# Patient Record
Sex: MALE | Race: WHITE | NOT HISPANIC OR LATINO | Employment: OTHER | URBAN - METROPOLITAN AREA
[De-identification: names, ages, dates, MRNs, and addresses within clinical notes are randomized per-mention and may not be internally consistent; named-entity substitution may affect disease eponyms.]

---

## 2018-05-25 ENCOUNTER — TRANSFERRED RECORDS (OUTPATIENT)
Dept: HEALTH INFORMATION MANAGEMENT | Facility: CLINIC | Age: 68
End: 2018-05-25

## 2018-10-15 ENCOUNTER — OFFICE VISIT (OUTPATIENT)
Dept: FAMILY MEDICINE | Facility: CLINIC | Age: 68
End: 2018-10-15
Payer: COMMERCIAL

## 2018-10-15 VITALS
OXYGEN SATURATION: 100 % | HEART RATE: 71 BPM | HEIGHT: 74 IN | BODY MASS INDEX: 27.73 KG/M2 | DIASTOLIC BLOOD PRESSURE: 79 MMHG | SYSTOLIC BLOOD PRESSURE: 123 MMHG

## 2018-10-15 DIAGNOSIS — L81.4 SOLAR LENTIGO: ICD-10-CM

## 2018-10-15 DIAGNOSIS — Z80.8 FAMILY HISTORY OF NONMELANOMA SKIN CANCER: ICD-10-CM

## 2018-10-15 DIAGNOSIS — B35.3 TINEA PEDIS OF BOTH FEET: ICD-10-CM

## 2018-10-15 DIAGNOSIS — D48.5 NEOPLASM OF UNCERTAIN BEHAVIOR OF SKIN: Primary | ICD-10-CM

## 2018-10-15 DIAGNOSIS — D22.9 MULTIPLE BENIGN MELANOCYTIC NEVI: ICD-10-CM

## 2018-10-15 DIAGNOSIS — Z23 NEED FOR PROPHYLACTIC VACCINATION AND INOCULATION AGAINST INFLUENZA: ICD-10-CM

## 2018-10-15 DIAGNOSIS — B35.3 ATHLETE'S FOOT: Primary | ICD-10-CM

## 2018-10-15 DIAGNOSIS — L57.0 AK (ACTINIC KERATOSIS): ICD-10-CM

## 2018-10-15 DIAGNOSIS — B07.0 PLANTAR WARTS: ICD-10-CM

## 2018-10-15 DIAGNOSIS — Z85.828 HISTORY OF BASAL CELL CARCINOMA: ICD-10-CM

## 2018-10-15 PROCEDURE — 99203 OFFICE O/P NEW LOW 30 MIN: CPT | Mod: 25 | Performed by: FAMILY MEDICINE

## 2018-10-15 PROCEDURE — 11311 SHAVE SKIN LESION 0.6-1.0 CM: CPT | Mod: 59 | Performed by: FAMILY MEDICINE

## 2018-10-15 PROCEDURE — 88305 TISSUE EXAM BY PATHOLOGIST: CPT | Mod: TC | Performed by: FAMILY MEDICINE

## 2018-10-15 PROCEDURE — 17003 DESTRUCT PREMALG LES 2-14: CPT | Performed by: FAMILY MEDICINE

## 2018-10-15 PROCEDURE — 90662 IIV NO PRSV INCREASED AG IM: CPT | Performed by: FAMILY MEDICINE

## 2018-10-15 PROCEDURE — 17000 DESTRUCT PREMALG LESION: CPT | Mod: 51 | Performed by: FAMILY MEDICINE

## 2018-10-15 PROCEDURE — G0008 ADMIN INFLUENZA VIRUS VAC: HCPCS | Performed by: FAMILY MEDICINE

## 2018-10-15 RX ORDER — CLOTRIMAZOLE AND BETAMETHASONE DIPROPIONATE 10; .64 MG/G; MG/G
CREAM TOPICAL 2 TIMES DAILY
Qty: 45 G | Refills: 0 | Status: SHIPPED | OUTPATIENT
Start: 2018-10-15 | End: 2018-12-14

## 2018-10-15 RX ORDER — TRIAMTERENE/HYDROCHLOROTHIAZID 37.5-25 MG
1 TABLET ORAL DAILY
COMMUNITY
End: 2019-01-30

## 2018-10-15 NOTE — PROGRESS NOTES

## 2018-10-15 NOTE — PROGRESS NOTES
[facility] - PRIMARY CARE SKIN    CC : skin cancer screening (full-body)  SUBJECTIVE:                                                    Pranay Ratliff is a 68 year old male who presents to clinic today for a full-body skin exam because of changing lesions and his history of skin cancer.    Bothersome lesions noticed by the patient or other skin concerns :  Issue One : Lesion on central forehead comes and goes. He has noted some purulent and bloody drainage.  Onset : several months.  Enlarging : NO.  Bleeding : YES  Itchy or irritating : NO.  Pain or tenderness : NO.  Changing color : NO.  Issue Two : A lesion on the sole of the right foot may be a callus. It is sometimes painful to walk. He has had multiple cryotherapy treatments. He has used Dr. Arrieta's and an OTC wart treatment solution bought from [employer].  Onset : began in 2016.  Enlarging : NO.  Bleeding : NO  Itchy or irritating : NO.  Pain or tenderness : YES.  Changing color : NO.    Issue Three: history of athletes foot and would like a refill on his antifungal prescription  Current medications include : Dyazide    Personal history of skin cancer : YES - history of basal cell carcinoma on the corner of the right lower eyelid (s/p MMS approximately [id number]).  Family history of skin cancer : YES - keratinocyte carcinoma(s) in mother.    Personal Medical History  Eczema Psoriasis Autoimmune   NO NO NO     Family Medical History  Eczema Psoriasis Autoimmune   NO YES - in brother and in paternal uncle Rheumatoid arthritis in mother, maternal aunt, and maternal cousin     Sun Exposure History  Previous history of significant sun exposure: He lives in [place], [place] and [place] and has had annual skin exams.  Blistering sunburns : NO  Tanning beds : NO.  Sunscreen Use : YES, SPF : uses Blue Lizard 50+.  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    Refer to electronic medical record (EMR) for past medical history and medications.    INTEGUMENTARY/SKIN: POSITIVE for changing lesions  ROS : 14 point review of systems was negative except the symptoms listed above in the HPI.    This document serves as a record of the services and decisions personally performed and made by Shandra Paige MD. It was created on her behalf by Anupam Payne, a trained medical scribe.  The creation of this document is based on the scribe's personal observations and the provider's statements to the medical scribe.  Anupam Payne, October 15, 2018 8:25 AM      OBJECTIVE:                                                    GENERAL: healthy, alert and no distress  SKIN: [place] Skin Type - II.  This patient was examined from the top of the head to the bottom of the feet  including scalp, face, neck, back, chest, breasts, buttocks, both arms, both legs, both hands, both feet, all 10 fingers and all 10 toes. The dermatoscope was used to help evaluate pigmented lesions.  Skin Pertinent Findings:  Right arm(s) and left arm(s) : brown, macule(s) most consistent with benign solar lentigo. Scattered infrequent brown macules of various sizes and shapes most consistent with (benign) melanocytic nevi.    Back : Multiple brown, macule(s) most consistent with benign solar lentigo. stuck-on appearing papules, raised, brown, coarse-textured, round lesion(s) most consistent with seborrheic keratoses.    Significant Findings:  Right foot, plantar surface : 3 mm in size verrucous lesion(s) within a calus    Mid-forehead : 7 x 3 mm in size slightly raised, indurated, erythematous, scaly lesion. ? Actinic keratosis ? Basal cell carcinoma ? Squamous cell carcinoma ? Other      Face: 3 mm in size, erythematous, scaly, non-indurated lesion(s) most consistent with actinic keratoses.  Name : Liquid Nitrogen Cry-Ac  Cryotherapy.  Indication : Pre-malignant lesion.  Location(s) : right upper lateral forehead - x3.  Completed by : Shandra Paige MD  Note : Discussed natural history of lesion and treatment options. Prior to treatment, we discussed inflammation, tenderness post-procedure, the healing process, and the risks of pain, infection, scarring, blistering, and hypo-/hyperpigmentation after healing. Explained that these lesions may grow back and may need additional treatment or re-treatment. The patient expressed a desire to proceed with cryotherapy.    The lesion(s) was treated with liquid nitrogen Cry-Ac, five second freeze repeated twice with a pause to allow for the area to thaw. If this lesion should recur, then it needs to be re-evaluated.    Patient tolerated the procedure well and left in good condition.  Total number of lesions treated : 3.    Diagnostic Test Results:  none           ASSESSMENT:                                                      Encounter Diagnoses   Name Primary?     Neoplasm of uncertain behavior of skin Yes     AK (actinic keratosis)      History of basal cell carcinoma      Family history of nonmelanoma skin cancer      Solar lentigo      Multiple benign melanocytic nevi      Plantar warts          PLAN:                                                    Patient Instructions   FUTURE APPOINTMENTS  Follow up in 1 year(s) for a full-body skin cancer screening.  Follow up per pathology report.    WOUND CARE INSTRUCTIONS  1. Wash hands before every dressing change.  2. After 24 hours, change dressing daily.  3. Wash the wound area with a mild soap, then rinse.  4. Gently pat dry with a sterile gauze or Q-tip.  5. Using a Q-tip, apply Vaseline or Aquaphor only over entire wound. Do NOT use Neosporin - as many people react to neomycin.  6. Finally, cover with a bandage or sterile non-stick gauze with micropore paper tape.  7. Repeat once daily until wound has healed.      Soap, water and shampoo will  "not hurt this area.    Do not go swimming or take baths, but showering is encouraged.    Limit use of the area where the procedure was done for a few days to allow for optimal healing.    If you experience bleeding:  Wash hands and hold firm pressure on the area for 10 minutes without checking to see if the bleeding has stopped. \"Checking\" pulls off the protective wound clot and restarts the bleeding all over again. Re-apply pressure for 10 minutes if necessary to stop bleeding.  Use additional sterile gauze and tape to maintain pressure once bleeding has stopped.  If bleeding continues, then call back to clinic at [phone].    Signs of Infection:  Infection can occur in any area where skin has been disrupted.  If you notice persistent redness, swelling, colored drainage, increasing pain, fever or other signs of infection, please call us at: [phone] and ask to have me or my colleague paged. We will call you back to discuss.    Pathology Results:  You will be notified, generally via letter or MyChart, in approximately 10 days. If there is anything we need to discuss or further treatment needed, I will call you to discuss it.    PATIENT INFORMATION : WOUNDS  During the healing process you will notice a number of changes. All wounds develop a small halo of redness surrounding the wound.  This means healing is occurring. Severe itching with extensive redness usually indicates sensitivity to the ointment or bandage tape used to dress the wound.  You should call our office if this develops.      Swelling  and/or discoloration around your surgical site is common, particularly when performed around the eye.    All wounds normally drain.  The larger the wound the more drainage there will be.  After 7-10 days, you will notice the wound beginning to shrink and new skin will begin to grow.  The wound is healed when you can see skin has formed over the entire area.  A healed wound has a healthy, shiny look to the " "surface and is red to dark pink in color to normalize.  Wounds may take approximately 4-6 weeks to heal.  Larger wounds may take 6-8 weeks. After the wound is healed you may discontinue dressing changes.    You may experience a sensation of tightness as your wound heals. This is normal and will gradually subside.    Your healed wound may be sensitive to temperature changes. This sensitivity improves with time, but if you re having a lot of discomfort, try to avoid temperature extremes.    Patients frequently experience itching after their wound appears to have healed because of the continue healing under the skin.  Plain Vaseline will help relieve the itching.    SUN PROTECTION INSTRUCTIONS  Sun damage can lead to skin cancer and premature aging of the skin.      The best way to protect from sun damage to your skin is to avoid the sun during peak hours (10 am - 2 pm) even on overcast days.      Use UPF sun-protective clothing, which while more expensive initially provides longer lasting coverage without having to worry about remembering to re-apply.  1. Wear a wide-brimmed hat and sunglasses.   2. Wear sun-protective clothing.  [employer] and other [employer] make sun protective clothing that are stylish, comfortable and cool. [employer] and other [employer] make UV arm sleeves suitable for golfing, gardening and other activities.      Sunscreen instructions:  1. Use sunscreens with Zinc Oxide, Titanium Dioxide or Avobenzone to protect from UVA rays.  2. Use SPF 30-50+ to protect from UVB rays.  3. Re-apply every 2 hours even if water resistant.  4. Apply on your face every day even when cloudy and even in the winter. UVA \"aging rays\" penetrate window glass and are just as strong in the winter as in the summer.    Product Recommendations:    Good examples include: Blue Lizard, EltaMD, Solbar    Good daily moisturizers with SPF: Vanicream, CeraVe.    For sensitive skin, consider : SkinMedica Essential " "Defense Mineral Shield Broad Spectrum SPF 35    Men: consider use of Neutrogena Triple Protect Facial Lotion      Never use tanning beds. Tanning beds are associated with much higher risks of skin cancer.    All tanning damages the skin. Aim for ivory skin year round and you will have less trouble with your skin in years to come. There is no merit in getting \"a base tan\" before a warm weather vacation, as any tanning indicates your body's response to sun damage.    Stop smoking. Smokers have higher rates of skin cancer and also have premature skin wrinkling.    FYI  You should use about 3 tablespoons of sunscreen to protect your whole body. Thus a typical eight ounce bottle of sunscreen should last 4 applications. Remember, that the SPF rating is compromised if you don t apply enough. Most people only apply 1/2 - 1/3 of the amount they need. Also don t forget areas such as your ears, feet, upper back and harder to reach places. Keep in mind that these amounts should be increased for larger body sizes.    Sunscreens with titanium dioxide and/or zinc oxide in the active ingredients are physical blockers as opposed to chemical blockers. Chemical-free sunscreens should not irritate the skin.    Spray-on sunscreens may be used for touch-up application only, not as a base layer. Also, use with caution around small children due to inhalation risk.    Avoid retinyl palmitate products.    Avoid combination products that include both sunscreen and insect repellant, as sunscreen should be applied every 2 hours, but insect repellant should not be applied as frequently.    SPF means sun protection factor, which is just the degree to which the sunscreen can protect against UVB rays. There is no rating system for UVA rays. SPF is calculated as the time skin will burn when sunscreen is applied vs. skin without sunscreen.    Water resistant sunscreens should be re-applied every 1-2 hours.    For more " information:  http://www.skincancer.org/prevention/sun-protection/sunscreen/sunscreens-safe-and-effective    SKIN CANCER SELF-EXAM INSTRUCTIONS  Check every month in the mirror or with a household member. Be aware of any changes, especially bleeding or tenderness. Also, make sure to check your nails for color changes after removal of nail polish.    For melanoma, check for:  A - Asymmetry. One half unlike the other half.  B - Border. Irregular, scalloped, ragged, notched, blurred or poorly defined borders.  C - Color. Color variations from one area to another, with shades of tan, brown and/or black present. Sometimes white, red or blue.  D - Diameter. Greater than 6 mm (about the size of a pencil eraser). Any new growth of a mole should be concerning and be evaluated.  E - Evolving. A mole or skin lesion that looks different from the rest or is changing in size, shape or color.    For basal cell carcinoma and squamous cell carcinoma, check for:    Sores, shiny bumps, nodules, scaly lesions, or wart-like growths that are itchy, tender, crusting, scabbing, eroding, oozing or bleeding.    Open sores/wounds or reddish/irritated areas that do not heal within 2-3 weeks.    Scar-like areas that are white, yellow or waxy in color.    FUTURE APPOINTMENTS  Follow up in 6 week(s) as needed.    How to use WartPeel:  1. Apply medication at bedtime.  2. Apply a very small amount of medication to the enclosed pick. Use the pick to apply a thin layer directly onto the wart. Use care to avoid applying the medicine to healthy skin. The medicine will break down healthy skin as well as the wart.  3. Occlude the wart with a piece of the enclosed tape.  4. Put the cap back tightly on the syringe.  5. Wash hands after applying the medicine. NEVER put the WartPEEL  in the mouth, nose, or eyes.  6. Remove the occlusion in the morning and wash the area thoroughly.  7. In case of accidental ingestion or contact with eye, nose, or mouth,  contact your physician or the local poison control center.        To order, please contact:  [facility]  [address] [place] [address]  [address]  [place] [address]  ph: [phone] toll-free  fax: [phone]  [email or web address]    The patient was counseled about sunscreens and sun avoidance. The patient was counseled to check the skin regularly and report any lesion that is new, changing, itching, scabbing, bleeding or otherwise bothersome. The patient was discharged ambulatory and in stable condition.    Educational brochures given to patient : skin cancer.      PROCEDURES:                                                    Name : Shave Excision  Indication : Excision of tissue for pathology evaluation.  Location(s) : Mid-forehead : 7 x 3 mm in size slightly raised, indurated, erythematous, scaly lesion. ? Actinic keratosis ? Basal cell carcinoma ? Squamous cell carcinoma ? Other.  Completed by : Shandra Paige MD  Photo Taken : yes.  Anesthesia : Patient was anesthetized by infiltrating the area surrounding the lesion with 1% lidocaine.   epinephrine [id number] : Yes.  Note : Discussed the risk of pain, infection, scarring, hypo- or hyperpigmentation and recurrence or need for re-treatment. The benefits of treatment and alternative treatments were also discussed.    During this procedure, the universal protocol was utilized. The patient's identity was confirmed by no less than two patient identifiers, correct procedure was verified, correct site was verified and marked as applicable and a final pause was completed.    Sterile technique was used throughout the procedure. The skin was cleaned and prepped with surgical cleanser. Once adequate anesthesia was obtained, the lesion was removed with a deep scallop shave procedure. The specimen was sent to pathology.    Direct pressure and aluminum chloride and monopolar cautery was applied for hemostasis. No bleeding was present upon the completion of the procedure.  The wound was coated with antibacterial ointment. A dry sterile dressing was applied. Patient tolerated the procedure well and left in satisfactory condition.    Primary provider and referring provider will be informed regarding the tissue report when it returns.      The information in this document, created by the medical scribe for me, accurately reflects the services I personally performed and the decisions made by me. I have reviewed and approved this document for accuracy prior to leaving the patient care area.  Shandra Paige MD October 15, 2018 8:25 AM  [facility]

## 2018-10-15 NOTE — TELEPHONE ENCOUNTER
Med pended- routing to provider to order.    Deja Suazo,RN BSN  [facility]  [phone]

## 2018-10-15 NOTE — PATIENT INSTRUCTIONS
"FUTURE APPOINTMENTS  Follow up in 1 year(s) for a full-body skin cancer screening.  Follow up per pathology report.    WOUND CARE INSTRUCTIONS  1. Wash hands before every dressing change.  2. After 24 hours, change dressing daily.  3. Wash the wound area with a mild soap, then rinse.  4. Gently pat dry with a sterile gauze or Q-tip.  5. Using a Q-tip, apply Vaseline or Aquaphor only over entire wound. Do NOT use Neosporin - as many people react to neomycin.  6. Finally, cover with a bandage or sterile non-stick gauze with micropore paper tape.  7. Repeat once daily until wound has healed.      Soap, water and shampoo will not hurt this area.    Do not go swimming or take baths, but showering is encouraged.    Limit use of the area where the procedure was done for a few days to allow for optimal healing.    If you experience bleeding:  Wash hands and hold firm pressure on the area for 10 minutes without checking to see if the bleeding has stopped. \"Checking\" pulls off the protective wound clot and restarts the bleeding all over again. Re-apply pressure for 10 minutes if necessary to stop bleeding.  Use additional sterile gauze and tape to maintain pressure once bleeding has stopped.  If bleeding continues, then call back to clinic at [phone].    Signs of Infection:  Infection can occur in any area where skin has been disrupted.  If you notice persistent redness, swelling, colored drainage, increasing pain, fever or other signs of infection, please call us at: [phone] and ask to have me or my colleague paged. We will call you back to discuss.    Pathology Results:  You will be notified, generally via letter or MyChart, in approximately 10 days. If there is anything we need to discuss or further treatment needed, I will call you to discuss it.    PATIENT INFORMATION : WOUNDS  During the healing process you will notice a number of changes. All wounds develop a small halo of redness surrounding the wound.  " This means healing is occurring. Severe itching with extensive redness usually indicates sensitivity to the ointment or bandage tape used to dress the wound.  You should call our office if this develops.      Swelling  and/or discoloration around your surgical site is common, particularly when performed around the eye.    All wounds normally drain.  The larger the wound the more drainage there will be.  After 7-10 days, you will notice the wound beginning to shrink and new skin will begin to grow.  The wound is healed when you can see skin has formed over the entire area.  A healed wound has a healthy, shiny look to the surface and is red to dark pink in color to normalize.  Wounds may take approximately 4-6 weeks to heal.  Larger wounds may take 6-8 weeks. After the wound is healed you may discontinue dressing changes.    You may experience a sensation of tightness as your wound heals. This is normal and will gradually subside.    Your healed wound may be sensitive to temperature changes. This sensitivity improves with time, but if you re having a lot of discomfort, try to avoid temperature extremes.    Patients frequently experience itching after their wound appears to have healed because of the continue healing under the skin.  Plain Vaseline will help relieve the itching.    SUN PROTECTION INSTRUCTIONS  Sun damage can lead to skin cancer and premature aging of the skin.      The best way to protect from sun damage to your skin is to avoid the sun during peak hours (10 am - 2 pm) even on overcast days.      Use UPF sun-protective clothing, which while more expensive initially provides longer lasting coverage without having to worry about remembering to re-apply.  1. Wear a wide-brimmed hat and sunglasses.   2. Wear sun-protective clothing.  [employer] and other [employer] make sun protective clothing that are stylish, comfortable and cool. [employer] and other [employer] make UV arm sleeves suitable for  "golfing, gardening and other activities.      Sunscreen instructions:  1. Use sunscreens with Zinc Oxide, Titanium Dioxide or Avobenzone to protect from UVA rays.  2. Use SPF 30-50+ to protect from UVB rays.  3. Re-apply every 2 hours even if water resistant.  4. Apply on your face every day even when cloudy and even in the winter. UVA \"aging rays\" penetrate window glass and are just as strong in the winter as in the summer.    Product Recommendations:    Good examples include: Blue Lizard, EltaMD, Solbar    Good daily moisturizers with SPF: Vanicream, CeraVe.    For sensitive skin, consider : SkinMedica Essential Defense Mineral Shield Broad Spectrum SPF 35    Men: consider use of Neutrogena Triple Protect Facial Lotion      Never use tanning beds. Tanning beds are associated with much higher risks of skin cancer.    All tanning damages the skin. Aim for ivory skin year round and you will have less trouble with your skin in years to come. There is no merit in getting \"a base tan\" before a warm weather vacation, as any tanning indicates your body's response to sun damage.    Stop smoking. Smokers have higher rates of skin cancer and also have premature skin wrinkling.    FYI  You should use about 3 tablespoons of sunscreen to protect your whole body. Thus a typical eight ounce bottle of sunscreen should last 4 applications. Remember, that the SPF rating is compromised if you don t apply enough. Most people only apply 1/2 - 1/3 of the amount they need. Also don t forget areas such as your ears, feet, upper back and harder to reach places. Keep in mind that these amounts should be increased for larger body sizes.    Sunscreens with titanium dioxide and/or zinc oxide in the active ingredients are physical blockers as opposed to chemical blockers. Chemical-free sunscreens should not irritate the skin.    Spray-on sunscreens may be used for touch-up application only, not as a base layer. Also, use with caution around " small children due to inhalation risk.    Avoid retinyl palmitate products.    Avoid combination products that include both sunscreen and insect repellant, as sunscreen should be applied every 2 hours, but insect repellant should not be applied as frequently.    SPF means sun protection factor, which is just the degree to which the sunscreen can protect against UVB rays. There is no rating system for UVA rays. SPF is calculated as the time skin will burn when sunscreen is applied vs. skin without sunscreen.    Water resistant sunscreens should be re-applied every 1-2 hours.    For more information:  http://www.skincancer.org/prevention/sun-protection/sunscreen/sunscreens-safe-and-effective    SKIN CANCER SELF-EXAM INSTRUCTIONS  Check every month in the mirror or with a household member. Be aware of any changes, especially bleeding or tenderness. Also, make sure to check your nails for color changes after removal of nail polish.    For melanoma, check for:  A - Asymmetry. One half unlike the other half.  B - Border. Irregular, scalloped, ragged, notched, blurred or poorly defined borders.  C - Color. Color variations from one area to another, with shades of tan, brown and/or black present. Sometimes white, red or blue.  D - Diameter. Greater than 6 mm (about the size of a pencil eraser). Any new growth of a mole should be concerning and be evaluated.  E - Evolving. A mole or skin lesion that looks different from the rest or is changing in size, shape or color.    For basal cell carcinoma and squamous cell carcinoma, check for:    Sores, shiny bumps, nodules, scaly lesions, or wart-like growths that are itchy, tender, crusting, scabbing, eroding, oozing or bleeding.    Open sores/wounds or reddish/irritated areas that do not heal within 2-3 weeks.    Scar-like areas that are white, yellow or waxy in color.    FUTURE APPOINTMENTS  Follow up in 6 week(s) as needed.    How to use WartPeel:  1. Apply medication at  bedtime.  2. Apply a very small amount of medication to the enclosed pick. Use the pick to apply a thin layer directly onto the wart. Use care to avoid applying the medicine to healthy skin. The medicine will break down healthy skin as well as the wart.  3. Occlude the wart with a piece of the enclosed tape.  4. Put the cap back tightly on the syringe.  5. Wash hands after applying the medicine. NEVER put the WartPEEL  in the mouth, nose, or eyes.  6. Remove the occlusion in the morning and wash the area thoroughly.  7. In case of accidental ingestion or contact with eye, nose, or mouth, contact your physician or the local poison control center.        To order, please contact:  [facility]  [facility] [address] [place] [address]  ph: [phone] toll-free  fax: [phone]  [email or web address]

## 2018-10-15 NOTE — TELEPHONE ENCOUNTER
Pt called back and the medication for his athletes foot is clotrimazole and betamethasone dipropionate cream 1%/0.05%. Please send new rx to [facility] ([facility]) Any questions pt can be reached at [phone] ok to leave message. Thank you.  Yaneth Vidal,

## 2018-10-15 NOTE — MR AVS SNAPSHOT
"              After Visit Summary   10/15/2018    Pranay Ratliff    MRN: [id number]           Patient Information     Date Of Birth          1950        Visit Information        Provider Department      10/15/2018 9:00 AM Patrizia Paige MD [facility]        Today's Diagnoses     Neoplasm of uncertain behavior of skin    -  1    AK (actinic keratosis)        History of basal cell carcinoma        Family history of nonmelanoma skin cancer        Solar lentigo        Multiple benign melanocytic nevi        Plantar warts          Care Instructions    FUTURE APPOINTMENTS  Follow up in 1 year(s) for a full-body skin cancer screening.  Follow up per pathology report.    WOUND CARE INSTRUCTIONS  1. Wash hands before every dressing change.  2. After 24 hours, change dressing daily.  3. Wash the wound area with a mild soap, then rinse.  4. Gently pat dry with a sterile gauze or Q-tip.  5. Using a Q-tip, apply Vaseline or Aquaphor only over entire wound. Do NOT use Neosporin - as many people react to neomycin.  6. Finally, cover with a bandage or sterile non-stick gauze with micropore paper tape.  7. Repeat once daily until wound has healed.      Soap, water and shampoo will not hurt this area.    Do not go swimming or take baths, but showering is encouraged.    Limit use of the area where the procedure was done for a few days to allow for optimal healing.    If you experience bleeding:  Wash hands and hold firm pressure on the area for 10 minutes without checking to see if the bleeding has stopped. \"Checking\" pulls off the protective wound clot and restarts the bleeding all over again. Re-apply pressure for 10 minutes if necessary to stop bleeding.  Use additional sterile gauze and tape to maintain pressure once bleeding has stopped.  If bleeding continues, then call back to clinic at [phone].    Signs of Infection:  Infection can occur in any area where skin has been disrupted.  If " you notice persistent redness, swelling, colored drainage, increasing pain, fever or other signs of infection, please call us at: [phone] and ask to have me or my colleague paged. We will call you back to discuss.    Pathology Results:  You will be notified, generally via letter or MyChart, in approximately 10 days. If there is anything we need to discuss or further treatment needed, I will call you to discuss it.    PATIENT INFORMATION : WOUNDS  During the healing process you will notice a number of changes. All wounds develop a small halo of redness surrounding the wound.  This means healing is occurring. Severe itching with extensive redness usually indicates sensitivity to the ointment or bandage tape used to dress the wound.  You should call our office if this develops.      Swelling  and/or discoloration around your surgical site is common, particularly when performed around the eye.    All wounds normally drain.  The larger the wound the more drainage there will be.  After 7-10 days, you will notice the wound beginning to shrink and new skin will begin to grow.  The wound is healed when you can see skin has formed over the entire area.  A healed wound has a healthy, shiny look to the surface and is red to dark pink in color to normalize.  Wounds may take approximately 4-6 weeks to heal.  Larger wounds may take 6-8 weeks. After the wound is healed you may discontinue dressing changes.    You may experience a sensation of tightness as your wound heals. This is normal and will gradually subside.    Your healed wound may be sensitive to temperature changes. This sensitivity improves with time, but if you re having a lot of discomfort, try to avoid temperature extremes.    Patients frequently experience itching after their wound appears to have healed because of the continue healing under the skin.  Plain Vaseline will help relieve the itching.    SUN PROTECTION INSTRUCTIONS  Sun damage can lead to skin  "cancer and premature aging of the skin.      The best way to protect from sun damage to your skin is to avoid the sun during peak hours (10 am - 2 pm) even on overcast days.      Use UPF sun-protective clothing, which while more expensive initially provides longer lasting coverage without having to worry about remembering to re-apply.  1. Wear a wide-brimmed hat and sunglasses.   2. Wear sun-protective clothing.  [employer] and other [employer] make sun protective clothing that are stylish, comfortable and cool. [employer] and other [employer] make UV arm sleeves suitable for golfing, gardening and other activities.      Sunscreen instructions:  1. Use sunscreens with Zinc Oxide, Titanium Dioxide or Avobenzone to protect from UVA rays.  2. Use SPF 30-50+ to protect from UVB rays.  3. Re-apply every 2 hours even if water resistant.  4. Apply on your face every day even when cloudy and even in the winter. UVA \"aging rays\" penetrate window glass and are just as strong in the winter as in the summer.    Product Recommendations:    Good examples include: Blue Lizard, EltaMD, Solbar    Good daily moisturizers with SPF: Vanicream, CeraVe.    For sensitive skin, consider : SkinMedica Essential Defense Mineral Shield Broad Spectrum SPF 35    Men: consider use of Neutrogena Triple Protect Facial Lotion      Never use tanning beds. Tanning beds are associated with much higher risks of skin cancer.    All tanning damages the skin. Aim for ivory skin year round and you will have less trouble with your skin in years to come. There is no merit in getting \"a base tan\" before a warm weather vacation, as any tanning indicates your body's response to sun damage.    Stop smoking. Smokers have higher rates of skin cancer and also have premature skin wrinkling.    FYI  You should use about 3 tablespoons of sunscreen to protect your whole body. Thus a typical eight ounce bottle of sunscreen should last 4 applications. Remember, " that the SPF rating is compromised if you don t apply enough. Most people only apply 1/2 - 1/3 of the amount they need. Also don t forget areas such as your ears, feet, upper back and harder to reach places. Keep in mind that these amounts should be increased for larger body sizes.    Sunscreens with titanium dioxide and/or zinc oxide in the active ingredients are physical blockers as opposed to chemical blockers. Chemical-free sunscreens should not irritate the skin.    Spray-on sunscreens may be used for touch-up application only, not as a base layer. Also, use with caution around small children due to inhalation risk.    Avoid retinyl palmitate products.    Avoid combination products that include both sunscreen and insect repellant, as sunscreen should be applied every 2 hours, but insect repellant should not be applied as frequently.    SPF means sun protection factor, which is just the degree to which the sunscreen can protect against UVB rays. There is no rating system for UVA rays. SPF is calculated as the time skin will burn when sunscreen is applied vs. skin without sunscreen.    Water resistant sunscreens should be re-applied every 1-2 hours.    For more information:  http://www.skincancer.org/prevention/sun-protection/sunscreen/sunscreens-safe-and-effective    SKIN CANCER SELF-EXAM INSTRUCTIONS  Check every month in the mirror or with a household member. Be aware of any changes, especially bleeding or tenderness. Also, make sure to check your nails for color changes after removal of nail polish.    For melanoma, check for:  A - Asymmetry. One half unlike the other half.  B - Border. Irregular, scalloped, ragged, notched, blurred or poorly defined borders.  C - Color. Color variations from one area to another, with shades of tan, brown and/or black present. Sometimes white, red or blue.  D - Diameter. Greater than 6 mm (about the size of a pencil eraser). Any new growth of a mole should be concerning and  be evaluated.  E - Evolving. A mole or skin lesion that looks different from the rest or is changing in size, shape or color.    For basal cell carcinoma and squamous cell carcinoma, check for:    Sores, shiny bumps, nodules, scaly lesions, or wart-like growths that are itchy, tender, crusting, scabbing, eroding, oozing or bleeding.    Open sores/wounds or reddish/irritated areas that do not heal within 2-3 weeks.    Scar-like areas that are white, yellow or waxy in color.    FUTURE APPOINTMENTS  Follow up in 6 week(s) as needed.    How to use WartPeel:  1. Apply medication at bedtime.  2. Apply a very small amount of medication to the enclosed pick. Use the pick to apply a thin layer directly onto the wart. Use care to avoid applying the medicine to healthy skin. The medicine will break down healthy skin as well as the wart.  3. Occlude the wart with a piece of the enclosed tape.  4. Put the cap back tightly on the syringe.  5. Wash hands after applying the medicine. NEVER put the WartPEEL  in the mouth, nose, or eyes.  6. Remove the occlusion in the morning and wash the area thoroughly.  7. In case of accidental ingestion or contact with eye, nose, or mouth, contact your physician or the local poison control center.        To order, please contact:  [facility]  [address] [place] [address]  ph: [phone] toll-free  fax: [phone]  [email or web address]          Follow-ups after your visit        Who to contact     If you have questions or need follow up information about today's clinic visit or your schedule please contact [facility] directly at [phone].  Normal or non-critical lab and imaging results will be communicated to you by MyChart, letter or phone within 4 business days after the clinic has received the results. If you do not hear from us within 7 days, please contact the clinic through MyChart or phone. If you have a critical or abnormal lab  "result, we will notify you by phone as soon as possible.  Submit refill requests through [employer] or call your pharmacy and they will forward the refill request to us. Please allow 3 business days for your refill to be completed.          Additional Information About Your Visit        Care EveryWhere ID     This is your Care EveryWhere ID. This could be used by other organizations to access your [facility] medical records  [id number]        Your Vitals Were     Pulse Height Pulse Oximetry BMI (Body Mass Index)          71 6' 1.83\" (1.875 m) 100% 27.73 kg/m2         Blood Pressure from Last 3 Encounters:   10/15/18 123/79   07/27/14 144/85    Weight from Last 3 Encounters:   07/27/14 215 lb (97.5 kg)              Today, you had the following     No orders found for display       Primary Care Provider    Provider Not In System                Equal Access to Services     [facility]: Hadii yi arizmendio Sopanchito, waaxda luqadaha, qaybta kaalmada yandy, mathew martines . So [facility] [phone].    ATENCIÓN: Si habla español, tiene a liz disposición servicios gratuitos de asistencia lingüística. Rosi al [phone].    We comply with applicable federal civil rights laws and [place] laws. We do not discriminate on the basis of race, color, national origin, age, disability, sex, sexual orientation, or gender identity.            Thank you!     Thank you for choosing [facility]  for your care. Our goal is always to provide you with excellent care. Hearing back from our patients is one way we can continue to improve our services. Please take a few minutes to complete the written survey that you may receive in the mail after your visit with us. Thank you!             Your Updated Medication List - Protect others around you: Learn how to safely use, store and throw away your medicines at www.disposemymeds.org.          This list is accurate as of 10/15/18  9:09 AM.  Always use " your most recent med list.                   Brand Name Dispense Instructions for use Diagnosis    aspirin 81 MG tablet      Take by mouth daily        Multi-vitamin Tabs tablet      Take 1 tablet by mouth daily        OMEGA-3 FISH OIL PO           SIMVASTATIN PO

## 2018-10-15 NOTE — TELEPHONE ENCOUNTER
patient requested a refill on his medication for athletes foot- he did not discuss with provider and does nto know the name of the medication.  Per Dr. Paige- she did see his athletes foot between his toes on exam- she will fill medication-  Called patient and left a voice mail that she would do this- he just needs to call back with the information regarding medication (name-dosage)    Deja Suazo RN BSN  [facility]  [phone]

## 2018-10-15 NOTE — LETTER
10/15/2018         RE: Pranay Ratliff  [address] [facility] [address]        Dear Colleague,    Thank you for referring your patient, Pranay Ratliff, to the [facility]. Please see a copy of my visit note below.    [facility] - PRIMARY CARE SKIN    CC : skin cancer screening (full-body)  SUBJECTIVE:                                                    Pranay Ratliff is a 68 year old male who presents to clinic today for a full-body skin exam because of changing lesions and his history of skin cancer.    Bothersome lesions noticed by the patient or other skin concerns :  Issue One : Lesion on central forehead comes and goes. He has noted some purulent and bloody drainage.  Onset : several months.  Enlarging : NO.  Bleeding : YES  Itchy or irritating : NO.  Pain or tenderness : NO.  Changing color : NO.  Issue Two : A lesion on the sole of the right foot may be a callus. It is sometimes painful to walk. He has had multiple cryotherapy treatments. He has used Dr. Arrieta's and an OTC wart treatment solution bought from [facility].  Onset : began in 2016.  Enlarging : NO.  Bleeding : NO  Itchy or irritating : NO.  Pain or tenderness : YES.  Changing color : NO.    Issue Three: history of athletes foot and would like a refill on his antifungal prescription  Current medications include : Dyazide    Personal history of skin cancer : YES - history of basal cell carcinoma on the corner of the right lower eyelid (s/p MMS approximately [id number]).  Family history of skin cancer : YES - keratinocyte carcinoma(s) in mother.    Personal Medical History  Eczema Psoriasis Autoimmune   NO NO NO     Family Medical History  Eczema Psoriasis Autoimmune   NO YES - in brother and in paternal uncle Rheumatoid arthritis in mother, maternal aunt, and maternal cousin     Sun Exposure History  Previous history of significant sun exposure: He lives in [place], [place] and [place] and has had annual skin  exams.  Blistering sunburns : NO  Tanning beds : NO.  Sunscreen Use : YES, SPF : uses Blue Lizard 50+.  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    Refer to electronic medical record (EMR) for past medical history and medications.    INTEGUMENTARY/SKIN: POSITIVE for changing lesions  ROS : 14 point review of systems was negative except the symptoms listed above in the HPI.    This document serves as a record of the services and decisions personally performed and made by Shandra Paige MD. It was created on her behalf by Anupam Payne, a trained medical scribe.  The creation of this document is based on the scribe's personal observations and the provider's statements to the medical scribe.  Anupam Payne, October 15, 2018 8:25 AM      OBJECTIVE:                                                    GENERAL: healthy, alert and no distress  SKIN: [place] Skin Type - II.  This patient was examined from the top of the head to the bottom of the feet  including scalp, face, neck, back, chest, breasts, buttocks, both arms, both legs, both hands, both feet, all 10 fingers and all 10 toes. The dermatoscope was used to help evaluate pigmented lesions.  Skin Pertinent Findings:  Right arm(s) and left arm(s) : brown, macule(s) most consistent with benign solar lentigo. Scattered infrequent brown macules of various sizes and shapes most consistent with (benign) melanocytic nevi.    Back : Multiple brown, macule(s) most consistent with benign solar lentigo. stuck-on appearing papules, raised, brown, coarse-textured, round lesion(s) most consistent with seborrheic keratoses.    Significant Findings:  Right foot, plantar surface : 3 mm in size verrucous lesion(s) within a calus    Mid-forehead : 7 x 3 mm in size slightly raised, indurated, erythematous,  scaly lesion. ? Actinic keratosis ? Basal cell carcinoma ? Squamous cell carcinoma ? Other      Face: 3 mm in size, erythematous, scaly, non-indurated lesion(s) most consistent with actinic keratoses.  Name : Liquid Nitrogen Cry-Ac Cryotherapy.  Indication : Pre-malignant lesion.  Location(s) : right upper lateral forehead - x3.  Completed by : Shandra Paige MD  Note : Discussed natural history of lesion and treatment options. Prior to treatment, we discussed inflammation, tenderness post-procedure, the healing process, and the risks of pain, infection, scarring, blistering, and hypo-/hyperpigmentation after healing. Explained that these lesions may grow back and may need additional treatment or re-treatment. The patient expressed a desire to proceed with cryotherapy.    The lesion(s) was treated with liquid nitrogen Cry-Ac, five second freeze repeated twice with a pause to allow for the area to thaw. If this lesion should recur, then it needs to be re-evaluated.    Patient tolerated the procedure well and left in good condition.  Total number of lesions treated : 3.    Diagnostic Test Results:  none           ASSESSMENT:                                                      Encounter Diagnoses   Name Primary?     Neoplasm of uncertain behavior of skin Yes     AK (actinic keratosis)      History of basal cell carcinoma      Family history of nonmelanoma skin cancer      Solar lentigo      Multiple benign melanocytic nevi      Plantar warts          PLAN:                                                    Patient Instructions   FUTURE APPOINTMENTS  Follow up in 1 year(s) for a full-body skin cancer screening.  Follow up per pathology report.    WOUND CARE INSTRUCTIONS  1. Wash hands before every dressing change.  2. After 24 hours, change dressing daily.  3. Wash the wound area with a mild soap, then rinse.  4. Gently pat dry with a sterile gauze or Q-tip.  5. Using a Q-tip, apply Vaseline or Aquaphor only over entire  "wound. Do NOT use Neosporin - as many people react to neomycin.  6. Finally, cover with a bandage or sterile non-stick gauze with micropore paper tape.  7. Repeat once daily until wound has healed.      Soap, water and shampoo will not hurt this area.    Do not go swimming or take baths, but showering is encouraged.    Limit use of the area where the procedure was done for a few days to allow for optimal healing.    If you experience bleeding:  Wash hands and hold firm pressure on the area for 10 minutes without checking to see if the bleeding has stopped. \"Checking\" pulls off the protective wound clot and restarts the bleeding all over again. Re-apply pressure for 10 minutes if necessary to stop bleeding.  Use additional sterile gauze and tape to maintain pressure once bleeding has stopped.  If bleeding continues, then call back to clinic at [phone].    Signs of Infection:  Infection can occur in any area where skin has been disrupted.  If you notice persistent redness, swelling, colored drainage, increasing pain, fever or other signs of infection, please call us at: [phone] and ask to have me or my colleague paged. We will call you back to discuss.    Pathology Results:  You will be notified, generally via letter or MyChart, in approximately 10 days. If there is anything we need to discuss or further treatment needed, I will call you to discuss it.    PATIENT INFORMATION : WOUNDS  During the healing process you will notice a number of changes. All wounds develop a small halo of redness surrounding the wound.  This means healing is occurring. Severe itching with extensive redness usually indicates sensitivity to the ointment or bandage tape used to dress the wound.  You should call our office if this develops.      Swelling  and/or discoloration around your surgical site is common, particularly when performed around the eye.    All wounds normally drain.  The larger the wound the more drainage there " "will be.  After 7-10 days, you will notice the wound beginning to shrink and new skin will begin to grow.  The wound is healed when you can see skin has formed over the entire area.  A healed wound has a healthy, shiny look to the surface and is red to dark pink in color to normalize.  Wounds may take approximately 4-6 weeks to heal.  Larger wounds may take 6-8 weeks. After the wound is healed you may discontinue dressing changes.    You may experience a sensation of tightness as your wound heals. This is normal and will gradually subside.    Your healed wound may be sensitive to temperature changes. This sensitivity improves with time, but if you re having a lot of discomfort, try to avoid temperature extremes.    Patients frequently experience itching after their wound appears to have healed because of the continue healing under the skin.  Plain Vaseline will help relieve the itching.    SUN PROTECTION INSTRUCTIONS  Sun damage can lead to skin cancer and premature aging of the skin.      The best way to protect from sun damage to your skin is to avoid the sun during peak hours (10 am - 2 pm) even on overcast days.      Use UPF sun-protective clothing, which while more expensive initially provides longer lasting coverage without having to worry about remembering to re-apply.  1. Wear a wide-brimmed hat and sunglasses.   2. Wear sun-protective clothing.  Wattio and other [employer]ate make sun protective clothing that are stylish, comfortable and cool. LIA and other Equidate make UV arm sleeves suitable for golfing, gardening and other activities.      Sunscreen instructions:  1. Use sunscreens with Zinc Oxide, Titanium Dioxide or Avobenzone to protect from UVA rays.  2. Use SPF 30-50+ to protect from UVB rays.  3. Re-apply every 2 hours even if water resistant.  4. Apply on your face every day even when cloudy and even in the winter. UVA \"aging rays\" penetrate window glass and are just as strong " "in the winter as in the summer.    Product Recommendations:    Good examples include: Blue Lizard, EltaMD, Solbar    Good daily moisturizers with SPF: Vanicream, CeraVe.    For sensitive skin, consider : SkinMedica Essential Defense Mineral Shield Broad Spectrum SPF 35    Men: consider use of Neutrogena Triple Protect Facial Lotion      Never use tanning beds. Tanning beds are associated with much higher risks of skin cancer.    All tanning damages the skin. Aim for ivory skin year round and you will have less trouble with your skin in years to come. There is no merit in getting \"a base tan\" before a warm weather vacation, as any tanning indicates your body's response to sun damage.    Stop smoking. Smokers have higher rates of skin cancer and also have premature skin wrinkling.    FYI  You should use about 3 tablespoons of sunscreen to protect your whole body. Thus a typical eight ounce bottle of sunscreen should last 4 applications. Remember, that the SPF rating is compromised if you don t apply enough. Most people only apply 1/2 - 1/3 of the amount they need. Also don t forget areas such as your ears, feet, upper back and harder to reach places. Keep in mind that these amounts should be increased for larger body sizes.    Sunscreens with titanium dioxide and/or zinc oxide in the active ingredients are physical blockers as opposed to chemical blockers. Chemical-free sunscreens should not irritate the skin.    Spray-on sunscreens may be used for touch-up application only, not as a base layer. Also, use with caution around small children due to inhalation risk.    Avoid retinyl palmitate products.    Avoid combination products that include both sunscreen and insect repellant, as sunscreen should be applied every 2 hours, but insect repellant should not be applied as frequently.    SPF means sun protection factor, which is just the degree to which the sunscreen can protect against UVB rays. There is no rating system " for UVA rays. SPF is calculated as the time skin will burn when sunscreen is applied vs. skin without sunscreen.    Water resistant sunscreens should be re-applied every 1-2 hours.    For more information:  http://www.skincancer.org/prevention/sun-protection/sunscreen/sunscreens-safe-and-effective    SKIN CANCER SELF-EXAM INSTRUCTIONS  Check every month in the mirror or with a household member. Be aware of any changes, especially bleeding or tenderness. Also, make sure to check your nails for color changes after removal of nail polish.    For melanoma, check for:  A - Asymmetry. One half unlike the other half.  B - Border. Irregular, scalloped, ragged, notched, blurred or poorly defined borders.  C - Color. Color variations from one area to another, with shades of tan, brown and/or black present. Sometimes white, red or blue.  D - Diameter. Greater than 6 mm (about the size of a pencil eraser). Any new growth of a mole should be concerning and be evaluated.  E - Evolving. A mole or skin lesion that looks different from the rest or is changing in size, shape or color.    For basal cell carcinoma and squamous cell carcinoma, check for:    Sores, shiny bumps, nodules, scaly lesions, or wart-like growths that are itchy, tender, crusting, scabbing, eroding, oozing or bleeding.    Open sores/wounds or reddish/irritated areas that do not heal within 2-3 weeks.    Scar-like areas that are white, yellow or waxy in color.    FUTURE APPOINTMENTS  Follow up in 6 week(s) as needed.    How to use WartPeel:  1. Apply medication at bedtime.  2. Apply a very small amount of medication to the enclosed pick. Use the pick to apply a thin layer directly onto the wart. Use care to avoid applying the medicine to healthy skin. The medicine will break down healthy skin as well as the wart.  3. Occlude the wart with a piece of the enclosed tape.  4. Put the cap back tightly on the syringe.  5. Wash hands after applying the medicine. NEVER  put the WartPEEL  in the mouth, nose, or eyes.  6. Remove the occlusion in the morning and wash the area thoroughly.  7. In case of accidental ingestion or contact with eye, nose, or mouth, contact your physician or the local poison control center.        To order, please contact:  [facility]  [address] [place] [address] [address]  [place] [address]  ph: [phone] toll-free  fax: [phone]  [email or web address]    The patient was counseled about sunscreens and sun avoidance. The patient was counseled to check the skin regularly and report any lesion that is new, changing, itching, scabbing, bleeding or otherwise bothersome. The patient was discharged ambulatory and in stable condition.    Educational brochures given to patient : skin cancer.      PROCEDURES:                                                    Name : Shave Excision  Indication : Excision of tissue for pathology evaluation.  Location(s) : Mid-forehead : 7 x 3 mm in size slightly raised, indurated, erythematous, scaly lesion. ? Actinic keratosis ? Basal cell carcinoma ? Squamous cell carcinoma ? Other.  Completed by : Shandra Paige MD  Photo Taken : yes.  Anesthesia : Patient was anesthetized by infiltrating the area surrounding the lesion with 1% lidocaine.   epinephrine [id number] : Yes.  Note : Discussed the risk of pain, infection, scarring, hypo- or hyperpigmentation and recurrence or need for re-treatment. The benefits of treatment and alternative treatments were also discussed.    During this procedure, the universal protocol was utilized. The patient's identity was confirmed by no less than two patient identifiers, correct procedure was verified, correct site was verified and marked as applicable and a final pause was completed.    Sterile technique was used throughout the procedure. The skin was cleaned and prepped with surgical cleanser. Once adequate anesthesia was obtained, the lesion was removed with a deep scallop shave procedure.  The specimen was sent to pathology.    Direct pressure and aluminum chloride and monopolar cautery was applied for hemostasis. No bleeding was present upon the completion of the procedure. The wound was coated with antibacterial ointment. A dry sterile dressing was applied. Patient tolerated the procedure well and left in satisfactory condition.    Primary provider and referring provider will be informed regarding the tissue report when it returns.      The information in this document, created by the medical scribe for me, accurately reflects the services I personally performed and the decisions made by me. I have reviewed and approved this document for accuracy prior to leaving the patient care area.  Shandra Paige MD October 15, 2018 8:25 AM  [facility] Influenza Immunization Documentation    1.  Is the person to be vaccinated sick today?   No    2. Does the person to be vaccinated have an allergy to a component   of the vaccine?   No  Egg Allergy Algorithm Link    3. Has the person to be vaccinated ever had a serious reaction   to influenza vaccine in the past?   No    4. Has the person to be vaccinated ever had Guillain-Barré syndrome?   No    Form completed by   Audra Morgan MA              Again, thank you for allowing me to participate in the care of your patient.        Sincerely,        Patrizia Paige MD

## 2018-10-17 LAB — COPATH REPORT: NORMAL

## 2018-10-22 ENCOUNTER — TELEPHONE (OUTPATIENT)
Dept: FAMILY MEDICINE | Facility: CLINIC | Age: 68
End: 2018-10-22

## 2018-10-22 NOTE — TELEPHONE ENCOUNTER
patient notified of test results and mohs procedure explained- appointment scheduled- letter mailed.    Deja JEFFERY RN  [facility]  [phone]  [facility]   [phone]

## 2018-10-22 NOTE — LETTER
[facility]  [address]  [address]  [phone]        10/22/2018       Pranay Ratliff  [address] [facility] [address]      Dear Pranay:    You are scheduled for Mohs Surgery on: Thursday, December 20, 2018 7:45 am.    Please check in at [facility], [address].     You don't need to arrive more than 5-10 minutes prior to your appointment time.     Be sure to eat a good breakfast and bathe and wash your hair prior to surgery.     If you are taking any anti-coagulants that are prescribed by your Doctor (such as Coumadin/Warfarin, Plavix, Aspirin, Ibuprofen), please continue taking them.     However, if you are taking anti-coagulants over the counter without a Doctor's order for a medical condition, please discontinue them 10 days prior to surgery.     Please wear loose comfortable clothing as it could possibly be 4-6 hours until your surgery is completed depending upon how many layers of tissue need to be removed.      Thank you,    DYAN Gallagher MD

## 2018-10-22 NOTE — TELEPHONE ENCOUNTER
Encounter : phonecall      Skin, mid-forehead:   - Basal cell carcinoma, nodular type, extending to the lateral and deep   margins - (see description)     Recommend :  Mohs procedure, referral to Dr. Gallagher , please set up.    Thank you,   Patrizia Paige M.D.

## 2018-12-14 DIAGNOSIS — B35.3 TINEA PEDIS OF BOTH FEET: ICD-10-CM

## 2018-12-14 RX ORDER — CLOTRIMAZOLE AND BETAMETHASONE DIPROPIONATE 10; .64 MG/G; MG/G
CREAM TOPICAL
Qty: 45 G | Refills: 0 | Status: SHIPPED | OUTPATIENT
Start: 2018-12-14 | End: 2023-04-13

## 2018-12-14 NOTE — TELEPHONE ENCOUNTER
Reason for Call:  Other prescription    Detailed comments: The patient is calling saying he needs to  this clotrimazole medication today.    Phone Number Patient can be reached at: Cell number on file:    Telephone Information:   Mobile [phone]     Best Time: Anytime    Can we leave a detailed message on this number? YES    Call taken on 12/14/2018 at 12:47 PM by Linda Zelaya

## 2018-12-20 ENCOUNTER — OFFICE VISIT (OUTPATIENT)
Dept: DERMATOLOGY | Facility: CLINIC | Age: 68
End: 2018-12-20
Payer: COMMERCIAL

## 2018-12-20 VITALS — DIASTOLIC BLOOD PRESSURE: 80 MMHG | HEART RATE: 77 BPM | SYSTOLIC BLOOD PRESSURE: 131 MMHG | OXYGEN SATURATION: 96 %

## 2018-12-20 DIAGNOSIS — L81.4 LENTIGO: ICD-10-CM

## 2018-12-20 DIAGNOSIS — D18.00 ANGIOMA: ICD-10-CM

## 2018-12-20 DIAGNOSIS — Z85.828 HISTORY OF SKIN CANCER: ICD-10-CM

## 2018-12-20 DIAGNOSIS — L82.1 SEBORRHEIC KERATOSIS: ICD-10-CM

## 2018-12-20 DIAGNOSIS — C44.319 BASAL CELL CARCINOMA (BCC) OF FOREHEAD: Primary | ICD-10-CM

## 2018-12-20 PROCEDURE — 13132 CMPLX RPR F/C/C/M/N/AX/G/H/F: CPT | Mod: 51 | Performed by: DERMATOLOGY

## 2018-12-20 PROCEDURE — 99203 OFFICE O/P NEW LOW 30 MIN: CPT | Mod: 25 | Performed by: DERMATOLOGY

## 2018-12-20 PROCEDURE — 17311 MOHS 1 STAGE H/N/HF/G: CPT | Performed by: DERMATOLOGY

## 2018-12-20 NOTE — PROGRESS NOTES
Pranay Ratliff , a 68 year old year old male patient, I was asked to see by Dr. otto for basal cell carcinoma on forehead.  He has a hc of non-melanoma skin cancer.  Patient states this has been present for a while.  Patient reports the following symptoms:  Not healing .  Patient reports the following previous treatments none.  Patient reports the following modifying factors none.  Associated symptoms: none.  Patient has no other skin complaints today.  Remainder of the HPI, Meds, PMH, Allergies, FH, and SH was reviewed in chart.      Past Medical History:   Diagnosis Date     Basal cell carcinoma        History reviewed. No pertinent surgical history.     Family History   Problem Relation Age of Onset     Skin Cancer Mother        Social History     Socioeconomic History     Marital status: Single     Spouse name: Not on file     Number of children: Not on file     Years of education: Not on file     Highest education level: Not on file   Social Needs     Financial resource strain: Not on file     Food insecurity - worry: Not on file     Food insecurity - inability: Not on file     Transportation needs - medical: Not on file     Transportation needs - non-medical: Not on file   Occupational History     Not on file   Tobacco Use     Smoking status: Never Smoker     Smokeless tobacco: Never Used   Substance and Sexual Activity     Alcohol use: Yes     Comment: twice a week     Drug use: No     Sexual activity: Yes   Other Topics Concern     Parent/sibling w/ CABG, MI or angioplasty before 65F 55M? Not Asked   Social History Narrative     Not on file       Outpatient Encounter Medications as of 12/20/2018   Medication Sig Dispense Refill     aspirin 81 MG tablet Take by mouth daily       clotrimazole-betamethasone (LOTRISONE) 1-0.05 % external cream APPLY TOPICALLY TWICE DAILY TO THE FEET. 45 g 0     multivitamin, therapeutic with minerals (MULTI-VITAMIN) TABS Take 1 tablet by mouth daily       Omega-3 Fatty Acids  (OMEGA-3 FISH OIL PO)        SIMVASTATIN PO        triamterene-hydrochlorothiazide (MAXZIDE-25) 37.5-25 MG per tablet Take 1 tablet by mouth daily       No facility-administered encounter medications on file as of 12/20/2018.              Review Of Systems  Skin: As above  Eyes: negative  Ears/Nose/Throat: negative  Respiratory: No shortness of breath, dyspnea on exertion, cough, or hemoptysis  Cardiovascular: negative  Gastrointestinal: negative  Genitourinary: negative  Musculoskeletal: negative  Neurologic: negative  Psychiatric: negative  Hematologic/Lymphatic/Immunologic: negative  Endocrine: negative      O:   NAD, WDWN, Alert & Oriented, Mood & Affect wnl, Vitals stable   Here today alone   /80   Pulse 77   SpO2 96%    General appearance diego ii   Vitals stable   Alert, oriented and in no acute distress      Stuck on papules and brown macules on trunk and ext    Red papules on neck   Mid forehead 5mm pink pearly papule        The remainder of expanded problem focused exam was unremarkable; the following areas were examined:  scalp/hair, conjunctiva/lids, face, neck, lips, chest, digits/nails, RUE, LUE.      Eyes: Conjunctivae/lids:Normal     ENT: Lips, buccal mucosa, tongue: normal    MSK:Normal    Cardiovascular: peripheral edema none    Pulm: Breathing Normal    Lymph Nodes: No Head and Neck Lymphadenopathy     Neuro/Psych: Orientation:Normal; Mood/Affect:Normal      A/P:  1. Seborrheic keratosis, letnigo, angioma, hx of non-melanoma skin cancer   2. Basal cell carcinoma forehead  BENIGN LESIONS DISCUSSED WITH PATIENT:  I discussed the specifics of tumor, prognosis, and genetics of benign lesions.  I explained that treatment of these lesions would be purely cosmetic and not medically neccessary.  I discussed with patient different removal options including excision, cautery and /or laser.      Nature and genetics of benign skin lesions dicussed with patient.  Signs and Symptoms of skin cancer  discussed with patient.  ABCDEs of melanoma reviewed with patient.  Patient encouraged to perform monthly skin exams.  UV precautions reviewed with patient.  Patient to follow up with Primary Care provider regarding elevated blood pressure.    Skin care regimen reviewed with patient: Eliminate harsh soaps, i.e. Dial, zest, irsih spring; Mild soaps such as Cetaphil or Dove sensitive skin, avoid hot or cold showers, aggressive use of emollients including vanicream, cetaphil or cerave discussed with patient.    Risks of non-melanoma skin cancer discussed with patient   Return to clinic 6 months    PROCEDURE NOTE  Mid forehead basal cell carcinoma   MOHS:   Location    After PGACAC discussed with patient, decision for Mohs surgery was made. Indication for Mohs was Location. Patient confirmed the site with Dr. Gallagher.  After anesthesia with LEC, the tumor was excised using standard Mohs technique in 1 stages(s).  CLEAR MARGINS OBTAINED and Final defect size was 1 cm.       REPAIR COMPLEX: Because of the tightness of the surrounding skin and Because of the size and full thickness nature of the defect, a complex closure was planned. After LEC anesthesia and prep, Burow's triangles were excised in the relaxed skin tension lines. The wound edges were widely undermined by dissection in the subcutaneous plane until adequate tissue mobility was obtained. Hemostasis was obtained. The wound edges were closed in a layered fashion using Vicryl and Fast Absorbing Plain Gut sutures. Postoperative length was 4 cm.   EBL minimal; complications none; wound care routine.  The patient was discharged in good condition and will return in one week for wound evaluation.

## 2018-12-20 NOTE — PATIENT INSTRUCTIONS
Sutured Wound Care     [facility]: [phone]    [facility]: [phone]          ? No strenuous activity for 48 hours. Resume moderate activity in 48 hours. No heavy exercising until you are seen for follow up in one week.     ? Take Tylenol as needed for discomfort.                         ? Do not drink alcoholic beverages for 48 hours.     ? Keep the pressure bandage in place for 24 hours. If the bandage becomes blood tinged or loose, reinforce it with gauze and tape.        (Refer to the reverse side of this page for management of bleeding).    ? Remove pressure bandage in 24 hours     ? Leave the flat bandage in place until your follow up appointment.    ? Keep the bandage dry. Wash around it carefully.    ? If the tape becomes soiled or starts to come off, reinforce it with additional paper tape.    ? Do not smoke for 3 weeks; smoking is detrimental to wound healing.    ? It is normal to have swelling and bruising around the surgical site. The bruising will fade in approximately 10-14 days. Elevate the area to reduce swelling.    ? Numbness, itchiness and sensitivity to temperature changes can occur after surgery and may take up to 18 months to normalize.      POSSIBLE COMPLICATIONS    BLEEDIN. Leave the bandage in place.  2. Use tightly rolled up gauze or a cloth to apply direct pressure over the bandage for 20   minutes.  3. Reapply pressure for an additional 20 minutes if necessary  4. Call the office or go to the nearest emergency room if pressure fails to stop the bleeding.  5. Use additional gauze and tape to maintain pressure once the bleeding has stopped.        PAIN:    1. Post operative pain should slowly get better, never worse.  2. A severe increase in pain may indicate a problem. Call the office if this occurs.    In case of emergency phone:Dr Gallagher [phone]

## 2018-12-20 NOTE — PROGRESS NOTES
Surgical Office Location:  [facility]  [address] [place] [id number]

## 2018-12-26 ENCOUNTER — ALLIED HEALTH/NURSE VISIT (OUTPATIENT)
Dept: DERMATOLOGY | Facility: CLINIC | Age: 68
End: 2018-12-26
Payer: COMMERCIAL

## 2018-12-26 DIAGNOSIS — Z48.01 ENCOUNTER FOR CHANGE OR REMOVAL OF SURGICAL WOUND DRESSING: ICD-10-CM

## 2018-12-26 DIAGNOSIS — C44.319 BASAL CELL CARCINOMA (BCC) OF FOREHEAD: Primary | ICD-10-CM

## 2018-12-26 PROCEDURE — 99207 ZZC NO CHARGE NURSE ONLY: CPT

## 2018-12-26 NOTE — PROGRESS NOTES
Pt returned to clinic for post surgery 1 week follow up bandage change. Pt has no complaints, denies pain. Bandage removed forehead, area cleansed with normal saline. Site is healing and wound edges approximating well. Reapplied new steri strips and paper tape.    Advised to watch for signs/sx of infection; spreading redness, drainage, odor, fever. Call or report promptly to clinic. Pt given written instructions and informed to rtc as needed. Patient verbalized understanding.       WOUND CARE INSTRUCTIONS  for  ONE WEEK AFTER SURGERY          1) Leave flat bandage on your skin for one week after today s bandage change.  2) In one week when you remove the bandage, you may resume your regular skin care routine, including washing with mild soap and water, applying moisturizer, make-up and sunscreen.    3) If there are any open or bleeding areas at the incision/graft site you should begin to cover the area with a bandage daily as follows:    1) Clean and dry the area with plain tap water using a Q-tip or sterile gauze pad.  2) Apply Polysporin or Bacitracin ointment to the open area.  3) Cover the wound with a band-aid or a sterile non-stick gauze pad and micropore paper tape.         SIGNS OF INFECTION  - If you notice any of these signs of infection, call your doctor right away: expanding redness around the wound.  - Yellow or greenish-colored pus or cloudy wound drainage.    - Red streaking spreading from the wound.  - Increased swelling, tenderness, or pain around the wound.   - Fever.    Please remember that yellow and clear drainage from a wound can be normal and related to normal wound healing.  Isolated drainage from a wound without a combination of the above features does not indicate infection.       *Once the bandages are removed, the scar will be red and firm (especially in the lip/chin area). This is normal and will fade in time. It might take 6-12 months for this to happen.     *Massaging the area will  help the scar soften and fade quicker. Begin to massage the area one month after the bandages have been removed. To massage apply pressure directly and firmly over the scar with the fingertips and move in a circular motion. Massage the area for a few minutes several times a day. Continue to massage the site for several months.    *Approximately 6-8 weeks after surgery it is not uncommon to see the formation of  tender pimple-like  bump along the scar. This is normal. As the scar continues to mature and the stitches underneath the skin begin to dissolve, this might occur. Do not pick or squeeze, this will resolve on it s own. Should one break open producing a small amount of drainage, apply Polysporin or Bacitracin ointment a few times a day until the wound is completely healed.    *Numbness in the surgical area is expected. It might take 12-18 months for the feeling to return to normal. During this time sensations of itchiness, tingling and occasional sharp pains might be noted. These feelings are normal and will subside once the nerves have completely healed.         IN CASE OF EMERGENCY: Dr Gallagher [phone]     If you were seen in [place] call: [phone]    If you were seen in [place] call: [phone]

## 2018-12-26 NOTE — PROGRESS NOTES
Pt returned to clinic for post surgery 1 week follow up bandage change. Pt has no complaints, denies pain. Bandage removed from forehead, area cleansed with normal saline. Site is healing and wound edges approximating well. Reapplied new steri strips and paper tape.    Advised to watch for signs/sx of infection; spreading redness, drainage, odor, fever. Call or report promptly to clinic. Pt given written instructions and informed to rtc as needed. Patient verbalized understanding.     Patient has follow up appointment next week with Dr. Paige patient advised he can clinic at any time with concerns.     Qi HALL RN, BSN, PHN

## 2018-12-31 ENCOUNTER — TELEPHONE (OUTPATIENT)
Dept: DERMATOLOGY | Facility: CLINIC | Age: 68
End: 2018-12-31

## 2018-12-31 NOTE — TELEPHONE ENCOUNTER
Patient called again, hoping to get advice asap or possibly get squeezed in sometime this week to check the wound.

## 2018-12-31 NOTE — TELEPHONE ENCOUNTER
Calling patient for reminder call with Dr. Paige. Patient stated his MOHS on his scalp was infected. He talked to his surgeon and was advised to remove the bandage and to clean the area.   Advise patient, he should speak to Dr. Gallagher's nurse re: issue.  Please advise and call him  [phone] (home)   Ok to leave detailed message: yes  Thank you  Britany Johnson

## 2019-01-02 ENCOUNTER — OFFICE VISIT (OUTPATIENT)
Dept: FAMILY MEDICINE | Facility: CLINIC | Age: 69
End: 2019-01-02
Payer: COMMERCIAL

## 2019-01-02 ENCOUNTER — OFFICE VISIT (OUTPATIENT)
Dept: DERMATOLOGY | Facility: CLINIC | Age: 69
End: 2019-01-02
Payer: COMMERCIAL

## 2019-01-02 VITALS
SYSTOLIC BLOOD PRESSURE: 125 MMHG | OXYGEN SATURATION: 100 % | DIASTOLIC BLOOD PRESSURE: 81 MMHG | HEART RATE: 70 BPM | BODY MASS INDEX: 27.73 KG/M2 | HEIGHT: 74 IN

## 2019-01-02 DIAGNOSIS — B07.0 PLANTAR WARTS: Primary | ICD-10-CM

## 2019-01-02 DIAGNOSIS — Z48.01 ENCOUNTER FOR CHANGE OR REMOVAL OF SURGICAL WOUND DRESSING: Primary | ICD-10-CM

## 2019-01-02 DIAGNOSIS — H10.33 ACUTE CONJUNCTIVITIS OF BOTH EYES, UNSPECIFIED ACUTE CONJUNCTIVITIS TYPE: ICD-10-CM

## 2019-01-02 DIAGNOSIS — L84 CALLUS OF FOOT: ICD-10-CM

## 2019-01-02 PROCEDURE — 99207 ZZC NO CHARGE NURSE ONLY: CPT

## 2019-01-02 PROCEDURE — 99214 OFFICE O/P EST MOD 30 MIN: CPT | Performed by: FAMILY MEDICINE

## 2019-01-02 NOTE — PROGRESS NOTES
"[facility] - PRIMARY CARE SKIN    CC: wart(s)  SUBJECTIVE:   Pranay Ratliff is a(n) 68 year old male who presents to clinic today for follow-up of warts on the right foot that started in 2016.    Type of treatment: WartPeel.  Treatment response: The wart has not been resolving. The callus may have fallen off.  Side effects: None.    Issue Two:  He has noticed inflammation of eyes (puffiness, sore, itchiness, and redness) that seem to start after the Mohs surgery on the forehead.  This began after 12/28/18. He has only been applying Vaseline to the Mohs site on the forehead. He has noticed increased mattering of the eyes. He has also been using Visine A. No other respiratory symptoms.    Issue Three:  His skin is \"so incredibly dry\" although he uses a variety of lotions.     Personal history of skin cancer : YES - history of basal cell carcinoma on the corner of the right lower eyelid (s/p MMS approximately 0354-8820); basal cell carcinoma on mid-forehead (s/p MMS 12/20/18).  Family history of skin cancer : YES - keratinocyte carcinoma(s) in mother.     Personal Medical History  Eczema Psoriasis Autoimmune   NO NO NO      Family Medical History  Eczema Psoriasis Autoimmune   NO YES - in brother and in paternal uncle Rheumatoid arthritis in mother, maternal aunt, and maternal cousin      Sun Exposure History  Previous history of significant sun exposure: He lives in [place], [place] and [place] and has had annual skin exams.  Blistering sunburns : NO  Tanning beds : NO.  Sunscreen Use : YES, SPF : uses Blue Lizard 50+.  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    Refer to electronic medical record (EMR) for past medical history and medications.    ROS: 14 point review of systems was negative except the symptoms listed above " in the [facility].    This document serves as a record of the services and decisions personally performed and made by Patrizia Paige MD and was created by Anupam Payne, a trained medical scribe, based on personal observations and provider statements to the medical scribe.  January 2, 2019 9:01 AM   Anupam Payne    OBJECTIVE:   GENERAL: healthy, alert and no distress.  SKIN: [place] Skin Type - II.  Face, Feet and Toes examined. The dermatoscope was used to help evaluate pigmented lesions.  Skin Pertinent Findings:  Feet:  Pared, residual verrucous lesion but smaller in size.    Conjunctiva injected erythema of upper and lower eyelids. Slight swelling.  Mid-forehead: Healing incision site. No infection.    ASSESSMENT & PLAN:     Encounter Diagnoses   Name Primary?     Plantar warts Yes     Callus of foot      Acute conjunctivitis of both eyes, unspecified acute conjunctivitis type      MDM:   Discussed the viral cause of warts and potential need for multiple treatments. Treatment options include observation, cryotherapy, curettage, candida, cantharidin and contact immunotherapy, WartPeel. Potential side effects include blister formation, scarring, and pain depending on the treatment. The patient decided to continue treatment of the wart(s) with WartPeel.    Patient Instructions   FUTURE APPOINTMENTS  Follow up in 2-3 weeks for re-evaluation of warts.    Apply topical gentamicin antibiotic eye drops to eyes 3 times a day for 7-10 days.    Continue doing WartPeel as previously instructed for 4 weeks.  2-3 times a week, use an [place] board or pumice stone after showering to gently pare down the callus.    DRY SKIN MANAGEMENT INSTRUCTIONS  Routine use of moisturizer is important for healthy, resilient skin not just for soft skin.     Sealing in moisture    Twice daily use of a moisturizer such as over-the-counter (OTC) CeraVe moisturizer cream (in the jar). CeraVe products contain ceramides and filaggrin proteins that can  "help to maintain the body's moisture layer.    After cleansing or washing, always apply moisturizer immediately after drying off (pat dry only) for best effect.    Protection while hydrating    Do not overuse soap. Unless you have been sweating extensively, just apply soap to groin and armpits.    Recommended products for body include: OTC unscented Dove for sensitive skin or OTC Vanicream cleansing bar.    Recommended facial cleansers include: OTC CeraVe hydrating facial cleanser or OTC Cetaphil daily facial cleanser.    Avoid use of    Scented/perfumed products    Irritating clothing (wool, new jeans, new/unwashed clothing, scratchy synthetics)    Neosporin or triple antibiotic topical products    Products containing aloe, herbs, Vitamin E, or other \"natural ingredients\".    Dryer sheets or fabric softeners (while symptoms are present)    If a topical medication is prescribed, apply topical prescription first, followed by use of moisturizing product.    TT: 25 minutes.  CT: 20 minutes, discussing treatment options (including insurance considerations), side effects, risks and benefits of the treatment options, management of pain and blister formation and when to return if not improving.    The information in this document, created by the medical scribe for me, accurately reflects the services I personally performed and the decisions made by me. I have reviewed and approved this document for accuracy prior to leaving the patient care area.  January 2, 2019 9:01 AM  Patrizia Paige MD  [facility] [place]  "

## 2019-01-02 NOTE — PATIENT INSTRUCTIONS

## 2019-01-02 NOTE — NURSING NOTE
Pt returned to clinic for post surgery 2 week follow up for wound check. Pt has no complaints, denies pain. Bandage removed from forehead, area cleansed with normal saline. Site is healing and wound edges approximating well. Reapplied Aquaphor and Band-aid.    Advised to watch for signs/sx of infection; spreading redness, drainage, odor, fever. Call or report promptly to clinic. Pt given written instructions and informed to rtc as needed. Patient verbalized understanding.     TERESA Jacobs-BSN  [facility]  [phone]

## 2019-01-02 NOTE — PATIENT INSTRUCTIONS
"FUTURE APPOINTMENTS  Follow up in 2-3 weeks for re-evaluation of warts.    Apply topical gentamicin antibiotic eye drops to eyes 3 times a day for 7-10 days.    Continue doing WartPeel as previously instructed for 4 weeks.  2-3 times a week, use an [place] board or pumice stone after showering to gently pare down the callus.    DRY SKIN MANAGEMENT INSTRUCTIONS  Routine use of moisturizer is important for healthy, resilient skin not just for soft skin.     Sealing in moisture    Twice daily use of a moisturizer such as over-the-counter (OTC) CeraVe moisturizer cream (in the jar). CeraVe products contain ceramides and filaggrin proteins that can help to maintain the body's moisture layer.    After cleansing or washing, always apply moisturizer immediately after drying off (pat dry only) for best effect.    Protection while hydrating    Do not overuse soap. Unless you have been sweating extensively, just apply soap to groin and armpits.    Recommended products for body include: OTC unscented Dove for sensitive skin or OTC Vanicream cleansing bar.    Recommended facial cleansers include: OTC CeraVe hydrating facial cleanser or OTC Cetaphil daily facial cleanser.    Avoid use of    Scented/perfumed products    Irritating clothing (wool, new jeans, new/unwashed clothing, scratchy synthetics)    Neosporin or triple antibiotic topical products    Products containing aloe, herbs, Vitamin E, or other \"natural ingredients\".    Dryer sheets or fabric softeners (while symptoms are present)    If a topical medication is prescribed, apply topical prescription first, followed by use of moisturizing product.  "

## 2019-01-03 ENCOUNTER — TELEPHONE (OUTPATIENT)
Dept: FAMILY MEDICINE | Facility: CLINIC | Age: 69
End: 2019-01-03

## 2019-01-03 DIAGNOSIS — H10.33 ACUTE CONJUNCTIVITIS OF BOTH EYES, UNSPECIFIED ACUTE CONJUNCTIVITIS TYPE: ICD-10-CM

## 2019-01-03 RX ORDER — ERYTHROMYCIN 5 MG/G
0.5 OINTMENT OPHTHALMIC 3 TIMES DAILY
Qty: 3.5 G | Refills: 0 | Status: SHIPPED | OUTPATIENT
Start: 2019-01-03 | End: 2019-02-12

## 2019-01-03 NOTE — TELEPHONE ENCOUNTER
gentamycin not available through  can we substitutes erythromycin  [facility].    Ok per Dr. Paige to give the EES - called [facility] pharmacist and gave verbal order.    Deja SuazoRN BSN  [facility]  [phone]

## 2019-01-04 ENCOUNTER — OFFICE VISIT (OUTPATIENT)
Dept: FAMILY MEDICINE | Facility: CLINIC | Age: 69
End: 2019-01-04
Payer: COMMERCIAL

## 2019-01-04 VITALS
SYSTOLIC BLOOD PRESSURE: 130 MMHG | BODY MASS INDEX: 28.51 KG/M2 | TEMPERATURE: 97 F | HEART RATE: 71 BPM | DIASTOLIC BLOOD PRESSURE: 80 MMHG | WEIGHT: 221 LBS

## 2019-01-04 DIAGNOSIS — H61.23 BILATERAL IMPACTED CERUMEN: ICD-10-CM

## 2019-01-04 DIAGNOSIS — H93.13 TINNITUS, BILATERAL: Primary | ICD-10-CM

## 2019-01-04 DIAGNOSIS — H93.293 ABNORMAL AUDITORY PERCEPTION, BILATERAL: ICD-10-CM

## 2019-01-04 PROCEDURE — 99213 OFFICE O/P EST LOW 20 MIN: CPT | Mod: 25 | Performed by: NURSE PRACTITIONER

## 2019-01-04 PROCEDURE — 69209 REMOVE IMPACTED EAR WAX UNI: CPT | Mod: 50 | Performed by: NURSE PRACTITIONER

## 2019-01-04 NOTE — PATIENT INSTRUCTIONS
1. See your audiologist next week and let him/her know about this. Hopefully, they can get you to their ENT associate. If not, when you return, I will order a referral to one of our ENT providers.

## 2019-01-04 NOTE — PROGRESS NOTES
"  SUBJECTIVE:                                                      Pranay Ratliff is a 68 year old male who presents to clinic today for the following health issues:    Concern - Pt states that he has a buildup feeling Since Dec 20th, and also says there is a clicking noise. Notes he wears hearing aides. Has a hx of blockage and tinnitus, but never clicking sound     Therapies Tried and outcome: none     HPI: Noticing a sound like \"crickets\" in his ears (R>L) for a couple weeks. Not getting worse. He does wear hearing aids. He hears this sound whether he is wearing them or not. He also has longstanding tinnitus and newly-diagnosed TMJ (treated with Flexeril over the summer). He notes that the sound is mainly present when he rotates his head or goes from standing to sitting and vice versa. It generally goes away while still. He denies mechanical, rhythmic, or clicking character of sound. Does not correspond to heart beat. Has an appointment with his audiologist early next week in [place].    Problem list and histories reviewed & adjusted, as indicated.  Additional history: as documented    Reviewed and updated as needed this visit by clinical staff  Tobacco  Allergies  Meds  Problems  Med Hx  Surg Hx  Fam Hx       Reviewed and updated as needed this visit by Provider  Tobacco  Allergies  Meds  Problems  Med Hx  Surg Hx  Fam Hx         ROS:  Constitutional, HEENT, musculoskeletal, neuro systems are negative, except as otherwise noted.    OBJECTIVE:                                                      /80 (BP Location: Left arm, Patient Position: Chair, Cuff Size: Adult Regular)   Pulse 71   Temp 97  F (36.1  C) (Tympanic)   Wt 100.2 kg (221 lb)   BMI 28.51 kg/m   Body mass index is 28.51 kg/m .   GENERAL: healthy, alert, well nourished, well hydrated, no distress  HENT: ear canals- impacted bilaterally with dark, dry cerumen (successfully cleared via irrigation); TMs- normal; Nose- normal; Mouth- " no ulcers, no lesions. Some jaw clicking with ROM. No change in patient perception of sound with gentle pressure over carotids and jugular veins.   NECK: no tenderness, no adenopathy, no asymmetry, no masses, no stiffness; thyroid- normal to palpation. No bruits.   RESP: lungs clear to auscultation - no rales, no rhonchi, no wheezes  CV: regular rates and rhythm, normal S1 S2, no S3 or S4 and no murmur, no click or rub -  NEURO: strength and tone- normal, sensory exam- grossly normal, mentation- intact, speech- normal, reflexes- symmetric    Diagnostic test results:  none     ASSESSMENT/PLAN:                                                      Pranay was seen today for ear problem. Could be a novel manifestation of his tinnitus. Does not seem to be related to vascular issue or palatal myoclonus based on history and exam. No red flags to prompt urgent neuro consult. Sound has decreased in intensity after clearing of cerumen. Advised him to bring this up at audiology appointment next week, if still present. Hopefully, his provider can get him to their associated ENT clinic while he is down in [place]. If this isn't possible, and the issue persists, he agrees to call this clinic, so I can order an ENT referral for when he returns to [place]. Agrees with plan, and all questions answered. Debrox drops ordered to keep cerumen soft and canals clear.     Diagnoses and all orders for this visit:    Tinnitus, bilateral    Abnormal auditory perception, bilateral    Bilateral impacted cerumen  -     HC REMOVAL IMPACTED CERUMEN IRRIGATION/LVG UNILAT  -     carbamide peroxide (DEBROX) 6.5 % otic solution; Place 5 drops into both ears daily as needed for other (impacted cerumen)        Risks, benefits and alternatives of treatments discussed. Plan agreed upon and all questions answered.      Follow-Up: Return in about 4 weeks (around 2/1/2019) for persistent or worsening symptoms.    See Patient Instructions      Florian Cabral,  APRN, CNP

## 2019-01-04 NOTE — Clinical Note
Please abstract the following data from this visit with this patient into the appropriate field in Epic:Colonoscopy done on this date: 8/2016 (approximately), by this group: [facility], results were normal .

## 2019-01-16 ENCOUNTER — TRANSFERRED RECORDS (OUTPATIENT)
Dept: HEALTH INFORMATION MANAGEMENT | Facility: CLINIC | Age: 69
End: 2019-01-16

## 2019-01-18 ENCOUNTER — TRANSFERRED RECORDS (OUTPATIENT)
Dept: FAMILY MEDICINE | Facility: CLINIC | Age: 69
End: 2019-01-18

## 2019-01-18 NOTE — PROGRESS NOTES
Records received form the [facility] and given to Nakul Cabral for review.  Yaneth Vidal,

## 2019-01-22 ENCOUNTER — OFFICE VISIT (OUTPATIENT)
Dept: FAMILY MEDICINE | Facility: CLINIC | Age: 69
End: 2019-01-22
Payer: COMMERCIAL

## 2019-01-22 VITALS — OXYGEN SATURATION: 98 % | SYSTOLIC BLOOD PRESSURE: 123 MMHG | DIASTOLIC BLOOD PRESSURE: 82 MMHG | HEART RATE: 78 BPM

## 2019-01-22 DIAGNOSIS — B07.0 PLANTAR WARTS: Primary | ICD-10-CM

## 2019-01-22 PROCEDURE — 99213 OFFICE O/P EST LOW 20 MIN: CPT | Performed by: FAMILY MEDICINE

## 2019-01-22 NOTE — LETTER
1/22/2019         RE: Pranay Ratliff  [address] [address]        Dear Colleague,    Thank you for referring your patient, Pranay Ratliff, to the [facility] [place]. Please see a copy of my visit note below.    [facility] - PRIMARY CARE SKIN    CC: wart(s)  SUBJECTIVE:   Pranay Ratliff is a(n) 68 year old male who presents to clinic today for follow-up of warts on the right foot that started in 2016.    Type of treatment: WartPeel started Oct 15, 2018. He is applying it every night. He has also been paring dead skin with a pumice stone.  Treatment response: The wart has still not responded to treatment.  Side effects: None.    Personal history of skin cancer : YES - history of basal cell carcinoma on the corner of the right lower eyelid (s/p MMS approximately [id number]); basal cell carcinoma on mid-forehead (s/p MMS 12/20/18).  Family history of skin cancer : YES - keratinocyte carcinoma(s) in mother.     Personal Medical History  Eczema Psoriasis Autoimmune   NO NO NO      Family Medical History  Eczema Psoriasis Autoimmune   NO YES - in brother and in paternal uncle Rheumatoid arthritis in mother, maternal aunt, and maternal cousin      Sun Exposure History  Previous history of significant sun exposure: He lives in [place], [place] and [place] and has had annual skin exams.  Blistering sunburns : NO  Tanning beds : NO.  Sunscreen Use : YES, SPF : uses Blue Lizard 50+.  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    Refer to electronic medical record (EMR) for past medical history and medications.    ROS: 14 point review of systems was negative except the symptoms listed above in the HPI.    This document serves as a record of the services and decisions personally performed and made by  Patrizia Paige MD and was created by Anupam Payne, a trained medical scribe, based on personal observations and provider statements to the medical scribe.  January 22, 2019 9:26 AM   Anupam Payne    OBJECTIVE:   GENERAL: healthy, alert and no distress.  SKIN: Hudson Skin Type - II.  Feet and Toes examined. The dermatoscope was used to help evaluate pigmented lesions.  Skin Pertinent Findings:  Right foot, plantar surface: Decreased thickness, but verrucous tissue still present.    ASSESSMENT & PLAN:     Encounter Diagnosis   Name Primary?     Plantar warts Yes     MDM:   Discussed the viral cause of warts and potential need for multiple treatments. Treatment options include observation, cryotherapy, curettage, candida, cantharidin and contact immunotherapy, WartPeel. Potential side effects include blister formation, scarring, and pain depending on the treatment. The patient decided to continue treatment of the wart(s) with WartPeel.    Patient Instructions   FUTURE APPOINTMENTS  Follow up in 2 weeks.  Follow up in Oct 2019 for a full-body skin cancer screening.    Continue applying WartPeel.  Continue using the pumice stone after showering to pare down dead skin.    TT: 20 minutes.  CT: 15 minutes, discussing treatment options (including insurance considerations), side effects, risks and benefits of the treatment options, management of pain and blister formation and when to return if not improving.    The information in this document, created by the medical scribe for me, accurately reflects the services I personally performed and the decisions made by me. I have reviewed and approved this document for accuracy prior to leaving the patient care area.  January 22, 2019 9:25 AM  Patrizia Paige MD  [facility] [place]    Again, thank you for allowing me to participate in the care of your patient.        Sincerely,        Patrizia Paige MD

## 2019-01-22 NOTE — PATIENT INSTRUCTIONS
FUTURE APPOINTMENTS  Follow up in 2 weeks.  Follow up in Oct 2019 for a full-body skin cancer screening.    Continue applying WartPeel.  Continue using the pumice stone after showering to pare down dead skin.

## 2019-01-22 NOTE — PROGRESS NOTES
[facility] - PRIMARY CARE SKIN    CC: wart(s)  SUBJECTIVE:   Pranay Ratliff is a(n) 68 year old male who presents to clinic today for follow-up of warts on the right foot that started in 2016.    Type of treatment: WartPeel started Oct 15, 2018. He is applying it every night. He has also been paring dead skin with a pumice stone.  Treatment response: The wart has still not responded to treatment.  Side effects: None.    Personal history of skin cancer : YES - history of basal cell carcinoma on the corner of the right lower eyelid (s/p MMS approximately [id number]); basal cell carcinoma on mid-forehead (s/p MMS 12/20/18).  Family history of skin cancer : YES - keratinocyte carcinoma(s) in mother.     Personal Medical History  Eczema Psoriasis Autoimmune   NO NO NO      Family Medical History  Eczema Psoriasis Autoimmune   NO YES - in brother and in paternal uncle Rheumatoid arthritis in mother, maternal aunt, and maternal cousin      Sun Exposure History  Previous history of significant sun exposure: He lives in [place], [place] and [place] and has had annual skin exams.  Blistering sunburns : NO  Tanning beds : NO.  Sunscreen Use : YES, SPF : uses Blue Lizard 50+.  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    Refer to electronic medical record (EMR) for past medical history and medications.    ROS: 14 point review of systems was negative except the symptoms listed above in the HPI.    This document serves as a record of the services and decisions personally performed and made by Patrizia Paige MD and was created by Anupam Payne, a trained medical scribe, based on personal observations and provider statements to the medical scribe.  January 22, 2019 9:26 AM   Anupam Payne    OBJECTIVE:   GENERAL: healthy, alert and no  distress.  SKIN: Hudson Skin Type - II.  Feet and Toes examined. The dermatoscope was used to help evaluate pigmented lesions.  Skin Pertinent Findings:  Right foot, plantar surface: Decreased thickness, but verrucous tissue still present.    ASSESSMENT & PLAN:     Encounter Diagnosis   Name Primary?     Plantar warts Yes     MDM:   Discussed the viral cause of warts and potential need for multiple treatments. Treatment options include observation, cryotherapy, curettage, candida, cantharidin and contact immunotherapy, WartPeel. Potential side effects include blister formation, scarring, and pain depending on the treatment. The patient decided to continue treatment of the wart(s) with WartPeel.    Patient Instructions   FUTURE APPOINTMENTS  Follow up in 2 weeks.  Follow up in Oct 2019 for a full-body skin cancer screening.    Continue applying WartPeel.  Continue using the pumice stone after showering to pare down dead skin.    TT: 20 minutes.  CT: 15 minutes, discussing treatment options (including insurance considerations), side effects, risks and benefits of the treatment options, management of pain and blister formation and when to return if not improving.    The information in this document, created by the medical scribe for me, accurately reflects the services I personally performed and the decisions made by me. I have reviewed and approved this document for accuracy prior to leaving the patient care area.  January 22, 2019 9:25 AM  Patrizia Paige MD  [facility] [place]

## 2019-01-25 ENCOUNTER — OFFICE VISIT (OUTPATIENT)
Dept: FAMILY MEDICINE | Facility: CLINIC | Age: 69
End: 2019-01-25
Payer: COMMERCIAL

## 2019-01-25 VITALS
DIASTOLIC BLOOD PRESSURE: 80 MMHG | WEIGHT: 222 LBS | TEMPERATURE: 97.2 F | BODY MASS INDEX: 28.63 KG/M2 | HEART RATE: 72 BPM | SYSTOLIC BLOOD PRESSURE: 126 MMHG

## 2019-01-25 DIAGNOSIS — J01.90 ACUTE SINUSITIS WITH SYMPTOMS > 10 DAYS: Primary | ICD-10-CM

## 2019-01-25 DIAGNOSIS — T78.40XS ALLERGIC REACTION, SEQUELA: ICD-10-CM

## 2019-01-25 PROCEDURE — 99213 OFFICE O/P EST LOW 20 MIN: CPT | Performed by: FAMILY MEDICINE

## 2019-01-25 RX ORDER — AZITHROMYCIN 250 MG/1
TABLET, FILM COATED ORAL
Qty: 6 TABLET | Refills: 0 | Status: SHIPPED | OUTPATIENT
Start: 2019-01-25 | End: 2019-01-30

## 2019-01-25 NOTE — PROGRESS NOTES
SUBJECTIVE:   Pranay Ratliff is a 68 year old male who presents to clinic today for the following health issues:      Acute Illness   Acute illness concerns: cough  Onset: 1 week, worsening     Fever: no    Chills/Sweats: no    Headache (location?): no    Sinus Pressure:no    Conjunctivitis:  no    Ear Pain: no    Rhinorrhea: no    Congestion: YES    Sore Throat: no     Cough: YES    Wheeze: no     Decreased Appetite: no    Nausea: no    Vomiting: no    Diarrhea:  no    Dysuria/Freq.: no    Fatigue/Achiness: no    Sick/Strep Exposure: no     Therapies Tried and outcome: johanna seltzer cold/cough - pt stopped due to some swelling/skin irritation around lips after taking       He has been feeling upper respiratory congestion symptoms.  Denies any chest pain shortness of breath some lip swelling noted after using Johanna-Derby plus which contain phenylephrine.    Problem list and histories reviewed & adjusted, as indicated.      Patient Active Problem List   Diagnosis     History of basal cell carcinoma     No past surgical history on file.    Social History     Tobacco Use     Smoking status: Never Smoker     Smokeless tobacco: Never Used   Substance Use Topics     Alcohol use: Yes     Comment: twice a week     Family History   Problem Relation Age of Onset     Skin Cancer Mother          Current Outpatient Medications   Medication Sig Dispense Refill     aspirin 81 MG tablet Take by mouth daily       azithromycin (ZITHROMAX) 250 MG tablet Two tablets first day, then one tablet daily for four days. 6 tablet 0     carbamide peroxide (DEBROX) 6.5 % otic solution Place 5 drops into both ears daily as needed for other (impacted cerumen) 14.8 mL 11     clotrimazole-betamethasone (LOTRISONE) 1-0.05 % external cream APPLY TOPICALLY TWICE DAILY TO THE FEET. 45 g 0     multivitamin, therapeutic with minerals (MULTI-VITAMIN) TABS Take 1 tablet by mouth daily       Omega-3 Fatty Acids (OMEGA-3 FISH OIL PO)        SIMVASTATIN PO         triamterene-hydrochlorothiazide (MAXZIDE-25) 37.5-25 MG per tablet Take 1 tablet by mouth daily         Reviewed and updated as needed this visit by clinical staff  Tobacco  Allergies  Meds       Reviewed and updated as needed this visit by Provider         ROS:  CONSTITUTIONAL: NEGATIVE for fever, chills, change in weight  ENT/MOUTH: positive for congestion  RESP: positive for cough non productuve  CV: NEGATIVE for chest pain, palpitations or peripheral edema    OBJECTIVE:                                                    /80   Pulse 72   Temp 97.2  F (36.2  C) (Tympanic)   Wt 100.7 kg (222 lb)   BMI 28.63 kg/m    Body mass index is 28.63 kg/m .   GENERAL: healthy, alert, well nourished, well hydrated, no distress  HENT: ear canals- normal; TMs- normal; Nose- normal; Mouth- no ulcers, no lesions. Sinus congestion.  NECK: no tenderness, no adenopathy, no asymmetry, no masses, no stiffness; thyroid- normal to palpation  RESP: lungs clear to auscultation - no rales, no rhonchi, no wheezes  CV: regular rates and rhythm, normal S1 S2, no S3 or S4 and no murmur, no click or rub -       ASSESSMENT/PLAN:                                                        ICD-10-CM    1. Acute sinusitis with symptoms > 10 days J01.90 azithromycin (ZITHROMAX) 250 MG tablet   2. Allergic reaction, sequela T78.40XS        Patient started Johanna-Chamberlain plus which contains phenylephrine guanfacine and Tylenol.  He has used Tylenol and guanfacine in the past however not sure what phenylephrine.  He stopped taking it he still have some blisters on the lips.  Suggested hydrocortisone cream to be applied.  Keep lips moist with Vaseline.  Avoid such medication in the past.  Warning signs were discussed with the patient.  Any worsening symptoms he needs to report back.  Other upper respiratory symptoms most likely sinsuites  Suggested azithromycin for possible upper respiratory sinusitis.  Gigi Rebollar MD  [facility]  [place]

## 2019-01-29 ENCOUNTER — TRANSFERRED RECORDS (OUTPATIENT)
Dept: FAMILY MEDICINE | Facility: CLINIC | Age: 69
End: 2019-01-29

## 2019-01-29 ENCOUNTER — TRANSFERRED RECORDS (OUTPATIENT)
Dept: HEALTH INFORMATION MANAGEMENT | Facility: CLINIC | Age: 69
End: 2019-01-29

## 2019-01-29 NOTE — PROGRESS NOTES
Records received from the [facility] and given to Dr Rebollar for review.  Yaneth Vidal,

## 2019-01-30 ENCOUNTER — OFFICE VISIT (OUTPATIENT)
Dept: FAMILY MEDICINE | Facility: CLINIC | Age: 69
End: 2019-01-30
Payer: COMMERCIAL

## 2019-01-30 VITALS
SYSTOLIC BLOOD PRESSURE: 130 MMHG | WEIGHT: 216 LBS | DIASTOLIC BLOOD PRESSURE: 80 MMHG | HEART RATE: 64 BPM | BODY MASS INDEX: 30.24 KG/M2 | TEMPERATURE: 96.9 F | HEIGHT: 71 IN

## 2019-01-30 DIAGNOSIS — K12.0 APHTHOUS ULCER OF MOUTH: ICD-10-CM

## 2019-01-30 DIAGNOSIS — Z00.00 ANNUAL VISIT FOR GENERAL ADULT MEDICAL EXAMINATION WITHOUT ABNORMAL FINDINGS: Primary | ICD-10-CM

## 2019-01-30 DIAGNOSIS — M76.30 IT BAND SYNDROME, UNSPECIFIED LATERALITY: ICD-10-CM

## 2019-01-30 DIAGNOSIS — I10 BENIGN ESSENTIAL HYPERTENSION: ICD-10-CM

## 2019-01-30 DIAGNOSIS — Z11.59 NEED FOR HEPATITIS C SCREENING TEST: ICD-10-CM

## 2019-01-30 DIAGNOSIS — G47.33 OBSTRUCTIVE SLEEP APNEA SYNDROME: ICD-10-CM

## 2019-01-30 DIAGNOSIS — Z85.828 HISTORY OF BASAL CELL CARCINOMA: ICD-10-CM

## 2019-01-30 DIAGNOSIS — M25.512 ACUTE PAIN OF LEFT SHOULDER: ICD-10-CM

## 2019-01-30 DIAGNOSIS — Z12.5 SCREENING FOR PROSTATE CANCER: Primary | ICD-10-CM

## 2019-01-30 DIAGNOSIS — Z12.5 SCREENING FOR PROSTATE CANCER: ICD-10-CM

## 2019-01-30 DIAGNOSIS — E78.5 HYPERLIPIDEMIA LDL GOAL <130: ICD-10-CM

## 2019-01-30 LAB
ALBUMIN SERPL-MCNC: 4.1 G/DL (ref 3.4–5)
ALP SERPL-CCNC: 78 U/L (ref 40–150)
ALT SERPL W P-5'-P-CCNC: 30 U/L (ref 0–70)
ANION GAP SERPL CALCULATED.3IONS-SCNC: 7 MMOL/L (ref 3–14)
AST SERPL W P-5'-P-CCNC: 23 U/L (ref 0–45)
BILIRUB SERPL-MCNC: 0.7 MG/DL (ref 0.2–1.3)
BUN SERPL-MCNC: 13 MG/DL (ref 7–30)
CALCIUM SERPL-MCNC: 9.1 MG/DL (ref 8.5–10.1)
CHLORIDE SERPL-SCNC: 101 MMOL/L (ref 94–109)
CHOLEST SERPL-MCNC: 157 MG/DL
CO2 SERPL-SCNC: 25 MMOL/L (ref 20–32)
CREAT SERPL-MCNC: 0.96 MG/DL (ref 0.66–1.25)
CREAT UR-MCNC: 146 MG/DL
ERYTHROCYTE [DISTWIDTH] IN BLOOD BY AUTOMATED COUNT: 13.5 % (ref 10–15)
GFR SERPL CREATININE-BSD FRML MDRD: 80 ML/MIN/{1.73_M2}
GLUCOSE SERPL-MCNC: 94 MG/DL (ref 70–99)
HCT VFR BLD AUTO: 44.5 % (ref 40–53)
HCV AB SERPL QL IA: NONREACTIVE
HDLC SERPL-MCNC: 48 MG/DL
HGB BLD-MCNC: 14.9 G/DL (ref 13.3–17.7)
LDLC SERPL CALC-MCNC: 73 MG/DL
MCH RBC QN AUTO: 30 PG (ref 26.5–33)
MCHC RBC AUTO-ENTMCNC: 33.5 G/DL (ref 31.5–36.5)
MCV RBC AUTO: 90 FL (ref 78–100)
MICROALBUMIN UR-MCNC: 7 MG/L
MICROALBUMIN/CREAT UR: 4.53 MG/G CR (ref 0–17)
NONHDLC SERPL-MCNC: 109 MG/DL
PLATELET # BLD AUTO: 337 10E9/L (ref 150–450)
POTASSIUM SERPL-SCNC: 3.6 MMOL/L (ref 3.4–5.3)
PROT SERPL-MCNC: 8.1 G/DL (ref 6.8–8.8)
PSA SERPL-ACNC: 6.65 UG/L (ref 0–4)
RBC # BLD AUTO: 4.96 10E12/L (ref 4.4–5.9)
SODIUM SERPL-SCNC: 133 MMOL/L (ref 133–144)
TRIGL SERPL-MCNC: 181 MG/DL
WBC # BLD AUTO: 9.8 10E9/L (ref 4–11)

## 2019-01-30 PROCEDURE — G0438 PPPS, INITIAL VISIT: HCPCS | Performed by: INTERNAL MEDICINE

## 2019-01-30 PROCEDURE — 82043 UR ALBUMIN QUANTITATIVE: CPT | Performed by: INTERNAL MEDICINE

## 2019-01-30 PROCEDURE — 85027 COMPLETE CBC AUTOMATED: CPT | Performed by: INTERNAL MEDICINE

## 2019-01-30 PROCEDURE — 80053 COMPREHEN METABOLIC PANEL: CPT | Performed by: INTERNAL MEDICINE

## 2019-01-30 PROCEDURE — G0103 PSA SCREENING: HCPCS | Performed by: INTERNAL MEDICINE

## 2019-01-30 PROCEDURE — 36415 COLL VENOUS BLD VENIPUNCTURE: CPT | Performed by: INTERNAL MEDICINE

## 2019-01-30 PROCEDURE — G0472 HEP C SCREEN HIGH RISK/OTHER: HCPCS | Performed by: INTERNAL MEDICINE

## 2019-01-30 PROCEDURE — 80061 LIPID PANEL: CPT | Performed by: INTERNAL MEDICINE

## 2019-01-30 RX ORDER — TRIAMTERENE/HYDROCHLOROTHIAZID 37.5-25 MG
1 TABLET ORAL DAILY
Qty: 90 TABLET | Refills: 3 | Status: SHIPPED | OUTPATIENT
Start: 2019-01-30 | End: 2019-02-12

## 2019-01-30 RX ORDER — SIMVASTATIN 20 MG
20 TABLET ORAL AT BEDTIME
Qty: 90 TABLET | Refills: 3 | Status: SHIPPED | OUTPATIENT
Start: 2019-01-30 | End: 2021-07-06

## 2019-01-30 SDOH — HEALTH STABILITY: MENTAL HEALTH: HOW OFTEN DO YOU HAVE A DRINK CONTAINING ALCOHOL?: MONTHLY OR LESS

## 2019-01-30 ASSESSMENT — MIFFLIN-ST. JEOR: SCORE: 1764.77

## 2019-01-30 NOTE — RESULT ENCOUNTER NOTE
Please let Pranay know that his PSA came back slightly elevated - 6.68. I would like him to come back in 4 weeks for a repeat test. If still elevated then we should refer to Urology.     I will send him a letter with the rest of his results.

## 2019-01-30 NOTE — LETTER
January 30, 2019      Pranay Ratliff  [address]  [place] [address]        Dear ,      It was a pleasure seeing you in clinic. Here are the results of your most recent labs:     - Cholesterol levels are well controlled with the exception of a slight elevation in your Triglycerides. You can continue taking Simvastatin 20 mg daily.   - Kidney function, liver function tests, and electrolytes are normal.   - Urine microalbumin level is normal   - Prostate cancer screening (PSA) is a little high. We can see this elevated for many reasons, most often it is not due to prostate cancer, so I would like to recheck this before referring you to see Urology. Please come back for labs in 4 weeks, this can be a lab only appointment.      If you have any further questions please don't hesitate to contact me. You can either reply via [employer] or call [phone].     Brigida Veras MD    Resulted Orders   Lipid panel reflex to direct LDL Fasting   Result Value Ref Range    Cholesterol 157 <200 mg/dL    Triglycerides 181 (H) <150 mg/dL      Comment:      Borderline high:  150-199 mg/dl  High:             200-499 mg/dl  Very high:       >499 mg/dl      HDL Cholesterol 48 >39 mg/dL    LDL Cholesterol Calculated 73 <100 mg/dL      Comment:      Desirable:       <100 mg/dl    Non HDL Cholesterol 109 <130 mg/dL   Comprehensive metabolic panel   Result Value Ref Range    Sodium 133 133 - 144 mmol/L    Potassium 3.6 3.4 - 5.3 mmol/L    Chloride 101 94 - 109 mmol/L    Carbon Dioxide 25 20 - 32 mmol/L    Anion Gap 7 3 - 14 mmol/L    Glucose 94 70 - 99 mg/dL    Urea Nitrogen 13 7 - 30 mg/dL    Creatinine 0.96 0.66 - 1.25 mg/dL    GFR Estimate 80 >60 mL/min/[1.73_m2]      Comment:      Non  GFR Calc  Starting 12/18/2018, serum creatinine based estimated GFR (eGFR) will be   calculated using the Chronic Kidney Disease Epidemiology Collaboration   (CKD-EPI) equation.      GFR Estimate If Black >90 >60  mL/min/[1.73_m2]      Comment:       GFR Calc  Starting 12/18/2018, serum creatinine based estimated GFR (eGFR) will be   calculated using the Chronic Kidney Disease Epidemiology Collaboration   (CKD-EPI) equation.      Calcium 9.1 8.5 - 10.1 mg/dL    Bilirubin Total 0.7 0.2 - 1.3 mg/dL    Albumin 4.1 3.4 - 5.0 g/dL    Protein Total 8.1 6.8 - 8.8 g/dL    Alkaline Phosphatase 78 40 - 150 U/L    ALT 30 0 - 70 U/L    AST 23 0 - 45 U/L   Albumin Random Urine Quantitative with Creat Ratio   Result Value Ref Range    Creatinine Urine 146 mg/dL    Albumin Urine mg/L 7 mg/L    Albumin Urine mg/g Cr 4.53 0 - 17 mg/g Cr   CBC with platelets   Result Value Ref Range    WBC 9.8 4.0 - 11.0 10e9/L    RBC Count 4.96 4.4 - 5.9 10e12/L    Hemoglobin 14.9 13.3 - 17.7 g/dL    Hematocrit 44.5 40.0 - 53.0 %    MCV 90 78 - 100 fl    MCH 30.0 26.5 - 33.0 pg    MCHC 33.5 31.5 - 36.5 g/dL    RDW 13.5 10.0 - 15.0 %    Platelet Count 337 150 - 450 10e9/L   Prostate spec antigen screen   Result Value Ref Range    PSA 6.65 (H) 0 - 4 ug/L      Comment:      Assay Method:  Chemiluminescence using Siemens Vista analyzer

## 2019-01-30 NOTE — Clinical Note
Colonoscopy done on this date: 4/24/2011 (approximately), by this group: [facility]S, results were normal.PLEASE DELETE THE 2016 DATE

## 2019-01-30 NOTE — RESULT ENCOUNTER NOTE
Please mail this letter to patient.     It was a pleasure seeing you in clinic. Here are the results of your most recent labs:     - Cholesterol levels are well controlled with the exception of a slight elevation in your Triglycerides. You can continue taking Simvastatin 20 mg daily.  - Kidney function, liver function tests, and electrolytes are normal.   - Urine microalbumin level is normal   - Prostate cancer screening (PSA) is a little high. We can see this elevated for many reasons, most often it is not due to prostate cancer, so I would like to recheck this before referring you to see Urology. Please come back for labs in 4 weeks.        If you have any further questions please don't hesitate to contact me. You can either reply via [employer] or call [phone].    Gricel Veras MD  Internal Medicine & Pediatrics  [facility] [place]

## 2019-01-30 NOTE — PROGRESS NOTES
"  SUBJECTIVE:   Pranay Ratliff is a 68 year old male who presents for Preventive Visit.  Are you in the first 12 months of your Medicare Part B coverage?  No    Physical Health:    In general, how would you rate your overall physical health? good    Outside of work, how many days during the week do you exercise? 2-3 days/week    Outside of work, approximately how many minutes a day do you exercise?45-60 minutes    If you drink alcohol do you typically have >3 drinks per day or >7 drinks per week? No    Do you usually eat at least 4 servings of fruit and vegetables a day, include whole grains & fiber and avoid regularly eating high fat or \"junk\" foods? Yes    Do you have any problems taking medications regularly?  No    Do you have any side effects from medications? none    Needs assistance for the following daily activities: no assistance needed    Which of the following safety concerns are present in your home?  none identified     Hearing impairment: Yes, wears bilat hearing aids - had hearing test done yesterday     In the past 6 months, have you been bothered by leaking of urine? no    Mental Health:    In general, how would you rate your overall mental or emotional health? Good - partner living in [facility]  PHQ-2 Score:      Do you feel safe in your environment? Yes    Do you have a Health Care Directive? Yes: Patient states has Advance Directive and will bring in a copy to clinic.    Additional concerns to address?  YES - blisters on his lips that started after taking a decongestant. Overall his cold symptoms have improved. He saw an ENT physician yesterday who took pictures of the blisters. Also, when he was in [facility] he was seeing physical therapy for bilateral IT band syndrome. He would like a referral to continue this year. Pranay does yoga and has been struggling with left shoulder pain so also would like this evaluated by PT.    Fall risk:  Fallen 2 or more times in the past year?: No  Any fall with " injury in the past year?: No  click delete button to remove this line now  Cognitive Screenin) Repeat 3 items (Leader, Season, Table)    2) Clock draw: NORMAL  3) 3 item recall: Recalls 3 objects  Results: 3 items recalled: COGNITIVE IMPAIRMENT LESS LIKELY    Mini-CogTM Copyright BRITTNEY Walker. Licensed by the author for use in [facility]; reprinted with permission (nikita@[facility]). All rights reserved.      Do you have sleep apnea, excessive snoring or daytime drowsiness?: yes  Has a CPAP machine for sleep apnea.     Colonoscopy done on this date: 2011 (approximately), by this group: [facility], results were normal.          Reviewed and updated as needed this visit by clinical staff  Tobacco  Allergies  Meds  Fam Hx  Soc Hx        Reviewed and updated as needed this visit by Provider        Social History     Tobacco Use     Smoking status: Never Smoker     Smokeless tobacco: Never Used   Substance Use Topics     Alcohol use: Yes     Frequency: Monthly or less     Comment: twice a week                           Current providers sharing in care for this patient include:   Patient Care Team:  [facility], Roma Jamee Wake as PCP - Patrizia Herebrt MD as PCP - Assigned PCP    The following health maintenance items are reviewed in Epic and correct as of today:  Health Maintenance   Topic Date Due     HEPATITIS C SCREENING  1968     LIPID SCREEN Q5 YR MALE (SYSTEM ASSIGNED)  1985     ZOSTER IMMUNIZATION (1 of 2) 2000     DTAP/TDAP/TD IMMUNIZATION (1 - Tdap) 2000     ADVANCE DIRECTIVE PLANNING Q5 YRS  2005     AORTIC ANEURYSM SCREENING (SYSTEM ASSIGNED)  2015     FALL RISK ASSESSMENT  2020     PHQ-2 Q1 YR  2020     COLON CANCER SCREEN (SYSTEM ASSIGNED)  2026     INFLUENZA VACCINE  Completed     IPV IMMUNIZATION  Aged Out     MENINGITIS IMMUNIZATION  Aged Out     BP Readings from Last 3 Encounters:   19 130/80   19  "126/80   01/22/19 123/82    Wt Readings from Last 3 Encounters:   01/30/19 98 kg (216 lb)   01/25/19 100.7 kg (222 lb)   01/04/19 100.2 kg (221 lb)                  Pneumonia Vaccine:Adults age 65+ who received Pneumovax (PPSV23) at 65 years or older: Should be given PCV13 > 1 year after their most recent PPSV23    ROS:  Constitutional, HEENT, cardiovascular, pulmonary, GI, , musculoskeletal, neuro, skin, endocrine and psych systems are negative, except as otherwise noted.    OBJECTIVE:   /80   Pulse 64   Temp 96.9  F (36.1  C) (Tympanic)   Ht 1.792 m (5' 10.55\")   Wt 98 kg (216 lb)   BMI 30.51 kg/m   Estimated body mass index is 30.51 kg/m  as calculated from the following:    Height as of this encounter: 1.792 m (5' 10.55\").    Weight as of this encounter: 98 kg (216 lb).  EXAM:   GENERAL: healthy, alert and no distress  EYES: Eyes grossly normal to inspection, PERRL and conjunctivae and sclerae normal  HENT: normal cephalic/atraumatic, ear canals and TM's normal, one apthous ulcer noted on the palate, multiple crusted over lesions on lips  NECK: no adenopathy, no asymmetry, masses, or scars and thyroid normal to palpation  RESP: lungs clear to auscultation - no rales, rhonchi or wheezes  CV: regular rate and rhythm, normal S1 S2, no S3 or S4, no murmur, click or rub, no peripheral edema and peripheral pulses strong  ABDOMEN: soft, nontender, no hepatosplenomegaly, no masses and bowel sounds normal  MS: no gross musculoskeletal defects noted, no edema  SKIN: no suspicious lesions or rashes  NEURO: Normal strength and tone, mentation intact and speech normal  PSYCH: mentation appears normal, affect normal/bright    Diagnostic Test Results:  none     ASSESSMENT / PLAN:   1. Annual visit for general adult medical examination without abnormal findings  - Lipid panel reflex to direct LDL Fasting  - Comprehensive metabolic panel  - CBC with platelets    2. History of basal cell carcinoma  Seeing the skin " "clinic.    3. Benign essential hypertension  Well controlled.   - Comprehensive metabolic panel  - Albumin Random Urine Quantitative with Creat Ratio  - triamterene-HCTZ (MAXZIDE-25) 37.5-25 MG tablet; Take 1 tablet by mouth daily  Dispense: 90 tablet; Refill: 3    4. Hyperlipidemia LDL goal <130  - Lipid panel reflex to direct LDL Fasting  - simvastatin (ZOCOR) 20 MG tablet; Take 1 tablet (20 mg) by mouth At Bedtime  Dispense: 90 tablet; Refill: 3    5. Need for hepatitis C screening test  - Hepatitis C Screen Reflex to HCV RNA Quant and Genotype    6. Obstructive sleep apnea syndrome  Wears his CPAP.    7. It band syndrome, unspecified laterality  - PHYSICAL THERAPY REFERRAL; Future    8. Acute pain of left shoulder  - PHYSICAL THERAPY REFERRAL; Future    9. Screening for prostate cancer  - Prostate spec antigen screen    10. Aphthous ulcer of mouth  Prnaay reports he broke out in sores after taking an OTC cold medication. Unclear if this was the trigger or if this could have been a breakout from his cold. Either way lesions are healing. Recommend watchful waiting.      End of Life Planning:  Patient currently has an advanced directive: No.  I have verified the patient's ablity to prepare an advanced directive/make health care decisions.  Literature was provided to assist patient in preparing an advanced directive.    COUNSELING:  Reviewed preventive health counseling, as reflected in patient instructions       Regular exercise       Healthy diet/nutrition       Vision screening       Hearing screening       Dental care       Hepatitis C screening       Colon cancer screening       Prostate cancer screening    BP Readings from Last 1 Encounters:   01/30/19 130/80     Estimated body mass index is 30.51 kg/m  as calculated from the following:    Height as of this encounter: 1.792 m (5' 10.55\").    Weight as of this encounter: 98 kg (216 lb).           reports that  has never smoked. he has never used smokeless " tobacco.      Appropriate preventive services were discussed with this patient, including applicable screening as appropriate for cardiovascular disease, diabetes, osteopenia/osteoporosis, and glaucoma.  As appropriate for age/gender, discussed screening for colorectal cancer, prostate cancer, breast cancer, and cervical cancer. Checklist reviewing preventive services available has been given to the patient.    Reviewed patients plan of care and provided an AVS. The Basic Care Plan (routine screening as documented in Health Maintenance) for Pranay meets the Care Plan requirement. This Care Plan has been established and reviewed with the Patient.    Counseling Resources:  ATP IV Guidelines  Pooled Cohorts Equation Calculator  Breast Cancer Risk Calculator  FRAX Risk Assessment  ICSI Preventive Guidelines  Dietary Guidelines for Americans, 2010  USDA's MyPlate  ASA Prophylaxis  Lung CA Screening    Brigida Veras MD  [facility] [place]

## 2019-01-30 NOTE — PATIENT INSTRUCTIONS
Preventive Health Recommendations:     See your health care provider every year to    Review health changes.     Discuss preventive care.      Review your medicines if your doctor has prescribed any.    Talk with your health care provider about whether you should have a test to screen for prostate cancer (PSA).    Every 3 years, have a diabetes test (fasting glucose). If you are at risk for diabetes, you should have this test more often.    Every 5 years, have a cholesterol test. Have this test more often if you are at risk for high cholesterol or heart disease.     Every 10 years, have a colonoscopy. Or, have a yearly FIT test (stool test). These exams will check for colon cancer.    Talk to with your health care provider about screening for Abdominal Aortic Aneurysm if you have a family history of AAA or have a history of smoking.  Shots:     Get a flu shot each year.     Get a tetanus shot every 10 years.     Talk to your doctor about your pneumonia vaccines. There are now two you should receive - Pneumovax (PPSV 23) and Prevnar (PCV 13).    Talk to your pharmacist about a shingles vaccine.     Talk to your doctor about the hepatitis B vaccine.  Nutrition:     Eat at least 5 servings of fruits and vegetables each day.     Eat whole-grain bread, whole-wheat pasta and brown rice instead of white grains and rice.     Get adequate Calcium and Vitamin D.   Lifestyle    Exercise for at least 150 minutes a week (30 minutes a day, 5 days a week). This will help you control your weight and prevent disease.     Limit alcohol to one drink per day.     No smoking.     Wear sunscreen to prevent skin cancer.     See your dentist every six months for an exam and cleaning.     See your eye doctor every 1 to 2 years to screen for conditions such as glaucoma, macular degeneration and cataracts.    Personalized Prevention Plan  You are due for the preventive services outlined below.  Your care team is available to assist you in  scheduling these services.  If you have already completed any of these items, please share that information with your care team to update in your medical record.    Health Maintenance Due   Topic Date Due     Hepatitis C Screening  01/31/1968     Cholesterol Lab - every 5 years  01/31/1985     Zoster (Shingles) Vaccine (1 of 2) 01/31/2000     Diptheria Tetanus Pertussis (DTAP/TDAP/TD) Vaccine (1 - Tdap) 07/13/2000     Discuss Advance Directive Planning  01/31/2005     AORTIC ANEURYSM SCREENING (SYSTEM ASSIGNED)  01/31/2015

## 2019-02-12 ENCOUNTER — OFFICE VISIT (OUTPATIENT)
Dept: FAMILY MEDICINE | Facility: CLINIC | Age: 69
End: 2019-02-12
Payer: COMMERCIAL

## 2019-02-12 VITALS
BODY MASS INDEX: 30.93 KG/M2 | DIASTOLIC BLOOD PRESSURE: 84 MMHG | HEART RATE: 89 BPM | OXYGEN SATURATION: 100 % | SYSTOLIC BLOOD PRESSURE: 122 MMHG | WEIGHT: 219 LBS | TEMPERATURE: 97.8 F

## 2019-02-12 DIAGNOSIS — M79.89 LOCALIZED SWELLING OF LOWER EXTREMITY: Primary | ICD-10-CM

## 2019-02-12 DIAGNOSIS — K21.00 GASTROESOPHAGEAL REFLUX DISEASE WITH ESOPHAGITIS: ICD-10-CM

## 2019-02-12 DIAGNOSIS — R97.20 ELEVATED PROSTATE SPECIFIC ANTIGEN (PSA): ICD-10-CM

## 2019-02-12 PROCEDURE — 99214 OFFICE O/P EST MOD 30 MIN: CPT | Performed by: INTERNAL MEDICINE

## 2019-02-12 RX ORDER — FUROSEMIDE 20 MG
TABLET ORAL
Qty: 30 TABLET | Refills: 1 | Status: SHIPPED | OUTPATIENT
Start: 2019-02-12 | End: 2022-03-09

## 2019-02-12 NOTE — PROGRESS NOTES
SUBJECTIVE:   Pranay Ratliff is a 69 year old male who presents to clinic today for the following health issues:      Medication Followup of  Triamterene    Taking Medication as prescribed: NO    Side Effects:  Eyes are dry, low labido    Medication Helping Symptoms:  yes     Pranay is concerned about his triamterene-hydrochlorothiazide. He recently got it filled and was reading the possible side effects so decided to try stopping it. Since stopping the medication he has noticed an improvement in his libido and also an improvement in his dry eye symptoms. He has been on this medication for several years.     Heartburn symptoms - was previously taking prilosec but heard about the possible side effects so wondering if there is something else he can try.    PSA elevation - noted at physical two weeks ago. Has a scheduled follow up in 2 weeks. Tells me that he does a lot of recumbant biking.       Problem list and histories reviewed & adjusted, as indicated.  Additional history: as documented    Patient Active Problem List   Diagnosis     History of basal cell carcinoma     Benign essential hypertension     Hyperlipidemia LDL goal <130     Obstructive sleep apnea syndrome     History reviewed. No pertinent surgical history.    Social History     Tobacco Use     Smoking status: Never Smoker     Smokeless tobacco: Never Used   Substance Use Topics     Alcohol use: Yes     Frequency: Monthly or less     Comment: twice a week     Family History   Problem Relation Age of Onset     Skin Cancer Mother          from pneumonia     Abdominal Aortic Aneurysm Mother      Heart Failure Father      Diabetes Type 1 Brother         Possibly secondary to chemical exposure in [place]     Diabetes Type 2  Maternal Grandmother      Cancer Brother         Exposure to Agent Orange in [place]     Heart Disease Brother            Reviewed and updated as needed this visit by clinical staff  Tobacco  Allergies  Meds  Med Hx  Surg Hx   Fam Hx  Soc Hx      Reviewed and updated as needed this visit by Provider         ROS:  Constitutional, HEENT, cardiovascular, pulmonary, gi and gu systems are negative, except as otherwise noted.    OBJECTIVE:     /84 (Cuff Size: Adult Regular)   Pulse 89   Temp 97.8  F (36.6  C) (Tympanic)   Wt 99.3 kg (219 lb)   SpO2 100%   BMI 30.93 kg/m    Body mass index is 30.93 kg/m .  GENERAL: healthy, alert and no distress  NECK: no adenopathy, no asymmetry, masses, or scars and thyroid normal to palpation  RESP: lungs clear to auscultation - no rales, rhonchi or wheezes  CV: regular rate and rhythm, normal S1 S2, no S3 or S4, no murmur, click or rub, no peripheral edema and peripheral pulses strong  ABDOMEN: soft, nontender, no hepatosplenomegaly, no masses and bowel sounds normal  MS: no gross musculoskeletal defects noted, no edema    Diagnostic Test Results:  none     ASSESSMENT/PLAN:     1. Localized swelling of lower extremity  Agree that Pranay should stop his Triamterene/HCTZ due to the side effects. If he experiences swelling he can use lasix on a PRN basis. However, I am worried that his blood pressure may get higher being off the medication. He has a home BP monitor and will monitor with goal of being around 130/80 or under. He will call with updates.   - furosemide (LASIX) 20 MG tablet; Take 1 tablet (20 mg) once daily as needed for swelling.  Dispense: 30 tablet; Refill: 1    2. Gastroesophageal reflux disease with esophagitis  Discussed lifestyle changes - smaller portions, not eating before bed. He can use Protonix for short periods of time or try using Zantac.     3. Elevated prostate specific antigen (PSA)  Recommending that Pranay avoid recumbant biking. He should       Follow up for lab appointment in 2 weeks and also with BP results.    Brigida Veras MD  [facility] [place]

## 2019-02-12 NOTE — PATIENT INSTRUCTIONS
Start measuring your blood pressure once or twice per week. The goal for your blood pressure is to be around 130/80 or under. If your blood pressure is increasing consistently beyond 140/90 then I would like you to let me know and I would likely start you on something else for your blood pressure.     For your heartburn - make sure to eat small portions and avoid eating a lot before laying in bed. Eating more fruits and vegetables is very helpful. Some medications you could consider would be over the counter prilosec once daily for 14 days or Zantac daily.

## 2019-02-13 ENCOUNTER — OFFICE VISIT (OUTPATIENT)
Dept: FAMILY MEDICINE | Facility: CLINIC | Age: 69
End: 2019-02-13
Payer: COMMERCIAL

## 2019-02-13 VITALS — SYSTOLIC BLOOD PRESSURE: 113 MMHG | OXYGEN SATURATION: 98 % | DIASTOLIC BLOOD PRESSURE: 75 MMHG | HEART RATE: 82 BPM

## 2019-02-13 DIAGNOSIS — L30.9 ECZEMA, UNSPECIFIED TYPE: ICD-10-CM

## 2019-02-13 DIAGNOSIS — B07.0 PLANTAR WARTS: Primary | ICD-10-CM

## 2019-02-13 PROCEDURE — 99213 OFFICE O/P EST LOW 20 MIN: CPT | Performed by: FAMILY MEDICINE

## 2019-02-13 RX ORDER — TRIAMCINOLONE ACETONIDE 1 MG/G
CREAM TOPICAL 2 TIMES DAILY
Qty: 30 G | Refills: 1 | Status: SHIPPED | OUTPATIENT
Start: 2019-02-13 | End: 2021-07-06 | Stop reason: ALTCHOICE

## 2019-02-13 NOTE — PATIENT INSTRUCTIONS
"FUTURE APPOINTMENTS  Follow up in 2 weeks for re-evaluation of warts.    Continue applying WartPeel as previously instructed.    DRY SKIN MANAGEMENT INSTRUCTIONS  Routine use of moisturizer is important for healthy, resilient skin not just for soft skin.     Sealing in moisture    Twice daily use of a moisturizer such as over-the-counter (OTC) CeraVe moisturizer cream (in the jar). CeraVe products contain ceramides and filaggrin proteins that can help to maintain the body's moisture layer.    After cleansing or washing, always apply moisturizer immediately after drying off (pat dry only) for best effect.    Protection while hydrating    Do not overuse soap. Unless you have been sweating extensively, just apply soap to groin and armpits.    Recommended products for body include: OTC unscented Dove for sensitive skin or OTC Vanicream cleansing bar.    Recommended facial cleansers include: OTC CeraVe hydrating facial cleanser or OTC Cetaphil daily facial cleanser.    Always try to wear rubber gloves when washing dishes or cleaning.    Avoid use of    Scented/perfumed products    Irritating clothing (wool, new jeans, new/unwashed clothing, scratchy synthetics)    Neosporin or triple antibiotic topical products    Products containing aloe, herbs, Vitamin E, or other \"natural ingredients\".    Dryer sheets or fabric softeners (while symptoms are present)    If a topical medication is prescribed, apply topical prescription first, followed by use of moisturizing product.    TOPICAL STEROID INSTRUCTIONS  Triamcinolone 0.1% cream.  Apply two times per day for 10-14 days. Then, use only when needed.  1. Wash hands before applying topical steroid.  2. Apply sparingly (just enough to rub in) onto affected areas of the left hand.  (Use the adult fingertip unit (FTU) as a guide.) Apply no more than 0.25 FTU per area.      This higher strength steroid should never be used on face nor groin.    After the initial treatment, topical " steroid may be used as needed for flare-ups but only for short-term treatment. If you are using this for prolonged periods of time to control flare-ups, return to clinic for re-evaluation of treatment.    Keep in mind to also regularly use moisturizer, as this preventative measure can help maintain your skin's natural protective moisture barrier.  Wear socks and/or cotton gloves (can purchase at any retail pharmacy) after application of moisturizer and topical steroid nightly at bedtime and wear until the morning.

## 2019-02-13 NOTE — LETTER
2/13/2019         RE: Pranay Ratliff  [address] [address]        Dear Colleague,    Thank you for [place] your [place], [place]iff[place] [facility] [place] see a copy of my visit note below.    [facility] - PRIMARY CARE SKIN    CC: wart(s)  SUBJECTIVE:   Pranay Ratliff is a(n) 68 year old male who presents to clinic today for follow-up of warts on the right foot that started in 2016.    Type of treatment: WartPeel started Oct 15, 2018. He is applying it every night. He has also been paring dead skin with a pumice stone.  Treatment response: The warts do not appear to have changed much in size, and callused skin is still present.  Side effects: None.    Previous therapies tried: OTC wart treatments    Issue Two; He has noticed dry, itchy skin on the left hand. OTC hydrocortisone 1% cream has helped relieve some of the itching. He does not use a moisturizer regularly on the hands; he has Aveeno at home.    Personal history of skin cancer : YES - history of basal cell carcinoma on the corner of the right lower eyelid (s/p MMS approximately 3327-1739); basal cell carcinoma on mid-forehead (s/p MMS 12/20/18).  Family history of skin cancer : YES - keratinocyte carcinoma(s) in mother.     Personal Medical History  Eczema Psoriasis Autoimmune   YES NO NO      Family Medical History  Eczema Psoriasis Autoimmune   NO YES - in brother and in paternal uncle Rheumatoid arthritis in mother, maternal aunt, and maternal cousin      Sun Exposure History  Previous history of significant sun exposure: He lives in [place], [place] and [place] and has had annual skin exams.  Blistering sunburns : NO  Tanning beds : NO.  Sunscreen Use : YES, SPF : uses Blue Lizard 50+.  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    Refer to electronic medical record (EMR) for past medical history and medications.    ROS: 14 point review of systems was negative except the symptoms listed above in the HPI.    This document serves as a record of the services and decisions personally performed and made by Patrizia Paige MD and was created by Anupam Payne, a trained medical scribe, based on personal observations and provider statements to the medical scribe.  February 13, 2019 9:18 AM   Anupam Payne    OBJECTIVE:   GENERAL: healthy, alert and no distress.  SKIN: Hudson Skin Type - II.  Hand, Feet and Toes examined. The dermatoscope was used to help evaluate pigmented lesions.  Skin Pertinent Findings:  Right foot, plantar surface: clear    Left dorsal hand: maculopapular patches varying in size from 5 - 20 mm.    ASSESSMENT & PLAN:     Encounter Diagnoses   Name Primary?     Plantar warts Yes     Eczema, unspecified type      MDM:   Discussed the viral cause of warts and potential need for multiple treatments. Treatment options include observation, cryotherapy, curettage, candida, cantharidin and contact immunotherapy, WartPeel. Potential side effects include blister formation, scarring, and pain depending on the treatment. The patient decided to continue treatment of the wart(s) with WartPeel.        Patient Instructions   FUTURE APPOINTMENTS  Follow up in 2 weeks for re-evaluation of warts.    Continue applying WartPeel as previously instructed.    DRY SKIN MANAGEMENT INSTRUCTIONS  Routine use of moisturizer is important for healthy, resilient skin not just for soft skin.     Sealing in moisture    Twice daily use of a moisturizer such as over-the-counter (OTC) CeraVe moisturizer cream (in the jar). CeraVe products contain ceramides and filaggrin proteins that can help to maintain the body's moisture layer.    After cleansing or washing, always apply moisturizer immediately after drying off (pat dry only) for  "best effect.    Protection while hydrating    Do not overuse soap. Unless you have been sweating extensively, just apply soap to groin and armpits.    Recommended products for body include: OTC unscented Dove for sensitive skin or OTC Vanicream cleansing bar.    Recommended facial cleansers include: OTC CeraVe hydrating facial cleanser or OTC Cetaphil daily facial cleanser.    Always try to wear rubber gloves when washing dishes or cleaning.    Avoid use of    Scented/perfumed products    Irritating clothing (wool, new jeans, new/unwashed clothing, scratchy synthetics)    Neosporin or triple antibiotic topical products    Products containing aloe, herbs, Vitamin E, or other \"natural ingredients\".    Dryer sheets or fabric softeners (while symptoms are present)    If a topical medication is prescribed, apply topical prescription first, followed by use of moisturizing product.    TOPICAL STEROID INSTRUCTIONS  Triamcinolone 0.1% cream.  Apply two times per day for 10-14 days. Then, use only when needed.  1. Wash hands before applying topical steroid.  2. Apply sparingly (just enough to rub in) onto affected areas of the left hand.  (Use the adult fingertip unit (FTU) as a guide.) Apply no more than 0.25 FTU per area.      This higher strength steroid should never be used on face nor groin.    After the initial treatment, topical steroid may be used as needed for flare-ups but only for short-term treatment. If you are using this for prolonged periods of time to control flare-ups, return to clinic for re-evaluation of treatment.    Keep in mind to also regularly use moisturizer, as this preventative measure can help maintain your skin's natural protective moisture barrier.  Wear socks and/or cotton gloves (can purchase at any retail pharmacy) after application of moisturizer and topical steroid nightly at bedtime and wear until the morning.    TT: 20 minutes.  CT: 15 minutes, discussing treatment options (including " insurance considerations), side effects, risks and benefits of the treatment options, management of pain and blister formation and when to return if not improving.    The information in this document, created by the medical scribe for me, accurately reflects the services I personally performed and the decisions made by me. I have reviewed and approved this document for accuracy prior to leaving the patient care area.  February 13, 2019 9:17 AM  Patrizia Paige MD  [facility]    Again, thank you for allowing me to participate in the care of your patient.        Sincerely,        Patrizia Paige MD

## 2019-02-13 NOTE — PROGRESS NOTES
[facility] - PRIMARY CARE SKIN    CC: wart(s)  SUBJECTIVE:   Pranay Ratliff is a(n) 68 year old male who presents to clinic today for follow-up of warts on the right foot that started in 2016.    Type of treatment: WartPeel started Oct 15, 2018. He is applying it every night. He has also been paring dead skin with a pumice stone.  Treatment response: The warts do not appear to have changed much in size, and callused skin is still present.  Side effects: None.    Previous therapies tried: OTC wart treatments    Issue Two; He has noticed dry, itchy skin on the left hand. OTC hydrocortisone 1% cream has helped relieve some of the itching. He does not use a moisturizer regularly on the hands; he has Aveeno at home.    Personal history of skin cancer : YES - history of basal cell carcinoma on the corner of the right lower eyelid (s/p MMS approximately 5333-2240); basal cell carcinoma on mid-forehead (s/p MMS 12/20/18).  Family history of skin cancer : YES - keratinocyte carcinoma(s) in mother.     Personal Medical History  Eczema Psoriasis Autoimmune   YES NO NO      Family Medical History  Eczema Psoriasis Autoimmune   NO YES - in brother and in paternal uncle Rheumatoid arthritis in mother, maternal aunt, and maternal cousin      Sun Exposure History  Previous history of significant sun exposure: He lives in [place], [place] and [place] and has had annual skin exams.  Blistering sunburns : NO  Tanning beds : NO.  Sunscreen Use : YES, SPF : uses Blue Lizard 50+.  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    Refer to electronic medical record (EMR) for past medical history and medications.    ROS: 14 point review of systems was negative except the symptoms listed above in the HPI.    This document serves as a record of the  services and decisions personally performed and made by Patrizia Paige MD and was created by Anupam Payne, a trained medical scribe, based on personal observations and provider statements to the medical scribe.  February 13, 2019 9:18 AM   Anupam Payne    OBJECTIVE:   GENERAL: healthy, alert and no distress.  SKIN: Hudson Skin Type - II.  Hand, Feet and Toes examined. The dermatoscope was used to help evaluate pigmented lesions.  Skin Pertinent Findings:  Right foot, plantar surface: clear    Left dorsal hand: maculopapular patches varying in size from 5 - 20 mm.    ASSESSMENT & PLAN:     Encounter Diagnoses   Name Primary?     Plantar warts Yes     Eczema, unspecified type      MDM:   Discussed the viral cause of warts and potential need for multiple treatments. Treatment options include observation, cryotherapy, curettage, candida, cantharidin and contact immunotherapy, WartPeel. Potential side effects include blister formation, scarring, and pain depending on the treatment. The patient decided to continue treatment of the wart(s) with WartPeel.        Patient Instructions   FUTURE APPOINTMENTS  Follow up in 2 weeks for re-evaluation of warts.    Continue applying WartPeel as previously instructed.    DRY SKIN MANAGEMENT INSTRUCTIONS  Routine use of moisturizer is important for healthy, resilient skin not just for soft skin.     Sealing in moisture    Twice daily use of a moisturizer such as over-the-counter (OTC) CeraVe moisturizer cream (in the jar). CeraVe products contain ceramides and filaggrin proteins that can help to maintain the body's moisture layer.    After cleansing or washing, always apply moisturizer immediately after drying off (pat dry only) for best effect.    Protection while hydrating    Do not overuse soap. Unless you have been sweating extensively, just apply soap to groin and armpits.    Recommended products for body include: OTC unscented Dove for sensitive skin or OTC Vanicream  "cleansing bar.    Recommended facial cleansers include: OTC CeraVe hydrating facial cleanser or OTC Cetaphil daily facial cleanser.    Always try to wear rubber gloves when washing dishes or cleaning.    Avoid use of    Scented/perfumed products    Irritating clothing (wool, new jeans, new/unwashed clothing, scratchy synthetics)    Neosporin or triple antibiotic topical products    Products containing aloe, herbs, Vitamin E, or other \"natural ingredients\".    Dryer sheets or fabric softeners (while symptoms are present)    If a topical medication is prescribed, apply topical prescription first, followed by use of moisturizing product.    TOPICAL STEROID INSTRUCTIONS  Triamcinolone 0.1% cream.  Apply two times per day for 10-14 days. Then, use only when needed.  1. Wash hands before applying topical steroid.  2. Apply sparingly (just enough to rub in) onto affected areas of the left hand.  (Use the adult fingertip unit (FTU) as a guide.) Apply no more than 0.25 FTU per area.      This higher strength steroid should never be used on face nor groin.    After the initial treatment, topical steroid may be used as needed for flare-ups but only for short-term treatment. If you are using this for prolonged periods of time to control flare-ups, return to clinic for re-evaluation of treatment.    Keep in mind to also regularly use moisturizer, as this preventative measure can help maintain your skin's natural protective moisture barrier.  Wear socks and/or cotton gloves (can purchase at any retail pharmacy) after application of moisturizer and topical steroid nightly at bedtime and wear until the morning.    TT: 20 minutes.  CT: 15 minutes, discussing treatment options (including insurance considerations), side effects, risks and benefits of the treatment options, management of pain and blister formation and when to return if not improving.    The information in this document, created by the medical scribe for me, accurately " reflects the services I personally performed and the decisions made by me. I have reviewed and approved this document for accuracy prior to leaving the patient care area.  February 13, 2019 9:17 AM  Patrizia Paige MD  [facility] [place]

## 2019-02-14 PROBLEM — K21.00 GASTROESOPHAGEAL REFLUX DISEASE WITH ESOPHAGITIS: Status: ACTIVE | Noted: 2019-02-14

## 2019-02-14 PROBLEM — M79.89 LOCALIZED SWELLING OF LOWER EXTREMITY: Status: ACTIVE | Noted: 2019-02-14

## 2019-02-26 ENCOUNTER — OFFICE VISIT (OUTPATIENT)
Dept: FAMILY MEDICINE | Facility: CLINIC | Age: 69
End: 2019-02-26
Payer: COMMERCIAL

## 2019-02-26 VITALS — DIASTOLIC BLOOD PRESSURE: 82 MMHG | OXYGEN SATURATION: 99 % | HEART RATE: 68 BPM | SYSTOLIC BLOOD PRESSURE: 136 MMHG

## 2019-02-26 DIAGNOSIS — B07.0 PLANTAR WARTS: Primary | ICD-10-CM

## 2019-02-26 DIAGNOSIS — Z12.5 SCREENING FOR PROSTATE CANCER: ICD-10-CM

## 2019-02-26 PROCEDURE — 36415 COLL VENOUS BLD VENIPUNCTURE: CPT | Performed by: INTERNAL MEDICINE

## 2019-02-26 PROCEDURE — 99213 OFFICE O/P EST LOW 20 MIN: CPT | Performed by: FAMILY MEDICINE

## 2019-02-26 PROCEDURE — G0103 PSA SCREENING: HCPCS | Performed by: INTERNAL MEDICINE

## 2019-02-26 NOTE — PATIENT INSTRUCTIONS
FUTURE APPOINTMENTS  Follow up in 2 week(s).    Continue applying WartPeel nightly.  Continue using the pumice stone to pare down calluses.

## 2019-02-26 NOTE — PROGRESS NOTES
[facility] - PRIMARY CARE SKIN    CC: wart(s)  SUBJECTIVE:   Pranay Ratliff is a(n) 68 year old male who presents to clinic today for follow-up of warts on the right foot that started in 2016.     He will be traveling to [place] beginning 3/26.    Type of treatment: WartPeel started Oct 15, 2018. Recall that he has been applying it every night, but he has previously had difficulty getting the medication absorbed due to thick calluses. He has also been paring dead skin with a pumice stone.  Treatment response: Warts are smaller in size.  Side effects: None.    Previous therapies tried: OTC wart treatments    Personal history of skin cancer : YES - history of basal cell carcinoma on the corner of the right lower eyelid (s/p MMS approximately 4193-4271); basal cell carcinoma on mid-forehead (s/p MMS 12/20/18).  Family history of skin cancer : YES - keratinocyte carcinoma(s) in mother.     Personal Medical History  Eczema Psoriasis Autoimmune   YES NO NO      Family Medical History  Eczema Psoriasis Autoimmune   NO YES - in brother and in paternal uncle Rheumatoid arthritis in mother, maternal aunt, and maternal cousin      Sun Exposure History  Previous history of significant sun exposure: He lives in [place], [place] and [place] and has had annual skin exams.  Blistering sunburns : NO  Tanning beds : NO.  Sunscreen Use : YES, SPF : uses Blue Lizard 50+.  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    Refer to electronic medical record (EMR) for past medical history and medications.    ROS: 14 point review of systems was negative except the symptoms listed above in the HPI.    This document serves as a record of the services and decisions personally performed and made by Patrizia Paige MD and was created by Anupam Payne, a trained  medical scribe, based on personal observations and provider statements to the medical scribe.  February 26, 2019 8:24 AM   Anupam Elmer    OBJECTIVE:   GENERAL: healthy, alert and no distress.  SKIN: Hudson Skin Type - II.  Hand, Feet and Toes examined. The dermatoscope was used to help evaluate pigmented lesions.  Skin Pertinent Findings:  Right foot: verrucous lesion is smaller in size    ASSESSMENT & PLAN:     Encounter Diagnosis   Name Primary?     Plantar warts Yes     MDM:   Discussed the viral cause of warts and potential need for multiple treatments. Treatment options include observation, cryotherapy, curettage, candida, cantharidin and contact immunotherapy, WartPeel. Potential side effects include blister formation, scarring, and pain depending on the treatment. The patient decided to continue treatment of the wart(s) with WartPeel.    Patient Instructions   FUTURE APPOINTMENTS  Follow up in 2 week(s).    Continue applying WartPeel nightly.  Continue using the pumice stone to pare down calluses.    TT: 20 minutes.  CT: 15 minutes, discussing treatment options (including insurance considerations), side effects, risks and benefits of the treatment options, management of pain and blister formation and when to return if not improving.    The information in this document, created by the medical scribe for me, accurately reflects the services I personally performed and the decisions made by me. I have reviewed and approved this document for accuracy prior to leaving the patient care area.  February 26, 2019 8:23 AM  Patrizia Paige MD  [facility] [place]

## 2019-02-26 NOTE — LETTER
2/26/2019         RE: Pranay Ratliff  [address] [address]        Dear Colleague,    Thank you for referring your patient, Pranay Ratliff, to the [facility] [place]. Please see a copy of my visit note below.    [facility] - PRIMARY CARE SKIN    CC: wart(s)  SUBJECTIVE:   Pranay Ratliff is a(n) 68 year old male who presents to clinic today for follow-up of warts on the right foot that started in 2016.     He will be traveling to [place] beginning 3/26.    Type of treatment: WartPeel started Oct 15, 2018. Recall that he has been applying it every night, but he has previously had difficulty getting the medication absorbed due to thick calluses. He has also been paring dead skin with a pumice stone.  Treatment response: Warts are smaller in size.  Side effects: None.    Previous therapies tried: OTC wart treatments    Personal history of skin cancer : YES - history of basal cell carcinoma on the corner of the right lower eyelid (s/p MMS approximately 0879-8115); basal cell carcinoma on mid-forehead (s/p MMS 12/20/18).  Family history of skin cancer : YES - keratinocyte carcinoma(s) in mother.     Personal Medical History  Eczema Psoriasis Autoimmune   YES NO NO      Family Medical History  Eczema Psoriasis Autoimmune   NO YES - in brother and in paternal uncle Rheumatoid arthritis in mother, maternal aunt, and maternal cousin      Sun Exposure History  Previous history of significant sun exposure: He lives in [place], [place] and [place] and has had annual skin exams.  Blistering sunburns : NO  Tanning beds : NO.  Sunscreen Use : YES, SPF : uses Blue Lizard 50+.  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    Refer to electronic medical record (EMR) for past medical history and medications.    ROS:  14 point review of systems was negative except the symptoms listed above in the HPI.    This document serves as a record of the services and decisions personally performed and made by Patrizia Paige MD and was created by Anupam Payne, a trained medical scribe, based on personal observations and provider statements to the medical scribe.  February 26, 2019 8:24 AM   Anupam Payne    OBJECTIVE:   GENERAL: healthy, alert and no distress.  SKIN: Hudson Skin Type - II.  Hand, Feet and Toes examined. The dermatoscope was used to help evaluate pigmented lesions.  Skin Pertinent Findings:  Right foot: verrucous lesion is smaller in size    ASSESSMENT & PLAN:     Encounter Diagnosis   Name Primary?     Plantar warts Yes     MDM:   Discussed the viral cause of warts and potential need for multiple treatments. Treatment options include observation, cryotherapy, curettage, candida, cantharidin and contact immunotherapy, WartPeel. Potential side effects include blister formation, scarring, and pain depending on the treatment. The patient decided to continue treatment of the wart(s) with WartPeel.    Patient Instructions   FUTURE APPOINTMENTS  Follow up in 2 week(s).    Continue applying WartPeel nightly.  Continue using the pumice stone to pare down calluses.    TT: 20 minutes.  CT: 15 minutes, discussing treatment options (including insurance considerations), side effects, risks and benefits of the treatment options, management of pain and blister formation and when to return if not improving.    The information in this document, created by the medical scribe for me, accurately reflects the services I personally performed and the decisions made by me. I have reviewed and approved this document for accuracy prior to leaving the patient care area.  February 26, 2019 8:23 AM  Patrizia Paige MD  [facility] [place]    Again, thank you for allowing me to participate in the care of your patient.         Sincerely,        Patrizia Paige MD

## 2019-02-27 ENCOUNTER — TELEPHONE (OUTPATIENT)
Dept: FAMILY MEDICINE | Facility: CLINIC | Age: 69
End: 2019-02-27

## 2019-02-27 DIAGNOSIS — R97.20 ELEVATED PROSTATE SPECIFIC ANTIGEN (PSA): Primary | ICD-10-CM

## 2019-02-27 LAB — PSA SERPL-ACNC: 6.32 UG/L (ref 0–4)

## 2019-02-27 NOTE — TELEPHONE ENCOUNTER
S/w pt and gave Dr. Veras's reply and referral information below.  Pt will call to schedule an appt with urology.    Jenifer MENENDEZ RN  EP Triage

## 2019-02-27 NOTE — TELEPHONE ENCOUNTER
Please let Pranay know that his PSA level is about the same (6.3). I would like him to see a Urologist for an evaluation. I am placing a referral to [facility] in [place]. [facility] [place] [phone]      Pranay will need to call and schedule an appointment. They should have our records. Thanks.

## 2019-03-02 ENCOUNTER — HOSPITAL ENCOUNTER (EMERGENCY)
Facility: CLINIC | Age: 69
Discharge: HOME | End: 2019-03-02
Attending: EMERGENCY MEDICINE | Admitting: EMERGENCY MEDICINE
Payer: COMMERCIAL

## 2019-03-02 ENCOUNTER — APPOINTMENT (OUTPATIENT)
Dept: CT IMAGING | Facility: CLINIC | Age: 69
End: 2019-03-02
Attending: EMERGENCY MEDICINE
Payer: COMMERCIAL

## 2019-03-02 ENCOUNTER — APPOINTMENT (OUTPATIENT)
Dept: GENERAL RADIOLOGY | Facility: CLINIC | Age: 69
End: 2019-03-02
Attending: EMERGENCY MEDICINE
Payer: COMMERCIAL

## 2019-03-02 VITALS
HEART RATE: 84 BPM | SYSTOLIC BLOOD PRESSURE: 134 MMHG | HEIGHT: 72 IN | OXYGEN SATURATION: 97 % | TEMPERATURE: 97.6 F | RESPIRATION RATE: 10 BRPM | WEIGHT: 212 LBS | BODY MASS INDEX: 28.71 KG/M2 | DIASTOLIC BLOOD PRESSURE: 91 MMHG

## 2019-03-02 DIAGNOSIS — R55 VASOVAGAL SYNCOPE: ICD-10-CM

## 2019-03-02 DIAGNOSIS — S00.81XA FOREHEAD ABRASION, INITIAL ENCOUNTER: ICD-10-CM

## 2019-03-02 DIAGNOSIS — S06.0X9A CONCUSSION WITH LOSS OF CONSCIOUSNESS, INITIAL ENCOUNTER: ICD-10-CM

## 2019-03-02 DIAGNOSIS — S09.90XA CLOSED HEAD INJURY, INITIAL ENCOUNTER: ICD-10-CM

## 2019-03-02 DIAGNOSIS — R55 SYNCOPE AND COLLAPSE: ICD-10-CM

## 2019-03-02 LAB
ANION GAP SERPL CALCULATED.3IONS-SCNC: 7 MMOL/L (ref 3–14)
BASOPHILS # BLD AUTO: 0.1 10E9/L (ref 0–0.2)
BASOPHILS NFR BLD AUTO: 0.6 %
BUN SERPL-MCNC: 14 MG/DL (ref 7–30)
CALCIUM SERPL-MCNC: 8.8 MG/DL (ref 8.5–10.1)
CHLORIDE SERPL-SCNC: 106 MMOL/L (ref 94–109)
CO2 SERPL-SCNC: 24 MMOL/L (ref 20–32)
CREAT SERPL-MCNC: 1.08 MG/DL (ref 0.66–1.25)
D DIMER PPP FEU-MCNC: 0.3 UG/ML FEU (ref 0–0.5)
DIFFERENTIAL METHOD BLD: ABNORMAL
EOSINOPHIL # BLD AUTO: 0.2 10E9/L (ref 0–0.7)
EOSINOPHIL NFR BLD AUTO: 2.6 %
ERYTHROCYTE [DISTWIDTH] IN BLOOD BY AUTOMATED COUNT: 13.6 % (ref 10–15)
GFR SERPL CREATININE-BSD FRML MDRD: 70 ML/MIN/{1.73_M2}
GLUCOSE SERPL-MCNC: 95 MG/DL (ref 70–99)
HCT VFR BLD AUTO: 39.4 % (ref 40–53)
HGB BLD-MCNC: 13.6 G/DL (ref 13.3–17.7)
IMM GRANULOCYTES # BLD: 0 10E9/L (ref 0–0.4)
IMM GRANULOCYTES NFR BLD: 0.1 %
INTERPRETATION ECG - MUSE: NORMAL
LYMPHOCYTES # BLD AUTO: 1.9 10E9/L (ref 0.8–5.3)
LYMPHOCYTES NFR BLD AUTO: 22.5 %
MCH RBC QN AUTO: 30.2 PG (ref 26.5–33)
MCHC RBC AUTO-ENTMCNC: 34.5 G/DL (ref 31.5–36.5)
MCV RBC AUTO: 87 FL (ref 78–100)
MONOCYTES # BLD AUTO: 1 10E9/L (ref 0–1.3)
MONOCYTES NFR BLD AUTO: 11.4 %
NEUTROPHILS # BLD AUTO: 5.3 10E9/L (ref 1.6–8.3)
NEUTROPHILS NFR BLD AUTO: 62.8 %
NRBC # BLD AUTO: 0 10*3/UL
NRBC BLD AUTO-RTO: 0 /100
PLATELET # BLD AUTO: 247 10E9/L (ref 150–450)
POTASSIUM SERPL-SCNC: 3.9 MMOL/L (ref 3.4–5.3)
RBC # BLD AUTO: 4.51 10E12/L (ref 4.4–5.9)
SODIUM SERPL-SCNC: 137 MMOL/L (ref 133–144)
TROPONIN I SERPL-MCNC: <0.015 UG/L (ref 0–0.04)
TSH SERPL DL<=0.005 MIU/L-ACNC: 3.64 MU/L (ref 0.4–4)
WBC # BLD AUTO: 8.4 10E9/L (ref 4–11)

## 2019-03-02 PROCEDURE — 85379 FIBRIN DEGRADATION QUANT: CPT | Performed by: EMERGENCY MEDICINE

## 2019-03-02 PROCEDURE — 25000128 H RX IP 250 OP 636: Performed by: EMERGENCY MEDICINE

## 2019-03-02 PROCEDURE — 80048 BASIC METABOLIC PNL TOTAL CA: CPT | Performed by: EMERGENCY MEDICINE

## 2019-03-02 PROCEDURE — 84443 ASSAY THYROID STIM HORMONE: CPT | Performed by: EMERGENCY MEDICINE

## 2019-03-02 PROCEDURE — 84484 ASSAY OF TROPONIN QUANT: CPT | Performed by: EMERGENCY MEDICINE

## 2019-03-02 PROCEDURE — 96360 HYDRATION IV INFUSION INIT: CPT

## 2019-03-02 PROCEDURE — 99285 EMERGENCY DEPT VISIT HI MDM: CPT | Mod: 25

## 2019-03-02 PROCEDURE — 93005 ELECTROCARDIOGRAM TRACING: CPT

## 2019-03-02 PROCEDURE — 85025 COMPLETE CBC W/AUTO DIFF WBC: CPT | Performed by: EMERGENCY MEDICINE

## 2019-03-02 PROCEDURE — 70450 CT HEAD/BRAIN W/O DYE: CPT

## 2019-03-02 PROCEDURE — 71046 X-RAY EXAM CHEST 2 VIEWS: CPT

## 2019-03-02 RX ADMIN — SODIUM CHLORIDE 1000 ML: 9 INJECTION, SOLUTION INTRAVENOUS at 17:40

## 2019-03-02 ASSESSMENT — ENCOUNTER SYMPTOMS
SHORTNESS OF BREATH: 0
WOUND: 1
WEAKNESS: 1
DIZZINESS: 1
HEADACHES: 1

## 2019-03-02 ASSESSMENT — MIFFLIN-ST. JEOR: SCORE: 1764.63

## 2019-03-02 NOTE — ED PROVIDER NOTES
History     Chief Complaint:  Syncopal Episode    HPI   Pranay Ratliff is a 69 year old male who presents with a syncopal episode. The patient reports that he was at [employer] prior to his arrival in the ED where he had used the sauna, then the whirlpool for 15 minutes, and then went to take a shower. After he finished his shower, he started to feel very weak and sat down in a shower chair. The next thing the patient knew, someone had found him on the ground and there was blood around him. The patient has no memory of passing out and falling over. He believes that he hit his on the corner of the shower stall when he fell because the right side of his forehead was bleeding. The patient currently feels a little shaky, weak, and has a headache. The patient denies any chest pain or tightness, shortness of breath, visual changes, or any history of diabetes.     The patient also reports that he takes a baby aspirin every day as well as diazide.    Allergies:  [facility] Allergy [Diphenhydramine]     Medications:    Aspirin  Debrox  Lotrisone  Lasix  Zocor  Kenalog  Fish-Oil     Past Medical History:    Basal Cell Carcinoma  Gastroesophageal Reflux Disease with Esophagitis  Localized swelling of lower extremity  Obstructive sleep apnea syndrome  Benign Essential Hypertension    Past Surgical History:    History reviewed. No pertinent surgical history.    Family History:    Mother: Skin Cancer, Abdominal Aortic Aneurysm  Father: Congestive Heart Failure  Brother: Diabetes. Heart Disease, Cancer    Social History:  Smoking Status: Never Smoker  Alcohol Use: Yes  Patient presents with family.  Marital Status:  Single      Review of Systems   Eyes: Negative for visual disturbance.   Respiratory: Negative for shortness of breath.    Cardiovascular: Negative for chest pain.   Skin: Positive for wound.   Neurological: Positive for dizziness, syncope, weakness and headaches.   All other systems reviewed and are  negative.    Physical Exam   First Vitals:  BP: (!) 163/93  Pulse: 69  Heart Rate: 68  Temp: 97.6  F (36.4  C)  Resp: 16  Height: 182.9 cm (6')  Weight: 96.2 kg (212 lb)  SpO2: 100 %  Lying Orthostatic BP: 154/90  Lying Orthostatic Pulse: 68 bpm  Standing Orthostatic BP: 134/91  Standing Orthostatic Pulse: 84 bpm    Physical Exam  General: Alert, appears well-developed and well-nourished. Cooperative.     No distress  HEENT:  Head:  Right forehead hematoma and abrasion.  Ears:  External ears are normal  Mouth/Throat:  Oropharynx is without erythema or exudate and mucous membranes are dry.   Eyes:   Conjunctivae normal and EOM are normal. No scleral icterus.    Pupils are equal, round, and reactive to light.   Neck:   Normal range of motion. Neck supple.  CV:  Normal rate, regular rhythm, normal heart sounds and radial pulses are 2+ and symmetric.  No murmur.  Resp:  Breath sounds are clear bilaterally    Non-labored, no retractions or accessory muscle use  GI:  Abdomen is soft, no distension, no tenderness. No rebound or guarding.  No CVA tenderness bilaterally  MS:  Normal range of motion. No edema.    Normal strength in all 4 extremities.     Back atraumatic.    No midline cervical, thoracic, or lumbar tenderness  Skin:  Warm and dry.  No rash or lesions noted.  Neuro: Alert. Normal strength. GCS: 15  Psych:  Normal mood and affect.    Emergency Department Course   ECG:  ECG (16:37:40):  Rate 72 bpm. AZ interval 150. QRS duration 88. QT/QTc 404/442. P-R-T axes 15 31 25. Normal sinus rhythm. Normal ECG. Interpreted at 1707 by Sawyer Ortega MD.    Imaging:  Radiographic findings were communicated with the patient and family who voiced understanding of the findings.  CT Head w/o contrast  1. No evidence of acute intracranial hemorrhage, mass, or herniation.  2. Right frontal scalp soft tissue injury without underlying fracture.  As read by Radiology.    XR Chest 2 Views  Heart and pulmonary vessels within normal  limits. Lungs clear. No  pleural effusion.  As read by Radiology.    Laboratory:  CBC: WNL (WBC 8.4, HGB 13.6, )  BMP: WNL (Creatinine 1.08)  Troponin I: <0.015  D Dimer Quantitative: 0.3  TSH with free T4 reflex: 3.64    Interventions:  1740 NS 1L IV Bolus    Emergency Department Course:  Past medical records, nursing notes, and vitals reviewed.  1710: I performed an exam of the patient and obtained history, as documented above.    The patient was sent for a Head CT scan  while in the emergency department, findings above.    IV inserted and blood drawn.    1909: I rechecked the patient.  Findings and plan explained to the Patient. Patient discharged home with instructions regarding supportive care, medications, and reasons to return. The importance of close follow-up was reviewed.     Impression & Plan      Medical Decision Making:  Pranay Ratliff is a 69 year old male who presents with a history and clinical exam consistent with syncope. I considered a broad differential for their syncope today including cardiac arrythmia, ACS, aortic stenosis, HOCM, PE, orthostatic hypotension, drugs, situational, carotid hypersensitivity, seizure, TIA, stroke, vasovagal.   He has no signs of a concerning etiology for syncope at this point.  In addition,he has no family history of sudden death, no chest pain, no seizure activity or post-ictal period, no murmur, and no signs of orthostasis in the ED, and no focal neurologic symptoms. The workup in the ED is negative and the physical exam is re-assurring. The patient also presented with a closed head injury.  The differential diagnosis includes skull fracture, epidural hematoma, subdural hematoma, intracerebral hemorrhage, and traumatic subarachnoid hemorrhage. I felt that a CT scan was warranted due to fall and heamtoma.      The CT scan did not detect an intracranial injury. Although unlikely with normal neuroimaging,  the patient/family understands the importance of  returning to the ED for a repeat evaluation with the development of worrisome symptoms, in particular we discussed: headaches that get worse, increased drowsiness, strange behavior, repetitive speech, seizures, repeated vomiting, growing confusion, increased irritability, slurred speech, weakness or numbness, and loss of responsiveness. Although rare, delayed CNS bleeds can occur, and the patient was notified of this.   Post concussive syndrome is also discussed.  The patient was provided with the [facility] Concussion Discharge Instructions. Supportive outpatient management is therefore indicated.  Follow-up with PCP in 2 days.  Return to ED with further episodes of syncope, chest pain, shortness of breath, or any further concern.    Diagnosis:    ICD-10-CM    1. Vasovagal syncope R55    2. Syncope and collapse R55    3. Closed head injury, initial encounter S09.90XA CONCUSSION  REFERRAL   4. Forehead abrasion, initial encounter S00.81XA    5. Concussion with loss of consciousness, initial encounter S06.0X9A CONCUSSION  REFERRAL     Disposition:  discharged to home    Discharge Medications:     Medication List      There are no discharge medications for this visit.         Margo Morgan  3/2/2019    EMERGENCY DEPARTMENT  I, Margo Morgan, am serving as a scribe at 5:10 PM on 3/2/2019 to document services personally performed by Saywer Ortega MD based on my observations and the provider's statements to me.        Sawyer Ortega MD  03/02/19 [id number]

## 2019-03-02 NOTE — ED AVS SNAPSHOT
Emergency Department  [address] [place] [id number]  Phone:  [phone]  Fax:  [phone]                                    Pranay Ratliff   MRN: [id number]    Department:   Emergency Department   Date of Visit:  3/2/2019           After Visit Summary Signature Page    I have received my discharge instructions, and my questions have been answered. I have discussed any challenges I see with this plan with the nurse or doctor.    ..........................................................................................................................................  Patient/Patient Representative Signature      ..........................................................................................................................................  Patient Representative Print Name and Relationship to Patient    ..................................................               ................................................  Date                                   Time    ..........................................................................................................................................  Reviewed by Signature/Title    ...................................................              ..............................................  Date                                               Time          22EPIC Rev 08/18

## 2019-03-02 NOTE — ED NOTES
Bed: ED19  Expected date:   Expected time:   Means of arrival:   Comments:  E1   69 M syncope  162

## 2019-03-03 NOTE — DISCHARGE INSTRUCTIONS
Discharge Instructions  Syncope    Syncope (fainting) is a sudden, short loss of consciousness (passing out spell). People will usually fall to the ground when they faint or slump over if seated.  People may also shake when this happens, and it can sometimes be difficult to tell the difference between syncope and a seizure. At this time, your provider does not find a reason to suspect that your fainting spell is a sign of anything dangerous or life-threatening.  However, sometimes the signs of serious illness do not show up right away.     Generally, every Emergency Department visit should have a follow-up clinic visit with either a primary or a specialty clinic/provider. Please follow-up as instructed by your emergency provider today.    Return to the Emergency Department if:  You faint again.   You have any significant bleeding.  You have chest pain or a fast or irregular heartbeat.  You feel short of breath.  You cough up any blood.  You have abdominal (belly) pain or unusual back pain.  You have ongoing vomiting (throwing up) or diarrhea (loose stools).  You have a black or tarry bowel movement, or blood in the stool or in your vomit.  You have a fever over 101 F.  You lose feeling or cannot move a part of your body or cannot talk normally.  You are confused, have a headache, cannot see well, or have a seizure.  DO NOT DRIVE. CALL 911 INSTEAD!    What can I do to help myself?  Follow any specific instructions that your provider discussed with you.  If you feel light-headed, make sure to sit down right away, even if you have to sit on the floor.  Follow up with your regular medical provider as discussed for further management. This may include lowering your blood pressure medications, insulin or other diabetic medications, checking your blood sugar more frequently, and drinking more fluids, taking medicines for vomiting or diarrhea or getting up slower.  If you were given a prescription for medicine here today,  be sure to read all of the information (including the package insert) that comes with your prescription.  This will include important information about the medicine, its side effects, and any warnings that you need to know about.  The pharmacist who fills the prescription can provide more information and answer questions you may have about the medicine.  If you have questions or concerns that the pharmacist cannot address, please call or return to the Emergency Department.   Remember that you can always come back to the Emergency Department if you are not able to see your regular provider in the amount of time listed above, if you get any new symptoms, or if there is anything that worries you.    Discharge Instructions  Concussion    You were seen today for signs of a concussion.  The symptoms will vary, depending on the nature of your injury and your health. You may have: headache, confusion, nausea (feel sick to your stomach), vomiting (throwing up) and problems with memory, concentrating, or sleep. You may feel dizzy, irritable, and tired. Children and teens may need help from their parents, teachers, and coaches to watch for symptoms as they recover.    Generally, every Emergency Department visit should have a follow-up clinic visit with either a primary or a specialty clinic/provider. Please follow-up as instructed by your emergency provider today.     Return to the Emergency Department if:  Your headache gets worse or you start to have a really bad headache even with the recommended treatment plan.   You feel drowsier, have growing confusion, or slurred speech.   You keep repeating yourself.   You have strange behavior or are feeling more irritable.   You have a seizure.   You vomit (throw up) more than once.   You have trouble walking.   You have weakness or numbness.  Your neck pain gets worse.   You have a loss of consciousness.   You have blood for fluid coming from your ears or nose.   You have new  symptoms or anything that worries you.     Home Care:  Get lots of rest and get enough sleep at night. Take daytime naps or rest if you feel tired.   Limit physical activity and ?thinking? activities. These can make symptoms worse.   Physical activities include gym, sports, weight training, running, exercise, and heavy lifting.   Thinking activities include homework, class work, job-related work, and screen time (phone, computer, tablet, TV, and video games).   Stick to a healthy diet and drink lots of fluids. Avoid alcohol.  As symptoms improve, you may slowly return to your daily activities. If symptoms get worse or return, reduce your activity.   Know that it is normal to feel sad or frustrated when you do not feel right and are less active.     Going Back to Work:  Your care team will tell you when you are ready to return to work.    Limit the amount of work you do soon after your injury. This may speed healing. Take breaks if your symptoms get worse. You should also reduce your physical activity as well as activities that require a lot of thinking until you see your doctor. You may need shorter work days and a lighter workload.  Avoid heavy lifting, working with machinery, driving and working at heights until your symptoms are gone or you are cleared by a provider.    Going Back to School:  If you are still having symptoms, you may need extra help at school.  Tell your teachers and school nurse about your injury and symptoms. Ask them to watch for problems with learning, memory, and concentrating. Symptoms may get worse when you do schoolwork, and you may become more irritable. You may need shorter school days, a reduced workload, and to postpone testing.  Do not drive or take gym class (physical activity) until cleared by a provider.    Returning to Sports:  Never return to play if you have any symptoms. A full recovery will reduce the chances of getting hurt again. Remember, it is better to miss one or two  games than a whole season.  You should rest from all physical activity until you see your provider. Generally, if all symptoms have completely cleared, your provider can help guide you to slowly return to sports. If symptoms return or worsen, stop the activity and see your provider.  Important: If you are in an organized sport and under age 18, you will need written consent from a healthcare provider before you return to sports. Typically, this will be your primary care or sports medicine provider. Please make an appointment.    If you were given a prescription for medicine here today, be sure to read all of the information (including the package insert) that comes with your prescription.  This will include important information about the medicine, its side effects, and any warnings that you need to know about.  The pharmacist who fills the prescription can provide more information and answer questions you may have about the medicine.  If you have questions or concerns that the pharmacist cannot address, please call or return to the Emergency Department.     Remember that you can always come back to the Emergency Department if you are not able to see your regular provider in the amount of time listed above, if you get any new symptoms, or if there is anything that worries you.

## 2019-03-04 ENCOUNTER — OFFICE VISIT (OUTPATIENT)
Dept: FAMILY MEDICINE | Facility: CLINIC | Age: 69
End: 2019-03-04
Payer: COMMERCIAL

## 2019-03-04 VITALS
SYSTOLIC BLOOD PRESSURE: 130 MMHG | BODY MASS INDEX: 29.57 KG/M2 | TEMPERATURE: 97.1 F | HEART RATE: 69 BPM | DIASTOLIC BLOOD PRESSURE: 82 MMHG | WEIGHT: 218 LBS | OXYGEN SATURATION: 95 %

## 2019-03-04 DIAGNOSIS — I10 BENIGN ESSENTIAL HYPERTENSION: ICD-10-CM

## 2019-03-04 DIAGNOSIS — R55 VASOVAGAL SYNCOPE: Primary | ICD-10-CM

## 2019-03-04 PROCEDURE — 99214 OFFICE O/P EST MOD 30 MIN: CPT | Performed by: INTERNAL MEDICINE

## 2019-03-04 RX ORDER — TRIAMTERENE/HYDROCHLOROTHIAZID 37.5-25 MG
1 TABLET ORAL DAILY
Qty: 90 TABLET | Refills: 3
Start: 2019-03-04 | End: 2022-04-07

## 2019-03-04 NOTE — PROGRESS NOTES
SUBJECTIVE:   Pranay Ratliff is a 69 year old male who presents to clinic today for the following health issues:      ED/UC Followup:    Facility:  [facility]   Date of visit: 3/2/2019  Reason for visit: fall at [employer]   Current Status:  Improved but still having fatigue        Pranay was working out at [employer] two days ago when he took a suana and then in a whirlpool for about 15 minutes. After this he went to take a hot shower and suddenly started feeling very dizzy. He sat down but the next thing he knew he was laying on the floor and there was blood around him. He was taken to the ER and his evaluation there was reassuring. He was diagnosed with vasovagal syncope and a concussion.     He no longer has a headache and dizziness has gone away. His only residual symptom is fatigue.       Problem list and histories reviewed & adjusted, as indicated.  Additional history: as documented    Patient Active Problem List   Diagnosis     History of basal cell carcinoma     Benign essential hypertension     Hyperlipidemia LDL goal <130     Obstructive sleep apnea syndrome     Gastroesophageal reflux disease with esophagitis     Localized swelling of lower extremity     Vasovagal syncope     No past surgical history on file.    Social History     Tobacco Use     Smoking status: Never Smoker     Smokeless tobacco: Never Used   Substance Use Topics     Alcohol use: Yes     Frequency: Monthly or less     Comment: twice a week     Family History   Problem Relation Age of Onset     Skin Cancer Mother          from pneumonia     Abdominal Aortic Aneurysm Mother      Heart Failure Father      Diabetes Type 1 Brother         Possibly secondary to chemical exposure in [place]     Diabetes Type 2  Maternal Grandmother      Cancer Brother         Exposure to Agent Orange in [place]     Heart Disease Brother            Reviewed and updated as needed this visit by clinical staff       Reviewed and updated  as needed this visit by Provider         ROS:  Constitutional, HEENT, cardiovascular, pulmonary, gi and gu systems are negative, except as otherwise noted.    OBJECTIVE:     /82   Pulse 69   Temp 97.1  F (36.2  C) (Oral)   Wt 98.9 kg (218 lb)   SpO2 95%   BMI 29.57 kg/m    Body mass index is 29.57 kg/m .  GENERAL: healthy, alert and no distress  RESP: lungs clear to auscultation - no rales, rhonchi or wheezes  CV: regular rate and rhythm, normal S1 S2, no S3 or S4, no murmur, click or rub, no peripheral edema and peripheral pulses strong  PSYCH: mentation appears normal, affect normal/bright    Diagnostic Test Results:  Reviewed in EPIC    ASSESSMENT/PLAN:     1. Vasovagal syncope  History certainly consistent with a vasovagal syncope.  He is no longer experiencing any associated symptoms from the fall other than some leftover fatigue.  I did discuss signs and symptoms of a concussion and recommended that he avoid any physical exertion at least for the next week and also avoid things that will strain his eyes significantly.  He does have information for the concussion clinic if his symptoms began to worsen.    2. Benign essential hypertension  - triamterene-HCTZ (MAXZIDE-25) 37.5-25 MG tablet; Take 1 tablet by mouth daily  Dispense: 90 tablet; Refill: 3    Follow up as needed. Has an appointment with Urology in 2 weeks for follow up of his elevated PSA.      Brigida Veras MD  [facility]

## 2019-03-12 ENCOUNTER — OFFICE VISIT (OUTPATIENT)
Dept: FAMILY MEDICINE | Facility: CLINIC | Age: 69
End: 2019-03-12
Payer: COMMERCIAL

## 2019-03-12 VITALS — DIASTOLIC BLOOD PRESSURE: 80 MMHG | SYSTOLIC BLOOD PRESSURE: 132 MMHG | OXYGEN SATURATION: 96 % | HEART RATE: 70 BPM

## 2019-03-12 DIAGNOSIS — B07.0 PLANTAR WARTS: Primary | ICD-10-CM

## 2019-03-12 PROCEDURE — 99213 OFFICE O/P EST LOW 20 MIN: CPT | Performed by: FAMILY MEDICINE

## 2019-03-12 NOTE — LETTER
3/12/2019         RE: Pranay Ratliff  [address] [address]        Dear Colleague,    Thank you for referring your patient, Pranay Ratliff, to the [facility]. Please see a copy of my visit note below.    [facility] [place] - PRIMARY CARE SKIN    CC: wart(s)  SUBJECTIVE:   Pranay Ratliff is a(n) 69 year old male who presents to clinic today for follow-up of warts on the right foot that started in 2016.    He will be traveling to [place] from 3/26 - 4/16.    Type of treatment: WartPeel started Oct 15, 2018. He has been applying it every night, but recall that he has had difficulty getting the medication absorbed due to thick calluses. He has also been paring dead skin with a pumice stone.    Side effects: None.    Previous therapies tried: OTC wart treatments    Personal history of skin cancer : YES - history of basal cell carcinoma on the corner of the right lower eyelid (s/p MMS approximately [id number]); basal cell carcinoma on mid-forehead (s/p MMS 12/20/18).  Family history of skin cancer : YES - keratinocyte carcinoma(s) in mother.     Personal Medical History  Eczema Psoriasis Autoimmune   YES NO NO      Family Medical History  Eczema Psoriasis Autoimmune   NO YES - in brother and in paternal uncle Rheumatoid arthritis in mother, maternal aunt, and maternal cousin      Sun Exposure History  Previous history of significant sun exposure: He lives in [place], [place] and [place] and has had annual skin exams.  Blistering sunburns : NO  Tanning beds : NO.  Sunscreen Use : YES, SPF : uses Blue Lizard 50+.  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    Refer to electronic medical record (EMR) for past medical history and medications.    ROS: 14 point review of systems was negative except the  symptoms listed above in the HPI.    This document serves as a record of the services and decisions personally performed and made by Patrizia Paige MD and was created by Anupam Payne, a trained medical scribe, based on personal observations and provider statements to the medical scribe.  March 12, 2019 8:37 AM   Anupam Payne    OBJECTIVE:   GENERAL: healthy, alert and no distress.  SKIN: [place] Skin Type - II.  Hand, Feet and Toes examined. The dermatoscope was used to help evaluate pigmented lesions.  Skin Pertinent Findings:  Right foot: pared , verrucous lesion smaller in size    ASSESSMENT & PLAN:     Encounter Diagnosis   Name Primary?     Plantar warts Yes     MDM:   Discussed the viral cause of warts and potential need for multiple treatments. Treatment options include observation, cryotherapy, curettage, candida, cantharidin and contact immunotherapy, WartPeel. Potential side effects include blister formation, scarring, and pain depending on the treatment. The patient decided to continue treatment of the wart(s) with WartPeel.    Patient Instructions   FUTURE APPOINTMENTS  Follow up in 5 weeks.    Continue applying WartPeel.    TT: 20 minutes.  CT: 15 minutes, discussing treatment options (including insurance considerations), side effects, risks and benefits of the treatment options, management of pain and blister formation and when to return if not improving.    The information in this document, created by the medical scribe for me, accurately reflects the services I personally performed and the decisions made by me. I have reviewed and approved this document for accuracy prior to leaving the patient care area.  March 12, 2019 8:37 AM  Patrizia Paige MD  [facility]    Again, thank you for allowing me to participate in the care of your patient.        Sincerely,        Patrizia Paige MD

## 2019-03-12 NOTE — PROGRESS NOTES
[facility] - PRIMARY CARE SKIN    CC: wart(s)  SUBJECTIVE:   Pranay Ratliff is a(n) 69 year old male who presents to clinic today for follow-up of warts on the right foot that started in 2016.    He will be traveling to [place] from 3/26 - 4/16.    Type of treatment: WartPeel started Oct 15, 2018. He has been applying it every night, but recall that he has had difficulty getting the medication absorbed due to thick calluses. He has also been paring dead skin with a pumice stone.    Side effects: None.    Previous therapies tried: OTC wart treatments    Personal history of skin cancer : YES - history of basal cell carcinoma on the corner of the right lower eyelid (s/p MMS approximately [id number]); basal cell carcinoma on mid-forehead (s/p MMS 12/20/18).  Family history of skin cancer : YES - keratinocyte carcinoma(s) in mother.     Personal Medical History  Eczema Psoriasis Autoimmune   YES NO NO      Family Medical History  Eczema Psoriasis Autoimmune   NO YES - in brother and in paternal uncle Rheumatoid arthritis in mother, maternal aunt, and maternal cousin      Sun Exposure History  Previous history of significant sun exposure: He lives in [place], [place] and [place] and has had annual skin exams.  Blistering sunburns : NO  Tanning beds : NO.  Sunscreen Use : YES, SPF : uses Blue Lizard 50+.  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    Refer to electronic medical record (EMR) for past medical history and medications.    ROS: 14 point review of systems was negative except the symptoms listed above in the HPI.    This document serves as a record of the services and decisions personally performed and made by Patrizia Paige MD and was created by Anupam Payne, a trained medical scribe, based on personal observations and  provider statements to the medical scribe.  March 12, 2019 8:37 AM   Anupam Payne    OBJECTIVE:   GENERAL: healthy, alert and no distress.  SKIN: [place] Skin Type - II.  Hand, Feet and Toes examined. The dermatoscope was used to help evaluate pigmented lesions.  Skin Pertinent Findings:  Right foot: pared , verrucous lesion smaller in size    ASSESSMENT & PLAN:     Encounter Diagnosis   Name Primary?     Plantar warts Yes     MDM:   Discussed the viral cause of warts and potential need for multiple treatments. Treatment options include observation, cryotherapy, curettage, candida, cantharidin and contact immunotherapy, WartPeel. Potential side effects include blister formation, scarring, and pain depending on the treatment. The patient decided to continue treatment of the wart(s) with WartPeel.    Patient Instructions   FUTURE APPOINTMENTS  Follow up in 5 weeks.    Continue applying WartPeel.    TT: 20 minutes.  CT: 15 minutes, discussing treatment options (including insurance considerations), side effects, risks and benefits of the treatment options, management of pain and blister formation and when to return if not improving.    The information in this document, created by the medical scribe for me, accurately reflects the services I personally performed and the decisions made by me. I have reviewed and approved this document for accuracy prior to leaving the patient care area.  March 12, 2019 8:37 AM  Patrizia Paige MD  [facility] [place]

## 2019-03-14 DIAGNOSIS — R97.20 ELEVATED PROSTATE SPECIFIC ANTIGEN (PSA): Primary | ICD-10-CM

## 2019-03-18 ENCOUNTER — OFFICE VISIT (OUTPATIENT)
Dept: UROLOGY | Facility: CLINIC | Age: 69
End: 2019-03-18
Payer: COMMERCIAL

## 2019-03-18 VITALS
WEIGHT: 218 LBS | DIASTOLIC BLOOD PRESSURE: 70 MMHG | SYSTOLIC BLOOD PRESSURE: 142 MMHG | BODY MASS INDEX: 29.53 KG/M2 | HEART RATE: 66 BPM | HEIGHT: 72 IN

## 2019-03-18 DIAGNOSIS — R97.20 ELEVATED PROSTATE SPECIFIC ANTIGEN (PSA): Primary | ICD-10-CM

## 2019-03-18 LAB
ALBUMIN UR-MCNC: NEGATIVE MG/DL
APPEARANCE UR: CLEAR
BILIRUB UR QL STRIP: NEGATIVE
COLOR UR AUTO: YELLOW
GLUCOSE UR STRIP-MCNC: NEGATIVE MG/DL
HGB UR QL STRIP: NEGATIVE
KETONES UR STRIP-MCNC: NEGATIVE MG/DL
LEUKOCYTE ESTERASE UR QL STRIP: NEGATIVE
NITRATE UR QL: NEGATIVE
PH UR STRIP: 7 PH (ref 5–7)
PR INTERVAL - MUSE: 1
SOURCE: NORMAL
SP GR UR STRIP: 1.01 (ref 1–1.03)
UROBILINOGEN UR STRIP-ACNC: 0.2 EU/DL (ref 0.2–1)

## 2019-03-18 PROCEDURE — 99204 OFFICE O/P NEW MOD 45 MIN: CPT | Mod: 25 | Performed by: UROLOGY

## 2019-03-18 PROCEDURE — 51798 US URINE CAPACITY MEASURE: CPT | Performed by: UROLOGY

## 2019-03-18 PROCEDURE — 81003 URINALYSIS AUTO W/O SCOPE: CPT | Performed by: UROLOGY

## 2019-03-18 ASSESSMENT — PAIN SCALES - GENERAL: PAINLEVEL: NO PAIN (0)

## 2019-03-18 ASSESSMENT — MIFFLIN-ST. JEOR: SCORE: 1791.84

## 2019-03-18 NOTE — LETTER
RE: Pranay Ratliff  [address] [address]     Dear Colleague,    Thank you for referring your patient, Pranay Ratliff, to the [facility] [place] at [facility]. Please see a copy of my visit note below.          Chief Complaint:    Elevated PSA (Prostate Specific Antigen)         Consult or Referral:     Mr. Pranay Ratliff is a 69 year old male seen at the request of Dr. Veras.         History of Present Illness:    Pranay Ratliff is a very pleasant 69 year old male who presents with a history of Elevated PSA (Prostate Specific Antigen).  No significant urinary symptoms. Occasional strong urinary urgency. Notes nocturia x 1-2. No dysuria or hematuria. Mild erectile dysfunction.     PSA 4.38 in 2/2018. Now up 6.32. Patient does note that he ejaculated within 24 hours of each of the last two PSA checks. No previous prostate cancer workup. No family history of prostate cancer.         Past Medical History:     Past Medical History:   Diagnosis Date     Basal cell carcinoma      Epididymitis, bilateral      Epididymitis, left      Epididymitis, right    HLD  HTN         Past Surgical History:     Past Surgical History:   Procedure Laterality Date     TESTICLE SURGERY       VASECTOMY            Medications     Current Outpatient Medications   Medication     aspirin 81 MG tablet     carbamide peroxide (DEBROX) 6.5 % otic solution     clotrimazole-betamethasone (LOTRISONE) 1-0.05 % external cream     furosemide (LASIX) 20 MG tablet     multivitamin, therapeutic with minerals (MULTI-VITAMIN) TABS     Omega-3 Fatty Acids (OMEGA-3 FISH OIL PO)     simvastatin (ZOCOR) 20 MG tablet     triamcinolone (KENALOG) 0.1 % external cream     triamterene-HCTZ (MAXZIDE-25) 37.5-25 MG tablet     No current facility-administered medications for this visit.           Family History:     Family History   Problem Relation Age of Onset     Skin Cancer  Mother          from pneumonia     Abdominal Aortic Aneurysm Mother      Heart Failure Father      Diabetes Type 1 Brother         Possibly secondary to chemical exposure in [place]     Diabetes Type 2  Maternal Grandmother      Cancer Brother         Exposure to Agent Orange in [place]     Heart Disease Brother           Social History:     Social History     Socioeconomic History     Marital status: Single     Spouse name: Not on file     Number of children: Not on file     Years of education: Not on file     Highest education level: Not on file   Occupational History     Not on file   Social Needs     Financial resource strain: Not on file     Food insecurity:     Worry: Not on file     Inability: Not on file     Transportation needs:     Medical: Not on file     Non-medical: Not on file   Tobacco Use     Smoking status: Never Smoker     Smokeless tobacco: Never Used   Substance and Sexual Activity     Alcohol use: Yes     Frequency: Monthly or less     Comment: twice a week     Drug use: No     Sexual activity: Yes   Lifestyle     Physical activity:     Days per week: Not on file     Minutes per session: Not on file     Stress: Not on file   Relationships     Social connections:     Talks on phone: Not on file     Gets together: Not on file     Attends Baptism service: Not on file     Active member of club or organization: Not on file     Attends meetings of clubs or organizations: Not on file     Relationship status: Not on file     Intimate partner violence:     Fear of current or ex partner: Not on file     Emotionally abused: Not on file     Physically abused: Not on file     Forced sexual activity: Not on file   Other Topics Concern     Parent/sibling w/ CABG, MI or angioplasty before 65F 55M? 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[employer]         Allergies:   Johanna-seljanether plus allergy [diphenhydramine]         Physical Exam:      Patient is a 69 year old  male   Vitals: Blood pressure 142/70, pulse 66, height 1.829 m (6'), weight 98.9 kg (218 lb).  Constitutional: Body mass index is 29.57 kg/m .  Alert, no acute distress, oriented, conversant  Eyes: no scleral icterus; extraocular muscles intact, moist conjunctivae  Neck: trachea midline, no thyromegaly  Ears/nose/mouth: throat/mouth:normal, good dentition  Respiratory: no respiratory distress, or pursed lip breathing  Cardiovascular: pulses strong and intact; no obvious jugular venous distension present  Gastrointestinal: soft, nontender, no organomegaly or masses,   Lymphatics: No inguinal adenopathy  Musculoskeltal: extremities normal, no peripheral edema  Skin: no suspicious lesions or rashes  Neuro: Alert, oriented, speech and mentation normal  Psych: affect and mood normal, alert and oriented to person, place and time  Gait: Normal  : penis, scrotum, testes normal,   ROSA anodular, symmetric      Labs and Pathology:    I reviewed all applicable laboratory and pathology data and went over findings with patient  Significant for   Lab Results   Component Value Date    CR 1.08 03/02/2019    CR 0.96 01/30/2019    CR 0.88 07/27/2014     PSA   Date Value Ref Range Status   02/26/2019 6.32 (H) 0 - 4 ug/L Final     Comment:     Assay Method:  Chemiluminescence using Siemens Vista analyzer   01/30/2019 6.65 (H) 0 - 4 ug/L Final     Comment:     Assay Method:  Chemiluminescence using Siemens Vista analyzer       Outside and Past Medical records:    I spent 10 minutes reviewing outside and past medical records.         Assessment and Plan:     Assessment: 69 year old male with elevated PSA to 6.32 on most recent check. We had a long discussion about the utility of PSA screening.  We talked about the Pros and Cons.  We discussed the findings of the PLCO and EORTC studies.  We discussed the results of the [place] trial of treatment vs. observation in clinically detected prostate cancer as  well as the results of the PIVOT trial in the context of screen-detected cancer.  We discussed the recommendations by the AUA, and USPSTF. We also discussed the significant (2-6%) and rising risk of biopsy associated sepsis.    We plugged his numbers into the PCPT individualized risk calculator and found   the risk of a negative biopsy is 69%  The risk of low risk cancer is 21%  The risk of intermediate to high risk is 10%    We also discussed the emerging role of MRI in the diagnosis of prostate cancer and the potential for performing MRI-Ultrasound Fusion biopsy if suspicious lesions are noted. He is interested in proceeding with MRI for further workup. Will also plan for recheck of his PSA when he returns to go over MRI results given ejaculations prior to his last PSA.    Plan:  MRI and repeat PSA  Follow-up 1 month    Orders  Orders Placed This Encounter   Procedures     MR Prostate wo & w Contrast     Jaylan Guajardo MD  Urology  [facility]  Clinic Phone [phone]

## 2019-03-18 NOTE — NURSING NOTE
Chief Complaint   Patient presents with     Clinic Care Coordination - Follow-up     Elevated PSA      Marisa Amador LPN

## 2019-03-18 NOTE — PROGRESS NOTES
Chief Complaint:    Elevated PSA (Prostate Specific Antigen)         Consult or Referral:     Mr. Pranay Ratliff is a 69 year old male seen at the request of Dr. Veras.         History of Present Illness:    Pranay Ratliff is a very pleasant 69 year old male who presents with a history of Elevated PSA (Prostate Specific Antigen).  No significant urinary symptoms. Occasional strong urinary urgency. Notes nocturia x 1-2. No dysuria or hematuria. Mild erectile dysfunction.     PSA 4.38 in 2018. Now up 6.32. Patient does note that he ejaculated within 24 hours of each of the last two PSA checks. No previous prostate cancer workup. No family history of prostate cancer.         Past Medical History:     Past Medical History:   Diagnosis Date     Basal cell carcinoma      Epididymitis, bilateral      Epididymitis, left      Epididymitis, right    HLD  HTN         Past Surgical History:     Past Surgical History:   Procedure Laterality Date     TESTICLE SURGERY       VASECTOMY            Medications     Current Outpatient Medications   Medication     aspirin 81 MG tablet     carbamide peroxide (DEBROX) 6.5 % otic solution     clotrimazole-betamethasone (LOTRISONE) 1-0.05 % external cream     furosemide (LASIX) 20 MG tablet     multivitamin, therapeutic with minerals (MULTI-VITAMIN) TABS     Omega-3 Fatty Acids (OMEGA-3 FISH OIL PO)     simvastatin (ZOCOR) 20 MG tablet     triamcinolone (KENALOG) 0.1 % external cream     triamterene-HCTZ (MAXZIDE-25) 37.5-25 MG tablet     No current facility-administered medications for this visit.           Family History:     Family History   Problem Relation Age of Onset     Skin Cancer Mother          from pneumonia     Abdominal Aortic Aneurysm Mother      Heart Failure Father      Diabetes Type 1 Brother         Possibly secondary to chemical exposure in [place]     Diabetes Type 2  Maternal Grandmother      Cancer Brother         Exposure to Agent Orange in [place]      Heart Disease Brother           Social History:     Social History     Socioeconomic History     Marital status: Single     Spouse name: Not on file     Number of children: Not on file     Years of education: Not on file     Highest education level: Not on file   Occupational History     Not on file   Social Needs     Financial resource strain: Not on file     Food insecurity:     Worry: Not on file     Inability: Not on file     Transportation needs:     Medical: Not on file     Non-medical: Not on file   Tobacco Use     Smoking status: Never Smoker     Smokeless tobacco: Never Used   Substance and Sexual Activity     Alcohol use: Yes     Frequency: Monthly or less     Comment: twice a week     Drug use: No     Sexual activity: Yes   Lifestyle     Physical activity:     Days per week: Not on file     Minutes per session: Not on file     Stress: Not on file   Relationships     Social connections:     Talks on phone: Not on file     Gets together: Not on file     Attends Hinduism service: Not on file     Active member of club or organization: Not on file     Attends meetings of clubs or organizations: Not on file     Relationship status: Not on file     Intimate partner violence:     Fear of current or ex partner: Not on file     Emotionally abused: Not on file     Physically abused: Not on file     Forced sexual activity: Not on file   Other Topics Concern     Parent/sibling w/ CABG, MI or angioplasty before 65F 55M? 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[employer]         Allergies:   Johanna-blanca plus allergy [diphenhydramine]         Review of Systems:  From intake questionnaire     Skin: negative  Eyes: negative  Ears/Nose/Throat: negative  Respiratory: No shortness of breath, dyspnea on exertion, cough, or hemoptysis  Cardiovascular: No chest pain or palpitations  Gastrointestinal: negative; no nausea/vomiting, constipation or  diarrhea  Genitourinary: as per HPI  Musculoskeletal: negative  Neurologic: negative  Psychiatric: negative  Hematologic/Lymphatic/Immunologic: negative  Endocrine: negative         Physical Exam:     Patient is a 69 year old  male   Vitals: Blood pressure 142/70, pulse 66, height 1.829 m (6'), weight 98.9 kg (218 lb).  Constitutional: Body mass index is 29.57 kg/m .  Alert, no acute distress, oriented, conversant  Eyes: no scleral icterus; extraocular muscles intact, moist conjunctivae  Neck: trachea midline, no thyromegaly  Ears/nose/mouth: throat/mouth:normal, good dentition  Respiratory: no respiratory distress, or pursed lip breathing  Cardiovascular: pulses strong and intact; no obvious jugular venous distension present  Gastrointestinal: soft, nontender, no organomegaly or masses,   Lymphatics: No inguinal adenopathy  Musculoskeltal: extremities normal, no peripheral edema  Skin: no suspicious lesions or rashes  Neuro: Alert, oriented, speech and mentation normal  Psych: affect and mood normal, alert and oriented to person, place and time  Gait: Normal  : penis, scrotum, testes normal,   ROSA anodular, symmetric      Labs and Pathology:    I reviewed all applicable laboratory and pathology data and went over findings with patient  Significant for   Lab Results   Component Value Date    CR 1.08 03/02/2019    CR 0.96 01/30/2019    CR 0.88 07/27/2014     PSA   Date Value Ref Range Status   02/26/2019 6.32 (H) 0 - 4 ug/L Final     Comment:     Assay Method:  Chemiluminescence using Siemens Vista analyzer   01/30/2019 6.65 (H) 0 - 4 ug/L Final     Comment:     Assay Method:  Chemiluminescence using Siemens Vista analyzer         Outside and Past Medical records:    I spent 10 minutes reviewing outside and past medical records.         Assessment and Plan:     Assessment: 69 year old male with elevated PSA to 6.32 on most recent check. We had a long discussion about the utility of PSA screening.  We talked about  the Pros and Cons.  We discussed the findings of the PLCO and EORTC studies.  We discussed the results of the [place] trial of treatment vs. observation in clinically detected prostate cancer as well as the results of the PIVOT trial in the context of screen-detected cancer.  We discussed the recommendations by the AUA, and USPSTF. We also discussed the significant (2-6%) and rising risk of biopsy associated sepsis.    We plugged his numbers into the PCPT individualized risk calculator and found   the risk of a negative biopsy is 69%  The risk of low risk cancer is 21%  The risk of intermediate to high risk is 10%    We also discussed the emerging role of MRI in the diagnosis of prostate cancer and the potential for performing MRI-Ultrasound Fusion biopsy if suspicious lesions are noted. He is interested in proceeding with MRI for further workup. Will also plan for recheck of his PSA when he returns to go over MRI results given ejaculations prior to his last PSA.    Plan:  MRI and repeat PSA  Follow-up 1 month    Orders  Orders Placed This Encounter   Procedures     MR Prostate wo & w Contrast     Jaylan Guajardo MD  Urology  [facility]  Clinic Phone [phone]

## 2019-03-19 PROBLEM — R97.20 ELEVATED PROSTATE SPECIFIC ANTIGEN (PSA): Status: ACTIVE | Noted: 2019-03-19

## 2019-04-18 ENCOUNTER — OFFICE VISIT (OUTPATIENT)
Dept: DERMATOLOGY | Facility: CLINIC | Age: 69
End: 2019-04-18
Payer: COMMERCIAL

## 2019-04-18 VITALS — OXYGEN SATURATION: 97 % | HEART RATE: 69 BPM | SYSTOLIC BLOOD PRESSURE: 134 MMHG | DIASTOLIC BLOOD PRESSURE: 84 MMHG

## 2019-04-18 DIAGNOSIS — Z85.828 HISTORY OF SKIN CANCER: Primary | ICD-10-CM

## 2019-04-18 PROCEDURE — 99212 OFFICE O/P EST SF 10 MIN: CPT | Performed by: DERMATOLOGY

## 2019-04-18 NOTE — PROGRESS NOTES
Pranay Ratliff is a 69 year old year old male patient here today for wound check.  WEll healed no issues.  Patient reports the following modifying factors none.  Associated symptoms: none.  Patient has no other skin complaints today.  Remainder of the HPI, Meds, PMH, Allergies, FH, and SH was reviewed in chart.      Past Medical History:   Diagnosis Date     Basal cell carcinoma      Epididymitis, bilateral      Epididymitis, left      Epididymitis, right        Past Surgical History:   Procedure Laterality Date     TESTICLE SURGERY       VASECTOMY          Family History   Problem Relation Age of Onset     Skin Cancer Mother          from pneumonia     Abdominal Aortic Aneurysm Mother      Heart Failure Father      Diabetes Type 1 Brother         Possibly secondary to chemical exposure in [place]     Diabetes Type 2  Maternal Grandmother      Cancer Brother         Exposure to Agent Orange in [place]     Heart Disease Brother        Social History     Socioeconomic History     Marital status: Single     Spouse name: Not on file     Number of children: Not on file     Years of education: Not on file     Highest education level: Not on file   Occupational History     Not on file   Social Needs     Financial resource strain: Not on file     Food insecurity:     Worry: Not on file     Inability: Not on file     Transportation needs:     Medical: Not on file     Non-medical: Not on file   Tobacco Use     Smoking status: Never Smoker     Smokeless tobacco: Never Used   Substance and Sexual Activity     Alcohol use: Yes     Frequency: Monthly or less     Comment: twice a week     Drug use: No     Sexual activity: Yes   Lifestyle     Physical activity:     Days per week: Not on file     Minutes per session: Not on file     Stress: Not on file   Relationships     Social connections:     Talks on phone: Not on file     Gets together: Not on file     Attends Congregational service: Not on file     Active member of club or  organization: Not on file     Attends meetings of clubs or organizations: Not on file     Relationship status: Not on file     Intimate partner violence:     Fear of current or ex partner: Not on file     Emotionally abused: Not on file     Physically abused: Not on file     Forced sexual activity: Not on file   Other Topics Concern     Parent/sibling w/ CABG, MI or angioplasty before 65F 55M? Not Asked   Social History Narrative     Not on file       Outpatient Encounter Medications as of 4/18/2019   Medication Sig Dispense Refill     aspirin 81 MG tablet Take by mouth daily       carbamide peroxide (DEBROX) 6.5 % otic solution Place 5 drops into both ears daily as needed for other (impacted cerumen) 14.8 mL 11     clotrimazole-betamethasone (LOTRISONE) 1-0.05 % external cream APPLY TOPICALLY TWICE DAILY TO THE FEET. 45 g 0     furosemide (LASIX) 20 MG tablet Take 1 tablet (20 mg) once daily as needed for swelling. 30 tablet 1     multivitamin, therapeutic with minerals (MULTI-VITAMIN) TABS Take 1 tablet by mouth daily       Omega-3 Fatty Acids (OMEGA-3 FISH OIL PO)        simvastatin (ZOCOR) 20 MG tablet Take 1 tablet (20 mg) by mouth At Bedtime 90 tablet 3     triamcinolone (KENALOG) 0.1 % external cream Apply topically 2 times daily to affected areas on the left hand for 10-14 days. Not for use on face nor groin. 30 g 1     triamterene-HCTZ (MAXZIDE-25) 37.5-25 MG tablet Take 1 tablet by mouth daily 90 tablet 3     No facility-administered encounter medications on file as of 4/18/2019.              Review Of Systems  Skin: As above  Eyes: negative  Ears/Nose/Throat: negative  Respiratory: No shortness of breath, dyspnea on exertion, cough, or hemoptysis  Cardiovascular: negative  Gastrointestinal: negative  Genitourinary: negative  Musculoskeletal: negative  Neurologic: negative  Psychiatric: negative  Hematologic/Lymphatic/Immunologic: negative  Endocrine: negative      O:   NAD, WDWN, Alert & Oriented, Mood &  Affect wnl, Vitals stable   Here today alone   /84   Pulse 69   SpO2 97%    General appearance normal   Vitals stable   Alert, oriented and in no acute distress     Basal cell carcinoma site wel lhealed      Eyes: Conjunctivae/lids:Normal     ENT: Lips, buccal mucosa, tongue: normal    MSK:Normal    Cardiovascular: peripheral edema none    Pulm: Breathing Normal    Neuro/Psych: Orientation:Normal; Mood/Affect:Normal      A/P:  1. Hx of non-melanoma skin cancer   Well healed  BENIGN LESIONS DISCUSSED WITH PATIENT:  I discussed the specifics of tumor, prognosis, and genetics of benign lesions.  I explained that treatment of these lesions would be purely cosmetic and not medically neccessary.  I discussed with patient different removal options including excision, cautery and /or laser.      Nature and genetics of benign skin lesions dicussed with patient.  Signs and Symptoms of skin cancer discussed with patient.  Patient encouraged to perform monthly skin exams.  UV precautions reviewed with patient.  Patient to follow up with Primary Care provider regarding elevated blood pressure.  Skin care regimen reviewed with patient: Eliminate harsh soaps, i.e. Dial, zest, irsih spring; Mild soaps such as Cetaphil or Dove sensitive skin, avoid hot or cold showers, aggressive use of emollients including vanicream, cetaphil or cerave discussed with patient.    Risks of non-melanoma skin cancer discussed with patient   Return to clinic 6 months

## 2019-04-18 NOTE — LETTER
2019         RE: Pranay Ratliff  [address] [facility] [address]        Dear Colleague,    Thank you for referring your patient, Pranay Ratliff, to the [facility]. Please see a copy of my visit note below.    Pranay Ratliff is a 69 year old year old male patient here today for wound check.  WEll healed no issues.  Patient reports the following modifying factors none.  Associated symptoms: none.  Patient has no other skin complaints today.  Remainder of the HPI, Meds, PMH, Allergies, FH, and SH was reviewed in chart.      Past Medical History:   Diagnosis Date     Basal cell carcinoma      Epididymitis, bilateral      Epididymitis, left      Epididymitis, right        Past Surgical History:   Procedure Laterality Date     TESTICLE SURGERY       VASECTOMY          Family History   Problem Relation Age of Onset     Skin Cancer Mother          from pneumonia     Abdominal Aortic Aneurysm Mother      Heart Failure Father      Diabetes Type 1 Brother         Possibly secondary to chemical exposure in [place]     Diabetes Type 2  Maternal Grandmother      Cancer Brother         Exposure to Agent Orange in [place]     Heart Disease Brother        Social History     Socioeconomic History     Marital status: Single     Spouse name: Not on file     Number of children: Not on file     Years of education: Not on file     Highest education level: Not on file   Occupational History     Not on file   Social Needs     Financial resource strain: Not on file     Food insecurity:     Worry: Not on file     Inability: Not on file     Transportation needs:     Medical: Not on file     Non-medical: Not on file   Tobacco Use     Smoking status: Never Smoker     Smokeless tobacco: Never Used   Substance and Sexual Activity     Alcohol use: Yes     Frequency: Monthly or less     Comment: twice a week     Drug use: No     Sexual activity: Yes   Lifestyle     Physical activity:     Days per  week: Not on file     Minutes per session: Not on file     Stress: Not on file   Relationships     Social connections:     Talks on phone: Not on file     Gets together: Not on file     Attends Zoroastrian service: Not on file     Active member of club or organization: Not on file     Attends meetings of clubs or organizations: Not on file     Relationship status: Not on file     Intimate partner violence:     Fear of current or ex partner: Not on file     Emotionally abused: Not on file     Physically abused: Not on file     Forced sexual activity: Not on file   Other Topics Concern     Parent/sibling w/ CABG, MI or angioplasty before 65F 55M? Not Asked   Social History Narrative     Not on file       Outpatient Encounter Medications as of 4/18/2019   Medication Sig Dispense Refill     aspirin 81 MG tablet Take by mouth daily       carbamide peroxide (DEBROX) 6.5 % otic solution Place 5 drops into both ears daily as needed for other (impacted cerumen) 14.8 mL 11     clotrimazole-betamethasone (LOTRISONE) 1-0.05 % external cream APPLY TOPICALLY TWICE DAILY TO THE FEET. 45 g 0     furosemide (LASIX) 20 MG tablet Take 1 tablet (20 mg) once daily as needed for swelling. 30 tablet 1     multivitamin, therapeutic with minerals (MULTI-VITAMIN) TABS Take 1 tablet by mouth daily       Omega-3 Fatty Acids (OMEGA-3 FISH OIL PO)        simvastatin (ZOCOR) 20 MG tablet Take 1 tablet (20 mg) by mouth At Bedtime 90 tablet 3     triamcinolone (KENALOG) 0.1 % external cream Apply topically 2 times daily to affected areas on the left hand for 10-14 days. Not for use on face nor groin. 30 g 1     triamterene-HCTZ (MAXZIDE-25) 37.5-25 MG tablet Take 1 tablet by mouth daily 90 tablet 3     No facility-administered encounter medications on file as of 4/18/2019.              Review Of Systems  Skin: As above  Eyes: negative  Ears/Nose/Throat: negative  Respiratory: No shortness of breath, dyspnea on exertion, cough, or  hemoptysis  Cardiovascular: negative  Gastrointestinal: negative  Genitourinary: negative  Musculoskeletal: negative  Neurologic: negative  Psychiatric: negative  Hematologic/Lymphatic/Immunologic: negative  Endocrine: negative      O:   NAD, WDWN, Alert & Oriented, Mood & Affect wnl, Vitals stable   Here today alone   /84   Pulse 69   SpO2 97%    General appearance normal   Vitals stable   Alert, oriented and in no acute distress     Basal cell carcinoma site wel lhealed      Eyes: Conjunctivae/lids:Normal     ENT: Lips, buccal mucosa, tongue: normal    MSK:Normal    Cardiovascular: peripheral edema none    Pulm: Breathing Normal    Neuro/Psych: Orientation:Normal; Mood/Affect:Normal      A/P:  1. Hx of non-melanoma skin cancer   Well healed  BENIGN LESIONS DISCUSSED WITH PATIENT:  I discussed the specifics of tumor, prognosis, and genetics of benign lesions.  I explained that treatment of these lesions would be purely cosmetic and not medically neccessary.  I discussed with patient different removal options including excision, cautery and /or laser.      Nature and genetics of benign skin lesions dicussed with patient.  Signs and Symptoms of skin cancer discussed with patient.  Patient encouraged to perform monthly skin exams.  UV precautions reviewed with patient.  Patient to follow up with Primary Care provider regarding elevated blood pressure.  Skin care regimen reviewed with patient: Eliminate harsh soaps, i.e. Dial, zest, irsih spring; Mild soaps such as Cetaphil or Dove sensitive skin, avoid hot or cold showers, aggressive use of emollients including vanicream, cetaphil or cerave discussed with patient.    Risks of non-melanoma skin cancer discussed with patient   Return to clinic 6 months      Again, thank you for allowing me to participate in the care of your patient.        Sincerely,        Tato Gallagher MD

## 2019-04-19 DIAGNOSIS — R97.20 ELEVATED PROSTATE SPECIFIC ANTIGEN (PSA): Primary | ICD-10-CM

## 2019-04-20 ENCOUNTER — ANCILLARY PROCEDURE (OUTPATIENT)
Dept: MRI IMAGING | Facility: CLINIC | Age: 69
End: 2019-04-20
Attending: UROLOGY
Payer: COMMERCIAL

## 2019-04-20 DIAGNOSIS — R97.20 ELEVATED PROSTATE SPECIFIC ANTIGEN (PSA): ICD-10-CM

## 2019-04-20 RX ORDER — GADOBUTROL 604.72 MG/ML
10 INJECTION INTRAVENOUS ONCE
Status: COMPLETED | OUTPATIENT
Start: 2019-04-20 | End: 2019-04-20

## 2019-04-20 RX ADMIN — GADOBUTROL 10 ML: 604.72 INJECTION INTRAVENOUS at 07:58

## 2019-04-20 NOTE — DISCHARGE INSTRUCTIONS
MRI Contrast Discharge Instructions    The IV contrast you received today will pass out of your body in your  urine. This will happen in the next 24 hours. You will not feel this process.  Your urine will not change color.    Drink at least 4 extra glasses of water or juice today (unless your doctor  has restricted your fluids). This reduces the stress on your kidneys.  You may take your regular medicines.    If you are on dialysis: It is best to have dialysis today.    If you have a reaction: Most reactions happen right away. If you have  any new symptoms after leaving the hospital (such as hives or swelling),  call your hospital at the correct number below. Or call your family doctor.  If you have breathing distress or wheezing, call 911.    Special instructions: ***    I have read and understand the above information.    Signature:______________________________________ Date:___________    Staff:__________________________________________ Date:___________     Time:__________    [facility] Departments:    ___[place]: [phone]  ___[facility]: [phone]  ___[place]: [phone] ___[facility]: [phone]  ___[facility]: [phone]  ___[facility]: [phone]  ___[phone]  ___[facility]: [phone]  ___[phone]

## 2019-04-22 ENCOUNTER — OFFICE VISIT (OUTPATIENT)
Dept: FAMILY MEDICINE | Facility: CLINIC | Age: 69
End: 2019-04-22
Payer: COMMERCIAL

## 2019-04-22 ENCOUNTER — OFFICE VISIT (OUTPATIENT)
Dept: UROLOGY | Facility: CLINIC | Age: 69
End: 2019-04-22
Payer: COMMERCIAL

## 2019-04-22 VITALS
DIASTOLIC BLOOD PRESSURE: 88 MMHG | BODY MASS INDEX: 29.12 KG/M2 | SYSTOLIC BLOOD PRESSURE: 144 MMHG | HEIGHT: 72 IN | WEIGHT: 215 LBS | HEART RATE: 67 BPM | OXYGEN SATURATION: 98 %

## 2019-04-22 VITALS — SYSTOLIC BLOOD PRESSURE: 120 MMHG | DIASTOLIC BLOOD PRESSURE: 78 MMHG

## 2019-04-22 DIAGNOSIS — B07.0 PLANTAR WARTS: Primary | ICD-10-CM

## 2019-04-22 DIAGNOSIS — R97.20 ELEVATED PROSTATE SPECIFIC ANTIGEN (PSA): Primary | ICD-10-CM

## 2019-04-22 LAB — PSA SERPL-MCNC: 4.7 NG/ML (ref 0–4)

## 2019-04-22 PROCEDURE — 36415 COLL VENOUS BLD VENIPUNCTURE: CPT | Performed by: UROLOGY

## 2019-04-22 PROCEDURE — 99213 OFFICE O/P EST LOW 20 MIN: CPT | Performed by: UROLOGY

## 2019-04-22 PROCEDURE — 84153 ASSAY OF PSA TOTAL: CPT | Performed by: UROLOGY

## 2019-04-22 PROCEDURE — 99213 OFFICE O/P EST LOW 20 MIN: CPT | Performed by: FAMILY MEDICINE

## 2019-04-22 RX ORDER — TAMSULOSIN HYDROCHLORIDE 0.4 MG/1
0.4 CAPSULE ORAL DAILY
Qty: 30 CAPSULE | Refills: 11 | Status: SHIPPED | OUTPATIENT
Start: 2019-04-22 | End: 2019-07-29

## 2019-04-22 ASSESSMENT — MIFFLIN-ST. JEOR: SCORE: 1770.29

## 2019-04-22 ASSESSMENT — PAIN SCALES - GENERAL: PAINLEVEL: NO PAIN (0)

## 2019-04-22 NOTE — LETTER
4/22/2019         RE: Pranay Ratliff  [address] [facility] [address]        Dear Colleague,    Thank you for referring your patient, [place]ay [place] [facility] Please see a copy of my visit note below.    [facility] - PRIMARY CARE SKIN    CC: wart(s)  SUBJECTIVE:   Pranay Ratliff is a(n) 69 year old male who presents to clinic today for follow-up of warts on the right foot that started in 2016.    Type of treatment: WartPeel started Oct 15, 2018. He has been applying it every night. Due to thick calluses preventing full absorption of the medication absorbed, he has been paring dead skin with a pumice stone.    Side effects: None.    Previous therapies tried: OTC wart treatments    Personal history of skin cancer : YES - history of basal cell carcinoma on the corner of the right lower eyelid (s/p MMS approximately [id number]); basal cell carcinoma on mid-forehead (s/p MMS 12/20/18).  Family history of skin cancer : YES - keratinocyte carcinoma(s) in mother.     Personal Medical History  Eczema Psoriasis Autoimmune   YES NO NO      Family Medical History  Eczema Psoriasis Autoimmune   NO YES - in brother and in paternal uncle Rheumatoid arthritis in mother, maternal aunt, and maternal cousin      Sun Exposure History  Previous history of significant sun exposure: He lives in [place], [place] and [place] and has had annual skin exams.  Blistering sunburns : NO  Tanning beds : NO.  Sunscreen Use : YES, SPF : uses Blue Lizard 50+.  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    Refer to electronic medical record (EMR) for past medical history and medications.    ROS: 14 point review of systems was negative except the symptoms listed above in the HPI.    This document serves as a record of  the services and decisions personally performed and made by Patrizia Paige MD and was created by Anupam Payne, a trained medical scribe, based on personal observations and provider statements to the medical scribe.  April 22, 2019 8:02 AM   Anupam Payne    OBJECTIVE:   GENERAL: healthy, alert and no distress.  SKIN: [place] Skin Type - II.  Hand, Feet and Toes examined. The dermatoscope was used to help evaluate pigmented lesions.  Skin Pertinent Findings:  Right foot: No appreciable verrucous lesion(s) seen. 1-2 mm area of soft erythematous tissue.    ASSESSMENT & PLAN:     Encounter Diagnosis   Name Primary?     Plantar warts Yes     MDM:   Discussed the viral cause of warts and potential need for multiple treatments. Treatment options include observation, cryotherapy, curettage, candida, cantharidin and contact immunotherapy, WartPeel. Potential side effects include blister formation, scarring, and pain depending on the treatment. The patient decided to continue treatment of the wart(s) with WartPeel.    Patient Instructions   FUTURE APPOINTMENTS  Follow up as needed if plantar warts recur.    Continue applying WartPeel once daily for 1 more month.    TT: 20 minutes.  CT: 15 minutes, discussing treatment options (including insurance considerations), side effects, risks and benefits of the treatment options, management of pain and blister formation and when to return if not improving.    The information in this document, created by the medical scribe for me, accurately reflects the services I personally performed and the decisions made by me. I have reviewed and approved this document for accuracy prior to leaving the patient care area.  April 22, 2019 8:02 AM  Patrizia Paige MD  [facility]    Again, thank you for allowing me to participate in the care of your patient.        Sincerely,        Patrizia Paige MD

## 2019-04-22 NOTE — PROGRESS NOTES
[facility] - PRIMARY CARE SKIN    CC: wart(s)  SUBJECTIVE:   Pranay Ratliff is a(n) 69 year old male who presents to clinic today for follow-up of warts on the right foot that started in 2016.    Type of treatment: WartPeel started Oct 15, 2018. He has been applying it every night. Due to thick calluses preventing full absorption of the medication absorbed, he has been paring dead skin with a pumice stone.    Side effects: None.    Previous therapies tried: OTC wart treatments    Personal history of skin cancer : YES - history of basal cell carcinoma on the corner of the right lower eyelid (s/p MMS approximately [id number]); basal cell carcinoma on mid-forehead (s/p MMS 12/20/18).  Family history of skin cancer : YES - keratinocyte carcinoma(s) in mother.     Personal Medical History  Eczema Psoriasis Autoimmune   YES NO NO      Family Medical History  Eczema Psoriasis Autoimmune   NO YES - in brother and in paternal uncle Rheumatoid arthritis in mother, maternal aunt, and maternal cousin      Sun Exposure History  Previous history of significant sun exposure: He lives in [place], [place] and [place] and has had annual skin exams.  Blistering sunburns : NO  Tanning beds : NO.  Sunscreen Use : YES, SPF : uses Blue Lizard 50+.  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    Refer to electronic medical record (EMR) for past medical history and medications.    ROS: 14 point review of systems was negative except the symptoms listed above in the HPI.    This document serves as a record of the services and decisions personally performed and made by Patrizia Paige MD and was created by Anupam Payne, a trained medical scribe, based on personal observations and provider statements to the medical scribe.  April 22, 2019 8:02 AM   Anupam  Payne    OBJECTIVE:   GENERAL: healthy, alert and no distress.  SKIN: [place] Skin Type - II.  Hand, Feet and Toes examined. The dermatoscope was used to help evaluate pigmented lesions.  Skin Pertinent Findings:  Right foot: No appreciable verrucous lesion(s) seen. 1-2 mm area of soft erythematous tissue.    ASSESSMENT & PLAN:     Encounter Diagnosis   Name Primary?     Plantar warts Yes     MDM:   Discussed the viral cause of warts and potential need for multiple treatments. Treatment options include observation, cryotherapy, curettage, candida, cantharidin and contact immunotherapy, WartPeel. Potential side effects include blister formation, scarring, and pain depending on the treatment. The patient decided to continue treatment of the wart(s) with WartPeel.    Patient Instructions   FUTURE APPOINTMENTS  Follow up as needed if plantar warts recur.    Continue applying WartPeel once daily for 1 more month.    TT: 20 minutes.  CT: 15 minutes, discussing treatment options (including insurance considerations), side effects, risks and benefits of the treatment options, management of pain and blister formation and when to return if not improving.    The information in this document, created by the medical scribe for me, accurately reflects the services I personally performed and the decisions made by me. I have reviewed and approved this document for accuracy prior to leaving the patient care area.  April 22, 2019 8:02 AM  Patrizia Paige MD  [facility]

## 2019-04-22 NOTE — NURSING NOTE
Chief Complaint   Patient presents with     Clinic Care Coordination - Follow-up     Pt here for same day PSA     Abbey Zepeda, YUKI

## 2019-04-22 NOTE — PATIENT INSTRUCTIONS
FUTURE APPOINTMENTS  Follow up as needed if plantar warts recur.    Continue applying WartPeel once daily for 1 more month.

## 2019-04-22 NOTE — PROGRESS NOTES
"  CHIEF COMPLAINT   It was my pleasure to see Prnaay Ratliff who is a 69 year old male for follow-up of elevated PSA.      HPI  Pranay Ratliff is a very pleasant 69 year old male who presents with a history of Elevated PSA (Prostate Specific Antigen).  PSA 4.38 in 2/2018. Now up 6.32. Patient does note that he ejaculated within 24 hours of each of the previous two PSA checks. No previous prostate cancer workup. No family history of prostate cancer. PSA today has come down some to 4.70. Also with recent MRI and here to discuss results.    Of note, he also endorses urinary urgency/frequency with modest weakening of stream. Also notes nocturia x 2-3. These symptoms have gradually worsened over the past few years. He has not had previous treatment for BPH. No hematuria or dysuria.    PSA  4/22/2019 - 4.70  2/26/2019 - 6.32  1/30/2019 - 6.65  2/2018 - 4.38    MRI Prostate 4/20/2019  Size: 73.5 grams  IMPRESSION:  1. Based on the most suspicious abnormality, this exam is  characterized as PIRADS 3 - The presence of clinically significant  cancer is equivocal.?The most suspicious abnormality is located at the  left posterior apex peripheral zone and there is no evidence of  extraprostatic extension.  2. No suspicious adenopathy or evidence of pelvic metastases.    PHYSICAL EXAM  Patient is a 69 year old  male   Vitals: Blood pressure 144/88, pulse 67, height 1.816 m (5' 11.5\"), weight 97.5 kg (215 lb), SpO2 98 %.  General Appearance Adult: Body mass index is 29.57 kg/m .  Alert, no acute distress, oriented  HENT: throat/mouth:normal, good dentition  Lungs: no respiratory distress, or pursed lip breathing  Heart: No obvious jugular venous distension present  Abdomen: soft, nontender, no organomegaly or masses  Back: no CVAT  Musculoskeltal: extremities normal, no peripheral edema  Skin: no suspicious lesions or rashes  Neuro: Alert, oriented, speech and mentation normal  Psych: affect and mood normal  Gait: " Normal    ASSESSMENT and PLAN  69 year old male with elevated PSA to 4.70 and PIRADS 3 lesion on his MRI. While his PSA has come down some, this has remained elevated over the past year. Furthermore, his PIRADS 3 lesion has the potential to represent prostate cancer. In this context, I recommended a prostate biopsy to better evaluate the potential for malignancy. That being said, his drop in PSA and MRI results are rather reassuring, and patient is rather reticent to proceed with a biopsy. As such, we discussed option to observe for now while he considers his options, and recheck a PSA in 3-6 months. In the end, he would like to consider options a prior to biopsy.    Regarding his voiding symptoms, we discussed the risks, benefits and side effects of tamsulosin and other BPH treatments. He is interested in trial of tamsulosin.    - Tamsulosin 0.4mg daily  - Follow-up 3 months for PVR, symptom review, and PSA free and total    I spent over 15 minutes with the patient.  Over half this time was spent on counseling regarding elevated PSA.    Jaylan Guajardo MD  Urology  [facility]  Clinic Phone [phone]

## 2019-04-24 ENCOUNTER — TELEPHONE (OUTPATIENT)
Dept: UROLOGY | Facility: CLINIC | Age: 69
End: 2019-04-24

## 2019-04-24 NOTE — TELEPHONE ENCOUNTER
Urology pt of Dr. Guajardo last seen 4/22/2019 when he received a new Rx for Flomax. Pranay calls today reporting he started this med 4/22 evening and on Tues 4/23 experienced side effects of SOB, dizziness and fatigue. He has stopped med and wanted to inform provider. Will forward this update to Dr. Guajardo. In Basket message sent.

## 2019-07-11 DIAGNOSIS — R97.20 ELEVATED PROSTATE SPECIFIC ANTIGEN (PSA): Primary | ICD-10-CM

## 2019-07-15 ENCOUNTER — OFFICE VISIT (OUTPATIENT)
Dept: UROLOGY | Facility: CLINIC | Age: 69
End: 2019-07-15
Payer: COMMERCIAL

## 2019-07-15 VITALS
BODY MASS INDEX: 29.12 KG/M2 | DIASTOLIC BLOOD PRESSURE: 88 MMHG | SYSTOLIC BLOOD PRESSURE: 120 MMHG | HEART RATE: 67 BPM | OXYGEN SATURATION: 96 % | WEIGHT: 215 LBS | HEIGHT: 72 IN

## 2019-07-15 DIAGNOSIS — R97.20 ELEVATED PROSTATE SPECIFIC ANTIGEN (PSA): ICD-10-CM

## 2019-07-15 LAB
PSA SERPL-MCNC: 5.3 NG/ML (ref 0–4)
RESIDUAL VOLUME (RV) (EXTERNAL): 6

## 2019-07-15 PROCEDURE — 99213 OFFICE O/P EST LOW 20 MIN: CPT | Mod: 25 | Performed by: UROLOGY

## 2019-07-15 PROCEDURE — 84153 ASSAY OF PSA TOTAL: CPT | Performed by: UROLOGY

## 2019-07-15 PROCEDURE — 36415 COLL VENOUS BLD VENIPUNCTURE: CPT | Performed by: UROLOGY

## 2019-07-15 PROCEDURE — 51798 US URINE CAPACITY MEASURE: CPT | Performed by: UROLOGY

## 2019-07-15 ASSESSMENT — MIFFLIN-ST. JEOR: SCORE: 1778.23

## 2019-07-15 ASSESSMENT — PAIN SCALES - GENERAL: PAINLEVEL: NO PAIN (0)

## 2019-07-15 NOTE — NURSING NOTE
Chief Complaint   Patient presents with     Clinic Care Coordination - Follow-up     Pt here for same day PSA and PVR   PVR is 6mL  Abbey Zepeda

## 2019-07-15 NOTE — PROGRESS NOTES
CHIEF COMPLAINT   It was my pleasure to see Pranay Ratliff who is a 69 year old male for follow-up of elevated PSA.      HPI  Pranay Ratliff is a very pleasant 69 year old male who presents with a history of Elevated PSA (Prostate Specific Antigen). See trend below. MRI with large prostate and PIRADS 3. PSA increased again to 5.30.     Of note, he also endorses urinary urgency/frequency with modest weakening of stream. Also notes nocturia x 2-3. These symptoms have gradually worsened over the past few years. He has not had previous treatment for BPH. No hematuria or dysuria. He tried tamsulosin and had significant side effects from this.     PSA  7/15/2019 - 5.30  4/22/2019 - 4.70  2/26/2019 - 6.32  1/30/2019 - 6.65  2/2018 - 4.38     MRI Prostate 4/20/2019  Size: 73.5 grams  IMPRESSION:  1. Based on the most suspicious abnormality, this exam is  characterized as PIRADS 3 - The presence of clinically significant  cancer is equivocal.?The most suspicious abnormality is located at the  left posterior apex peripheral zone and there is no evidence of  extraprostatic extension.  2. No suspicious adenopathy or evidence of pelvic metastases.    PHYSICAL EXAM  Patient is a 69 year old  male   Vitals: Blood pressure 120/88, pulse 67, height 1.829 m (6'), weight 97.5 kg (215 lb), SpO2 96 %.  General Appearance Adult: Body mass index is 29.16 kg/m .  Alert, no acute distress, oriented  HENT: throat/mouth:normal, good dentition  Lungs: no respiratory distress, or pursed lip breathing  Heart: No obvious jugular venous distension present  Abdomen: soft, nontender, no organomegaly or masses  Back: no CVAT  Musculoskeltal: extremities normal, no peripheral edema  Skin: no suspicious lesions or rashes  Neuro: Alert, oriented, speech and mentation normal  Psych: affect and mood normal  Gait: Normal    PVR = 6    ASSESSMENT and PLAN  69 year old male with elevated PSA to 4.70 and PIRADS 3 lesion on his MRI. His PSA has risen again  to 5.30. We discussed the implications of this, in the context of his PIRADS 3 lesion. Given the PSA remains elevated, proceeding with a TRUS biopsy would be reasonable, and he elects to proceed at this point. We discussed the risks of a biopsy, including blood in urine, stool, and semen, as well as risk of infection. He expresses understanding and would like to proceed with a Fusion biopsy.     - Schedule for MRI-US Fusion prostate biopsy    I spent over 15 minutes with the patient.  Over half this time was spent on counseling regarding elevated PSA.    Jaylan Guajardo MD  Urology  [facility]  Clinic Phone [phone]

## 2019-07-23 ENCOUNTER — TELEPHONE (OUTPATIENT)
Dept: UROLOGY | Facility: CLINIC | Age: 69
End: 2019-07-23

## 2019-07-23 DIAGNOSIS — Z79.2 PROPHYLACTIC ANTIBIOTIC: Primary | ICD-10-CM

## 2019-07-23 RX ORDER — CIPROFLOXACIN 500 MG/1
500 TABLET, FILM COATED ORAL 2 TIMES DAILY
Qty: 6 TABLET | Refills: 0 | Status: SHIPPED | OUTPATIENT
Start: 2019-07-23 | End: 2019-09-16

## 2019-07-23 NOTE — TELEPHONE ENCOUNTER
[facility] Call Center    Phone Message    May a detailed message be left on voicemail: no    Reason for Call: Medication Question or concern regarding medication   Prescription Clarification  Name of Medication: Cipro 500mg  Prescribing Provider: Dr. Guajardo   Pharmacy: [facility]   What on the order needs clarification? Pt needs to get this medication prior to his MRI prostate biopsy on 8/6/19 with Dr. Guajardo. Pt is being seen at [facility]. Please call Pt back to confirm.          Action Taken: Message routed to:  Other:  CLINICAL [place]

## 2019-07-29 ENCOUNTER — OFFICE VISIT (OUTPATIENT)
Dept: FAMILY MEDICINE | Facility: CLINIC | Age: 69
End: 2019-07-29
Payer: COMMERCIAL

## 2019-07-29 ENCOUNTER — TELEPHONE (OUTPATIENT)
Dept: FAMILY MEDICINE | Facility: CLINIC | Age: 69
End: 2019-07-29

## 2019-07-29 VITALS
SYSTOLIC BLOOD PRESSURE: 122 MMHG | OXYGEN SATURATION: 95 % | DIASTOLIC BLOOD PRESSURE: 74 MMHG | HEART RATE: 69 BPM | TEMPERATURE: 97.4 F | BODY MASS INDEX: 30.07 KG/M2 | WEIGHT: 222 LBS | HEIGHT: 72 IN

## 2019-07-29 DIAGNOSIS — Z82.49 FAMILY HISTORY OF CORONARY ARTERY DISEASE: ICD-10-CM

## 2019-07-29 DIAGNOSIS — S06.0X1D CONCUSSION WITH LOSS OF CONSCIOUSNESS OF 30 MINUTES OR LESS, SUBSEQUENT ENCOUNTER: Primary | ICD-10-CM

## 2019-07-29 PROCEDURE — 99207 C PAF COMPLETED  NO CHARGE: CPT | Performed by: INTERNAL MEDICINE

## 2019-07-29 PROCEDURE — 99213 OFFICE O/P EST LOW 20 MIN: CPT | Performed by: INTERNAL MEDICINE

## 2019-07-29 ASSESSMENT — MIFFLIN-ST. JEOR: SCORE: 1809.99

## 2019-07-29 NOTE — TELEPHONE ENCOUNTER
DONE.      .Gaby WILLETT    [facility]

## 2019-07-29 NOTE — TELEPHONE ENCOUNTER
OPTUM PAF Project printed and given to PCP or MA for completion at patient's on 07/29/2019 appointment  Routing to Provider to sign EPIC order (pended in this encounter) once appointment is complete  TC to fax to [phone] to process once it has been signed    .Gaby WILLETT    [facility] [place]

## 2019-07-29 NOTE — PROGRESS NOTES
"Subjective     Pranay Ratliff is a 69 year old male who presents to clinic today for the following health issues:    HPI   ED/UC Followup:    Facility:  [facility]   Date of visit: 2019  Reason for visit: [place]ell at [place], was unconscious was dehydration. Hit his head really hard    Current Status: Getting headaches and feeling fatigue a lot lately. Feeling some pressure on the back and top and front head area. Just feeling not all the way there.      Pranay had a concussion back in March. I saw him for follow up on  at which time he reported no significant symptoms other than fatigue. He had a referral for the concussion clinic but did not follow through since he felt like he was recovering.     Unfortunately Pranay continues to feel \"not right\". He is experiencing more fatigue, dull daily headache, and occasional dizziness.     Has a prostate biopsy ().    Patient Active Problem List   Diagnosis     History of basal cell carcinoma     Benign essential hypertension     Hyperlipidemia LDL goal <130     Obstructive sleep apnea syndrome     Gastroesophageal reflux disease with esophagitis     Localized swelling of lower extremity     Vasovagal syncope     Elevated prostate specific antigen (PSA)     Past Surgical History:   Procedure Laterality Date     TESTICLE SURGERY       VASECTOMY         Social History     Tobacco Use     Smoking status: Never Smoker     Smokeless tobacco: Never Used   Substance Use Topics     Alcohol use: Yes     Frequency: Monthly or less     Comment: twice a week     Family History   Problem Relation Age of Onset     Skin Cancer Mother          from pneumonia     Abdominal Aortic Aneurysm Mother      Heart Failure Father      Diabetes Type 1 Brother         Possibly secondary to chemical exposure in [place]     Diabetes Type 2  Maternal Grandmother      Cancer Brother         Exposure to Agent Orange in [place]     Heart Disease Brother      "       Reviewed and updated as needed this visit by Provider         Review of Systems   ROS COMP: Constitutional, HEENT, cardiovascular, pulmonary, GI, , musculoskeletal, neuro, skin, endocrine and psych systems are negative, except as otherwise noted.      Objective    /74 (BP Location: Left arm, Patient Position: Chair, Cuff Size: Adult Regular)   Pulse 69   Temp 97.4  F (36.3  C) (Tympanic)   Ht 1.829 m (6')   Wt 100.7 kg (222 lb)   SpO2 95%   BMI 30.11 kg/m    Body mass index is 30.11 kg/m .  Physical Exam   GENERAL: healthy, alert and no distress  EYES: Eyes grossly normal to inspection, PERRL and conjunctivae and sclerae normal  RESP: lungs clear to auscultation - no rales, rhonchi or wheezes  CV: regular rate and rhythm, normal S1 S2, no S3 or S4, no murmur, click or rub, no peripheral edema and peripheral pulses strong  NEURO: Normal strength and tone, mentation intact and speech normal    Diagnostic Test Results:  Labs reviewed in Epic        Assessment & Plan     1. Concussion with loss of consciousness of 30 minutes or less, subsequent encounter  Pranay is still experiencing some residual symptoms from his concussion in March, specifically fatigue and a dull headache. Recommending he make an appointment with the concussion clinic. He agrees and has this referral information already.    2. Family history of coronary artery disease  - CT Coronary Calcium Scan; Future     BMI:   Estimated body mass index is 30.11 kg/m  as calculated from the following:    Height as of this encounter: 1.829 m (6').    Weight as of this encounter: 100.7 kg (222 lb).             No follow-ups on file.    Brigida Veras MD  [facility] [place]

## 2019-07-30 ENCOUNTER — PRE VISIT (OUTPATIENT)
Dept: ONCOLOGY | Facility: CLINIC | Age: 69
End: 2019-07-30

## 2019-07-30 NOTE — TELEPHONE ENCOUNTER
Patient coming in for prostate biopsy. Patient contacted regarding prep needed to be done prior to biopsy. This includes stopping all blood thinners for 1 week prior to biopsy, obtaining and administering fleets enema 2 hours prior to biopsy. Patient will also receive 6 antibiotics to start BID for three days starting the day prior to procedure. Patient stated understanding and had no further questions.

## 2019-08-06 ENCOUNTER — OFFICE VISIT (OUTPATIENT)
Dept: UROLOGY | Facility: CLINIC | Age: 69
End: 2019-08-06
Payer: COMMERCIAL

## 2019-08-06 VITALS
BODY MASS INDEX: 29.8 KG/M2 | DIASTOLIC BLOOD PRESSURE: 84 MMHG | WEIGHT: 220 LBS | HEIGHT: 72 IN | HEART RATE: 93 BPM | SYSTOLIC BLOOD PRESSURE: 136 MMHG

## 2019-08-06 DIAGNOSIS — R97.20 ELEVATED PROSTATE SPECIFIC ANTIGEN (PSA): Primary | ICD-10-CM

## 2019-08-06 PROCEDURE — 88305 TISSUE EXAM BY PATHOLOGIST: CPT | Performed by: UROLOGY

## 2019-08-06 RX ORDER — GENTAMICIN 40 MG/ML
80 INJECTION, SOLUTION INTRAMUSCULAR; INTRAVENOUS ONCE
Status: COMPLETED | OUTPATIENT
Start: 2019-08-06 | End: 2019-08-06

## 2019-08-06 RX ORDER — LIDOCAINE HYDROCHLORIDE 10 MG/ML
10 INJECTION, SOLUTION EPIDURAL; INFILTRATION; INTRACAUDAL; PERINEURAL ONCE
Status: COMPLETED | OUTPATIENT
Start: 2019-08-06 | End: 2019-08-06

## 2019-08-06 RX ADMIN — GENTAMICIN 80 MG: 40 INJECTION, SOLUTION INTRAMUSCULAR; INTRAVENOUS at 12:12

## 2019-08-06 RX ADMIN — LIDOCAINE HYDROCHLORIDE 10 ML: 10 INJECTION, SOLUTION EPIDURAL; INFILTRATION; INTRACAUDAL; PERINEURAL at 12:12

## 2019-08-06 ASSESSMENT — PAIN SCALES - GENERAL: PAINLEVEL: NO PAIN (0)

## 2019-08-06 ASSESSMENT — MIFFLIN-ST. JEOR: SCORE: 1800.91

## 2019-08-06 NOTE — PATIENT INSTRUCTIONS
Please see the attached instructions regarding your Post Biopsy Care, and make sure you have scheduled or will schedule your Biopsy Follow Up.    It was a pleasure meeting with you today.  Thank you for allowing me and my team the privilege of caring for you today. YOU are the reason we are here, and I truly hope we provided you with the excellent service you deserve.  Please let us know if there is anything else we can do for you so that we can be sure you are leaving completely satisfied with your care experience.      Lele Amador, EMT.            [facility]  Precautions Following a Prostate Biopsy    There are four conditions that you should watch for after a prostate biopsy:    1. Excessive pain  2. Bleeding irregularities (passing numerous  dime sized  clots or if your urine looks like cranberry juice)  3. Fever of 100 degrees or more  4. If you are unable to urinate        If any of these occur, call the Urology Clinic during normal business hours (M-F, 8:00-4:30) at [phone].  If you experience a problem after normal business hours, call our 24-hour phone number at [phone] and ask for the Urology Resident on call to be paged.      If you experience any discomfort following the biopsy, you may take Tylenol.  DO NOT TAKE ASPIRIN unless specified by your physician.   If the discomfort becomes severe or uncontrolled by medication, contact the Urology Clinic or Urology Resident (after normal business hours).      Do not be alarmed if you have some blood in your stool, in your urine, or ejaculate (semen).  This occurrence is normal and may last up to three (3) or four (4) days, usually intermittently.  Blood in the ejaculate (semen) may last several weeks, up to about a dozen ejaculations.  The blood in your ejaculate may appear as brown streaks, blood tinged, and immediately following a biopsy, it may appear bright red.      If you run a fever above 100 degrees, call  the Urology Clinic or Urology Resident (after normal business hours) immediately.  If you are unable to reach your physician or the Resident on call, go to the nearest emergency room.  Explain that you have had a transrectal biopsy of your prostate and what problems you are experiencing.        You should attempt to urinate following your biopsy before you leave the clinic.  If you are unable to urinate four (4) to six (6) hours after you leave the clinic, you will need to contact the Urology Clinic or the Resident on call.  If you are unable to reach your physician or the Resident on call, go to the nearest emergency room.      If you have any questions or concerns after your biopsy, feel free to contact the Urology Clinic at [phone] during M-F, 8:00-5pm business hours.  If you need to speak with someone after normal business hours, call [phone] and ask for the Resident on call to be paged.

## 2019-08-06 NOTE — NURSING NOTE
Return-  Prostate Biopsy     He denies any uriary sx and pains.    Chief Complaint   Patient presents with     Prostate Biopsy     Elevated PSA       Blood pressure 136/84, pulse 93, height 1.829 m (6'), weight 99.8 kg (220 lb). Body mass index is 29.84 kg/m .    Patient Active Problem List   Diagnosis     History of basal cell carcinoma     Benign essential hypertension     Hyperlipidemia LDL goal <130     Obstructive sleep apnea syndrome     Gastroesophageal reflux disease with esophagitis     Localized swelling of lower extremity     Vasovagal syncope     Elevated prostate specific antigen (PSA)     Concussion with loss of consciousness of 30 minutes or less, subsequent encounter       Allergies   Allergen Reactions     Johanna-Jana Plus Allergy [Diphenhydramine]      Patient has lip swollen ,has used tylenol in th past. No issues. Has used benadryl in the past no issues.  Never used phenylephrine.       Current Outpatient Medications   Medication Sig Dispense Refill     aspirin 81 MG tablet Take by mouth daily       carbamide peroxide (DEBROX) 6.5 % otic solution Place 5 drops into both ears daily as needed for other (impacted cerumen) 14.8 mL 11     clotrimazole-betamethasone (LOTRISONE) 1-0.05 % external cream APPLY TOPICALLY TWICE DAILY TO THE FEET. 45 g 0     furosemide (LASIX) 20 MG tablet Take 1 tablet (20 mg) once daily as needed for swelling. 30 tablet 1     multivitamin, therapeutic with minerals (MULTI-VITAMIN) TABS Take 1 tablet by mouth daily       Omega-3 Fatty Acids (OMEGA-3 FISH OIL PO)        simvastatin (ZOCOR) 20 MG tablet Take 1 tablet (20 mg) by mouth At Bedtime 90 tablet 3     triamcinolone (KENALOG) 0.1 % external cream Apply topically 2 times daily to affected areas on the left hand for 10-14 days. Not for use on face nor groin. 30 g 1     triamterene-HCTZ (MAXZIDE-25) 37.5-25 MG tablet Take 1 tablet by mouth daily 90 tablet 3       Social History     Tobacco Use     Smoking status: Never  Smoker     Smokeless tobacco: Never Used   Substance Use Topics     Alcohol use: Yes     Frequency: Monthly or less     Comment: twice a week     Drug use: No       Patient states he did all biopsy prep accordingly, including taking the antibiotics as directed, stopping blood thinners, and completing the enema shortly prior to his appointment.    Invasive Procedure Safety Checklist:    Procedure: Prostate Biopsy    Action: Complete sections and checkboxes as appropriate.    Pre-procedure:  1. Patient ID Verified with 2 identifiers (Anna and  or MRN) : YES    2. Procedure and site verified with patient/designee (when able) : YES    3. Accurate consent documentation in medical record : YES    4. H&P (or appropriate assessment) documented in medical record : N/A  H&P must be up to 30 days prior to procedure an updated within 24 hours of                 Procedure as applicable.     5. Relevant diagnostic and radiology test results appropriately labeled and displayed as applicable : YES    6. Blood products, implants, devices, and/or special equipment available for the procedure as applicable : YES    7. Procedure site(s) marked with provider initials [Exclusions: none] : NO    8. Marking not required. Reason : Yes  Procedure does not require site marking    Time Out:     Time-Out performed immediately prior to starting procedure, including verbal and active participation of all team members addressing: YES    1. Correct patient identity.  2. Confirmed that the correct side and site are marked.  3. An accurate procedure to be done.  4. Agreement on the procedure to be done.  5. Correct patient position.  6. Relevant images and results are properly labeled and appropriately displayed.  7. The need to administer antibiotics or fluids for irrigation purposes during the procedure as applicable.  8. Safety precautions based on patient history or medication use.    During Procedure: Verification of correct person, site, and  procedure occurs any time the responsibility for care of the patient is transferred to another member of the care team.    The following medication was given:     MEDICATION:  Lidocaine 1%  ROUTE: Provider Administered  SITE: Provider Administered  DOSE: 10mL  LOT #: [id number]  :[employer]  EXPIRATION DATE: 04/23  NDC#: [address]-37   Was there drug waste? Yes  Amount of drug waste (10mL): 20.  Reason for waste:  As per MD    Prior to injection, verified patient identity using patient's name and date of birth.  Due to injection administration, patient instructed to remain in clinic for 15 minutes  afterwards, and to report any adverse reaction to me immediately.    Drug Amount Wasted:  Yes: 10 mg/ml   Vial/Syringe: Single dose vial    The following medication was given:     MEDICATION:  Gentamicin    ROUTE: IM  SITE: RUQ - Gluteus  DOSE: 80mg  LOT #: [id number]  : [employer]works  EXPIRATION DATE: 07/20  NDC#: [address]-01   Was there drug waste? No    Prior to injection, verified patient identity using patient's name and date of birth.  Due to injection administration, patient instructed to remain in clinic for 15 minutes  afterwards, and to report any adverse reaction to me immediately.    Drug Amount Wasted:  None.  Vial/Syringe: Single dose vial    Lele Amador, ADELAIDA  August 6, 2019

## 2019-08-06 NOTE — PROGRESS NOTES
PREPROCEDURE DIAGNOSIS: Elevated PSA.   POSTPROCEDURE DIAGNOSIS: Elevated PSA.   PROCEDURE: Transrectal ultrasound sizing and transrectal ultrasound guided prostatic needle biopsy, including MRI fusion targeting  for Pranay Ratliff who is a 69 year old male. PSA 5.30.  SURGEON: Jaylan Guajardo  ANESTHESIA: 5 mL of 1% periprostatic block bilaterally   We previously obtained an MRI of the prostate and identified all PIRADS 3-5 lesions for targeting    Target Lesion #1 PIRADS 3 left posterior apex    DESCRIPTION OF PROCEDURE: The procedure, the outcome, the anesthesia, and the risks were discussed with the patient. Informed consent was obtained and signed and a timeout was completed prior to the procedure. Digital rectal examination was performed with the below findings noted. Anesthesia was administered as noted above and the transrectal ultrasound probe was inserted, sizing was performed, and the below findings were noted. 3 core biopsies were taken from each of the MRI targets, and 12 core biopsies were taken as described below. The probe was then withdrawn. Patient tolerated the procedure well.   FINDINGS: Digital rectal exam reveals normal prostate. Total volume is 60 mL. Hypoechoic lesion bilaterally. Calcifications noted bilaterally. US images were obtained and then fused with the MRI images.  The fused images were then used to guide the biopsy of the targeted lesions with 2 cores taken of each lesion.  We then performed random biopsies.  12 cores were taken with 6 on each side, base, mid and apex.  PLAN: Will follow-up next week to review results.  Jaylan Guajardo MD  Urology  [facility]  Clinic Phone [phone]

## 2019-08-15 ENCOUNTER — HOSPITAL ENCOUNTER (OUTPATIENT)
Dept: CARDIOLOGY | Facility: CLINIC | Age: 69
Discharge: HOME | End: 2019-08-15
Attending: INTERNAL MEDICINE | Admitting: INTERNAL MEDICINE
Payer: COMMERCIAL

## 2019-08-15 DIAGNOSIS — Z82.49 FAMILY HISTORY OF CORONARY ARTERY DISEASE: ICD-10-CM

## 2019-08-15 LAB — COPATH REPORT: NORMAL

## 2019-08-15 PROCEDURE — 75571 CT HRT W/O DYE W/CA TEST: CPT

## 2019-08-15 PROCEDURE — 75571 CT HRT W/O DYE W/CA TEST: CPT | Mod: 26 | Performed by: INTERNAL MEDICINE

## 2019-08-15 NOTE — LETTER
August 16, 2019      Pranay Ratliff  [address] [address]        Dear ,    We are writing to inform you of your test results.    Your CT Calcium screening shows moderate risk for coronary artery disease based on your calcium score. Reduction of risk factors and maintaining an LDL cholesterol < 100 mg/dL is the best preventive measure against heart disease. Your LDL is currently at goal. Continue to consume a heart-healthy diet. I would recommend staying on Aspirin 81 mg daily in addition to your statin.     Resulted Orders   CT Coronary Calcium Scan    Narrative    Procedure: CT CALCIUM SCREENING     Examination Date: 8/15/2019 3:13 PM      Clinical Information: Family history of coronary artery disease     Ordering Provider: Brigida Veras    PROCEDURE: High-resolution, ECG synchronized multi-slice computed  tomography was performed without incident. Coronary calcification was  analyzed using [employer] calcium scoring software. Scan protocol was  optimized to minimize radiation exposure. The total radiation exposure  was calculated to be 39 DLP and 0.40 mSv.    FINDINGS:    Overall quality of the study: Good.     CORONARY ARTERY CALCIUM SCORES:     Left main coronary artery: 0  Left anterior descending coronary artery: 81.83  Circumflex coronary artery: 8.49  Right coronary artery: 22.87    TOTAL CALCIUM SCORE: 113.18    The total Agatston calcium score is 113.18 placing the patient in the  47th percentile when compared to age and gender matched control group.    The ascending aorta appears to be mildly dilated measuring 3.60 x 3.67  cm at the level of the right pulmonary artery.    Please review Radiology report for incidental noncardiac findings that  will follow separately      CALCIUM SCORE -399: If the patient has more than one cardiac  risk factor (male age >45, female age >55,hypertension, smoking, high  cholesterol, low HDL, family history of heart disease) then the  risk  of coronary heart disease, death or myocardial infarction is 1.3% per  year (ENMA Maradiaga. et al. [facility], 2007; 49:378-402). The more calcium  is detected, the higher is the likelihood for an obstructive coronary  disease. However, there is no unique relationship between the amount  of detected calcium and the extent of or localization of coronary  artery disease. If there are any cardiac symptoms (for example chest  pain/ pressure or shortness of breath) then immediate medical  attention is necessary.    GENERAL RECOMMENDATIONS: Adoption and maintenance of a healthy  lifestyle is recommended for all people. This should include regular,  appropriate exercise and observance of a proper diet, to ensure  balanced nutrition and weight control. Tobacco use should be avoided.  Cholesterol has been linked to coronary atherosclerosis, and we  strongly encourage adhering to the recommendations of the National  Cholesterol Education Panel (NCEP). For primary prevention, these  include a target goal for total cholesterol of less than 200 mg/dl,  HDL cholesterol of greater than 40 mg/dl, triglycerides of less than  150 mg/dl, and LDL cholesterol of less than 100 mg/dl. However note  that these are general recommendations only, and as with all such  matters, the personal physician should be consulted regarding  recommendations and treatments appropriate for the individual.    AGENS ROCHA MD       If you have any questions or concerns, please call the clinic at the number listed above.       Sincerely,      Brigida Veras MD

## 2019-08-16 NOTE — RESULT ENCOUNTER NOTE
Please mail this letter to patient.     Dear Pranay,    Your CT Calcium screening shows moderate risk for coronary artery disease based on your calcium score. Reduction of risk factors and maintaining an LDL cholesterol < 100 mg/dL is the best preventive measure against heart disease. Your LDL is currently at goal. Continue to consume a heart-healthy diet. I would recommend staying on Aspirin 81 mg daily in addition to your statin.      If you have any further questions please don't hesitate to contact me. You can either reply via [employer] or call [phone].    Gricel Veras MD  Internal Medicine & Pediatrics  [facility] [place] [place]

## 2019-08-19 ENCOUNTER — OFFICE VISIT (OUTPATIENT)
Dept: UROLOGY | Facility: CLINIC | Age: 69
End: 2019-08-19
Payer: COMMERCIAL

## 2019-08-19 VITALS
WEIGHT: 220 LBS | BODY MASS INDEX: 29.8 KG/M2 | SYSTOLIC BLOOD PRESSURE: 122 MMHG | HEIGHT: 72 IN | DIASTOLIC BLOOD PRESSURE: 82 MMHG

## 2019-08-19 DIAGNOSIS — R97.20 ELEVATED PROSTATE SPECIFIC ANTIGEN (PSA): ICD-10-CM

## 2019-08-19 DIAGNOSIS — N40.1 BPH WITH URINARY OBSTRUCTION: Primary | ICD-10-CM

## 2019-08-19 DIAGNOSIS — N13.8 BPH WITH URINARY OBSTRUCTION: Primary | ICD-10-CM

## 2019-08-19 PROCEDURE — 99213 OFFICE O/P EST LOW 20 MIN: CPT | Performed by: UROLOGY

## 2019-08-19 RX ORDER — FINASTERIDE 5 MG/1
5 TABLET, FILM COATED ORAL DAILY
Qty: 90 TABLET | Refills: 3 | Status: SHIPPED | OUTPATIENT
Start: 2019-08-19 | End: 2021-07-06 | Stop reason: ALTCHOICE

## 2019-08-19 ASSESSMENT — MIFFLIN-ST. JEOR: SCORE: 1800.91

## 2019-08-19 ASSESSMENT — PAIN SCALES - GENERAL: PAINLEVEL: NO PAIN (0)

## 2019-08-19 NOTE — PROGRESS NOTES
CHIEF COMPLAINT   It was my pleasure to see Pranay Ratliff who is a 69 year old male for follow-up of elevated PSA    HPI   Pranay Ratliff is a very pleasant 69 year old male who presents with a history of Elevated PSA (Prostate Specific Antigen).  PSA 5.3 Now s/p TRUS biopsy. Has done well since biopsy which demonstrated no malignancy.    Also has history of BPH with weak stream and LUTS. Did not tolerate tamsulosin on previous trial of this. Is bothered by his urinary symptoms.     MRI Prostate 4/20/2019  IMPRESSION:  1. Based on the most suspicious abnormality, this exam is  characterized as PIRADS 3 - The presence of clinically significant  cancer is equivocal.?The most suspicious abnormality is located at the  left posterior apex peripheral zone and there is no evidence of  extraprostatic extension.  2. No suspicious adenopathy or evidence of pelvic metastases.    TRUS 8/6/2019  FINAL DIAGNOSIS:   A. Prostate biopsy, left base:   - Benign prostate tissue     B. Prostate biopsy, left mid:   - Benign prostate tissue     C. Prostate biopsy, left apex:   - Benign prostate tissue     D. Prostate biopsy, right base:   - Benign prostate tissue     E. Prostate biopsy, right mid:   - Benign prostate tissue     F. Prostate biopsy, right apex:   - Benign prostate tissue     G. Prostate biopsy peripheral target #1:   - Benign prostate tissue     PHYSICAL EXAM  Patient is a 69 year old  male   Vitals: Blood pressure 122/82, height 1.829 m (6'), weight 99.8 kg (220 lb).  General Appearance Adult: Body mass index is 29.84 kg/m .  Alert, no acute distress, oriented  HENT: throat/mouth:normal, good dentition  Lungs: no respiratory distress, or pursed lip breathing  Heart: No obvious jugular venous distension present  Abdomen: soft, nontender, no organomegaly or masses  Back: no CVAT  Musculoskeltal: extremities normal, no peripheral edema  Skin: no suspicious lesions or rashes  Neuro: Alert, oriented, speech and mentation  normal  Psych: affect and mood normal  Gait: Normal    ASSESSMENT and PLAN  69 year old male with history of elevated PSA and PIRADS 3 lesion on MRI. PSA 5.3. TRUS biopsy in August 2019 with no evidence of malignancy. We discussed his urinary symptoms today. He is interested in trial of finasteride, as he did not tolerate alpha blockers. We discussed potential risks of this. Side effects were discussed today.    - Finasteride 5 mg daily  - Follow-up in 6 months with PSA    I spent over 15 minutes with the patient.  Over half this time was spent on counseling regarding elevated PSA and BPH with urinary obstruction.    Jaylan Guajardo MD  Urology  [facility]  Clinic Phone [phone]

## 2019-08-20 PROBLEM — N13.8 BPH WITH URINARY OBSTRUCTION: Status: ACTIVE | Noted: 2019-08-20

## 2019-08-20 PROBLEM — N40.1 BPH WITH URINARY OBSTRUCTION: Status: ACTIVE | Noted: 2019-08-20

## 2019-09-03 ENCOUNTER — TELEPHONE (OUTPATIENT)
Dept: SURGERY | Facility: CLINIC | Age: 69
End: 2019-09-03

## 2019-09-04 ENCOUNTER — OFFICE VISIT (OUTPATIENT)
Dept: PHYSICAL MEDICINE AND REHAB | Facility: CLINIC | Age: 69
End: 2019-09-04
Payer: COMMERCIAL

## 2019-09-04 VITALS
OXYGEN SATURATION: 94 % | BODY MASS INDEX: 29.8 KG/M2 | SYSTOLIC BLOOD PRESSURE: 129 MMHG | HEART RATE: 62 BPM | HEIGHT: 72 IN | DIASTOLIC BLOOD PRESSURE: 79 MMHG | WEIGHT: 220 LBS

## 2019-09-04 DIAGNOSIS — S09.90XS FATIGUE DUE TO OLD HEAD INJURY: ICD-10-CM

## 2019-09-04 DIAGNOSIS — R53.83 FATIGUE DUE TO OLD HEAD INJURY: ICD-10-CM

## 2019-09-04 DIAGNOSIS — M79.18 MYOFASCIAL PAIN SYNDROME, CERVICAL: ICD-10-CM

## 2019-09-04 DIAGNOSIS — M54.81 OCCIPITAL NEURALGIA OF RIGHT SIDE: ICD-10-CM

## 2019-09-04 DIAGNOSIS — F07.81 POST CONCUSSION SYNDROME: Primary | ICD-10-CM

## 2019-09-04 ASSESSMENT — ANXIETY QUESTIONNAIRES
1. FEELING NERVOUS, ANXIOUS, OR ON EDGE: NOT AT ALL
IF YOU CHECKED OFF ANY PROBLEMS ON THIS QUESTIONNAIRE, HOW DIFFICULT HAVE THESE PROBLEMS MADE IT FOR YOU TO DO YOUR WORK, TAKE CARE OF THINGS AT HOME, OR GET ALONG WITH OTHER PEOPLE: NOT DIFFICULT AT ALL
7. FEELING AFRAID AS IF SOMETHING AWFUL MIGHT HAPPEN: NOT AT ALL
6. BECOMING EASILY ANNOYED OR IRRITABLE: NOT AT ALL
5. BEING SO RESTLESS THAT IT IS HARD TO SIT STILL: NOT AT ALL
3. WORRYING TOO MUCH ABOUT DIFFERENT THINGS: NOT AT ALL
2. NOT BEING ABLE TO STOP OR CONTROL WORRYING: NOT AT ALL
GAD7 TOTAL SCORE: 1

## 2019-09-04 ASSESSMENT — PATIENT HEALTH QUESTIONNAIRE - PHQ9
SUM OF ALL RESPONSES TO PHQ QUESTIONS 1-9: 1
5. POOR APPETITE OR OVEREATING: SEVERAL DAYS

## 2019-09-04 ASSESSMENT — PAIN SCALES - GENERAL: PAINLEVEL: NO PAIN (1)

## 2019-09-04 ASSESSMENT — MIFFLIN-ST. JEOR: SCORE: 1800.91

## 2019-09-04 NOTE — LETTER
"2019       RE: Pranay Ratliff  [address]  [place] [address]     Dear Colleague,    Thank you for referring your patient, Pranay Ratliff, to the [facility] PHYSICAL MEDICINE AND REHABILITATION at [facility]. Please see a copy of my visit note below.           PM&R Clinic Note     Patient Name: Pranay Ratliff : 1950 Medical Record: [id number]     Requesting Physician/clinician: No att. providers found           History of Present Illness:     Pranay Ratliff is a 69 year old male whom back in 2019 was at [employer] prior to his arrival in the ED where he had used the sauna, then the whirlpool for 15 minutes, and then went to take a shower. After he finished his shower, he started to feel very weak and sat down in a shower chair. The next thing the patient knew, someone had found him on the ground and there was blood around him. The patient has no memory of passing out and falling over. He believes that he hit his on the corner of the shower stall when he fell because the right side of his forehead was bleeding.  He went to ER via ambulance and GCS was 15 as well as cardiac work up at arrival.      CT Head w/o contrast  1. No evidence of acute intracranial hemorrhage, mass, or herniation.  2. Right frontal scalp soft tissue injury without underlying fracture.    Patient was discharged home.  Per records patient continues to feel \"not right\". He is experiencing more fatigue, dull daily headache, and occasional dizziness.     Current status:  Patient states since then he feels some dull headaches which have been improving; however, he still gets occipital and back of neck headaches.   Almost daily.  Low level. Not electric or stabbing, just dull ache, and has not tried any medications to help.  He also has some fatigue issues, tires easily with normal activities in which previously would not.  No bladder or bowel issues.     Symptoms    "   CONCUSSION SYMPTOMS ASSESSMENT 2019   Headache or Pressure In Head 1 - mild   Upset Stomach or Throwing Up 0 - none   Problems with Balance 0 - none   Feeling Dizzy 0 - none   Sensitivity to Light 0 - none   Sensitivity to Noise 0 - none   Mood Changes 0 - none   Feeling sluggish, hazy, or foggy 1 - mild   Trouble Concentrating, Lack of Focus 1 - mild   Motion Sickness 0 - none   Vision Changes 0 - none   Memory Problems 0 - none   Feeling Confused 0 - none   Neck Pain 2 - mild to moderate   Trouble Sleeping 1 - mild   Total Number of Symptoms 5   Symptom Severity Score 6     Therapies/HEP:  None for this concussion.    Functionally, still at normal level, doing Yen Yoga.  Just has these dull headaches and notices some fatigue.             Past Medical and Surgical History:     Past Medical History:   Diagnosis Date     Basal cell carcinoma      Epididymitis, bilateral      Epididymitis, left      Epididymitis, right      Past Surgical History:   Procedure Laterality Date     TESTICLE SURGERY       VASECTOMY              Social History:     Social History     Tobacco Use     Smoking status: Never Smoker     Smokeless tobacco: Never Used   Substance Use Topics     Alcohol use: Yes     Frequency: Monthly or less     Comment: twice a week       Marital Status: has partner  Living situation: with partner who is in hospital  Family support: partner   Vocational History:  Retired, 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    ADLs: independent  Assistive devices:  none  iADLs (medication management and finances): indpendent  Hand dominance: Left handed  Driving: yes         Family History:     Family History   Problem Relation Age of Onset     Skin Cancer Mother          from pneumonia     Abdominal Aortic Aneurysm Mother      Heart Failure Father       Diabetes Type 1 Brother         Possibly secondary to chemical exposure in [place]     Diabetes Type 2  Maternal Grandmother      Cancer Brother         Exposure to Agent Orange in [place]     Heart Disease Brother             Medications:     Current Outpatient Medications   Medication Sig Dispense Refill     aspirin 81 MG tablet Take by mouth daily       Camphor-Menthol-Methyl Sal 1.2-5.7-6.3 % PTCH Externally apply 1 patch topically daily as needed (apply to right side of neck for pain) 24 patch 3     carbamide peroxide (DEBROX) 6.5 % otic solution Place 5 drops into both ears daily as needed for other (impacted cerumen) 14.8 mL 11     clotrimazole-betamethasone (LOTRISONE) 1-0.05 % external cream APPLY TOPICALLY TWICE DAILY TO THE FEET. 45 g 0     finasteride (PROSCAR) 5 MG tablet Take 1 tablet (5 mg) by mouth daily 90 tablet 3     furosemide (LASIX) 20 MG tablet Take 1 tablet (20 mg) once daily as needed for swelling. 30 tablet 1     multivitamin, therapeutic with minerals (MULTI-VITAMIN) TABS Take 1 tablet by mouth daily       Omega-3 Fatty Acids (OMEGA-3 FISH OIL PO)        simvastatin (ZOCOR) 20 MG tablet Take 1 tablet (20 mg) by mouth At Bedtime 90 tablet 3     triamcinolone (KENALOG) 0.1 % external cream Apply topically 2 times daily to affected areas on the left hand for 10-14 days. Not for use on face nor groin. 30 g 1     triamterene-HCTZ (MAXZIDE-25) 37.5-25 MG tablet Take 1 tablet by mouth daily 90 tablet 3            Allergies:     Allergies   Allergen Reactions     Johanna-Jana Plus Allergy [Diphenhydramine]      Patient has lip swollen ,has used tylenol in th past. No issues. Has used benadryl in the past no issues.  Never used phenylephrine.            ROS:     A focused ROS is negative other than the symptoms noted above in the HPI.    Constitutional: denies any fevers, chills, any recent weight loss   Eyes: denies changes in visual acuity   Ears, Nose, Throat: denies any difficulty  swallowing   Cardiovascular: denies any exertional chest pain or palpitation   Respiratory: denies dyspnea   Gastrointestinal: denies any nausea, vomiting, abdominal pain, diarrhea or constipation   Genitourinary: denies any dysuria, hematuria, frequency or urgency   Musculoskeletal: denies any muscle pain, joint pain, there is some neck pain/stiffness or back pain   Neurologic: denies  changes in motor or sensory function, no loss of balance or vertigo   Psychiatric: denies mood changes; sleeps OK except with CPAP         Physical Examiniation:     VITAL SIGNS: /79   Pulse 62   Ht 1.829 m (6')   Wt 99.8 kg (220 lb)   SpO2 94%   BMI 29.84 kg/m     BMI: Estimated body mass index is 29.84 kg/m  as calculated from the following:    Height as of this encounter: 1.829 m (6').    Weight as of this encounter: 99.8 kg (220 lb).    Gen: NAD, pleasant and cooperative   HEENT: patient has no nystagmus, NCAT, EOMI, there is marked tender and taught cervical paraspinals on R side and reproducible pain at R occipital region.    Cardio: 2+ radial pulse, good cap refill  Pulm: non-labored breathing in room air, symmetrical chest rise  Abd: benign  Ext: WWP, no edema in BLE, no tenderness in calves  Neuro/MSK: there is 5/5 in c5-t1 and l2-s1 myotomes.  Patient is AOx3  No aphasia  Negative hoffmans b/l  Normal reflexes, slightly diminished achilles b/l  No dysmetria  CN 2-12 intact  GAIT: wnfl         Laboratory/Imaging:     CT SCAN OF THE HEAD WITHOUT CONTRAST   3/2/2019 5:54 PM      HISTORY: Head trauma.     TECHNIQUE:  Axial images of the head and coronal reformations without  IV contrast material. Radiation dose for this scan was reduced using  automated exposure control, adjustment of the mA and/or kV according  to patient size, or iterative reconstruction technique.     COMPARISON: None.     FINDINGS: There is no evidence of intracranial hemorrhage, mass, acute  infarct or anomaly. The ventricles are normal in size,  shape and  configuration. The brain parenchyma and subarachnoid spaces are  normal.      The visualized portions of the sinuses and mastoids appear normal.     Right frontal scalp soft tissue injury without underlying fracture.                                                               IMPRESSION:     1. No evidence of acute intracranial hemorrhage, mass, or herniation.  2. Right frontal scalp soft tissue injury without underlying fracture.         Assessment/Plan:     Pranay was seen today for head injury.    Diagnoses and all orders for this visit:    Post concussion syndrome  -     CONCUSSION  REFERRAL    Myofascial pain syndrome, cervical  -     PHYSICAL THERAPY REFERRAL; Future  -     DME Referral  -     Camphor-Menthol-Methyl Sal 1.2-5.7-6.3 % PTCH; Externally apply 1 patch topically daily as needed (apply to right side of neck for pain)  -     CONCUSSION  REFERRAL    Occipital neuralgia of right side    Fatigue due to old head injury    1. Patient education: In depth discussion and education was provided about the assessment and implications of each of the below recommendations for management. Patient indicated readiness to learn, all questions were answered and understanding of material presented was confirmed.  2. Work-up: none at this time  3. Therapy/equipment/braces: wrote for PT and message therapy also ordered Neck Messager  4. Medications: salonpas prn  5. Interventions: none  6. Referral / follow up with other providers: none  7. Follow up: 6 weeks, if no improvement will need trigger points and/or occipital nerve block.  Discussed neurofatigue as well as and building tolerance back up, as well as sleep hygiene and melatonin trial.  If no improvement may need CBT as well as stimulant.     I spent a total of 45 minutes face-to-face with Pranay Ratliff during today's office visit. Over 50% of this time was spent counseling the patient and/or coordinating care. See note for details.      Again, thank you for allowing me to participate in the care of your patient.      Sincerely,    Tato Avelar, DO

## 2019-09-04 NOTE — PATIENT INSTRUCTIONS
"    GENERAL ADVICE:  ~ Gradually ease back into your usual activities.   ~ Rest as needed to help your symptoms go away.  - Consider pairing 50 minutes of activity with 10 minutes of rest.  ~ Allow yourself more time for activities.  ~ Write things down.  At home, at work, whenever there is something that you should remember, even it is simple.  SCREENS:  ~ Change settings on your phone and computer using the \"Blue Light Filter\" (Night Shift on all  [employer] products)  ~ The goal is making screens more yellow and less blue.    ~ If this is not an option you can download this program, [employer], to adjust your screen resolution.  ~ Swivel for various filter and font apps  ~ Turn screen brightness down  ~ Increase font size  ~ Limit screen activities including computer, TV and phones.  NECK PAIN:  ~ Ice or Heat are good~  ~ Massage is ok if it doesn't trigger more symptoms~  ~ Gentle stretches and range-of-motion are helpful.  DIZZINESS:  ~ No driving when dizzy.  ~ No biking, climbing heights or ladders if dizzy.  FATIGUE:  ~ Daily exercise is strongly encouraged.  Start with a 10 min walk and increase the time as tolerated until you are walking 30 minutes per day.    ~ Focus on Good sleep hygiene instead of napping . Your goal should be 8 hours of sleep every night.  ~ Try Melatonin 1 hour before bed  ANXIETY OR MOOD SWINGS:  ~ If you are irritable or anxious, take a break in a quiet room.  ~ Try using the free Calm dennise for guided breathing and mindfulness/meditation.  ~ Explore [employer] (https://www.headspace.com) for free and easy-to-use meditation guidance.  LIGHT SENSITIVITIES:  ~ Avoid florescent lighting when possible.  ~Yellow or zan tinted lenses may help reduce computer or night-time road glare.             ~ Amazon has a $10.00 option: [employer] yellow Night Vision.  NOISE SENSITIVITIES:  ~ Try listening to calming sounds such as the \"Calm Dennise\" to help shift your focus off of more irritating " sounds.  ~ Avoid crowded areas at first then slowly introduce yourself to small increments of crowded, noisy areas.  ~ Try High fidelity earplugs used by [employer]. Etymotic ETY-Plugs, can be found on Amazon for $13.00.  DIET:  - In principle incorporate more protein, lots of veggies, some fruit, whole grains.    - Less sweets and saturated fat.   - Mediterranean Diet is an easy-to-follow example.  ~ Drink plenty of water throughout the day (8-10 glasses per day)    [facility]   Nurse Line # [phone]   Fax # [phone]  Scheduling; # [phone]  E-MAIL - [email or web address]    Thank you for allowing us to be a part of your care.

## 2019-09-04 NOTE — NURSING NOTE
Chief Complaint   Patient presents with     Head Injury     concussion w/loc 3/2/2019 - Fall/ Syncopy with frontal head strike.       Vitals:    09/04/19 1545   BP: 129/79   Pulse: 62   SpO2: 94%   Weight: 99.8 kg (220 lb)   Height: 1.829 m (6')       Body mass index is 29.84 kg/m .      CONCUSSION SYMPTOMS ASSESSMENT 9/4/2019   Headache or Pressure In Head 1 - mild   Upset Stomach or Throwing Up 0 - none   Problems with Balance 0 - none   Feeling Dizzy 0 - none   Sensitivity to Light 0 - none   Sensitivity to Noise 0 - none   Mood Changes 0 - none   Feeling sluggish, hazy, or foggy 1 - mild   Trouble Concentrating, Lack of Focus 1 - mild   Motion Sickness 0 - none   Vision Changes 0 - none   Memory Problems 0 - none   Feeling Confused 0 - none   Neck Pain 2 - mild to moderate   Trouble Sleeping 1 - mild   Total Number of Symptoms 5   Symptom Severity Score 6     PHQ-9 SCORE 9/4/2019   PHQ-9 Total Score 1         GADIEL-7 SCORE 9/4/2019   Total Score 1

## 2019-09-04 NOTE — PROGRESS NOTES
"       PM&R Clinic Note     Patient Name: Pranay Ratliff : 1950 Medical Record: [id number]     Requesting Physician/clinician: No att. providers found           History of Present Illness:     Pranay Ratliff is a 69 year old male whom back in 2019 was at [employer] prior to his arrival in the ED where he had used the sauna, then the whirlpool for 15 minutes, and then went to take a shower. After he finished his shower, he started to feel very weak and sat down in a shower chair. The next thing the patient knew, someone had found him on the ground and there was blood around him. The patient has no memory of passing out and falling over. He believes that he hit his on the corner of the shower stall when he fell because the right side of his forehead was bleeding.  He went to ER via ambulance and GCS was 15 as well as cardiac work up at arrival.      CT Head w/o contrast  1. No evidence of acute intracranial hemorrhage, mass, or herniation.  2. Right frontal scalp soft tissue injury without underlying fracture.    Patient was discharged home.  Per records patient continues to feel \"not right\". He is experiencing more fatigue, dull daily headache, and occasional dizziness.     Current status:  Patient states since then he feels some dull headaches which have been improving; however, he still gets occipital and back of neck headaches.   Almost daily.  Low level. Not electric or stabbing, just dull ache, and has not tried any medications to help.  He also has some fatigue issues, tires easily with normal activities in which previously would not.  No bladder or bowel issues.     Symptoms      CONCUSSION SYMPTOMS ASSESSMENT 2019   Headache or Pressure In Head 1 - mild   Upset Stomach or Throwing Up 0 - none   Problems with Balance 0 - none   Feeling Dizzy 0 - none   Sensitivity to Light 0 - none   Sensitivity to Noise 0 - none   Mood Changes 0 - none   Feeling sluggish, hazy, or foggy 1 - mild "   Trouble Concentrating, Lack of Focus 1 - mild   Motion Sickness 0 - none   Vision Changes 0 - none   Memory Problems 0 - none   Feeling Confused 0 - none   Neck Pain 2 - mild to moderate   Trouble Sleeping 1 - mild   Total Number of Symptoms 5   Symptom Severity Score 6     Therapies/HEP:  None for this concussion.    Functionally, still at normal level, doing Yen Yoga.  Just has these dull headaches and notices some fatigue.               Past Medical and Surgical History:     Past Medical History:   Diagnosis Date     Basal cell carcinoma      Epididymitis, bilateral      Epididymitis, left      Epididymitis, right      Past Surgical History:   Procedure Laterality Date     TESTICLE SURGERY       VASECTOMY              Social History:     Social History     Tobacco Use     Smoking status: Never Smoker     Smokeless tobacco: Never Used   Substance Use Topics     Alcohol use: Yes     Frequency: Monthly or less     Comment: twice a week       Marital Status: has partner  Living situation: with partner who is in hospital  Family support: partner   Vocational History:  Retired, 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    ADLs: independent  Assistive devices:  none  iADLs (medication management and finances): indpendent  Hand dominance: Left handed  Driving: yes           Family History:     Family History   Problem Relation Age of Onset     Skin Cancer Mother          from pneumonia     Abdominal Aortic Aneurysm Mother      Heart Failure Father      Diabetes Type 1 Brother         Possibly secondary to chemical exposure in [place]     Diabetes Type 2  Maternal Grandmother      Cancer Brother         Exposure to Agent Orange in [place]     Heart Disease Brother             Medications:     Current Outpatient Medications   Medication Sig Dispense  Refill     aspirin 81 MG tablet Take by mouth daily       Camphor-Menthol-Methyl Sal 1.2-5.7-6.3 % PTCH Externally apply 1 patch topically daily as needed (apply to right side of neck for pain) 24 patch 3     carbamide peroxide (DEBROX) 6.5 % otic solution Place 5 drops into both ears daily as needed for other (impacted cerumen) 14.8 mL 11     clotrimazole-betamethasone (LOTRISONE) 1-0.05 % external cream APPLY TOPICALLY TWICE DAILY TO THE FEET. 45 g 0     finasteride (PROSCAR) 5 MG tablet Take 1 tablet (5 mg) by mouth daily 90 tablet 3     furosemide (LASIX) 20 MG tablet Take 1 tablet (20 mg) once daily as needed for swelling. 30 tablet 1     multivitamin, therapeutic with minerals (MULTI-VITAMIN) TABS Take 1 tablet by mouth daily       Omega-3 Fatty Acids (OMEGA-3 FISH OIL PO)        simvastatin (ZOCOR) 20 MG tablet Take 1 tablet (20 mg) by mouth At Bedtime 90 tablet 3     triamcinolone (KENALOG) 0.1 % external cream Apply topically 2 times daily to affected areas on the left hand for 10-14 days. Not for use on face nor groin. 30 g 1     triamterene-HCTZ (MAXZIDE-25) 37.5-25 MG tablet Take 1 tablet by mouth daily 90 tablet 3            Allergies:     Allergies   Allergen Reactions     Johanna-Jana Plus Allergy [Diphenhydramine]      Patient has lip swollen ,has used tylenol in th past. No issues. Has used benadryl in the past no issues.  Never used phenylephrine.              ROS:     A focused ROS is negative other than the symptoms noted above in the HPI.      Constitutional: denies any fevers, chills, any recent weight loss   Eyes: denies changes in visual acuity   Ears, Nose, Throat: denies any difficulty swallowing   Cardiovascular: denies any exertional chest pain or palpitation   Respiratory: denies dyspnea   Gastrointestinal: denies any nausea, vomiting, abdominal pain, diarrhea or constipation   Genitourinary: denies any dysuria, hematuria, frequency or urgency   Musculoskeletal: denies any muscle pain,  joint pain, there is some neck pain/stiffness or back pain   Neurologic: denies  changes in motor or sensory function, no loss of balance or vertigo   Psychiatric: denies mood changes; sleeps OK except with CPAP             Physical Examiniation:     VITAL SIGNS: /79   Pulse 62   Ht 1.829 m (6')   Wt 99.8 kg (220 lb)   SpO2 94%   BMI 29.84 kg/m    BMI: Estimated body mass index is 29.84 kg/m  as calculated from the following:    Height as of this encounter: 1.829 m (6').    Weight as of this encounter: 99.8 kg (220 lb).    Gen: NAD, pleasant and cooperative   HEENT: patient has no nystagmus, NCAT, EOMI, there is marked tender and taught cervical paraspinals on R side and reproducible pain at R occipital region.    Cardio: 2+ radial pulse, good cap refill  Pulm: non-labored breathing in room air, symmetrical chest rise  Abd: benign  Ext: WWP, no edema in BLE, no tenderness in calves  Neuro/MSK: there is 5/5 in c5-t1 and l2-s1 myotomes.  Patient is AOx3  No aphasia  Negative hoffmans b/l  Normal reflexes, slightly diminished achilles b/l  No dysmetria  CN 2-12 intact  GAIT: wnfl               Laboratory/Imaging:     CT SCAN OF THE HEAD WITHOUT CONTRAST   3/2/2019 5:54 PM      HISTORY: Head trauma.     TECHNIQUE:  Axial images of the head and coronal reformations without  IV contrast material. Radiation dose for this scan was reduced using  automated exposure control, adjustment of the mA and/or kV according  to patient size, or iterative reconstruction technique.     COMPARISON: None.     FINDINGS: There is no evidence of intracranial hemorrhage, mass, acute  infarct or anomaly. The ventricles are normal in size, shape and  configuration. The brain parenchyma and subarachnoid spaces are  normal.      The visualized portions of the sinuses and mastoids appear normal.     Right frontal scalp soft tissue injury without underlying fracture.                                                                       IMPRESSION:     1. No evidence of acute intracranial hemorrhage, mass, or herniation.  2. Right frontal scalp soft tissue injury without underlying fracture.           Assessment/Plan:     Pranay was seen today for head injury.    Diagnoses and all orders for this visit:    Post concussion syndrome  -     CONCUSSION  REFERRAL    Myofascial pain syndrome, cervical  -     PHYSICAL THERAPY REFERRAL; Future  -     DME Referral  -     Camphor-Menthol-Methyl Sal 1.2-5.7-6.3 % PTCH; Externally apply 1 patch topically daily as needed (apply to right side of neck for pain)  -     CONCUSSION  REFERRAL    Occipital neuralgia of right side    Fatigue due to old head injury          1. Patient education: In depth discussion and education was provided about the assessment and implications of each of the below recommendations for management. Patient indicated readiness to learn, all questions were answered and understanding of material presented was confirmed.  2. Work-up: none at this time  3. Therapy/equipment/braces: wrote for PT and message therapy also ordered Neck Messager  4. Medications: salonpas prn  5. Interventions: none  6. Referral / follow up with other providers: none  7. Follow up: 6 weeks, if no improvement will need trigger points and/or occipital nerve block.  Discussed neurofatigue as well as and building tolerance back up, as well as sleep hygiene and melatonin trial.  If no improvement may need CBT as well as stimulant.         I spent a total of 45 minutes face-to-face with Pranay Ratliff during today's office visit. Over 50% of this time was spent counseling the patient and/or coordinating care. See note for details.

## 2019-09-05 ENCOUNTER — TELEPHONE (OUTPATIENT)
Dept: SURGERY | Facility: CLINIC | Age: 69
End: 2019-09-05

## 2019-09-05 ASSESSMENT — ANXIETY QUESTIONNAIRES: GAD7 TOTAL SCORE: 1

## 2019-09-05 NOTE — TELEPHONE ENCOUNTER
Notified patient that [id number] does not have the cervical massager.He will have to purchase it retail.

## 2019-09-11 ENCOUNTER — THERAPY VISIT (OUTPATIENT)
Dept: PHYSICAL THERAPY | Facility: CLINIC | Age: 69
End: 2019-09-11
Payer: COMMERCIAL

## 2019-09-11 DIAGNOSIS — G44.219 EPISODIC TENSION-TYPE HEADACHE, NOT INTRACTABLE: ICD-10-CM

## 2019-09-11 DIAGNOSIS — M54.2 NECK PAIN: ICD-10-CM

## 2019-09-11 DIAGNOSIS — M79.18 MYOFASCIAL PAIN SYNDROME, CERVICAL: ICD-10-CM

## 2019-09-11 PROCEDURE — 97110 THERAPEUTIC EXERCISES: CPT | Mod: GP | Performed by: PHYSICAL THERAPIST

## 2019-09-11 PROCEDURE — 97162 PT EVAL MOD COMPLEX 30 MIN: CPT | Mod: GP | Performed by: PHYSICAL THERAPIST

## 2019-09-11 PROCEDURE — 97140 MANUAL THERAPY 1/> REGIONS: CPT | Mod: GP | Performed by: PHYSICAL THERAPIST

## 2019-09-11 NOTE — PROGRESS NOTES
[facility] Initial Evaluation  Subjective:  Patient is referred with c/o R sided neck pain and occipital and frontal headaches. He lost consciousness and fell, striking his head on 3-2-19 [place] [facility] [place]. He suffered a concussion. He underwent a variety of tests and no other serious underlying health issues were discovered. He currently notes intermittent right sided occipital and frontal headaches/pressure as well as right sided neck pain and limited ROM. Sxs are worse with neck flexion, prolonged sitting and R rotation.    The history is provided by the patient.   Type of problem:  Cervical spine   Condition occurred with:  A fall/slip. This is a new condition    Patient reports pain:  Cervical right side, mid cervical spine and upper cervical spine. Radiates to:  Head. Associated symptoms:  Loss of motion/stiffness, ringing in ears, headache and dizziness. Symptoms are exacerbated by rotating head, looking up or down and sitting and relieved by rest.    Pranay Ratliff being seen for neck pain and headaches.   Where condition occurred: in the community.  and reported as 2/10 on pain scale. General health as reported by patient is good. Pertinent medical history includes:  Migraines/headaches, concussions/dizziness, cancer and sleep disorder/apnea.  Medical allergies: other (christiano seltzer plus).    Current medications:  High blood pressure medication.   Primary job tasks include:  Repetitive tasks.  Pain is described as aching and is intermittent. Pain is worse during the day. Since onset symptoms are gradually improving. Special tests:  X-ray, CT scan and other.        Barriers include:  None as reported by patient.  Red flags:  None as reported by patient.                      Objective:  System    Physical Exam    [facility] Cervical Evaluation    Posture:  Sitting: fair  Standing: fair  Protruding Head: yes  Wry Neck: no  Correction of Posture: better    Movement Loss:  Protrusion  (PRO): min and pain  Flexion (Flex): min and pain  Retraction (RET): nil  Extension (EXT): mod and pain  Lateral Flexion Right (LF R): pain and min  Lateral Flexion Left (LF L): mod and pain  Rotation Right (ROT R): min and pain  Rotation Left (ROT L): nil  Test Movements:      RET: During: increases  After: no worse  Mechanical Response: no effect  Repeat RET: During: increases  After: better  Mechanical Response: IncROM                          Conclusion: trauma and other (cervicogenic headaches)  Principle of Treatment:  Posture Correction: Instructed in proper posture when reading or using computer    Extension: retraction with OP x 10/ 4-6 x daily    Other: fist tractions, R C1-2 rotation SNAGS                                         ROS    Assessment/Plan:    Patient is a 69 year old male with cervical complaints.    Patient has the following significant findings with corresponding treatment plan.                Diagnosis 1:  Neck pain and headaches  Pain -  mechanical traction, manual therapy, self management, education, directional preference exercise and home program  Decreased joint mobility - manual therapy, therapeutic exercise and home program  Decreased function - therapeutic activities and home program    Therapy Evaluation Codes:   1) History comprised of:   Personal factors that impact the plan of care:      Time since onset of symptoms.    Comorbidity factors that impact the plan of care are:      None.     Medications impacting care: None.  2) Examination of Body Systems comprised of:   Body structures and functions that impact the plan of care:      Cervical spine.   Activity limitations that impact the plan of care are:      Driving, Reading/Computer work and Sitting.  3) Clinical presentation characteristics are:   Evolving/Changing.  4) Decision-Making    Moderate complexity using standardized patient assessment instrument and/or measureable assessment of functional outcome.  Cumulative  Therapy Evaluation is: Moderate complexity.    Previous and current functional limitations:  (See Goal Flow Sheet for this information)    Short term and Long term goals: (See Goal Flow Sheet for this information)     Communication ability:  Patient appears to be able to clearly communicate and understand verbal and written communication and follow directions correctly.  Treatment Explanation - The following has been discussed with the patient:   RX ordered/plan of care  Anticipated outcomes  Possible risks and side effects  This patient would benefit from PT intervention to resume normal activities.   Rehab potential is excellent.    Frequency:  2 X week, once daily  Duration:  for 3 weeks  Discharge Plan:  Achieve all LTG.  Independent in home treatment program.  Reach maximal therapeutic benefit.    Please refer to the daily flowsheet for treatment today, total treatment time and time spent performing 1:1 timed codes.

## 2019-09-13 ENCOUNTER — THERAPY VISIT (OUTPATIENT)
Dept: PHYSICAL THERAPY | Facility: CLINIC | Age: 69
End: 2019-09-13
Payer: COMMERCIAL

## 2019-09-13 DIAGNOSIS — G44.219 EPISODIC TENSION-TYPE HEADACHE, NOT INTRACTABLE: ICD-10-CM

## 2019-09-13 DIAGNOSIS — M54.2 NECK PAIN: ICD-10-CM

## 2019-09-13 PROCEDURE — 97110 THERAPEUTIC EXERCISES: CPT | Mod: GP | Performed by: PHYSICAL THERAPIST

## 2019-09-13 PROCEDURE — 97140 MANUAL THERAPY 1/> REGIONS: CPT | Mod: GP | Performed by: PHYSICAL THERAPIST

## 2019-09-16 ENCOUNTER — OFFICE VISIT (OUTPATIENT)
Dept: FAMILY MEDICINE | Facility: CLINIC | Age: 69
End: 2019-09-16
Payer: COMMERCIAL

## 2019-09-16 VITALS
TEMPERATURE: 98.1 F | BODY MASS INDEX: 30.24 KG/M2 | WEIGHT: 223 LBS | SYSTOLIC BLOOD PRESSURE: 112 MMHG | OXYGEN SATURATION: 97 % | DIASTOLIC BLOOD PRESSURE: 71 MMHG | HEART RATE: 65 BPM

## 2019-09-16 DIAGNOSIS — R97.20 ELEVATED PROSTATE SPECIFIC ANTIGEN (PSA): ICD-10-CM

## 2019-09-16 DIAGNOSIS — I10 BENIGN ESSENTIAL HYPERTENSION: ICD-10-CM

## 2019-09-16 DIAGNOSIS — E78.5 HYPERLIPIDEMIA LDL GOAL <100: Primary | ICD-10-CM

## 2019-09-16 DIAGNOSIS — R53.83 FATIGUE, UNSPECIFIED TYPE: ICD-10-CM

## 2019-09-16 DIAGNOSIS — D64.9 LOW HEMATOCRIT: ICD-10-CM

## 2019-09-16 DIAGNOSIS — Z23 NEED FOR PROPHYLACTIC VACCINATION AND INOCULATION AGAINST INFLUENZA: ICD-10-CM

## 2019-09-16 PROCEDURE — 90662 IIV NO PRSV INCREASED AG IM: CPT | Performed by: INTERNAL MEDICINE

## 2019-09-16 PROCEDURE — 83540 ASSAY OF IRON: CPT | Performed by: INTERNAL MEDICINE

## 2019-09-16 PROCEDURE — 82728 ASSAY OF FERRITIN: CPT | Performed by: INTERNAL MEDICINE

## 2019-09-16 PROCEDURE — G0008 ADMIN INFLUENZA VIRUS VAC: HCPCS | Performed by: INTERNAL MEDICINE

## 2019-09-16 PROCEDURE — 99214 OFFICE O/P EST MOD 30 MIN: CPT | Mod: 25 | Performed by: INTERNAL MEDICINE

## 2019-09-16 PROCEDURE — 83550 IRON BINDING TEST: CPT | Performed by: INTERNAL MEDICINE

## 2019-09-16 PROCEDURE — 36415 COLL VENOUS BLD VENIPUNCTURE: CPT | Performed by: INTERNAL MEDICINE

## 2019-09-16 NOTE — PROGRESS NOTES
Subjective     Pranay Ratliff is a 69 year old male who presents to clinic today for the following health issues:    HPI   Concern - fatigue  Onset:     Description:    pt coming in to discuss labs. He is very fatigue. He does have sleep apnea and is using his CPAP at night. He usually takes it off after 3-4 hours but he does use it every night. Under some stress recently; his partner was in the hospital for a month and will be coming home with a wound vac. Pranay is getting regular excerise and also doing a restorative yoga class weekly.Trying to eat well.    Going through physical therapy for his post-concussion syndrome. This has been helping. Low level headache - taking Tylenol and this has been helping.    Prostate biopsy clear - without evidence for cancer. Has a follow up with Urology in February.       Patient Active Problem List   Diagnosis     History of basal cell carcinoma     Benign essential hypertension     Hyperlipidemia LDL goal <100     Obstructive sleep apnea syndrome     Gastroesophageal reflux disease with esophagitis     Localized swelling of lower extremity     Vasovagal syncope     Elevated prostate specific antigen (PSA)     Concussion with loss of consciousness of 30 minutes or less, subsequent encounter     BPH with urinary obstruction     Myofascial pain syndrome, cervical     Occipital neuralgia of right side     Fatigue due to old head injury     Post concussion syndrome     Neck pain     Episodic tension-type headache, not intractable     Past Surgical History:   Procedure Laterality Date     TESTICLE SURGERY       VASECTOMY         Social History     Tobacco Use     Smoking status: Never Smoker     Smokeless tobacco: Never Used   Substance Use Topics     Alcohol use: Yes     Frequency: Monthly or less     Comment: twice a week     Family History   Problem Relation Age of Onset     Skin Cancer Mother          from pneumonia     Abdominal Aortic Aneurysm Mother      Heart Failure  Father      Diabetes Type 1 Brother         Possibly secondary to chemical exposure in [place]     Diabetes Type 2  Maternal Grandmother      Cancer Brother         Exposure to Agent Orange in [place]     Heart Disease Brother            Reviewed and updated as needed this visit by Provider         Review of Systems   ROS COMP: Constitutional, HEENT, cardiovascular, pulmonary, gi and gu systems are negative, except as otherwise noted.      Objective    /71   Pulse 65   Temp 98.1  F (36.7  C) (Tympanic)   Wt 101.2 kg (223 lb)   SpO2 97%   BMI 30.24 kg/m    Body mass index is 30.24 kg/m .  Physical Exam   GENERAL: healthy, alert and no distress  NECK: no adenopathy, no asymmetry, masses, or scars and thyroid normal to palpation  RESP: lungs clear to auscultation - no rales, rhonchi or wheezes  CV: regular rate and rhythm, normal S1 S2, no S3 or S4, no murmur, click or rub, no peripheral edema and peripheral pulses strong  MS: no gross musculoskeletal defects noted, no edema  PSYCH: mentation appears normal, affect normal/bright    Diagnostic Test Results:  CORONARY ARTERY CALCIUM SCORES:      Left main coronary artery: 0  Left anterior descending coronary artery: 81.83  Circumflex coronary artery: 8.49  Right coronary artery: 22.87     TOTAL CALCIUM SCORE: 113.18        Assessment & Plan     1. Hyperlipidemia LDL goal <100  CT Coronary Calcium scan done last month. Scores noted above. Recommending LDL remain below 100 mg/dL and ideally close to or below 70 mg/dL. Also recommending a daily aspirin 81 mg.   - Continue Simvastatin 20 mg for now. LDL was 73 mg/dL. Follow up for repeat fasting labs in January 2020.     2. Benign essential hypertension  Well controlled on Triamterene/HCTZ    3. Fatigue, unspecified type  Discussed that this may be secondary to stress/anxiety. His hemoglobin has been normal though his hematocrit was low on our last check. Will add on iron studies.   - Ferritin  - Iron and iron  binding capacity    4. Low hematocrit  - Ferritin  - Iron and iron binding capacity    5. Need for prophylactic vaccination and inoculation against influenza  - INFLUENZA (HIGH DOSE) 3 VALENT VACCINE [26912]  - Vaccine Administration, Initial [71905]    6. Elevated prostate specific antigen (PSA)  S/p prostate biopsy with no evidence for malignancy.       BMI:   Estimated body mass index is 30.24 kg/m  as calculated from the following:    Height as of 9/4/19: 1.829 m (6').    Weight as of this encounter: 101.2 kg (223 lb).           Return in about 4 months (around 1/16/2020) for Lab Work - Fasting, Medication Follow up.    Brigida Veras MD  [facility] [place]

## 2019-09-17 ENCOUNTER — TELEPHONE (OUTPATIENT)
Dept: FAMILY MEDICINE | Facility: CLINIC | Age: 69
End: 2019-09-17

## 2019-09-17 LAB
FERRITIN SERPL-MCNC: 78 NG/ML (ref 26–388)
IRON SATN MFR SERPL: 43 % (ref 15–46)
IRON SERPL-MCNC: 126 UG/DL (ref 35–180)
TIBC SERPL-MCNC: 290 UG/DL (ref 240–430)

## 2019-09-17 NOTE — TELEPHONE ENCOUNTER
Please see result note on list. Called patient back with lab results, Left a non-detailed message and call back number [phone].     Fannie Waite RN, BSN  [facility] [place]

## 2019-09-17 NOTE — RESULT ENCOUNTER NOTE
Please let Pranay know that his Ferritin and Iron levels are well within the normal limits. It appears that his fatigue is not due to iron deficiency.

## 2019-09-17 NOTE — TELEPHONE ENCOUNTER
Reason for Call:  Other call back    Detailed comments: Pt returning call from nurse about results    Phone Number Patient can be reached at: Cell number on file:    Telephone Information:   Mobile [phone]       Best Time: any    Can we leave a detailed message on this number? Yes      Call taken on 9/17/2019 at 10:28 AM by Sandi Shah

## 2019-09-20 ENCOUNTER — THERAPY VISIT (OUTPATIENT)
Dept: PHYSICAL THERAPY | Facility: CLINIC | Age: 69
End: 2019-09-20
Payer: COMMERCIAL

## 2019-09-20 DIAGNOSIS — G44.219 EPISODIC TENSION-TYPE HEADACHE, NOT INTRACTABLE: ICD-10-CM

## 2019-09-20 DIAGNOSIS — M54.2 NECK PAIN: ICD-10-CM

## 2019-09-20 PROCEDURE — 97010 HOT OR COLD PACKS THERAPY: CPT | Mod: GP | Performed by: PHYSICAL THERAPIST

## 2019-09-20 PROCEDURE — 97110 THERAPEUTIC EXERCISES: CPT | Mod: GP | Performed by: PHYSICAL THERAPIST

## 2019-09-20 PROCEDURE — 97140 MANUAL THERAPY 1/> REGIONS: CPT | Mod: GP | Performed by: PHYSICAL THERAPIST

## 2019-09-26 ENCOUNTER — THERAPY VISIT (OUTPATIENT)
Dept: PHYSICAL THERAPY | Facility: CLINIC | Age: 69
End: 2019-09-26
Payer: COMMERCIAL

## 2019-09-26 DIAGNOSIS — M54.2 NECK PAIN: ICD-10-CM

## 2019-09-26 DIAGNOSIS — G44.219 EPISODIC TENSION-TYPE HEADACHE, NOT INTRACTABLE: ICD-10-CM

## 2019-09-26 PROCEDURE — 97140 MANUAL THERAPY 1/> REGIONS: CPT | Mod: GP | Performed by: PHYSICAL THERAPIST

## 2019-09-26 PROCEDURE — 97010 HOT OR COLD PACKS THERAPY: CPT | Mod: GP | Performed by: PHYSICAL THERAPIST

## 2019-09-26 PROCEDURE — 97110 THERAPEUTIC EXERCISES: CPT | Mod: GP | Performed by: PHYSICAL THERAPIST

## 2019-10-03 ENCOUNTER — THERAPY VISIT (OUTPATIENT)
Dept: PHYSICAL THERAPY | Facility: CLINIC | Age: 69
End: 2019-10-03
Payer: COMMERCIAL

## 2019-10-03 DIAGNOSIS — M54.2 NECK PAIN: ICD-10-CM

## 2019-10-03 DIAGNOSIS — G44.219 EPISODIC TENSION-TYPE HEADACHE, NOT INTRACTABLE: ICD-10-CM

## 2019-10-03 PROCEDURE — 97140 MANUAL THERAPY 1/> REGIONS: CPT | Mod: GP | Performed by: PHYSICAL THERAPIST

## 2019-10-03 PROCEDURE — 97110 THERAPEUTIC EXERCISES: CPT | Mod: GP | Performed by: PHYSICAL THERAPIST

## 2019-10-10 ENCOUNTER — THERAPY VISIT (OUTPATIENT)
Dept: PHYSICAL THERAPY | Facility: CLINIC | Age: 69
End: 2019-10-10
Payer: COMMERCIAL

## 2019-10-10 DIAGNOSIS — G44.219 EPISODIC TENSION-TYPE HEADACHE, NOT INTRACTABLE: ICD-10-CM

## 2019-10-10 DIAGNOSIS — M54.2 NECK PAIN: ICD-10-CM

## 2019-10-10 PROCEDURE — 97140 MANUAL THERAPY 1/> REGIONS: CPT | Mod: GP | Performed by: PHYSICAL THERAPIST

## 2019-10-10 PROCEDURE — 97010 HOT OR COLD PACKS THERAPY: CPT | Mod: GP | Performed by: PHYSICAL THERAPIST

## 2019-10-10 PROCEDURE — 97012 MECHANICAL TRACTION THERAPY: CPT | Mod: GP | Performed by: PHYSICAL THERAPIST

## 2019-10-16 ENCOUNTER — OFFICE VISIT (OUTPATIENT)
Dept: PHYSICAL MEDICINE AND REHAB | Facility: CLINIC | Age: 69
End: 2019-10-16
Payer: COMMERCIAL

## 2019-10-16 ENCOUNTER — THERAPY VISIT (OUTPATIENT)
Dept: CHIROPRACTIC MEDICINE | Facility: CLINIC | Age: 69
End: 2019-10-16
Payer: COMMERCIAL

## 2019-10-16 VITALS
BODY MASS INDEX: 30.2 KG/M2 | DIASTOLIC BLOOD PRESSURE: 84 MMHG | OXYGEN SATURATION: 96 % | SYSTOLIC BLOOD PRESSURE: 143 MMHG | HEIGHT: 72 IN | HEART RATE: 69 BPM | WEIGHT: 223 LBS

## 2019-10-16 DIAGNOSIS — S09.90XS FATIGUE DUE TO OLD HEAD INJURY: ICD-10-CM

## 2019-10-16 DIAGNOSIS — M99.02 THORACIC SEGMENT DYSFUNCTION: ICD-10-CM

## 2019-10-16 DIAGNOSIS — F07.81 POST CONCUSSION SYNDROME: ICD-10-CM

## 2019-10-16 DIAGNOSIS — M54.2 CERVICALGIA: ICD-10-CM

## 2019-10-16 DIAGNOSIS — R53.83 FATIGUE DUE TO OLD HEAD INJURY: ICD-10-CM

## 2019-10-16 DIAGNOSIS — M99.01 CERVICAL SEGMENT DYSFUNCTION: Primary | ICD-10-CM

## 2019-10-16 DIAGNOSIS — R51.9 CEPHALGIA: ICD-10-CM

## 2019-10-16 DIAGNOSIS — M79.18 MYOFASCIAL PAIN SYNDROME, CERVICAL: ICD-10-CM

## 2019-10-16 DIAGNOSIS — F07.81 POST CONCUSSION SYNDROME: Primary | ICD-10-CM

## 2019-10-16 PROCEDURE — 99203 OFFICE O/P NEW LOW 30 MIN: CPT | Mod: GA | Performed by: CHIROPRACTOR

## 2019-10-16 PROCEDURE — 98940 CHIROPRACT MANJ 1-2 REGIONS: CPT | Mod: AT | Performed by: CHIROPRACTOR

## 2019-10-16 ASSESSMENT — PAIN SCALES - GENERAL: PAINLEVEL: MILD PAIN (2)

## 2019-10-16 ASSESSMENT — MIFFLIN-ST. JEOR: SCORE: 1814.52

## 2019-10-16 NOTE — PROGRESS NOTES
"       PM&R Follow-up Clinic Note     Patient Name: Pranay Ratliff : 1950 Medical Record: [id number]     Requesting Physician/clinician: No att. providers found           History of Present Illness:     Pranay Ratliff is a 69 year old male whom back in 2019 was at [employer] prior to his arrival in the ED where he had used the sauna, then the whirlpool for 15 minutes, and then went to take a shower. After he finished his shower, he started to feel very weak and sat down in a shower chair. The next thing the patient knew, someone had found him on the ground and there was blood around him. The patient has no memory of passing out and falling over. He believes that he hit his on the corner of the shower stall when he fell because the right side of his forehead was bleeding.  He went to ER via ambulance and GCS was 15 as well as cardiac work up at arrival.      CT Head w/o contrast  1. No evidence of acute intracranial hemorrhage, mass, or herniation.  2. Right frontal scalp soft tissue injury without underlying fracture.    Patient was discharged home.  Per records patient continues to feel \"not right\". He is experiencing more fatigue, dull daily headache, and occasional dizziness.     Last visit:  Patient stated since then he felt some dull headaches which have been improving; however, he still gets occipital and back of neck headaches.   Almost daily.  Low level. Not electric or stabbing, just dull ache, and has not tried any medications to help.  He also has some fatigue issues, tires easily with normal activities in which previously would not.  No bladder or bowel issues.     Current Symptoms:  CONCUSSION SYMPTOMS ASSESSMENT 2019 10/16/2019   Headache or Pressure In Head 1 - mild 3 - moderate   Upset Stomach or Throwing Up 0 - none 0 - none   Problems with Balance 0 - none 0 - none   Feeling Dizzy 0 - none 0 - none   Sensitivity to Light 0 - none 0 - none   Sensitivity to Noise 0 - none 0 " - none   Mood Changes 0 - none 0 - none   Feeling sluggish, hazy, or foggy 1 - mild 1 - mild   Trouble Concentrating, Lack of Focus 1 - mild 1 - mild   Motion Sickness 0 - none 0 - none   Vision Changes 0 - none 0 - none   Memory Problems 0 - none 0 - none   Feeling Confused 0 - none 0 - none   Neck Pain 2 - mild to moderate 3 - moderate   Trouble Sleeping 1 - mild 1 - mild   Total Number of Symptoms 5 5   Symptom Severity Score 6 9     Therapies/HEP:  Been doing PT and also Chiropractor for his neck pain.  The neck pain is improving as well as range of motion.      Functionally, still at normal level, doing Yen Yoga.  Just has these dull headaches and notices some fatigue.      Headaches still with him, mild to moderate, tylenol helps.  Headaches are daily.  They are right side and also back of head.             Past Medical and Surgical History:     Past Medical History:   Diagnosis Date     Basal cell carcinoma      Epididymitis, bilateral      Epididymitis, left      Epididymitis, right      Past Surgical History:   Procedure Laterality Date     TESTICLE SURGERY       VASECTOMY              Social History:     Social History     Tobacco Use     Smoking status: Never Smoker     Smokeless tobacco: Never Used   Substance Use Topics     Alcohol use: Yes     Frequency: Monthly or less     Comment: twice a week       Marital Status: has partner  Living situation: with partner who is in hospital  Family support: partner   Vocational History:  Retired, 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    ADLs: independent  Assistive devices:  none  iADLs (medication management and finances): indpendent  Hand dominance: Left handed  Driving: yes           Family History:     Family History   Problem Relation Age of Onset     Skin Cancer Mother          from  pneumonia     Abdominal Aortic Aneurysm Mother      Heart Failure Father      Diabetes Type 1 Brother         Possibly secondary to chemical exposure in [place]     Diabetes Type 2  Maternal Grandmother      Cancer Brother         Exposure to Agent Orange in [place]     Heart Disease Brother             Medications:     Current Outpatient Medications   Medication Sig Dispense Refill     aspirin 81 MG tablet Take by mouth daily       Camphor-Menthol-Methyl Sal 1.2-5.7-6.3 % PTCH Externally apply 1 patch topically daily as needed (apply to right side of neck for pain) 24 patch 3     carbamide peroxide (DEBROX) 6.5 % otic solution Place 5 drops into both ears daily as needed for other (impacted cerumen) 14.8 mL 11     clotrimazole-betamethasone (LOTRISONE) 1-0.05 % external cream APPLY TOPICALLY TWICE DAILY TO THE FEET. 45 g 0     finasteride (PROSCAR) 5 MG tablet Take 1 tablet (5 mg) by mouth daily 90 tablet 3     furosemide (LASIX) 20 MG tablet Take 1 tablet (20 mg) once daily as needed for swelling. 30 tablet 1     multivitamin, therapeutic with minerals (MULTI-VITAMIN) TABS Take 1 tablet by mouth daily       Omega-3 Fatty Acids (OMEGA-3 FISH OIL PO)        simvastatin (ZOCOR) 20 MG tablet Take 1 tablet (20 mg) by mouth At Bedtime 90 tablet 3     triamcinolone (KENALOG) 0.1 % external cream Apply topically 2 times daily to affected areas on the left hand for 10-14 days. Not for use on face nor groin. 30 g 1     triamterene-HCTZ (MAXZIDE-25) 37.5-25 MG tablet Take 1 tablet by mouth daily 90 tablet 3            Allergies:     Allergies   Allergen Reactions     Dottie Plus Allergy [Diphenhydramine]      Patient has lip swollen ,has used tylenol in th past. No issues. Has used benadryl in the past no issues.  Never used phenylephrine.              ROS:     A focused ROS is negative other than the symptoms noted above in the HPI.      Constitutional: denies any fevers, chills, any recent weight loss   Eyes: denies  changes in visual acuity   Ears, Nose, Throat: denies any difficulty swallowing   Cardiovascular: denies any exertional chest pain or palpitation   Respiratory: denies dyspnea   Gastrointestinal: denies any nausea, vomiting, abdominal pain, diarrhea or constipation   Genitourinary: denies any dysuria, hematuria, frequency or urgency   Musculoskeletal: denies any muscle pain, joint pain, there is some neck pain/stiffness or back pain   Neurologic: denies  changes in motor or sensory function, no loss of balance or vertigo   Psychiatric: denies mood changes; sleeps OK except with CPAP             Physical Examiniation:     VITAL SIGNS: BP (!) 143/84   Pulse 69   Ht 1.829 m (6')   Wt 101.2 kg (223 lb)   SpO2 96%   BMI 30.24 kg/m    BMI: Estimated body mass index is 30.24 kg/m  as calculated from the following:    Height as of this encounter: 1.829 m (6').    Weight as of this encounter: 101.2 kg (223 lb).    Gen: NAD, pleasant and cooperative   HEENT: patient has no nystagmus, NCAT, EOMI, there is marked tender and taught cervical paraspinals on R side and reproducible pain at R occipital region.    Cardio: 2+ radial pulse, good cap refill  Pulm: non-labored breathing in room air, symmetrical chest rise  Abd: benign  Ext: WWP, no edema in BLE, no tenderness in calves  Neuro/MSK: there is 5/5 in c5-t1 and l2-s1 myotomes.  Patient is AOx3  No aphasia  Negative hoffmans b/l  Normal reflexes, slightly diminished achilles b/l  No dysmetria  CN 2-12 intact  GAIT: wnfl               Laboratory/Imaging:     CT SCAN OF THE HEAD WITHOUT CONTRAST   3/2/2019 5:54 PM      HISTORY: Head trauma.     TECHNIQUE:  Axial images of the head and coronal reformations without  IV contrast material. Radiation dose for this scan was reduced using  automated exposure control, adjustment of the mA and/or kV according  to patient size, or iterative reconstruction technique.     COMPARISON: None.     FINDINGS: There is no evidence of  intracranial hemorrhage, mass, acute  infarct or anomaly. The ventricles are normal in size, shape and  configuration. The brain parenchyma and subarachnoid spaces are  normal.      The visualized portions of the sinuses and mastoids appear normal.     Right frontal scalp soft tissue injury without underlying fracture.                                                                      IMPRESSION:     1. No evidence of acute intracranial hemorrhage, mass, or herniation.  2. Right frontal scalp soft tissue injury without underlying fracture.           Assessment/Plan:     Pranay was seen today for head injury.    Diagnoses and all orders for this visit:    Post concussion syndrome    Myofascial pain syndrome, cervical  -     ACUPUNCTURE REFERRAL    Fatigue due to old head injury        1. Patient education: In depth discussion and education was provided about the assessment and implications of each of the below recommendations for management. Patient indicated readiness to learn, all questions were answered and understanding of material presented was confirmed.  2. Work-up: none at this time  3. Therapy/equipment/braces: wrote for PT and message therapy also ordered Neck Massager, he will start acupuncture   4. Medications: none at this time  5. Interventions: none  6. Referral / follow up with other providers: none  7. Follow up: 3 months, if no improvement will need trigger points and/or occipital nerve block.  Discussed neurofatigue as well as and building tolerance back up, as well as sleep hygiene and melatonin trial.  If no improvement may need CBT as well as stimulant.     Tato Avelar,       I spent a total of 45 minutes face-to-face with Pranay Ratliff during today's office visit. Over 50% of this time was spent counseling the patient and/or coordinating care. See note for details.

## 2019-10-16 NOTE — LETTER
"10/16/2019       RE: Pranay Ratliff  [address]  [place] [address]     Dear Colleague,    Thank you for referring your patient, Pranay Ratliff, to the [facility] PHYSICAL MEDICINE AND REHABILITATION at [facility]. Please see a copy of my visit note below.           PM&R Follow-up Clinic Note     Patient Name: Pranay Ratliff : 1950 Medical Record: [id number]     Requesting Physician/clinician: No att. providers found           History of Present Illness:     Pranay Ratliff is a 69 year old male whom back in 2019 was at [employer] prior to his arrival in the ED where he had used the sauna, then the whirlpool for 15 minutes, and then went to take a shower. After he finished his shower, he started to feel very weak and sat down in a shower chair. The next thing the patient knew, someone had found him on the ground and there was blood around him. The patient has no memory of passing out and falling over. He believes that he hit his on the corner of the shower stall when he fell because the right side of his forehead was bleeding.  He went to ER via ambulance and GCS was 15 as well as cardiac work up at arrival.      CT Head w/o contrast  1. No evidence of acute intracranial hemorrhage, mass, or herniation.  2. Right frontal scalp soft tissue injury without underlying fracture.    Patient was discharged home.  Per records patient continues to feel \"not right\". He is experiencing more fatigue, dull daily headache, and occasional dizziness.     Last visit:  Patient stated since then he felt some dull headaches which have been improving; however, he still gets occipital and back of neck headaches.   Almost daily.  Low level. Not electric or stabbing, just dull ache, and has not tried any medications to help.  He also has some fatigue issues, tires easily with normal activities in which previously would not.  No bladder or bowel issues.     Current " Symptoms:  CONCUSSION SYMPTOMS ASSESSMENT 9/4/2019 10/16/2019   Headache or Pressure In Head 1 - mild 3 - moderate   Upset Stomach or Throwing Up 0 - none 0 - none   Problems with Balance 0 - none 0 - none   Feeling Dizzy 0 - none 0 - none   Sensitivity to Light 0 - none 0 - none   Sensitivity to Noise 0 - none 0 - none   Mood Changes 0 - none 0 - none   Feeling sluggish, hazy, or foggy 1 - mild 1 - mild   Trouble Concentrating, Lack of Focus 1 - mild 1 - mild   Motion Sickness 0 - none 0 - none   Vision Changes 0 - none 0 - none   Memory Problems 0 - none 0 - none   Feeling Confused 0 - none 0 - none   Neck Pain 2 - mild to moderate 3 - moderate   Trouble Sleeping 1 - mild 1 - mild   Total Number of Symptoms 5 5   Symptom Severity Score 6 9     Therapies/HEP:  Been doing PT and also Chiropractor for his neck pain.  The neck pain is improving as well as range of motion.      Functionally, still at normal level, doing Yen Yoga.  Just has these dull headaches and notices some fatigue.      Headaches still with him, mild to moderate, tylenol helps.  Headaches are daily.  They are right side and also back of head.             Past Medical and Surgical History:     Past Medical History:   Diagnosis Date     Basal cell carcinoma      Epididymitis, bilateral      Epididymitis, left      Epididymitis, right      Past Surgical History:   Procedure Laterality Date     TESTICLE SURGERY       VASECTOMY              Social History:     Social History     Tobacco Use     Smoking status: Never Smoker     Smokeless tobacco: Never Used   Substance Use Topics     Alcohol use: Yes     Frequency: Monthly or less     Comment: twice a week       Marital Status: has partner  Living situation: with partner who is in hospital  Family support: partner   Vocational History:  Retired, 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    ADLs: independent  Assistive devices:  none  iADLs (medication management and finances): indpendent  Hand dominance: Left handed  Driving: yes           Family History:     Family History   Problem Relation Age of Onset     Skin Cancer Mother          from pneumonia     Abdominal Aortic Aneurysm Mother      Heart Failure Father      Diabetes Type 1 Brother         Possibly secondary to chemical exposure in [place]     Diabetes Type 2  Maternal Grandmother      Cancer Brother         Exposure to Agent Orange in [place]     Heart Disease Brother             Medications:     Current Outpatient Medications   Medication Sig Dispense Refill     aspirin 81 MG tablet Take by mouth daily       Camphor-Menthol-Methyl Sal 1.2-5.7-6.3 % PTCH Externally apply 1 patch topically daily as needed (apply to right side of neck for pain) 24 patch 3     carbamide peroxide (DEBROX) 6.5 % otic solution Place 5 drops into both ears daily as needed for other (impacted cerumen) 14.8 mL 11     clotrimazole-betamethasone (LOTRISONE) 1-0.05 % external cream APPLY TOPICALLY TWICE DAILY TO THE FEET. 45 g 0     finasteride (PROSCAR) 5 MG tablet Take 1 tablet (5 mg) by mouth daily 90 tablet 3     furosemide (LASIX) 20 MG tablet Take 1 tablet (20 mg) once daily as needed for swelling. 30 tablet 1     multivitamin, therapeutic with minerals (MULTI-VITAMIN) TABS Take 1 tablet by mouth daily       Omega-3 Fatty Acids (OMEGA-3 FISH OIL PO)        simvastatin (ZOCOR) 20 MG tablet Take 1 tablet (20 mg) by mouth At Bedtime 90 tablet 3     triamcinolone (KENALOG) 0.1 % external cream Apply topically 2 times daily to affected areas on the left hand for 10-14 days. Not for use on face nor groin. 30 g 1     triamterene-HCTZ (MAXZIDE-25) 37.5-25 MG tablet Take 1 tablet by mouth daily 90 tablet 3            Allergies:     Allergies   Allergen Reactions     Dottie Plus Allergy [Diphenhydramine]       Patient has lip swollen ,has used tylenol in th past. No issues. Has used benadryl in the past no issues.  Never used phenylephrine.              ROS:     A focused ROS is negative other than the symptoms noted above in the HPI.      Constitutional: denies any fevers, chills, any recent weight loss   Eyes: denies changes in visual acuity   Ears, Nose, Throat: denies any difficulty swallowing   Cardiovascular: denies any exertional chest pain or palpitation   Respiratory: denies dyspnea   Gastrointestinal: denies any nausea, vomiting, abdominal pain, diarrhea or constipation   Genitourinary: denies any dysuria, hematuria, frequency or urgency   Musculoskeletal: denies any muscle pain, joint pain, there is some neck pain/stiffness or back pain   Neurologic: denies  changes in motor or sensory function, no loss of balance or vertigo   Psychiatric: denies mood changes; sleeps OK except with CPAP             Physical Examiniation:     VITAL SIGNS: BP (!) 143/84   Pulse 69   Ht 1.829 m (6')   Wt 101.2 kg (223 lb)   SpO2 96%   BMI 30.24 kg/m     BMI: Estimated body mass index is 30.24 kg/m  as calculated from the following:    Height as of this encounter: 1.829 m (6').    Weight as of this encounter: 101.2 kg (223 lb).    Gen: NAD, pleasant and cooperative   HEENT: patient has no nystagmus, NCAT, EOMI, there is marked tender and taught cervical paraspinals on R side and reproducible pain at R occipital region.    Cardio: 2+ radial pulse, good cap refill  Pulm: non-labored breathing in room air, symmetrical chest rise  Abd: benign  Ext: WWP, no edema in BLE, no tenderness in calves  Neuro/MSK: there is 5/5 in c5-t1 and l2-s1 myotomes.  Patient is AOx3  No aphasia  Negative hoffmans b/l  Normal reflexes, slightly diminished achilles b/l  No dysmetria  CN 2-12 intact  GAIT: wnfl               Laboratory/Imaging:     CT SCAN OF THE HEAD WITHOUT CONTRAST   3/2/2019 5:54 PM      HISTORY: Head trauma.     TECHNIQUE:  Axial  images of the head and coronal reformations without  IV contrast material. Radiation dose for this scan was reduced using  automated exposure control, adjustment of the mA and/or kV according  to patient size, or iterative reconstruction technique.     COMPARISON: None.     FINDINGS: There is no evidence of intracranial hemorrhage, mass, acute  infarct or anomaly. The ventricles are normal in size, shape and  configuration. The brain parenchyma and subarachnoid spaces are  normal.      The visualized portions of the sinuses and mastoids appear normal.     Right frontal scalp soft tissue injury without underlying fracture.                                                                      IMPRESSION:     1. No evidence of acute intracranial hemorrhage, mass, or herniation.  2. Right frontal scalp soft tissue injury without underlying fracture.           Assessment/Plan:     Pranay was seen today for head injury.    Diagnoses and all orders for this visit:    Post concussion syndrome    Myofascial pain syndrome, cervical  -     ACUPUNCTURE REFERRAL    Fatigue due to old head injury        1. Patient education: In depth discussion and education was provided about the assessment and implications of each of the below recommendations for management. Patient indicated readiness to learn, all questions were answered and understanding of material presented was confirmed.  2. Work-up: none at this time  3. Therapy/equipment/braces: wrote for PT and message therapy also ordered Neck Massager, he will start acupuncture   4. Medications: none at this time  5. Interventions: none  6. Referral / follow up with other providers: none  7. Follow up: 3 months, if no improvement will need trigger points and/or occipital nerve block.  Discussed neurofatigue as well as and building tolerance back up, as well as sleep hygiene and melatonin trial.  If no improvement may need CBT as well as stimulant.     Tato Avelar,       I  spent a total of 45 minutes face-to-face with Pranay Ratliff during today's office visit. Over 50% of this time was spent counseling the patient and/or coordinating care. See note for details.

## 2019-10-16 NOTE — NURSING NOTE
Chief Complaint   Patient presents with     Head Injury     CONCUSSION 3/2/2019- SYNCOPY WITH FRONTAL HEAD STRIKE.       Vitals:    10/16/19 1517   BP: (!) 143/84   Pulse: 69   SpO2: 96%   Weight: 101.2 kg (223 lb)   Height: 1.829 m (6')       Body mass index is 30.24 kg/m .      GADIEL-7 SCORE 9/4/2019   Total Score 1     PHQ-9 SCORE 9/4/2019   PHQ-9 Total Score 1     CONCUSSION SYMPTOMS ASSESSMENT 9/4/2019 10/16/2019   Headache or Pressure In Head 1 - mild 3 - moderate   Upset Stomach or Throwing Up 0 - none 0 - none   Problems with Balance 0 - none 0 - none   Feeling Dizzy 0 - none 0 - none   Sensitivity to Light 0 - none 0 - none   Sensitivity to Noise 0 - none 0 - none   Mood Changes 0 - none 0 - none   Feeling sluggish, hazy, or foggy 1 - mild 1 - mild   Trouble Concentrating, Lack of Focus 1 - mild 1 - mild   Motion Sickness 0 - none 0 - none   Vision Changes 0 - none 0 - none   Memory Problems 0 - none 0 - none   Feeling Confused 0 - none 0 - none   Neck Pain 2 - mild to moderate 3 - moderate   Trouble Sleeping 1 - mild 1 - mild   Total Number of Symptoms 5 5   Symptom Severity Score 6 9

## 2019-10-23 ENCOUNTER — THERAPY VISIT (OUTPATIENT)
Dept: CHIROPRACTIC MEDICINE | Facility: CLINIC | Age: 69
End: 2019-10-23
Payer: COMMERCIAL

## 2019-10-23 DIAGNOSIS — M99.02 THORACIC SEGMENT DYSFUNCTION: ICD-10-CM

## 2019-10-23 DIAGNOSIS — M99.01 CERVICAL SEGMENT DYSFUNCTION: Primary | ICD-10-CM

## 2019-10-23 DIAGNOSIS — M54.2 CERVICALGIA: ICD-10-CM

## 2019-10-23 DIAGNOSIS — M54.81 OCCIPITAL NEURALGIA OF RIGHT SIDE: ICD-10-CM

## 2019-10-23 DIAGNOSIS — F07.81 POST CONCUSSION SYNDROME: ICD-10-CM

## 2019-10-23 DIAGNOSIS — R51.9 CEPHALGIA: ICD-10-CM

## 2019-10-23 PROCEDURE — 98940 CHIROPRACT MANJ 1-2 REGIONS: CPT | Mod: AT | Performed by: CHIROPRACTOR

## 2019-10-23 NOTE — PROGRESS NOTES
Visit #:  2    Subjective:  Pranay Ratliff is a 69 year old male who is seen in f/u up for:        Cervical segment dysfunction  Cervicalgia  Occipital neuralgia of right side  Thoracic segment dysfunction  Cephalgia  Post concussion syndrome.     Since last visit on 10/16/2019,  Pranay Ratliff reports:    Area of chief complaint:  Cervical and Thoracic :  Symptoms are graded at 3/10. The quality is described as stiff, achey, dull.  Motion has increased, but is still not normal. The pt reports 30% improvement since initial presentation. He is pleased with his progress. Pain concentrates on the R side of the neck. ROM has improved when turing to the right. He states he slept much better this past week. The pt denies weakness or other unusual sx. Patient feels that they are improved due to a reduction in symptoms.     Since last visit the patient feels that they are 30 percent  improved from last visit.       Objective:  The following was observed:    P: palpatory tenderness Levator scapulae and Sub-occipital Bilaterally  A: static palpation demonstrates intersegmental asymmetry   R: motion palpation notes restricted motion  T: hypertonicity at: Levator scapulae and Sub-occipital R>>L    Segmental spinal dysfunction/restrictions found at:  Occiput, C1 , C7  and T5       Assessment:    Diagnoses:      1. Cervical segment dysfunction    2. Cervicalgia    3. Occipital neuralgia of right side    4. Thoracic segment dysfunction    5. Cephalgia    6. Post concussion syndrome        Patient's condition:  Patient had restrictions pre-manipulation    Treatment effectiveness:  Post manipulation there is better intersegmental movement and Patient claims to feel looser post manipulation      Procedures:  CMT:  87249 Chiropractic manipulative treatment 1-2 regions performed   Occiput: Diversified and Activator, OCC RR, Prone, Supine  Cervical: Diversified and Activator, C1 , C2, Prone  Thoracic: Diversified, T5, T9,  Prone    Modalities:  None performed this visit    Therapeutic procedures:  None    Response to Treatment  Reduction in symptoms as reported by patient    Prognosis: Good    Progress towards Goals: Patient is making progress towards the goal  .Goals:  SLEEPING: the patient specific goal is to attain his pre-injury status of 6-7  hours comfortably  Turn hid head to the right      Recommendations:    Instructions:  none     Follow-up:    Return to care in 1 week .

## 2019-10-23 NOTE — PROGRESS NOTES
Chiropractic Clinic Visit    PCP: Brigida Veras is a 69 year old male who is seen  in consultation at the request of  Tato Avelar M.D. presenting with chronic neck pain and HA . Patient reports that the onset was 2019.  When asked, patient denies:, falling, slipping, bending and reaching or sleeping awkwardly. The pt reports chronic neck pain and HA sx after falling in the shower. He hit the R side of his head when falling. He has been dx with a concussion. He reports occipitals HA that are on the R side and is constant. The px is sharp. The pt grades the px a 2-4/10 on VAS. He cannot turn his head to the right side. The pt has been in PT with good results. Stretching and yoga will decrease the pain. The pt denies weakness or other unusual sx.  Prior to onset, the patient was able to turn his head to the right side. Patient notes that due to symptoms, they can only sleep interrupted. Pranay Ratliff notes   sleeping rated at a 4/10 difficulty  and prior to this incident it was 0/10.    Injury: falling injury/concussion    Location of Pain: right suboccipital, R cervical  at the following level(s) Occiput, C1 , C2 , C7 , T1  and T4   Duration of Pain: 7 months    Rating of Pain at worst: 4/10  Rating of Pain Currently: 2/10  Symptoms are better with: PT   Symptoms are worse with: turning his head, sleeping  Additional Features: Constant ROY        Health History  as reported by the patient:    How does the patient rate their own health:   Good    Current or past medical history:   Cancer, Concussion/Dizziness and Implanted device    Medical allergies  None    Past Traumas/Surgeries  Cancer:  Basal skin    Family History  Family History   Problem Relation Age of Onset     Skin Cancer Mother          from pneumonia     Abdominal Aortic Aneurysm Mother      Heart Failure Father      Diabetes Type 1 Brother         Possibly secondary to chemical exposure in [place]     Diabetes  Type 2  Maternal Grandmother      Cancer Brother         Exposure to Agent Orange in [place]     Heart Disease Brother        Medications:  other:  Diazine, finasteride, simvastatin      Occupation:  Retired     Primary job tasks:   Other: reading, cooking yoga     Barriers as home/work:   none    Additional health Issues:     None       Review of Systems  Musculoskeletal: as above  Remainder of review of systems is negative including constitutional, CV, pulmonary, GI, Skin and Neurologic except as noted in HPI or medical history.    Past Medical History:   Diagnosis Date     Basal cell carcinoma      Epididymitis, bilateral      Epididymitis, left      Epididymitis, right      Past Surgical History:   Procedure Laterality Date     TESTICLE SURGERY       VASECTOMY         Objective  There were no vitals taken for this visit.    GENERAL APPEARANCE: healthy, alert and no distress   GAIT: NORMAL  SKIN: no suspicious lesions or rashes  NEURO: Normal strength and tone, mentation intact and speech normal  PSYCH:  mentation appears normal and affect normal/bright    Pranay was asked to complete the Neck Disability Index, today in the office. NDI Disability score: 24%; pain severity scale: 4/10..    Cervical Spine Exam    Range of Motion:         Full active and passive ROM forward flexion,   Decreased extension, lateral rotation, lateral flexion pain on the right side     Inspection:         No visible deformity        normal lordotic curvature maintained    Tender:        upper border of trapezius       Suboccipitals     Non-Tender:        remainder of cervical spine area    Muscle strength:       C4 (shoulder shrug)  symmetric 5/5 Normal       C5 (shoulder abduction) symmetric 5/5 Normal       C6 (elbow flexion) symmetric 5/5 Normal       C7 (elbow extension) symmetric 5/5 Normal       C8 (finger abduction, thumb flexion) symmetric 5/5 Normal    Reflexes:        C5 (biceps) symmetric 2 bilaterally       C6 (supinator)  symmetric 2 bilaterally       C7 (triceps) symmetric 2 bilaterally    Sensation:       grossly intact througout bilateral upper extremities    Special Tests:       positive (+) Spurling  Janes's- positive, VBI- negative and Banerjee Lei - negative    Lymphatics:        no edema noted in the upper extremities       Segmental spinal dysfunction/restrictions found at:  Occiput, C1 , C7 , T1  and T5       The following soft tissue hypotonicities were observed:Sub-occiput: ache and dull pain, referred pain: no    Trigger points were found in:none     Muscle spasm found in:Sub-occipital and R longus coli:       Radiology:  None     Assessment:    1. Cervical segment dysfunction    2. Cervicalgia    3. Thoracic segment dysfunction    4. Cephalgia    5. Post concussion syndrome        RX ordered/plan of care  Anticipated outcomes  Possible risks and side effects    After discussing the risk and benefits of care, patient consented to treatment    Patient's condition:  Patient had restrictions pre-manipulation    Treatment effectiveness:  Post manipulation there is better intersegmental movement and Patient claims to feel looser post manipulation    Plan:    Procedures:    Evaluation and Management:  43063 Moderate level exam 30 min    CMT:  27967 Chiropractic manipulative treatment 1-2 regions performed   Occiput: Diversified and Activator, R OCC , Prone, Supine  Cervical: Diversified and Activator, C1 , C2, Prone  Thoracic: Diversified, T1, T5, T9, Prone    Modalities:  None performed this visit    Therapeutic procedures:  None    Response to Treatment  Reduction in symptoms as reported by patient    Prognosis: Good      Treatment plan and goals:  Goals:  SLEEPING: the patient specific goal is to attain his pre-injury status of 6-7  hours comfortably  Turn hid head to the right     Frequency of care  Duration of care is estimated to be 3-6 weeks, from the initial treatment.  It is estimated that the patient will need a total  of 3-6 visits to resolve this episode.  For the initial therapeutic trial of care, the frequency is recommended at 1 X week, once daily.  A reevaluation would be clinically appropriate in 3-6 visits, to determine progress and further course of care.    In-Office Treatment  Evaluation  Spinal Chiropractic Manipulative Therapy  ACP discussion      Recommendations:    Instructions:  none     Follow-up:  Return to care in 1 week .     Disclaimer: This note consists of symbols derived from keyboarding, dictation and/or voice recognition software. As a result, there may be errors in the script that have gone undetected. Please consider this when interpreting information found in this chart.

## 2019-10-25 ENCOUNTER — NURSE TRIAGE (OUTPATIENT)
Dept: FAMILY MEDICINE | Facility: CLINIC | Age: 69
End: 2019-10-25

## 2019-10-25 ENCOUNTER — OFFICE VISIT (OUTPATIENT)
Dept: FAMILY MEDICINE | Facility: CLINIC | Age: 69
End: 2019-10-25
Payer: COMMERCIAL

## 2019-10-25 VITALS
OXYGEN SATURATION: 97 % | RESPIRATION RATE: 16 BRPM | DIASTOLIC BLOOD PRESSURE: 76 MMHG | WEIGHT: 225 LBS | TEMPERATURE: 98.7 F | HEART RATE: 69 BPM | SYSTOLIC BLOOD PRESSURE: 122 MMHG | HEIGHT: 72 IN | BODY MASS INDEX: 30.48 KG/M2

## 2019-10-25 DIAGNOSIS — M53.3 SACROILIAC JOINT DYSFUNCTION: Primary | ICD-10-CM

## 2019-10-25 DIAGNOSIS — R06.02 SOB (SHORTNESS OF BREATH): ICD-10-CM

## 2019-10-25 DIAGNOSIS — Z23 NEED FOR VACCINATION: ICD-10-CM

## 2019-10-25 DIAGNOSIS — K21.9 GASTROESOPHAGEAL REFLUX DISEASE WITHOUT ESOPHAGITIS: ICD-10-CM

## 2019-10-25 PROCEDURE — 99214 OFFICE O/P EST MOD 30 MIN: CPT | Performed by: FAMILY MEDICINE

## 2019-10-25 RX ORDER — SUCRALFATE 1 G/1
1 TABLET ORAL 2 TIMES DAILY
Qty: 20 TABLET | Refills: 0 | Status: SHIPPED | OUTPATIENT
Start: 2019-10-25 | End: 2021-07-06 | Stop reason: ALTCHOICE

## 2019-10-25 RX ORDER — OMEPRAZOLE 40 MG/1
40 CAPSULE, DELAYED RELEASE ORAL DAILY
Qty: 30 CAPSULE | Refills: 0 | Status: SHIPPED | OUTPATIENT
Start: 2019-10-25 | End: 2021-07-06 | Stop reason: ALTCHOICE

## 2019-10-25 ASSESSMENT — MIFFLIN-ST. JEOR: SCORE: 1823.59

## 2019-10-25 NOTE — TELEPHONE ENCOUNTER
"Pt triaged for SOB and hip pain, Pt stated the hip pain in on the lefts side toward the back/top of hip. Pt noted not to have trauma to the area but did perform extra strenuous activity in the yard. Pain a=onset shortly afterwards.    SOB started   Wednesday, Pt noted it is minimal but noticeable more than usual. Pt was taking Ranitidine but stopped about a month ago during the recall. Pt has not been taking alternative. DENIES: CP,  Dizziness/Lightheadedness, Numbness/Tingling, HA, Vision/Hearing Changes, N/V, Palpitations  Pt given appt to review SOB today.     Next 5 appointments (look out 90 days)    Oct 25, 2019  2:00 PM CDT  SHORT with Hipolito Alva MD  [facility] ([facility]) [address] [place] [address] [id number] [phone]          Additional Information    Negative: Breathing stopped and hasn't returned    Negative: Choking on something    Negative: SEVERE difficulty breathing (e.g., struggling for each breath, speaks in single words, pulse > 120)    Negative: Bluish (or gray) lips or face    Negative: Difficult to awaken or acting confused (e.g., disoriented, slurred speech)    Negative: Passed out (i.e., fainted, collapsed and was not responding)    Negative: Wheezing started suddenly after medicine, an allergic food, or bee sting    Negative: Stridor    Negative: Slow, shallow and weak breathing    Negative: Sounds like a life-threatening emergency to the triager    Negative: MODERATE difficulty breathing (e.g., speaks in phrases, SOB even at rest, pulse 100-120) of new onset or worse than normal    Negative: Wheezing can be heard across the room    Negative: Drooling or spitting out saliva (because can't swallow)    Negative: Any history of prior \"blood clot\" in leg or lungs    Negative: Recent illness requiring prolonged bedrest (i.e., immobilization)    Negative: Hip or leg fracture in past 2 months (e.g., or had cast on leg or ankle)    Negative: " "Major surgery in the past month    Negative: Recent long-distance travel with prolonged time in car, bus, plane, or train (i.e., within past 2 weeks; 6 or  more hours duration)    Negative: Extra heart beats OR irregular heart beating   (i.e., \"palpitations\")    Negative: Chest pain    Negative: Wheezing (high pitched whistling sound) and previous asthma attacks or use of asthma medicines    Negative: Difficulty breathing and only present when coughing    Negative: Difficulty breathing and only from stuffy or runny nose    Negative: Fever > 103 F (39.4 C)    Negative: Fever > 101 F (38.3 C) and over 60 years of age    Negative: Fever > 100.0 F (37.8 C) and bedridden (e.g., nursing home patient, stroke, chronic illness, recovering from surgery)    Negative: Fever > 100.0 F (37.8 C) and diabetes mellitus or weak immune system (e.g., HIV positive, cancer chemo, splenectomy, organ transplant, chronic steroids)    Negative: Periods where breathing stops and then resumes normally and bedridden (e.g., nursing home patient, CVA)    Negative: Pregnant or postpartum (< 1 month since delivery)    Negative: Patient sounds very sick or weak to the triager    MILD difficulty breathing (e.g., minimal/no SOB at rest, SOB with walking, pulse < 100) of new onset or worse than normal    Negative: Longstanding difficulty breathing (e.g., CHF, COPD, emphysema) and worse than normal    Negative: Longstanding difficulty breathing and not responding to usual therapy    Negative: Continuous (nonstop) coughing    Negative: Patient wants to be seen    MODERATE longstanding difficulty breathing (e.g., speaks in phrases, SOB even at rest, pulse 100-120) and SAME as normal    Negative: MILD longstanding difficulty breathing (e.g., speaks in phrases, SOB even at rest, pulse 100-120) and SAME as normal    Answer Assessment - Initial Assessment Questions  1. RESPIRATORY STATUS: \"Describe your breathing?\" (e.g., wheezing, shortness of breath, unable " "to speak, severe coughing)       Short of breath,    2. ONSET: \"When did this breathing problem begin?\"       Since Wednesday,    3. PATTERN \"Does the difficult breathing come and go, or has it been constant since it started?\"       Constant, seems GERD related    4. SEVERITY: \"How bad is your breathing?\" (e.g., mild, moderate, severe)     - MILD: No SOB at rest, mild SOB with walking, speaks normally in sentences, can lay down, no retractions, pulse < 100.     - MODERATE: SOB at rest, SOB with minimal exertion and prefers to sit, cannot lie down flat, speaks in phrases, mild retractions, audible wheezing, pulse 100-120.     - SEVERE: Very SOB at rest, speaks in single words, struggling to breathe, sitting hunched forward, retractions, pulse > 120       Moderate, when walking around     5. RECURRENT SYMPTOM: \"Have you had difficulty breathing before?\" If so, ask: \"When was the last time?\" and \"What happened that time?\"       Yes, but more pronounced    6. CARDIAC HISTORY: \"Do you have any history of heart disease?\" (e.g., heart attack, angina, bypass surgery, angioplasty)       No, cholesterol has been high    7. LUNG HISTORY: \"Do you have any history of lung disease?\"  (e.g., pulmonary embolus, asthma, emphysema)      No    8. CAUSE: \"What do you think is causing the breathing problem?\"       Unknown, GERD maybe  Pt stated he has not taken anything for about a month.    9. OTHER SYMPTOMS: \"Do you have any other symptoms? (e.g., dizziness, runny nose, cough, chest pain, fever)      Congestion in sinuses, clear    10. PREGNANCY: \"Is there any chance you are pregnant?\" \"When was your last menstrual period?\"        No    11. TRAVEL: \"Have you traveled out of the country in the last month?\" (e.g., travel history, exposures)        No    Protocols used: BREATHING DIFFICULTY-A-OH    Glenn Jimenez RN   [facility]    "

## 2019-10-25 NOTE — PROGRESS NOTES
Subjective     Pranay Ratliff is a 69 year old male who presents to clinic today for the following health issues:    HPI   Musculoskeletal problem/pain      Duration: 2 days     Description  Location: left hip     Intensity:  moderate    Accompanying signs and symptoms: none    History  Previous similar problem: no   Previous evaluation:  none    Precipitating or alleviating factors:  Trauma or overuse: not sure, was doing some yard work that day   Aggravating factors include: walking and climbing stairs    Therapies tried and outcome: Tylenol, Ibuprofen, Aspercreme, heat     Respiratory Problem       Duration: 2 days     Description (location/character/radiation): SOB         Patient Active Problem List   Diagnosis     History of basal cell carcinoma     Benign essential hypertension     Hyperlipidemia LDL goal <100     Obstructive sleep apnea syndrome     Gastroesophageal reflux disease with esophagitis     Localized swelling of lower extremity     Vasovagal syncope     Elevated prostate specific antigen (PSA)     Concussion with loss of consciousness of 30 minutes or less, subsequent encounter     BPH with urinary obstruction     Myofascial pain syndrome, cervical     Occipital neuralgia of right side     Fatigue due to old head injury     Post concussion syndrome     Cervicalgia     Episodic tension-type headache, not intractable     Cervical segment dysfunction     Thoracic segment dysfunction     Cephalgia     Past Surgical History:   Procedure Laterality Date     TESTICLE SURGERY       VASECTOMY         Social History     Tobacco Use     Smoking status: Never Smoker     Smokeless tobacco: Never Used   Substance Use Topics     Alcohol use: Yes     Frequency: Monthly or less     Comment: twice a week     Family History   Problem Relation Age of Onset     Skin Cancer Mother          from pneumonia     Abdominal Aortic Aneurysm Mother      Heart Failure Father      Diabetes Type 1 Brother         Possibly  secondary to chemical exposure in [facility]     Diabetes Type 2  Maternal Grandmother      Cancer Brother         Exposure to Agent Orange in [facility]     Heart Disease Brother          Current Outpatient Medications   Medication Sig Dispense Refill     aspirin 81 MG tablet Take by mouth daily       carbamide peroxide (DEBROX) 6.5 % otic solution Place 5 drops into both ears daily as needed for other (impacted cerumen) 14.8 mL 11     clotrimazole-betamethasone (LOTRISONE) 1-0.05 % external cream APPLY TOPICALLY TWICE DAILY TO THE FEET. 45 g 0     finasteride (PROSCAR) 5 MG tablet Take 1 tablet (5 mg) by mouth daily 90 tablet 3     multivitamin, therapeutic with minerals (MULTI-VITAMIN) TABS Take 1 tablet by mouth daily       Omega-3 Fatty Acids (OMEGA-3 FISH OIL PO)        omeprazole (PRILOSEC) 40 MG DR capsule Take 1 capsule (40 mg) by mouth daily 30 capsule 0     simvastatin (ZOCOR) 20 MG tablet Take 1 tablet (20 mg) by mouth At Bedtime 90 tablet 3     sucralfate (CARAFATE) 1 GM tablet Take 1 tablet (1 g) by mouth 2 times daily 20 tablet 0     triamterene-HCTZ (MAXZIDE-25) 37.5-25 MG tablet Take 1 tablet by mouth daily 90 tablet 3     Camphor-Menthol-Methyl Sal 1.2-5.7-6.3 % PTCH Externally apply 1 patch topically daily as needed (apply to right side of neck for pain) (Patient not taking: Reported on 10/25/2019) 24 patch 3     furosemide (LASIX) 20 MG tablet Take 1 tablet (20 mg) once daily as needed for swelling. (Patient not taking: Reported on 10/25/2019) 30 tablet 1     triamcinolone (KENALOG) 0.1 % external cream Apply topically 2 times daily to affected areas on the left hand for 10-14 days. Not for use on face nor groin. (Patient not taking: Reported on 10/25/2019) 30 g 1     Allergies   Allergen Reactions     [place] Plus Allergy [Diphenhydramine]      Patient has lip swollen ,has used tylenol in th past. No issues. Has used benadryl in the past no issues.  Never used phenylephrine.     Recent Labs   Lab  Test 03/02/19  1636 01/30/19  0901   LDL  --  73   HDL  --  48   TRIG  --  181*   ALT  --  30   CR 1.08 0.96   GFRESTIMATED 70 80   GFRESTBLACK 81 >90   POTASSIUM 3.9 3.6   TSH 3.64  --       BP Readings from Last 3 Encounters:   10/25/19 122/76   10/16/19 (!) 143/84   09/16/19 112/71    Wt Readings from Last 3 Encounters:   10/25/19 102.1 kg (225 lb)   10/16/19 101.2 kg (223 lb)   09/16/19 101.2 kg (223 lb)              Reviewed and updated as needed this visit by Provider         Review of Systems   ROS COMP: Constitutional, HEENT, cardiovascular, pulmonary, gi and gu systems are negative, except as otherwise noted.      Objective    /76 (Cuff Size: Adult Large)   Pulse 69   Temp 98.7  F (37.1  C) (Tympanic)   Resp 16   Ht 1.829 m (6')   Wt 102.1 kg (225 lb)   SpO2 97%   BMI 30.52 kg/m    Body mass index is 30.52 kg/m .  Physical Exam   GENERAL: healthy, alert and no distress  HENT: normal cephalic/atraumatic, ear canals and TM's normal, nose and mouth without ulcers or lesions and erythematous pharynx with swelling mildly   NECK: no adenopathy, no asymmetry, masses, or scars and thyroid normal to palpation  RESP: lungs clear to auscultation - no rales, rhonchi or wheezes  CV: regular rate and rhythm, normal S1 S2, no S3 or S4, no murmur, click or rub, no peripheral edema and peripheral pulses strong  ABDOMEN: soft, nontender, no hepatosplenomegaly, no masses and bowel sounds normal  MS: right SI joint pain with tenderness on Lt L spine paraspinous muscle, has no radiculopathy, negative SLRT/ANNETTE            Assessment & Plan     1. Need for vaccination      2. Gastroesophageal reflux disease without esophagitis  Has worsening SOB with GERD, will have him to try PPI and carafate for next 1-4 weeks  And will consider EGD if not improving   - omeprazole (PRILOSEC) 40 MG DR capsule; Take 1 capsule (40 mg) by mouth daily  Dispense: 30 capsule; Refill: 0  - sucralfate (CARAFATE) 1 GM tablet; Take 1 tablet  (1 g) by mouth 2 times daily  Dispense: 20 tablet; Refill: 0    3. SOB (shortness of breath)  Mentioned above   - omeprazole (PRILOSEC) 40 MG DR capsule; Take 1 capsule (40 mg) by mouth daily  Dispense: 30 capsule; Refill: 0  - sucralfate (CARAFATE) 1 GM tablet; Take 1 tablet (1 g) by mouth 2 times daily  Dispense: 20 tablet; Refill: 0    4. Sacroiliac joint dysfunction  Will have him to try home PT       BMI:   Estimated body mass index is 30.52 kg/m  as calculated from the following:    Height as of this encounter: 1.829 m (6').    Weight as of this encounter: 102.1 kg (225 lb).           Return in about 4 weeks (around 11/22/2019) for back pain and GERD/SOB.    Hipolito Alva MD  [facility] [place] [place]

## 2019-10-25 NOTE — TELEPHONE ENCOUNTER
Reason for call:  Patient reporting a symptom    Symptom or request: SOB    Duration (how long have symptoms been present): 2 days    Have you been treated for this before? Yes    Additional comments: Patient called in because he is having hip pain, but then said he is having shortness of breath for 2 days.    Phone Number patient can be reached at:  Cell number on file:    Telephone Information:   Mobile [phone]       Best Time:  any    Can we leave a detailed message on this number:  YES    Call taken on 10/25/2019 at 8:52 AM by Audra Duron

## 2019-10-30 ENCOUNTER — THERAPY VISIT (OUTPATIENT)
Dept: CHIROPRACTIC MEDICINE | Facility: CLINIC | Age: 69
End: 2019-10-30
Payer: COMMERCIAL

## 2019-10-30 DIAGNOSIS — M99.02 THORACIC SEGMENT DYSFUNCTION: ICD-10-CM

## 2019-10-30 DIAGNOSIS — M99.01 CERVICAL SEGMENT DYSFUNCTION: Primary | ICD-10-CM

## 2019-10-30 DIAGNOSIS — M54.2 CERVICALGIA: ICD-10-CM

## 2019-10-30 DIAGNOSIS — F07.81 POST CONCUSSION SYNDROME: ICD-10-CM

## 2019-10-30 DIAGNOSIS — G44.301 INTRACTABLE POST-TRAUMATIC HEADACHE, UNSPECIFIED CHRONICITY PATTERN: ICD-10-CM

## 2019-10-30 DIAGNOSIS — M54.81 OCCIPITAL NEURALGIA OF RIGHT SIDE: ICD-10-CM

## 2019-10-30 PROCEDURE — 98941 CHIROPRACT MANJ 3-4 REGIONS: CPT | Mod: AT | Performed by: CHIROPRACTOR

## 2019-10-30 NOTE — PROGRESS NOTES
Visit #:  3    Subjective:  Pranay Ratliff is a 69 year old male who is seen in f/u up for:        Cervical segment dysfunction  Cervicalgia  Occipital neuralgia of right side  Intractable post-traumatic headache, unspecified chronicity pattern  Thoracic segment dysfunction  Post concussion syndrome.     Since last visit,   Pranay Ratliff reports:    Area of chief complaint:  Cervical and Thoracic :  Symptoms are graded at 3/10. The quality is described as stiff, achey, dull.  Motion has increased, but is still not normal. The pt reports 60-70% improvement since initial presentation. He is pleased with his progress. Pain concentrates on the R side of the neck. ROM has improved when turing to the right. He states he is now sleeping throughout the night. The pt states he has decreased pain medication. He was raking in the yard and notes moderate SI joint pain today. He is unable to bend or lift without pain. He saw his PCP for pain medication. The pt denies weakness or other unusual sx. Patient feels that they are improved due to a reduction in symptoms.     Since last visit the patient feels that they are 60-70 percent  improved from last visit.       Objective:  The following was observed:    P: palpatory tenderness Levator scapulae and Sub-occipital Bilaterally  A: static palpation demonstrates intersegmental asymmetry   R: motion palpation notes restricted motion  T: hypertonicity at: Levator scapulae and Sub-occipital R>>L    Segmental spinal dysfunction/restrictions found at:  Occiput, C1 , C7  and T5       Assessment:    Diagnoses:      1. Cervical segment dysfunction    2. Cervicalgia    3. Occipital neuralgia of right side    4. Intractable post-traumatic headache, unspecified chronicity pattern    5. Thoracic segment dysfunction    6. Post concussion syndrome        Patient's condition:  Patient responding well to therapy    Treatment effectiveness:  Post manipulation there is better intersegmental movement  and Patient claims to feel looser post manipulation      Procedures:  CMT:  98440 Chiropractic manipulative treatment 3-4  regions performed   Occiput: Diversified and Activator, OCC RR, Prone, Supine  Cervical: Diversified and Activator, C1 , C2, Prone  Thoracic: Diversified, T5, T9, Prone  L Sacrum: drop table       Modalities:  None performed this visit    Therapeutic procedures:  None    Response to Treatment  Reduction in symptoms as reported by patient    Prognosis: Good    Progress towards Goals: Patient is making progress towards the goal  .Goals:  SLEEPING: the patient specific goal is to attain his pre-injury status of 6-7  hours comfortably  Turn hid head to the right      Recommendations:    Instructions:  none     Follow-up:    Return to care in 1 week .

## 2019-11-08 ENCOUNTER — THERAPY VISIT (OUTPATIENT)
Dept: CHIROPRACTIC MEDICINE | Facility: CLINIC | Age: 69
End: 2019-11-08
Payer: COMMERCIAL

## 2019-11-08 DIAGNOSIS — G44.301 INTRACTABLE POST-TRAUMATIC HEADACHE, UNSPECIFIED CHRONICITY PATTERN: ICD-10-CM

## 2019-11-08 DIAGNOSIS — F07.81 POST CONCUSSION SYNDROME: ICD-10-CM

## 2019-11-08 DIAGNOSIS — M99.02 THORACIC SEGMENT DYSFUNCTION: ICD-10-CM

## 2019-11-08 DIAGNOSIS — M99.01 CERVICAL SEGMENT DYSFUNCTION: Primary | ICD-10-CM

## 2019-11-08 DIAGNOSIS — M99.04 SOMATIC DYSFUNCTION OF SACRAL REGION: ICD-10-CM

## 2019-11-08 DIAGNOSIS — M54.81 OCCIPITAL NEURALGIA OF RIGHT SIDE: ICD-10-CM

## 2019-11-08 DIAGNOSIS — M54.2 CERVICALGIA: ICD-10-CM

## 2019-11-08 PROCEDURE — 98941 CHIROPRACT MANJ 3-4 REGIONS: CPT | Mod: AT | Performed by: CHIROPRACTOR

## 2019-11-08 NOTE — PROGRESS NOTES
Visit #:  4    Subjective:  Pranay Ratliff is a 69 year old male who is seen in f/u up for:        Cervical segment dysfunction  Cervicalgia  Thoracic segment dysfunction  Intractable post-traumatic headache, unspecified chronicity pattern  Occipital neuralgia of right side  Somatic dysfunction of sacral region  Post concussion syndrome.     Since last visit,   Pranay Ratliff reports:    Area of chief complaint:  Cervical and Thoracic :  Symptoms are graded at 3/10. The quality is described as stiff, achey, dull.  Motion has increased, but is still not normal. The pt reports at least 75%-80% improvement overall. He notes some stiffness in the neck area when turning. HA sx have decreased significantly. Low back and sacral pain has subsided as well.   The pt denies weakness or other unusual sx. Patient feels that they are improved due to a reduction in symptoms.     Since last visit the patient feels that they are 75%-80  percent  improved from last visit.       Objective:  The following was observed:    P: palpatory tenderness Levator scapulae and Sub-occipital Bilaterally  A: static palpation demonstrates intersegmental asymmetry   R: motion palpation notes restricted motion  T: hypertonicity at: Levator scapulae and Sub-occipital R>>L    Segmental spinal dysfunction/restrictions found at:  Occiput, C1 , C7  and T5       Assessment:    Diagnoses:      1. Cervical segment dysfunction    2. Cervicalgia    3. Thoracic segment dysfunction    4. Intractable post-traumatic headache, unspecified chronicity pattern    5. Occipital neuralgia of right side    6. Somatic dysfunction of sacral region    7. Post concussion syndrome        Patient's condition:  Patient responding well to therapy    Treatment effectiveness:  Post manipulation there is better intersegmental movement and Patient claims to feel looser post manipulation      Procedures:  CMT:  03132 Chiropractic manipulative treatment 3-4  regions performed   Occiput:  Diversified and Activator, OCC RR, Prone, Supine  Cervical: Diversified and Activator, C1 , C2, Prone  Thoracic: Diversified, T5, T9, Prone  L Sacrum: drop table       Modalities:  None performed this visit    Therapeutic procedures:  None    Response to Treatment  Reduction in symptoms as reported by patient    Prognosis: Good    Progress towards Goals: Patient is making progress towards the goal  .Goals:  SLEEPING: the patient specific goal is to attain his pre-injury status of 6-7  hours comfortably  Turn hid head to the right      Recommendations:    Instructions:  none     Follow-up:    Return to care in 1 week with ACP

## 2019-11-13 PROBLEM — M99.04 SOMATIC DYSFUNCTION OF SACRAL REGION: Status: ACTIVE | Noted: 2019-11-13

## 2019-11-21 PROBLEM — G44.219 EPISODIC TENSION-TYPE HEADACHE, NOT INTRACTABLE: Status: RESOLVED | Noted: 2019-09-11 | Resolved: 2019-11-21

## 2019-11-21 PROBLEM — M54.2 CERVICALGIA: Status: RESOLVED | Noted: 2019-09-11 | Resolved: 2019-11-21

## 2019-11-21 NOTE — PROGRESS NOTES
Discharge Note    Progress reporting period is from last progress note on   to Oct 10, 2019.    Pranay failed to follow up and current status is unknown.  Please see information below for last relevant information on current status.  Patient seen for 6 visits.    SUBJECTIVE  Subjective changes noted by patient:     .  Current pain level is  .     Previous pain level was  2/10.   Changes in function:  Yes (See Goal flowsheet attached for changes in current functional level)  Adverse reaction to treatment or activity: None    OBJECTIVE  Changes noted in objective findings:       ASSESSMENT/PLAN  Diagnosis: neck pain and headaches   Updated problem list and treatment plan:   Pain - HEP  STG/LTGs have been met or progress has been made towards goals:  Yes, please see goal flowsheet for most current information  Assessment of Progress: current status is unknown.    Last current status:     Self Management Plans:  HEP  I have re-evaluated this patient and find that the nature, scope, duration and intensity of the therapy is appropriate for the medical condition of the patient.  Pranay continues to require the following intervention to meet STG and LTG's:  HEP.    Recommendations:  Discharge with current home program.  Patient to follow up with MD as needed.    Please refer to the daily flowsheet for treatment today, total treatment time and time spent performing 1:1 timed codes.

## 2020-05-07 ENCOUNTER — TELEPHONE (OUTPATIENT)
Dept: ONCOLOGY | Facility: CLINIC | Age: 70
End: 2020-05-07

## 2020-05-07 NOTE — TELEPHONE ENCOUNTER
Spoke to patient. He will stay off Finasteride an see primary . He will  Update our office after seeing primary  And we can check with Dr Guajardo at  That time for  Any new orders .Екатерина Robert LPN

## 2020-05-07 NOTE — TELEPHONE ENCOUNTER
[facility] Call Center    Phone Message    May a detailed message be left on voicemail: yes     Reason for Call: Symptoms or Concerns     If patient has red-flag symptoms, warm transfer to triage line    Current symptom or concern: breast pain in both breasts primarily in nipples.  He thinks that it may be a side affect of the finasteride (PROSCAR) 5 MG tablet.  He does not feel any masses.  He does have an appointment on Monday with his PCP in [place] in clinic to have him check them out.  He did stop taking the medication on 5/5/2020.    Symptoms have been present for:  approx 5 week(s)    Has patient previously been seen for this? No    By :     Date:     Are there any new or worsening symptoms? No      Action Taken: Message routed to:  Other: [facility]    Travel Screening: Not Applicable

## 2020-10-06 ENCOUNTER — TELEPHONE (OUTPATIENT)
Dept: UROLOGY | Facility: CLINIC | Age: 70
End: 2020-10-06

## 2020-10-06 NOTE — TELEPHONE ENCOUNTER
Patient called nurse line and reports that his PSA recently on Oct. 5th was 6.5 and on May 6th it was 2.07. Patient stopped finasteride due to gynecomastia side effects. He is in [place] and will not be able to follow-up until near December. Will send message to MD. Marisa Amador LPN

## 2020-10-06 NOTE — TELEPHONE ENCOUNTER
"Per MD-\"Probably best to have him recheck again when he is back in December or January. Nothing urgent to do right now.\"    Marisa Amador LPN    "

## 2020-10-06 NOTE — TELEPHONE ENCOUNTER
[facility] Call Center    Phone Message    May a detailed message be left on voicemail: yes     Reason for Call: Other: pt is currently in [place], might not be back for his appt in dec, had his PSA drawn at [facility] and would like to share/discuss the results with the care team, please call reynold thanks!     Action Taken: Message routed to:  [facility] ([facility]    Travel Screening: Not Applicable

## 2020-10-20 ENCOUNTER — TRANSFERRED RECORDS (OUTPATIENT)
Dept: HEALTH INFORMATION MANAGEMENT | Facility: CLINIC | Age: 70
End: 2020-10-20

## 2020-11-16 ENCOUNTER — HEALTH MAINTENANCE LETTER (OUTPATIENT)
Age: 70
End: 2020-11-16

## 2020-12-28 ENCOUNTER — TRANSFERRED RECORDS (OUTPATIENT)
Dept: HEALTH INFORMATION MANAGEMENT | Facility: CLINIC | Age: 70
End: 2020-12-28

## 2021-03-16 ENCOUNTER — TRANSFERRED RECORDS (OUTPATIENT)
Dept: HEALTH INFORMATION MANAGEMENT | Facility: CLINIC | Age: 71
End: 2021-03-16

## 2021-03-30 ENCOUNTER — TRANSFERRED RECORDS (OUTPATIENT)
Dept: HEALTH INFORMATION MANAGEMENT | Facility: CLINIC | Age: 71
End: 2021-03-30

## 2021-05-24 ENCOUNTER — TRANSFERRED RECORDS (OUTPATIENT)
Dept: HEALTH INFORMATION MANAGEMENT | Facility: CLINIC | Age: 71
End: 2021-05-24

## 2021-07-06 ENCOUNTER — OFFICE VISIT (OUTPATIENT)
Dept: FAMILY MEDICINE | Facility: CLINIC | Age: 71
End: 2021-07-06
Payer: COMMERCIAL

## 2021-07-06 ENCOUNTER — MYC MEDICAL ADVICE (OUTPATIENT)
Dept: FAMILY MEDICINE | Facility: CLINIC | Age: 71
End: 2021-07-06

## 2021-07-06 VITALS
BODY MASS INDEX: 30.61 KG/M2 | TEMPERATURE: 96 F | WEIGHT: 226 LBS | HEART RATE: 66 BPM | HEIGHT: 72 IN | RESPIRATION RATE: 16 BRPM | SYSTOLIC BLOOD PRESSURE: 130 MMHG | DIASTOLIC BLOOD PRESSURE: 78 MMHG | OXYGEN SATURATION: 99 %

## 2021-07-06 DIAGNOSIS — I10 BENIGN ESSENTIAL HYPERTENSION: ICD-10-CM

## 2021-07-06 DIAGNOSIS — K21.9 GASTROESOPHAGEAL REFLUX DISEASE WITHOUT ESOPHAGITIS: Primary | ICD-10-CM

## 2021-07-06 DIAGNOSIS — E78.5 HYPERLIPIDEMIA LDL GOAL <100: ICD-10-CM

## 2021-07-06 DIAGNOSIS — M46.92 CERVICAL SPONDYLITIS (H): Primary | ICD-10-CM

## 2021-07-06 DIAGNOSIS — M48.02 CERVICAL STENOSIS OF SPINAL CANAL: ICD-10-CM

## 2021-07-06 DIAGNOSIS — R97.20 ELEVATED PROSTATE SPECIFIC ANTIGEN (PSA): ICD-10-CM

## 2021-07-06 PROCEDURE — 99214 OFFICE O/P EST MOD 30 MIN: CPT | Performed by: FAMILY MEDICINE

## 2021-07-06 RX ORDER — ROSUVASTATIN CALCIUM 20 MG/1
20 TABLET, COATED ORAL DAILY
COMMUNITY
End: 2021-09-17

## 2021-07-06 RX ORDER — DUTASTERIDE 0.5 MG/1
0.5 CAPSULE, LIQUID FILLED ORAL DAILY
COMMUNITY
End: 2023-04-13

## 2021-07-06 RX ORDER — TADALAFIL 5 MG/1
5 TABLET ORAL EVERY 24 HOURS
COMMUNITY
End: 2022-03-09

## 2021-07-06 RX ORDER — FLUOCINONIDE 0.5 MG/G
CREAM TOPICAL 2 TIMES DAILY
COMMUNITY
End: 2022-04-07

## 2021-07-06 RX ORDER — PANTOPRAZOLE SODIUM 40 MG/1
40 TABLET, DELAYED RELEASE ORAL DAILY
COMMUNITY
End: 2022-07-18

## 2021-07-06 ASSESSMENT — MIFFLIN-ST. JEOR: SCORE: 1818.13

## 2021-07-06 ASSESSMENT — PAIN SCALES - GENERAL: PAINLEVEL: NO PAIN (0)

## 2021-07-06 NOTE — PROGRESS NOTES
Assessment & Plan     Gastroesophageal reflux disease without esophagitis  Stable, keep monitoring   - pantoprazole (PROTONIX) 40 MG EC tablet; Take 40 mg by mouth daily    Hyperlipidemia LDL goal <100  Stable, will review the lab at next CPE  - rosuvastatin (CRESTOR) 20 MG tablet; Take 20 mg by mouth daily    Benign essential hypertension  Stable, keep monitoring, encouraged him to keep on taking med as prescribed by previous PCP     Elevated prostate specific antigen (PSA)  Seeing urology, encouraged him to see dizzienss related with current dose of meds   - dutasteride (AVODART) 0.5 MG capsule; Take 0.5 mg by mouth daily  - tadalafil (CIALIS) 5 MG tablet; Take 5 mg by mouth every 24 hours    Cervical stenosis of spinal canal  Has been stable, keep monitoring   Encouraged him to resume PT, pt will contact us if need new referral order to [facility]  - diclofenac (VOLTAREN) 1 % topical gel; Apply topically 4 times daily      33 minutes spent on the date of the encounter doing chart review, history and exam, documentation and further activities per the note       BMI:   Estimated body mass index is 30.65 kg/m  as calculated from the following:    Height as of this encounter: 1.829 m (6').    Weight as of this encounter: 102.5 kg (226 lb).   Weight management plan: Discussed healthy diet and exercise guidelines    FUTURE APPOINTMENTS:       - Follow-up visit in 6 months for CPE    No follow-ups on file.    Hipolito Alva MD  [facility] NEREYDA PRAIRIMARISA Perez is a 71 year old who presents for the following health issues     HPI     Concern - Establish care          Review of Systems   Constitutional, HEENT, cardiovascular, pulmonary, gi and gu systems are negative, except as otherwise noted.      Objective    /78   Pulse 66   Temp 96  F (35.6  C) (Tympanic)   Resp 16   Ht 1.829 m (6')   Wt 102.5 kg (226 lb)   SpO2 99%   BMI 30.65 kg/m    Body mass index is 30.65 kg/m .  Physical Exam    GENERAL: healthy, alert and no distress  EYES: Eyes grossly normal to inspection, PERRL and conjunctivae and sclerae normal  HENT: ear canals and TM's normal, nose and mouth without ulcers or lesions  NECK: no adenopathy, no asymmetry, masses, or scars and thyroid normal to palpation  RESP: lungs clear to auscultation - no rales, rhonchi or wheezes  CV: regular rate and rhythm, normal S1 S2, no S3 or S4, no murmur, click or rub, no peripheral edema and peripheral pulses strong  ABDOMEN: soft, nontender, no hepatosplenomegaly, no masses and bowel sounds normal  MS: no gross musculoskeletal defects noted, no edema  SKIN: no suspicious lesions or rashes  NEURO: Normal strength and tone, mentation intact and speech normal

## 2021-07-06 NOTE — TELEPHONE ENCOUNTER
Please see MyChart and advise. Immunizations are in the patient's chart and his Health Maintenance is showing he needs another PPSV23.  Ann-Marie Rose RN

## 2021-07-06 NOTE — TELEPHONE ENCOUNTER
Dr. Veras- Please see [employer] message below. Please advise. Thanks  Cee Crocker  Referral Coordinator

## 2021-07-08 ENCOUNTER — THERAPY VISIT (OUTPATIENT)
Dept: PHYSICAL THERAPY | Facility: CLINIC | Age: 71
End: 2021-07-08
Payer: COMMERCIAL

## 2021-07-08 DIAGNOSIS — M46.92 CERVICAL SPONDYLITIS (H): ICD-10-CM

## 2021-07-08 DIAGNOSIS — R42 DIZZINESS: ICD-10-CM

## 2021-07-08 DIAGNOSIS — M54.2 CERVICAL PAIN: ICD-10-CM

## 2021-07-08 PROCEDURE — 97161 PT EVAL LOW COMPLEX 20 MIN: CPT | Mod: GP | Performed by: PHYSICAL THERAPIST

## 2021-07-08 PROCEDURE — 97110 THERAPEUTIC EXERCISES: CPT | Mod: GP | Performed by: PHYSICAL THERAPIST

## 2021-07-08 PROCEDURE — 97112 NEUROMUSCULAR REEDUCATION: CPT | Mod: GP | Performed by: PHYSICAL THERAPIST
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
## 2021-07-12 ENCOUNTER — THERAPY VISIT (OUTPATIENT)
Dept: PHYSICAL THERAPY | Facility: CLINIC | Age: 71
End: 2021-07-12
Payer: COMMERCIAL

## 2021-07-12 DIAGNOSIS — M54.2 CERVICAL PAIN: ICD-10-CM

## 2021-07-12 DIAGNOSIS — R42 DIZZINESS: ICD-10-CM

## 2021-07-12 PROCEDURE — 97110 THERAPEUTIC EXERCISES: CPT | Mod: GP | Performed by: PHYSICAL THERAPIST

## 2021-07-12 PROCEDURE — 97140 MANUAL THERAPY 1/> REGIONS: CPT | Mod: GP | Performed by: PHYSICAL THERAPIST

## 2021-07-12 PROCEDURE — 97112 NEUROMUSCULAR REEDUCATION: CPT | Mod: GP | Performed by: PHYSICAL THERAPIST

## 2021-07-19 ENCOUNTER — THERAPY VISIT (OUTPATIENT)
Dept: PHYSICAL THERAPY | Facility: CLINIC | Age: 71
End: 2021-07-19
Payer: COMMERCIAL

## 2021-07-19 DIAGNOSIS — M54.2 CERVICAL PAIN: ICD-10-CM

## 2021-07-19 DIAGNOSIS — R42 DIZZINESS: ICD-10-CM

## 2021-07-19 PROCEDURE — 97110 THERAPEUTIC EXERCISES: CPT | Mod: GP | Performed by: PHYSICAL THERAPIST

## 2021-07-19 PROCEDURE — 97140 MANUAL THERAPY 1/> REGIONS: CPT | Mod: GP | Performed by: PHYSICAL THERAPIST

## 2021-07-27 ENCOUNTER — THERAPY VISIT (OUTPATIENT)
Dept: PHYSICAL THERAPY | Facility: CLINIC | Age: 71
End: 2021-07-27
Payer: COMMERCIAL

## 2021-07-27 DIAGNOSIS — M54.2 CERVICAL PAIN: ICD-10-CM

## 2021-07-27 DIAGNOSIS — R42 DIZZINESS: ICD-10-CM

## 2021-07-27 PROCEDURE — 97012 MECHANICAL TRACTION THERAPY: CPT | Mod: GP | Performed by: PHYSICAL THERAPIST

## 2021-07-27 PROCEDURE — 97140 MANUAL THERAPY 1/> REGIONS: CPT | Mod: GP | Performed by: PHYSICAL THERAPIST

## 2021-07-27 PROCEDURE — 97110 THERAPEUTIC EXERCISES: CPT | Mod: GP | Performed by: PHYSICAL THERAPIST

## 2021-08-03 ENCOUNTER — THERAPY VISIT (OUTPATIENT)
Dept: PHYSICAL THERAPY | Facility: CLINIC | Age: 71
End: 2021-08-03
Payer: COMMERCIAL

## 2021-08-03 DIAGNOSIS — M54.2 CERVICAL PAIN: ICD-10-CM

## 2021-08-03 DIAGNOSIS — R42 DIZZINESS: ICD-10-CM

## 2021-08-03 PROCEDURE — 97012 MECHANICAL TRACTION THERAPY: CPT | Mod: GP | Performed by: PHYSICAL THERAPIST

## 2021-08-03 PROCEDURE — 97110 THERAPEUTIC EXERCISES: CPT | Mod: GP | Performed by: PHYSICAL THERAPIST

## 2021-08-03 PROCEDURE — 97140 MANUAL THERAPY 1/> REGIONS: CPT | Mod: GP | Performed by: PHYSICAL THERAPIST

## 2021-08-11 ENCOUNTER — THERAPY VISIT (OUTPATIENT)
Dept: PHYSICAL THERAPY | Facility: CLINIC | Age: 71
End: 2021-08-11
Payer: COMMERCIAL

## 2021-08-11 DIAGNOSIS — R42 DIZZINESS: ICD-10-CM

## 2021-08-11 DIAGNOSIS — M54.2 CERVICAL PAIN: ICD-10-CM

## 2021-08-11 PROCEDURE — 97110 THERAPEUTIC EXERCISES: CPT | Mod: GP | Performed by: PHYSICAL THERAPIST

## 2021-08-11 PROCEDURE — 97140 MANUAL THERAPY 1/> REGIONS: CPT | Mod: GP | Performed by: PHYSICAL THERAPIST

## 2021-08-11 PROCEDURE — 97112 NEUROMUSCULAR REEDUCATION: CPT | Mod: GP | Performed by: PHYSICAL THERAPIST

## 2021-08-17 ENCOUNTER — THERAPY VISIT (OUTPATIENT)
Dept: PHYSICAL THERAPY | Facility: CLINIC | Age: 71
End: 2021-08-17
Payer: COMMERCIAL

## 2021-08-17 DIAGNOSIS — R42 DIZZINESS: ICD-10-CM

## 2021-08-17 DIAGNOSIS — M54.2 CERVICAL PAIN: ICD-10-CM

## 2021-08-17 PROCEDURE — 97140 MANUAL THERAPY 1/> REGIONS: CPT | Mod: GP | Performed by: PHYSICAL THERAPIST

## 2021-08-17 PROCEDURE — 97112 NEUROMUSCULAR REEDUCATION: CPT | Mod: GP | Performed by: PHYSICAL THERAPIST

## 2021-08-17 PROCEDURE — 97012 MECHANICAL TRACTION THERAPY: CPT | Mod: GP | Performed by: PHYSICAL THERAPIST

## 2021-08-17 NOTE — PROGRESS NOTES
Subjective:  HPI  Physical Exam                    Objective:  System    Physical Exam    General     ROS    Assessment/Plan:    PROGRESS  REPORT    Progress reporting period is from 07/08/2021 to 08/11/2021.       SUBJECTIVE  Subjective: Neck is about the same as last week, but he thinks it is a little better overall.  He does the retraction exercises which help some, but he still has tightness in his neck.  He thinks the traction is helpful as he seems to feel better for longer periods after he has done it.  He still has pain with reading for about 1 hour, especially bringing his head up afterwards.  He continues to do general UE strengthening exercises from previous PT which don't bother his neck--i.e. bicep curls, triceps, arm raises, etc.      Current Pain level: 1/10.     Initial Pain level: 1/10.   Changes in function:  Yes, decreased pain overall with daily activities.  Adverse reaction to treatment or activity: None    OBJECTIVE  Changes noted in objective findings:  None  Objective: Continued forward head posture contributing to upper cervical tightness/pain.  Needs to continue end range retraction with overpressure to address.  Added scapular strengthening to help with posture--no issues.  Decreased upper/midback strength as noted with posture.       ASSESSMENT/PLAN  Patient has been seen for 6 visits with treatment focusing on cervical retraction and posture exercises in addition to use of manual therapy and cervical traction to decrease pain.  He reports gradual improvement with pain, however, functional improvement has not been significant up to this point.  He would benefit form continued treatment to further decrease pain and allow for greater functional improvements.    Updated problem list and treatment plan: Diagnosis 1:  neck  Pain -  Mechanical traction, self management, education, directional preference exercise and home program  Decreased ROM/flexibility - manual therapy, therapeutic exercise  and home program  Decreased strength - therapeutic exercise, therapeutic activities and home program  Decreased function - therapeutic activities and home program  Impaired posture - neuro re-education and home program  STG/LTGs have been met or progress has been made towards goals:  None  Assessment of Progress: The patient's condition is improving.  Self Management Plans:  Patient has been instructed in a home treatment program.  Patient  has been instructed in self management of symptoms.  I have re-evaluated this patient and find that the nature, scope, duration and intensity of the therapy is appropriate for the medical condition of the patient.  Pranay continues to require the following intervention to meet STG and LTG's:  PT    Recommendations:  This patient would benefit from continued therapy.     Frequency:  1 X week, once daily  Duration:  for 4 weeks        Please refer to the daily flowsheet for treatment today, total treatment time and time spent performing 1:1 timed codes.

## 2021-09-01 ENCOUNTER — THERAPY VISIT (OUTPATIENT)
Dept: PHYSICAL THERAPY | Facility: CLINIC | Age: 71
End: 2021-09-01
Payer: COMMERCIAL

## 2021-09-01 DIAGNOSIS — M54.2 CERVICAL PAIN: ICD-10-CM

## 2021-09-01 DIAGNOSIS — R42 DIZZINESS: ICD-10-CM

## 2021-09-01 PROCEDURE — 97140 MANUAL THERAPY 1/> REGIONS: CPT | Mod: GP | Performed by: PHYSICAL THERAPIST

## 2021-09-01 PROCEDURE — 97012 MECHANICAL TRACTION THERAPY: CPT | Mod: GP | Performed by: PHYSICAL THERAPIST

## 2021-09-09 ENCOUNTER — THERAPY VISIT (OUTPATIENT)
Dept: PHYSICAL THERAPY | Facility: CLINIC | Age: 71
End: 2021-09-09
Payer: COMMERCIAL

## 2021-09-09 DIAGNOSIS — M54.2 CERVICAL PAIN: ICD-10-CM

## 2021-09-09 DIAGNOSIS — R42 DIZZINESS: ICD-10-CM

## 2021-09-09 PROCEDURE — 97012 MECHANICAL TRACTION THERAPY: CPT | Mod: GP | Performed by: PHYSICAL THERAPIST

## 2021-09-09 PROCEDURE — 97110 THERAPEUTIC EXERCISES: CPT | Mod: GP | Performed by: PHYSICAL THERAPIST

## 2021-09-09 NOTE — PROGRESS NOTES
Subjective:  HPI  Physical Exam                    Objective:  System    Physical Exam    General     ROS    Assessment/Plan:    DISCHARGE REPORT    Progress reporting period is from 08/11/2021 to 09/09/2021.       SUBJECTIVE  Subjective: Patient reports his neck is doing okay overall.  Headaches have been a little stronger lately for unknown reasons.  He has continued to do the exercises consistently and feels they help.  He reports less stiffness with turning his neck for driving now.  He can read for about 1 hour but still has discomfort 1/10 afterwards, however, this has improved since his initial visit.  He still feels lightheadedness, but overall he feels this has improved.       Current Pain level: 2/10.     Initial Pain level: 1/10.   Changes in function:  Yes, improved mobility turning neck for driving, overall less pain after reading.    Adverse reaction to treatment or activity: None    OBJECTIVE  Objective: Cervical AROM:  B rotation nil loss, R neck pain increases with L rotation end range; extension mod loss, NE.      ASSESSMENT/PLAN  Patient has been seen for 9 visits with treatment focusing on posture and cervical retraction exercises, scapular strengthening, and use of manual therapy (STM) and cervical mechanical traction to decrease pain and improve mobility.  Progress has been slow as expected due to the chronic nature of his pain, but he has reported improvements with pain, cervical mobility, and overall function since his initial visit.  He has a good HEP and demonstrates good compliance, so he should do well continuing independently to further manage his symptoms at this time    Updated problem list and treatment plan: Diagnosis 1:  Chronic neck pain  Pain -  self management, education, directional preference exercise and home program  Decreased ROM/flexibility - home program  Decreased function - home program  Impaired posture - home program  STG/LTGs have been met or progress has been made  towards goals:  Yes, STG for reading has been met but LTG is still limited by mild pain.   Assessment of Progress: The patient's condition is improving.  Self Management Plans:  Patient has been instructed in a home treatment program.  Patient is independent in a home treatment program.  Patient  has been instructed in self management of symptoms.  Patient is independent in self management of symptoms.  Pranay continues to require the following intervention to meet STG and LTG's:  PT intervention is no longer required to meet STG/LTG.    Recommendations:  This patient is ready to be discharged from therapy and continue their home treatment program.    Please refer to the daily flowsheet for treatment today, total treatment time and time spent performing 1:1 timed codes.

## 2021-09-16 ENCOUNTER — OFFICE VISIT (OUTPATIENT)
Dept: UROLOGY | Facility: CLINIC | Age: 71
End: 2021-09-16
Payer: COMMERCIAL

## 2021-09-16 VITALS
OXYGEN SATURATION: 98 % | SYSTOLIC BLOOD PRESSURE: 130 MMHG | DIASTOLIC BLOOD PRESSURE: 70 MMHG | HEART RATE: 72 BPM | HEIGHT: 72 IN | BODY MASS INDEX: 29.8 KG/M2 | WEIGHT: 220 LBS

## 2021-09-16 DIAGNOSIS — R97.20 ELEVATED PROSTATE SPECIFIC ANTIGEN (PSA): Primary | ICD-10-CM

## 2021-09-16 DIAGNOSIS — N13.8 BPH WITH OBSTRUCTION/LOWER URINARY TRACT SYMPTOMS: ICD-10-CM

## 2021-09-16 DIAGNOSIS — N40.1 BPH WITH OBSTRUCTION/LOWER URINARY TRACT SYMPTOMS: ICD-10-CM

## 2021-09-16 LAB — PSA SERPL-MCNC: 3 UG/L (ref 0–4)

## 2021-09-16 PROCEDURE — 36415 COLL VENOUS BLD VENIPUNCTURE: CPT | Performed by: UROLOGY

## 2021-09-16 PROCEDURE — 99214 OFFICE O/P EST MOD 30 MIN: CPT | Performed by: UROLOGY

## 2021-09-16 PROCEDURE — 84153 ASSAY OF PSA TOTAL: CPT | Performed by: UROLOGY

## 2021-09-16 RX ORDER — DUTASTERIDE 0.5 MG/1
0.5 CAPSULE, LIQUID FILLED ORAL DAILY
Qty: 90 CAPSULE | Refills: 3 | Status: SHIPPED | OUTPATIENT
Start: 2021-09-16 | End: 2022-03-09

## 2021-09-16 ASSESSMENT — PAIN SCALES - GENERAL: PAINLEVEL: NO PAIN (0)

## 2021-09-16 ASSESSMENT — MIFFLIN-ST. JEOR: SCORE: 1790.91

## 2021-09-16 NOTE — PROGRESS NOTES
CHIEF COMPLAINT   It was my pleasure to see Pranay Ratliff who is a 71 year old male for follow-up of elevated PSA and BPH.      HPI  Pranay Ratliff is a very pleasant 71 year old male who presents with a history of Elevated PSA (Prostate Specific Antigen).  PSA 5.3 and had s/p TRUS biopsy 8/2019. Has done well since biopsy which demonstrated no malignancy.     Also has history of BPH with weak stream and LUTS. Did not tolerate tamsulosin on previous trial of this. Is bothered by his urinary symptoms.  Has been on several medications over the years, most recently managed by urologist in [place], [place]. He is now back in [place]. Most recently was taking tadalafil 5 mg daily and dutasteride. Stopped these a few weeks ago given concerns about light-headedness. Currently still bothered by weak stream, and nocturia.    PSA   9/16/2021 - 3.00 (on dutasteride)     MRI Prostate 4/20/2019  IMPRESSION:  1. Based on the most suspicious abnormality, this exam is  characterized as PIRADS 3 - The presence of clinically significant  cancer is equivocal.?The most suspicious abnormality is located at the  left posterior apex peripheral zone and there is no evidence of  extraprostatic extension.  2. No suspicious adenopathy or evidence of pelvic metastases.     TRUS 8/6/2019  FINAL DIAGNOSIS:   A. Prostate biopsy, left base:   - Benign prostate tissue     B. Prostate biopsy, left mid:   - Benign prostate tissue     C. Prostate biopsy, left apex:   - Benign prostate tissue     D. Prostate biopsy, right base:   - Benign prostate tissue     E. Prostate biopsy, right mid:   - Benign prostate tissue     F. Prostate biopsy, right apex:   - Benign prostate tissue     G. Prostate biopsy peripheral target #1:   - Benign prostate tissue     PHYSICAL EXAM  Patient is a 71 year old  male   Vitals: Blood pressure 130/70, pulse 72, height 1.829 m (6'), weight 99.8 kg (220 lb), SpO2 98 %.  General Appearance Adult: Body mass index is 29.84 kg/m .  Alert,  no acute distress, oriented  Lungs: no respiratory distress, or pursed lip breathing  Abdomen: soft, nontender, no organomegaly or masses  Back: no CVAT  Neuro: Alert, oriented, speech and mentation normal  Psych: affect and mood normal    Outside and Past Medical records:  Review of the result(s) of each unique test - PSA    ASSESSMENT and PLAN  71 year old male with history of elevated PSA and PIRADS 3 lesion on MRI. PSA 3.0 today, but on dutasteride (not taking past 2 months, however). TRUS biopsy in August 2019 with no evidence of malignancy. We discussed his urinary symptoms today. His light-headedness very possibly caused by tadalafil. Will plan to restart dutasteride to see what his side effects do. He has not tolerated alpha blockers previously. We discussed options for surgical intervention with TURP, PVP, Rezum, or related procedures which may improve his symptoms without the need for medications. He expresses reticence for any procedural intervention. Will return in about 6 months for symptom review and PSA recheck and tentatively plan to have him see Dr. Sinha to discuss his options for bladder outlet procedures.     - continue dutasteride 5 mg daily  - Follow-up in 6 months with PSA    32 minutes spent on the date of the encounter doing chart review, history and exam, documentation and further activities as noted above.    Jaylan Guajardo MD  Urology  [facility]

## 2021-09-17 ENCOUNTER — MYC MEDICAL ADVICE (OUTPATIENT)
Dept: FAMILY MEDICINE | Facility: CLINIC | Age: 71
End: 2021-09-17

## 2021-09-17 DIAGNOSIS — E78.5 HYPERLIPIDEMIA LDL GOAL <100: ICD-10-CM

## 2021-09-17 RX ORDER — ROSUVASTATIN CALCIUM 20 MG/1
20 TABLET, COATED ORAL DAILY
Qty: 90 TABLET | Refills: 1 | Status: SHIPPED | OUTPATIENT
Start: 2021-09-17 | End: 2022-03-07

## 2021-09-17 NOTE — TELEPHONE ENCOUNTER
Please review my chart message and advise.    Routing refill request to provider for review/approval because:  Medication is reported/historical    Jenifer MENENDEZ RN  EP Triage

## 2021-10-08 ENCOUNTER — OFFICE VISIT (OUTPATIENT)
Dept: URGENT CARE | Facility: URGENT CARE | Age: 71
End: 2021-10-08
Payer: COMMERCIAL

## 2021-10-08 ENCOUNTER — NURSE TRIAGE (OUTPATIENT)
Dept: FAMILY MEDICINE | Facility: CLINIC | Age: 71
End: 2021-10-08

## 2021-10-08 ENCOUNTER — ANCILLARY PROCEDURE (OUTPATIENT)
Dept: GENERAL RADIOLOGY | Facility: CLINIC | Age: 71
End: 2021-10-08
Attending: PHYSICIAN ASSISTANT
Payer: COMMERCIAL

## 2021-10-08 VITALS
BODY MASS INDEX: 30.84 KG/M2 | DIASTOLIC BLOOD PRESSURE: 88 MMHG | WEIGHT: 227.4 LBS | SYSTOLIC BLOOD PRESSURE: 145 MMHG | OXYGEN SATURATION: 97 % | HEART RATE: 56 BPM

## 2021-10-08 DIAGNOSIS — S29.9XXA TRAUMATIC INJURY OF RIB: Primary | ICD-10-CM

## 2021-10-08 DIAGNOSIS — S29.9XXA TRAUMATIC INJURY OF RIB: ICD-10-CM

## 2021-10-08 PROCEDURE — 71101 X-RAY EXAM UNILAT RIBS/CHEST: CPT | Mod: LT | Performed by: RADIOLOGY

## 2021-10-08 PROCEDURE — 99214 OFFICE O/P EST MOD 30 MIN: CPT | Performed by: PHYSICIAN ASSISTANT

## 2021-10-08 RX ORDER — DICLOFENAC SODIUM 75 MG/1
75 TABLET, DELAYED RELEASE ORAL 3 TIMES DAILY PRN
Qty: 30 TABLET | Refills: 0 | Status: SHIPPED | OUTPATIENT
Start: 2021-10-08 | End: 2022-03-09

## 2021-10-08 NOTE — TELEPHONE ENCOUNTER
"Call from patient who reports that he fell off the front step while cleaning window. Estimates about 5\" high. He states that he fell and landed on his left elbow, knee, and rib cage. He is concerned about potential broken rib.     Patient states it is more difficult to take a deep breath, but he is unsure if it is from the trauma of falling. He denies shortness of breath.     RN advised patient be seen today in . He initially declined. No x-ray at [facility]. Patient agrees to go to  to be evaluated.      Reason for Disposition    Back trauma    [1] Last tetanus shot > 5 years ago AND [2] DIRTY cut or scrape    Additional Information    Negative: Neck trauma    Negative: Head trauma    Negative: Abdominal trauma in pregnancy    Negative: Abdominal trauma    Negative: Chest Trauma    Negative: Dangerous mechanism of injury (e.g., MVA, contact sports, trampoline, diving, fall > 10 feet or 3 meters)  (Exception: back pain began > 1 hour after injury)    Negative: [1] Weakness (i.e., paralysis, loss of muscle strength) of the leg(s) or foot AND [2] sudden onset after back injury    Negative: [1] Numbness (i.e., loss of sensation) of the leg(s) or foot AND [2] sudden onset after back injury    Negative: [1] Major bleeding (e.g., actively dripping or spurting) AND [2] can't be stopped    Negative: Bullet wound, knife wound, or other penetrating object    Negative: Shock suspected (e.g., cold/pale/clammy skin, too weak to stand, low BP, rapid pulse)    Negative: Sounds like a life-threatening emergency to the triager    Negative: [1] Injuries at more than 1 site AND [2] unsure which guideline to use    Negative: Injury to the neck    Negative: Injury to the tailbone    Negative: Back pain not from an injury    Negative: Back pain from overuse (work, exercise, gardening) OR from twisting, lifting, or bending injury    Negative: [1] SEVERE PAIN in kidney area (flank) AND [2] follows direct blow to that site    " "Negative: Blood in urine (red, pink, or tea-colored)    Negative: [1] Unable to urinate (or only a few drops) > 4 hours AND [2] bladder feels very full (e.g., palpable bladder or strong urge to urinate)    Negative: [1] Loss of bladder or bowel control (urine or bowel incontinence; wetting self, leaking stool) AND [2] new onset    Negative: Numbness (loss of sensation) in groin or rectal area    Negative: Skin is split open or gaping  (or length > 1/2 inch or 12 mm)    Negative: Puncture wound of back    Negative: [1] Bleeding AND [2] won't stop after 10 minutes of direct pressure (using correct technique)    Negative: Sounds like a serious injury to the triager    Negative: Weakness of a leg or foot (e.g., unable to bear weight, dragging foot)    Negative: Numbness of a leg or foot (i.e.., loss of sensation)    Negative: [1] SEVERE pain (e.g., excruciating) AND [2] not improved 2 hours after pain medicine/ice packs    Negative: Pain radiates into the thigh or further down the leg now    Negative: [1] Landed hard on feet or buttocks AND [2] pain over spine    Negative: Suspicious history for the injury    Negative: Patient is confused or is an unreliable provider of information (e.g., dementia, severe intellectual disability, alcohol intoxication)    Negative: Large swelling or bruise > 2 inches (5 cm)    Negative: [1] No prior tetanus shots (or is not fully vaccinated) AND [2] any wound (e.g., cut, scrape)    Negative: [1] HIV positive or severe immunodeficiency (severely weak immune system) AND [2] DIRTY cut or scrape    Negative: [1] High-risk adult (e.g., age > 60, osteoporosis, chronic steroid use) AND [2] still hurts    Answer Assessment - Initial Assessment Questions  1. MECHANISM: \"How did the injury happen?\" (Consider the possibility of domestic violence or elder abuse)        Cleaning the front window and stepped wrong and fell onto the sidewalk   - noticing pain in elbow, knee, and rib cage on left side " "    2. ONSET: \"When did the injury happen?\" (Minutes or hours ago)        Within last 1 hour     3. LOCATION: \"What part of the back is injured?\"        Left side     4. SEVERITY: \"Can you move the back normally?\"        Able to move around normally  - increased pain   - using ice pack       5. PAIN: \"Is there any pain?\" If so, ask: \"How bad is the pain?\"   (Scale 1-10; or mild, moderate, severe)        Rates pain as 5/10     6. CORD SYMPTOMS: Any weakness or numbness of the arms or legs?\"        No    7. SIZE: For cuts, bruises, or swelling, ask: \"How large is it?\" (e.g., inches or centimeters)    Skin scrapes         8. TETANUS: For any breaks in the skin, ask: \"When was the last tetanus booster?\"            9. OTHER SYMPTOMS: \"Do you have any other symptoms?\" (e.g., abdominal pain, blood in urine)        Rib pain     10. PREGNANCY: \"Is there any chance you are pregnant?\" \"When was your last menstrual period?\"          NO    Protocols used: INJURY - MULTIPLE SITES - GUIDELINE [id number], BACK INJURY-A-      "

## 2021-10-08 NOTE — PROGRESS NOTES
SUBJECTIVE:  Pranay Ratliff is a 71 year old male who comes in with concerns for broken rib on the left.  2 hours ago was cleaning windows and missed a step and landed directly on left side side on  The cement.  Did not hit head and no LOC.  Has pain on the upper lateral side of ribs.  Hurts to take deep breath but no SOB or chest pain at rest  Took some Tylenol     Past Medical History:   Diagnosis Date     Basal cell carcinoma      Epididymitis, bilateral      Epididymitis, left      Epididymitis, right      Current Outpatient Medications   Medication     aspirin 81 MG tablet     carbamide peroxide (DEBROX) 6.5 % otic solution     clotrimazole-betamethasone (LOTRISONE) 1-0.05 % external cream     diclofenac (VOLTAREN) 1 % topical gel     fluocinonide (LIDEX) 0.05 % external cream     furosemide (LASIX) 20 MG tablet     multivitamin, therapeutic with minerals (MULTI-VITAMIN) TABS     Omega-3 Fatty Acids (OMEGA-3 FISH OIL PO)     pantoprazole (PROTONIX) 40 MG EC tablet     rosuvastatin (CRESTOR) 20 MG tablet     tadalafil (CIALIS) 5 MG tablet     triamterene-HCTZ (MAXZIDE-25) 37.5-25 MG tablet     dutasteride (AVODART) 0.5 MG capsule     dutasteride (AVODART) 0.5 MG capsule     No current facility-administered medications for this visit.     Social History     Socioeconomic History     Marital status: Single     Spouse name: Not on file     Number of children: Not on file     Years of education: Not on file     Highest education level: Not on file   Occupational History     Not on file   Tobacco Use     Smoking status: Never Smoker     Smokeless tobacco: Never Used   Substance and Sexual Activity     Alcohol use: Yes     Comment: twice a week     Drug use: No     Sexual activity: Yes   Other Topics Concern     Parent/sibling w/ CABG, MI or angioplasty before 65F 55M? Not Asked   Social History Narrative     Not on file     Social Determinants of Health     Financial Resource Strain:      Difficulty of Paying Living  Expenses:    Food Insecurity:      Worried About Running Out of Food in the Last Year:      Ran Out of Food in the Last Year:    Transportation Needs:      Lack of Transportation (Medical):      Lack of Transportation (Non-Medical):    Physical Activity:      Days of Exercise per Week:      Minutes of Exercise per Session:    Stress:      Feeling of Stress :    Social Connections:      Frequency of Communication with Friends and Family:      Frequency of Social Gatherings with Friends and Family:      Attends Taoist Services:      Active Member of Clubs or Organizations:      Attends Club or Organization Meetings:      Marital Status:    Intimate Partner Violence:      Fear of Current or Ex-Partner:      Emotionally Abused:      Physically Abused:      Sexually Abused:      ROS  Negative other than stated above     Exam:  GENERAL APPEARANCE: healthy, alert and no distress  EYES: EOMI,  PERRL  RESP: lungs clear to auscultation - no rales, rhonchi or wheezes  CV: regular rates and rhythm, normal S1 S2, no S3 or S4 and no murmur, click or rub -  MS: tenderness left upper lateral ribs.  No focal pain and no crepitus noted  SKIN: no suspicious lesions or rashes    X-ray  No acute findings noted per my read    assessment/plan:  (S29.9XXA) Traumatic injury of rib  (primary encounter diagnosis)  Comment:   Plan: XR Ribs & Chest Left G/E 3 Views, diclofenac         (VOLTAREN) 75 MG EC tablet,          No fracture noted.  Ice and med as directed . Red flag signs discussed  To Follow-up with PCP as needed if sx worsen or new sx develop

## 2021-10-11 ENCOUNTER — TELEPHONE (OUTPATIENT)
Dept: FAMILY MEDICINE | Facility: CLINIC | Age: 71
End: 2021-10-11

## 2021-10-11 NOTE — TELEPHONE ENCOUNTER
Pt calling fell on cement on Friday morning landing on left side.  Was seen in UC on Saturday and xray was negative for broken ribs.  Given diclofenac and has been taking 2 tabs a day and tylenol in between doses which is helping.  States having hard time sleeping and is sleeping sitting up.  Not able to take a deep breath.  Wondering what more he can do or if should be seen?    Advised to alternate between ice and heat to see if helps. Scheduled with Dr. Alva on Tuesday 10/12 at 2 pm.    Jenifer MENENDEZ RN  EP Triage

## 2021-10-12 ENCOUNTER — ANCILLARY PROCEDURE (OUTPATIENT)
Dept: GENERAL RADIOLOGY | Facility: CLINIC | Age: 71
End: 2021-10-12
Attending: FAMILY MEDICINE
Payer: COMMERCIAL

## 2021-10-12 ENCOUNTER — OFFICE VISIT (OUTPATIENT)
Dept: FAMILY MEDICINE | Facility: CLINIC | Age: 71
End: 2021-10-12
Payer: COMMERCIAL

## 2021-10-12 VITALS
TEMPERATURE: 96.2 F | HEART RATE: 65 BPM | SYSTOLIC BLOOD PRESSURE: 132 MMHG | DIASTOLIC BLOOD PRESSURE: 84 MMHG | RESPIRATION RATE: 16 BRPM | WEIGHT: 232.8 LBS | OXYGEN SATURATION: 97 % | BODY MASS INDEX: 31.57 KG/M2

## 2021-10-12 DIAGNOSIS — H61.20 WAX IN EAR: ICD-10-CM

## 2021-10-12 DIAGNOSIS — Z12.11 SCREEN FOR COLON CANCER: ICD-10-CM

## 2021-10-12 DIAGNOSIS — S20.212D CONTUSION OF LEFT CHEST WALL, SUBSEQUENT ENCOUNTER: ICD-10-CM

## 2021-10-12 DIAGNOSIS — S20.212D CONTUSION OF LEFT CHEST WALL, SUBSEQUENT ENCOUNTER: Primary | ICD-10-CM

## 2021-10-12 PROCEDURE — 99213 OFFICE O/P EST LOW 20 MIN: CPT | Mod: 25 | Performed by: FAMILY MEDICINE

## 2021-10-12 PROCEDURE — 69210 REMOVE IMPACTED EAR WAX UNI: CPT | Performed by: FAMILY MEDICINE

## 2021-10-12 PROCEDURE — 71101 X-RAY EXAM UNILAT RIBS/CHEST: CPT | Mod: LT | Performed by: RADIOLOGY

## 2021-10-12 ASSESSMENT — PAIN SCALES - GENERAL: PAINLEVEL: EXTREME PAIN (8)

## 2021-10-12 NOTE — PROGRESS NOTES
Assessment & Plan     Screen for colon cancer      Wax in ear  Removed with ENT alligator forceps and flushed with water without complication   - REMOVAL OF IMPACTED WAX MD    Contusion of left chest wall, subsequent encounter  Had no fracture in ER initial xr, still has pain, currently on diclofenac po bid and diclofenac topical gel bid. Encouraged him take pills but not topical NSAIDs, and have him to alternating with APAP with diclofenac po  xr showed 6th and 7th ribs fracture  Encouraged applying ice and chest binder as needed  - XR Ribs & Chest Left G/E 3 Views; Future             FUTURE APPOINTMENTS:       - Follow-up visit in 6 months for CPE    Return in about 6 months (around 4/12/2022) for Physical Exam.    Hipolito Alva MD  [facility] NEREYDA Perez is a 71 year old who presents for the following health issues     HPI     ED/UC Followup:    Facility:  [facility] urgent care  Date of visit: 10-8-21  Reason for visit: fall hurt ribs  Current Status: rib pain  Having diarrhea after taking medication     Patient would also like ears looked at        Review of Systems   Constitutional, HEENT, cardiovascular, pulmonary, gi and gu systems are negative, except as otherwise noted.      Objective    /84 (Cuff Size: Adult Large)   Pulse 65   Temp (!) 96.2  F (35.7  C) (Tympanic)   Resp 16   Wt 105.6 kg (232 lb 12.8 oz)   SpO2 97%   BMI 31.57 kg/m    Body mass index is 31.57 kg/m .  Physical Exam   GENERAL: healthy, alert and no distress  NECK: no adenopathy, no asymmetry, masses, or scars and thyroid normal to palpation  RESP: lungs clear to auscultation - no rales, rhonchi or wheezes  CV: regular rate and rhythm, normal S1 S2, no S3 or S4, no murmur, click or rub, no peripheral edema and peripheral pulses strong  ABDOMEN: soft, nontender, no hepatosplenomegaly, no masses and bowel sounds normal  MS: point tenderness on left lateral chest wall around 7th and 8th ribs and  intercostal muscle

## 2021-10-13 ENCOUNTER — MYC MEDICAL ADVICE (OUTPATIENT)
Dept: FAMILY MEDICINE | Facility: CLINIC | Age: 71
End: 2021-10-13

## 2021-10-13 DIAGNOSIS — S22.42XA CLOSED FRACTURE OF MULTIPLE RIBS OF LEFT SIDE, INITIAL ENCOUNTER: Primary | ICD-10-CM

## 2021-10-13 NOTE — TELEPHONE ENCOUNTER
There is binder called 'rib belt', he can use. It is similar as the binder he currently uses.   I cannot tell it could be covered by insurance and DME. I just in case printed DME scripts for him, and placed at team 3. He can check on with medical supplies store and fill it with the scripts

## 2021-11-01 ENCOUNTER — OFFICE VISIT (OUTPATIENT)
Dept: FAMILY MEDICINE | Facility: CLINIC | Age: 71
End: 2021-11-01
Payer: COMMERCIAL

## 2021-11-01 VITALS
BODY MASS INDEX: 31.49 KG/M2 | SYSTOLIC BLOOD PRESSURE: 130 MMHG | TEMPERATURE: 96 F | HEART RATE: 69 BPM | WEIGHT: 232.2 LBS | DIASTOLIC BLOOD PRESSURE: 76 MMHG | OXYGEN SATURATION: 98 %

## 2021-11-01 DIAGNOSIS — S22.42XA CLOSED FRACTURE OF MULTIPLE RIBS OF LEFT SIDE, INITIAL ENCOUNTER: ICD-10-CM

## 2021-11-01 DIAGNOSIS — R42 DIZZINESS: Primary | ICD-10-CM

## 2021-11-01 PROCEDURE — 99213 OFFICE O/P EST LOW 20 MIN: CPT | Performed by: FAMILY MEDICINE

## 2021-11-01 ASSESSMENT — PAIN SCALES - GENERAL: PAINLEVEL: MODERATE PAIN (4)

## 2021-11-01 NOTE — PROGRESS NOTES
Assessment & Plan     Dizziness  Not improving, has no other focal neurologic sign  Will have him to see ENT for further evaluation   - Otolaryngology Referral; Future    Closed fracture of multiple ribs of left side, initial encounter  Slowly improving, encouraged him conservative management with light cardio exercise                FUTURE APPOINTMENTS:       - Follow-up visit in 6 months for CPE    No follow-ups on file.    Hipolito Alva MD  [facility] NEREYDA Perez is a 71 year old who presents for the following health issues     HPI     Here for a follow up after fall, rib fracture  Having dizziness  Pain on left, makes popping noise  Some pain on right side and upper left chest  taking tylenol        Review of Systems   Constitutional, HEENT, cardiovascular, pulmonary, gi and gu systems are negative, except as otherwise noted.      Objective    /76 (Cuff Size: Adult Large)   Pulse 69   Temp (!) 96  F (35.6  C) (Tympanic)   Wt 105.3 kg (232 lb 3.2 oz)   SpO2 98%   BMI 31.49 kg/m    Body mass index is 31.49 kg/m .  Physical Exam   GENERAL: healthy, alert and no distress  NECK: no adenopathy, no asymmetry, masses, or scars and thyroid normal to palpation  RESP: lungs clear to auscultation - no rales, rhonchi or wheezes  CV: regular rate and rhythm, normal S1 S2, no S3 or S4, no murmur, click or rub, no peripheral edema and peripheral pulses strong  ABDOMEN: soft, nontender, no hepatosplenomegaly, no masses and bowel sounds normal  MS: no gross musculoskeletal defects noted, no edema

## 2021-11-10 ENCOUNTER — TRANSFERRED RECORDS (OUTPATIENT)
Dept: HEALTH INFORMATION MANAGEMENT | Facility: CLINIC | Age: 71
End: 2021-11-10
Payer: COMMERCIAL

## 2021-11-12 ENCOUNTER — MYC MEDICAL ADVICE (OUTPATIENT)
Dept: FAMILY MEDICINE | Facility: CLINIC | Age: 71
End: 2021-11-12
Payer: COMMERCIAL

## 2021-11-12 ENCOUNTER — NURSE TRIAGE (OUTPATIENT)
Dept: FAMILY MEDICINE | Facility: CLINIC | Age: 71
End: 2021-11-12
Payer: COMMERCIAL

## 2021-11-12 DIAGNOSIS — S29.9XXA TRAUMATIC INJURY OF RIB: Primary | ICD-10-CM

## 2021-11-12 DIAGNOSIS — S29.9XXA TRAUMATIC INJURY OF RIB: ICD-10-CM

## 2021-11-12 DIAGNOSIS — S22.42XA CLOSED FRACTURE OF MULTIPLE RIBS OF LEFT SIDE, INITIAL ENCOUNTER: ICD-10-CM

## 2021-11-12 NOTE — TELEPHONE ENCOUNTER
Called patient to triage symptoms based on RMI message:     After I saw Dr Екатерина Camacho at Urgent Care on October 8, 2021 after fracturing ribs on my left side she prescribed Diclofenac 75mg (2 tablets /day)--30 tablets total over two weeks. Since then I ve been taking extra strength Tylenol. Today I m having more sharp pain than usual on my upper left rib cage & I think I need a stronger pain reliever like Diclofenac as it also helps with inflammation. The prescription number is [id number] & my Pharmacy is [facility] on [address] in [place] @ [phone]. My telephone is [phone].     Pt reports symptoms are consistent with previous rib pain. States he had injury on 10/8 and has been managed by Dr. Alva. He requests something else to help with pain. He requests refill of diclofenac.     Routing to provider to review/advise.    Additional Information    Negative: Severe difficulty breathing (e.g., struggling for each breath, speaks in single words)    Negative: Passed out (i.e., fainted, collapsed and was not responding)    Negative: Difficult to awaken or acting confused (e.g., disoriented, slurred speech)    Negative: Shock suspected (e.g., cold/pale/clammy skin, too weak to stand, low BP, rapid pulse)    Negative: Chest pain lasting longer than 5 minutes and ANY of the following:* Over 45 years old* Over 30 years old and at least one cardiac risk factor (e.g., diabetes, high blood pressure, high cholesterol, smoker, or strong family history of heart disease)* History of heart disease (i.e., angina, heart attack, heart failure, bypass surgery, takes nitroglycerin)* Pain is crushing, pressure-like, or heavy    Negative: Heart beating < 50 beats per minute OR > 140 beats per minute    Negative: Visible sweat on face or sweat dripping down face    Negative: Sounds like a life-threatening emergency to the triager    Negative: Followed an injury to chest    Negative: SEVERE chest pain    Negative: Pain also  in shoulder(s) or arm(s) or jaw    Negative: Difficulty breathing    Negative: Cocaine use within last 3 days    Negative: Major surgery in the past month    Negative: Hip or leg fracture (broken bone) in past month (or had cast on leg or ankle in past month)    Negative: Illness requiring prolonged bedrest in past month (e.g., immobilization, long hospital stay)    Negative: Long-distance travel in past month (e.g., car, bus, train, plane; with trip lasting 6 or more hours)    Negative: History of prior 'blood clot' in leg or lungs (i.e., deep vein thrombosis, pulmonary embolism)    Negative: History of inherited increased risk of blood clots (e.g., Factor 5 Leiden, Anti-thrombin 3, Protein C or Protein S deficiency, Prothrombin mutation)    Negative: Heart beating irregularly or very rapidly    Negative: Chest pain lasting longer than 5 minutes and occurred in last 3 days (72 hours) (Exception: feels exactly the same as previously diagnosed heartburn and has accompanying sour taste in mouth)    Negative: Chest pain or 'angina' comes and goes and is happening more often (increasing in frequency) or getting worse (increasing in severity) (Exception: chest pains that last only a few seconds)    Negative: Dizziness or lightheadedness    Negative: Coughing up blood    Negative: Patient sounds very sick or weak to the triager    Negative: Cancer treatment in the past two months (or has cancer now)    Negative: Patient says chest pain feels exactly the same as previously diagnosed 'heartburn'  and  describes burning in chest and accompanying sour taste in mouth    Negative: Fever > 100.4 F (38.0 C)    Negative: Chest pain(s) lasting a few seconds persists > 3 days    Negative: Rash in same area as pain (may be described as 'small blisters')    Negative: All other patients with chest pain (Exception: fleeting chest pain lasting a few seconds)    Negative: Patient wants to be seen    Negative: Chest pain(s) lasting a few  "seconds from coughing    Negative: Chest pain(s) lasting a few seconds    Answer Assessment - Initial Assessment Questions  1. LOCATION: \"Where does it hurt?\"          Left upper chest  States it is rib area  States he broke his ribs and sometimes they move and 'pop'    Hasn't been this painful for a while    He took diclofenac tablet which has helped   - also using ES Tylenol     Saw Dr. Alva who referred to ENT. Did evaluation and checked hearing. No Menieres Disease.     States this is from injury 10/8 when he broke ribs.     2. RADIATION: \"Does the pain go anywhere else?\" (e.g., into neck, jaw, arms, back)        No     3. ONSET: \"When did the chest pain begin?\" (Minutes, hours or days)         After accident 10/8    4. PATTERN \"Does the pain come and go, or has it been constant since it started?\"  \"Does it get worse with exertion?\"         Comes and goes     5. DURATION: \"How long does it last\" (e.g., seconds, minutes, hours)        Estimates several seconds at a time, then recedes    States it is there low-level all the time     6. SEVERITY: \"How bad is the pain?\"  (e.g., Scale 1-10; mild, moderate, or severe)     - MILD (1-3): doesn't interfere with normal activities      - MODERATE (4-7): interferes with normal activities or awakens from sleep     - SEVERE (8-10): excruciating pain, unable to do any normal activities          Rates pain as 3-4/10   This morning was 6-7/10    States it settles down and he wants medication to help it     7. CARDIAC RISK FACTORS: \"Do you have any history of heart problems or risk factors for heart disease?\" (e.g., angina, prior heart attack; diabetes, high blood pressure, high cholesterol, smoker, or strong family history of heart disease)        High cholesterol   Hypertension     8. PULMONARY RISK FACTORS: \"Do you have any history of lung disease?\"  (e.g., blood clots in lung, asthma, emphysema, birth control pills)    Sleep apnea         9. CAUSE: \"What do you think is " "causing the chest pain?\"        Rib injury     10. OTHER SYMPTOMS: \"Do you have any other symptoms?\" (e.g., dizziness, nausea, vomiting, sweating, fever, difficulty breathing, cough)          Denies vomiting, nausea, sweating, fever, difficulty breathing or cough     Some diarrhea   Ongoing dizziness    11. PREGNANCY: \"Is there any chance you are pregnant?\" \"When was your last menstrual period?\"          No    Protocols used: CHEST PAIN-A-[place]      "

## 2021-11-14 RX ORDER — TRAMADOL HYDROCHLORIDE 50 MG/1
50 TABLET ORAL EVERY 12 HOURS PRN
Qty: 15 TABLET | Refills: 0 | Status: SHIPPED | OUTPATIENT
Start: 2021-11-14 | End: 2022-03-09

## 2022-01-06 ENCOUNTER — OFFICE VISIT (OUTPATIENT)
Dept: DERMATOLOGY | Facility: CLINIC | Age: 72
End: 2022-01-06
Payer: COMMERCIAL

## 2022-01-06 VITALS — OXYGEN SATURATION: 98 % | HEART RATE: 78 BPM | DIASTOLIC BLOOD PRESSURE: 87 MMHG | SYSTOLIC BLOOD PRESSURE: 149 MMHG

## 2022-01-06 DIAGNOSIS — L81.4 LENTIGO: Primary | ICD-10-CM

## 2022-01-06 DIAGNOSIS — L82.1 SEBORRHEIC KERATOSIS: ICD-10-CM

## 2022-01-06 DIAGNOSIS — L30.8 ASTEATOTIC DERMATITIS: ICD-10-CM

## 2022-01-06 DIAGNOSIS — Z85.828 HISTORY OF SKIN CANCER: ICD-10-CM

## 2022-01-06 DIAGNOSIS — D23.9 DERMAL NEVUS: ICD-10-CM

## 2022-01-06 DIAGNOSIS — D18.01 ANGIOMA OF SKIN: ICD-10-CM

## 2022-01-06 PROCEDURE — 99213 OFFICE O/P EST LOW 20 MIN: CPT | Performed by: DERMATOLOGY

## 2022-01-06 RX ORDER — FLUOCINONIDE 0.5 MG/G
CREAM TOPICAL 2 TIMES DAILY
Qty: 120 G | Refills: 6 | Status: SHIPPED | OUTPATIENT
Start: 2022-01-06 | End: 2024-03-18

## 2022-01-06 NOTE — PROGRESS NOTES
Pranay Ratliff is an extremely pleasant 71 year old year old male patient here today for hx of non-melanoma skin cancer.  He notes dry itching skin.   .   Patient states this has been present for a while.  Patient reports the following symptoms:  itching.  Patient reports the following previous treatments none.  These treatments did not work.  Patient reports the following modifying factors none.  Associated symptoms: none.  Patient has no other skin complaints today.  Remainder of the HPI, Meds, PMH, Allergies, FH, and SH was reviewed in chart.      Past Medical History:   Diagnosis Date     Basal cell carcinoma      Concussion with loss of consciousness of 30 minutes or less, subsequent encounter      Epididymitis, bilateral      Epididymitis, left      Epididymitis, right      Obstructive sleep apnea syndrome      Vasovagal syncope        Past Surgical History:   Procedure Laterality Date     TESTICLE SURGERY       VASECTOMY          Family History   Problem Relation Age of Onset     Skin Cancer Mother          from pneumonia     Abdominal Aortic Aneurysm Mother      Heart Failure Father      Diabetes Type 1 Brother         Possibly secondary to chemical exposure in [place]     Diabetes Type 2  Maternal Grandmother      Cancer Brother         Exposure to Agent Orange in [place]     Heart Disease Brother        Social History     Socioeconomic History     Marital status:      Spouse name: Not on file     Number of children: Not on file     Years of education: Not on file     Highest education level: Not on file   Occupational History     Not on file   Tobacco Use     Smoking status: Never Smoker     Smokeless tobacco: Never Used   Substance and Sexual Activity     Alcohol use: Yes     Comment: twice a week     Drug use: No     Sexual activity: Yes   Other Topics Concern     Parent/sibling w/ CABG, MI or angioplasty before 65F 55M? Not Asked   Social History Narrative     Not on file     Social  Determinants of Health     Financial Resource Strain: Not on file   Food Insecurity: Not on file   Transportation Needs: Not on file   Physical Activity: Not on file   Stress: Not on file   Social Connections: Not on file   Intimate Partner Violence: Not on file   Housing Stability: Not on file       Outpatient Encounter Medications as of 1/6/2022   Medication Sig Dispense Refill     aspirin 81 MG tablet Take by mouth daily       carbamide peroxide (DEBROX) 6.5 % otic solution Place 5 drops into both ears daily as needed for other (impacted cerumen) 14.8 mL 11     clotrimazole-betamethasone (LOTRISONE) 1-0.05 % external cream APPLY TOPICALLY TWICE DAILY TO THE FEET. 45 g 0     diclofenac (VOLTAREN) 1 % topical gel Apply topically 4 times daily       diclofenac (VOLTAREN) 75 MG EC tablet Take 1 tablet (75 mg) by mouth 3 times daily as needed for moderate pain (Patient not taking: Reported on 11/1/2021) 30 tablet 0     dutasteride (AVODART) 0.5 MG capsule Take 1 capsule (0.5 mg) by mouth daily 90 capsule 3     dutasteride (AVODART) 0.5 MG capsule Take 0.5 mg by mouth daily        fluocinonide (LIDEX) 0.05 % external cream Apply topically 2 times daily       furosemide (LASIX) 20 MG tablet Take 1 tablet (20 mg) once daily as needed for swelling. 30 tablet 1     multivitamin, therapeutic with minerals (MULTI-VITAMIN) TABS Take 1 tablet by mouth daily       Omega-3 Fatty Acids (OMEGA-3 FISH OIL PO)        pantoprazole (PROTONIX) 40 MG EC tablet Take 40 mg by mouth daily       rosuvastatin (CRESTOR) 20 MG tablet Take 1 tablet (20 mg) by mouth daily 90 tablet 1     tadalafil (CIALIS) 5 MG tablet Take 5 mg by mouth every 24 hours       traMADol (ULTRAM) 50 MG tablet Take 1 tablet (50 mg) by mouth every 12 hours as needed for severe pain 15 tablet 0     triamterene-HCTZ (MAXZIDE-25) 37.5-25 MG tablet Take 1 tablet by mouth daily 90 tablet 3     No facility-administered encounter medications on file as of 1/6/2022.              O:   NAD, WDWN, Alert & Oriented, Mood & Affect wnl, Vitals stable   Here today alone   BP (!) 149/87   Pulse 78   SpO2 98%    General appearance normal   Vitals stable   Alert, oriented and in no acute distress      Following lymph nodes palpated: Occipital, Cervical, Supraclavicular no lad   Asteatotic dermatitis on trunk and ext      Stuck on papules and brown macules on trunk and ext   Red papules on trunk  Flesh colored papules on trunk     The remainder of the full exam was normal; the following areas were examined:  conjunctiva/lids, oral mucosa, neck, peripheral vascular system, abdomen, lymph nodes, digits/nails, eccrine and apocrine glands, scalp/hair, face, neck, chest, abdomen, buttocks, back, RUE, LUE, RLE, LLE       Eyes: Conjunctivae/lids:Normal     ENT: Lips, buccal mucosa, tongue: normal    MSK:Normal    Cardiovascular: peripheral edema none    Pulm: Breathing Normal    Lymph Nodes: No Head and Neck Lymphadenopathy     Neuro/Psych: Orientation:Alert and Orientedx3 ; Mood/Affect:normal       A/P:  1. Seborrheic keratosis, lentigo, angioma, dermal nevus, hx of non-melanoma skin cancer   2. Asteatotic derm  Lidex prn   Skin care and emollient use discussed with patient     It was a pleasure speaking to Pranay Ratliff today.  Previous clinic notes and pertinent laboratory tests were reviewed prior to Pranay Ratliff's visit.  BENIGN LESIONS DISCUSSED WITH PATIENT:  I discussed the specifics of tumor, prognosis, and genetics of benign lesions.  I explained that treatment of these lesions would be purely cosmetic and not medically neccessary.  I discussed with patient different removal options including excision, cautery and /or laser.      Nature and genetics of benign skin lesions dicussed with patient.  Signs and Symptoms of skin cancer discussed with patient.  Patient encouraged to perform monthly skin exams.  UV precautions reviewed with patient.  Skin care regimen reviewed with patient:  Eliminate harsh soaps, i.e. Dial, zest, irsih spring; Mild soaps such as Cetaphil or Dove sensitive skin, avoid hot or cold showers, aggressive use of emollients including vanicream, cetaphil or cerave discussed with patient.    Risks of non-melanoma skin cancer discussed with patient   Return to clinic 4 months

## 2022-01-06 NOTE — LETTER
2022         RE: Pranay Ratliff  [address]  [place] [address]        Dear Colleague,    Thank you for referring your patient, Pranay Ratliff, to the [facility]. Please see a copy of my visit note below.    Pranay Ratliff is an extremely pleasant 71 year old year old male patient here today for hx of non-melanoma skin cancer.  He notes dry itching skin.   .   Patient states this has been present for a while.  Patient reports the following symptoms:  itching.  Patient reports the following previous treatments none.  These treatments did not work.  Patient reports the following modifying factors none.  Associated symptoms: none.  Patient has no other skin complaints today.  Remainder of the HPI, Meds, PMH, Allergies, FH, and SH was reviewed in chart.      Past Medical History:   Diagnosis Date     Basal cell carcinoma      Concussion with loss of consciousness of 30 minutes or less, subsequent encounter      Epididymitis, bilateral      Epididymitis, left      Epididymitis, right      Obstructive sleep apnea syndrome      Vasovagal syncope        Past Surgical History:   Procedure Laterality Date     TESTICLE SURGERY       VASECTOMY          Family History   Problem Relation Age of Onset     Skin Cancer Mother          from pneumonia     Abdominal Aortic Aneurysm Mother      Heart Failure Father      Diabetes Type 1 Brother         Possibly secondary to chemical exposure in [place]     Diabetes Type 2  Maternal Grandmother      Cancer Brother         Exposure to Agent Orange in [place]     Heart Disease Brother        Social History     Socioeconomic History     Marital status:      Spouse name: Not on file     Number of children: Not on file     Years of education: Not on file     Highest education level: Not on file   Occupational History     Not on file   Tobacco Use     Smoking status: Never Smoker     Smokeless tobacco: Never Used   Substance and  Sexual Activity     Alcohol use: Yes     Comment: twice a week     Drug use: No     Sexual activity: Yes   Other Topics Concern     Parent/sibling w/ CABG, MI or angioplasty before 65F 55M? Not Asked   Social History Narrative     Not on file     Social Determinants of Health     Financial Resource Strain: Not on file   Food Insecurity: Not on file   Transportation Needs: Not on file   Physical Activity: Not on file   Stress: Not on file   Social Connections: Not on file   Intimate Partner Violence: Not on file   Housing Stability: Not on file       Outpatient Encounter Medications as of 1/6/2022   Medication Sig Dispense Refill     aspirin 81 MG tablet Take by mouth daily       carbamide peroxide (DEBROX) 6.5 % otic solution Place 5 drops into both ears daily as needed for other (impacted cerumen) 14.8 mL 11     clotrimazole-betamethasone (LOTRISONE) 1-0.05 % external cream APPLY TOPICALLY TWICE DAILY TO THE FEET. 45 g 0     diclofenac (VOLTAREN) 1 % topical gel Apply topically 4 times daily       diclofenac (VOLTAREN) 75 MG EC tablet Take 1 tablet (75 mg) by mouth 3 times daily as needed for moderate pain (Patient not taking: Reported on 11/1/2021) 30 tablet 0     dutasteride (AVODART) 0.5 MG capsule Take 1 capsule (0.5 mg) by mouth daily 90 capsule 3     dutasteride (AVODART) 0.5 MG capsule Take 0.5 mg by mouth daily        fluocinonide (LIDEX) 0.05 % external cream Apply topically 2 times daily       furosemide (LASIX) 20 MG tablet Take 1 tablet (20 mg) once daily as needed for swelling. 30 tablet 1     multivitamin, therapeutic with minerals (MULTI-VITAMIN) TABS Take 1 tablet by mouth daily       Omega-3 Fatty Acids (OMEGA-3 FISH OIL PO)        pantoprazole (PROTONIX) 40 MG EC tablet Take 40 mg by mouth daily       rosuvastatin (CRESTOR) 20 MG tablet Take 1 tablet (20 mg) by mouth daily 90 tablet 1     tadalafil (CIALIS) 5 MG tablet Take 5 mg by mouth every 24 hours       traMADol (ULTRAM) 50 MG tablet Take 1  tablet (50 mg) by mouth every 12 hours as needed for severe pain 15 tablet 0     triamterene-HCTZ (MAXZIDE-25) 37.5-25 MG tablet Take 1 tablet by mouth daily 90 tablet 3     No facility-administered encounter medications on file as of 1/6/2022.             O:   NAD, WDWN, Alert & Oriented, Mood & Affect wnl, Vitals stable   Here today alone   BP (!) 149/87   Pulse 78   SpO2 98%    General appearance normal   Vitals stable   Alert, oriented and in no acute distress      Following lymph nodes palpated: Occipital, Cervical, Supraclavicular no lad   Asteatotic dermatitis on trunk and ext      Stuck on papules and brown macules on trunk and ext   Red papules on trunk  Flesh colored papules on trunk     The remainder of the full exam was normal; the following areas were examined:  conjunctiva/lids, oral mucosa, neck, peripheral vascular system, abdomen, lymph nodes, digits/nails, eccrine and apocrine glands, scalp/hair, face, neck, chest, abdomen, buttocks, back, RUE, LUE, RLE, LLE       Eyes: Conjunctivae/lids:Normal     ENT: Lips, buccal mucosa, tongue: normal    MSK:Normal    Cardiovascular: peripheral edema none    Pulm: Breathing Normal    Lymph Nodes: No Head and Neck Lymphadenopathy     Neuro/Psych: Orientation:Alert and Orientedx3 ; Mood/Affect:normal       A/P:  1. Seborrheic keratosis, lentigo, angioma, dermal nevus, hx of non-melanoma skin cancer   2. Asteatotic derm  Lidex prn   Skin care and emollient use discussed with patient     It was a pleasure speaking to Pranay Ratliff today.  Previous clinic notes and pertinent laboratory tests were reviewed prior to Pranay Ratliff's visit.  BENIGN LESIONS DISCUSSED WITH PATIENT:  I discussed the specifics of tumor, prognosis, and genetics of benign lesions.  I explained that treatment of these lesions would be purely cosmetic and not medically neccessary.  I discussed with patient different removal options including excision, cautery and /or laser.      Nature and  genetics of benign skin lesions dicussed with patient.  Signs and Symptoms of skin cancer discussed with patient.  Patient encouraged to perform monthly skin exams.  UV precautions reviewed with patient.  Skin care regimen reviewed with patient: Eliminate harsh soaps, i.e. Dial, zest, irsih spring; Mild soaps such as Cetaphil or Dove sensitive skin, avoid hot or cold showers, aggressive use of emollients including vanicream, cetaphil or cerave discussed with patient.    Risks of non-melanoma skin cancer discussed with patient   Return to clinic 4 months        Again, thank you for allowing me to participate in the care of your patient.        Sincerely,        Tato Gallagher MD

## 2022-01-08 ENCOUNTER — HEALTH MAINTENANCE LETTER (OUTPATIENT)
Age: 72
End: 2022-01-08

## 2022-01-10 ENCOUNTER — E-VISIT (OUTPATIENT)
Dept: URGENT CARE | Facility: URGENT CARE | Age: 72
End: 2022-01-10
Payer: COMMERCIAL

## 2022-01-10 DIAGNOSIS — Z20.822 SUSPECTED COVID-19 VIRUS INFECTION: Primary | ICD-10-CM

## 2022-01-10 PROCEDURE — 99421 OL DIG E/M SVC 5-10 MIN: CPT | Performed by: PHYSICIAN ASSISTANT

## 2022-01-10 NOTE — PATIENT INSTRUCTIONS
Chris,      Based on your responses, you may have coronavirus (COVID-19). This illness can cause fever, cough and trouble breathing. Many people get a mild case and get better on their own. Some people can get very sick.    Will I be tested for COVID-19?  We would like to test you for COVID-19 virus. I have placed orders for this test.     To schedule: go to your [employer] home page and scroll down to the section that says  You have an appointment that needs to be scheduled  and click the large green button that says  Schedule Now  and follow the steps to find the next available openings.    If you are unable to complete these [employer] scheduling steps, please call [phone] to schedule your testing.     Return to work/school/ guidance:  Please let your workplace manager and staffing office know when your isolation ends.       If you receive a positive COVID-19 test result, follow the guidance of the those who are giving you the results. Usually the return to work is 10 days from symptom onset or positive test date, (or in some cases 20 days if you are immunocompromised). If your symptoms started after your positive test, the 10 days should start when your symptoms started.   o If you work at [employer], you must also be cleared by Employee Occupational Health and Safety to return to work.      If you receive a negative COVID-19 test result and did not have a high risk exposure to someone with a known positive COVID-19 test, you can return to work once you're free of fever for 24 hours without fever-reducing medication and your symptoms are improving or resolved.    If you receive a negative COVID-19 test and had a high-risk exposure to someone who has tested positive for COVID-19 then you can return to work 14 days after your last contact with the positive individual. Follow quarantine guidance given by your doctor or public health officials.     Sign up for Get[employer] Loop:  We know it's scary to hear  that you might have COVID-19. We want to track your symptoms to make sure you're okay over the next 2 weeks. Please look for an email from [employer]--this is a free, online program that we'll use to keep in touch. To sign up, follow the link in the email you will receive. Learn more at [email or web address]    How can I take care of myself?  Over the counter medications may help with your symptoms like congestion, cough, chills, or fever.    There are not many effective prescription treatments for early COVID-19. Hydroxychloroquine, ivermectin, and azithromycin are not effective or recommended for COVID-19.    If your symptoms started in the last 10 days, you may be able to receive a treatment with monoclonal antibodies. This treatment can lower your risk of severe illness and going to the hospital. It is given through an IV or under your skin (subcutaneous) and must be given at an infusion center. You must be 12 or older, weight at least 88 pounds, and have a positive COVID-19 test.     If you would like to sign up to be considered to receive the monoclonal antibody medicine, please complete a participation form through the [facility] here: MNRAP ([email or web address]). You may also call the [facility] COVID-19 Public Hotline at 1-728.634.2479 (open Mon-Fri: 9am-7pm and Sat: 10am-6pm).     Not all people who are eligible will receive the medicine, since supply is limited. You will be contacted in the next 1 to 2 business days only if you are selected. If you do not receive a call, you have not been selected to receive the medicine. If you have any questions about this medication, please contact your primary care provider. For more information, see [email or web address]      Get lots of rest. Drink extra fluids (unless a doctor has told you not to)    Take Tylenol (acetaminophen) or ibuprofen for fever or  pain. If you have liver or kidney problems, ask your family doctor if it's okay to take Tylenol o ibuprofen    Take over the counter medications for your symptoms, as directed by your doctor. You may also talk to your pharmacist.      If you have other health problems (like cancer, heart failure, an organ transplant or severe kidney disease): Call your specialty clinic if you don't feel better in the next 2 days.    Know when to call 911. Emergency warning signs include:  o Trouble breathing or shortness of breath  o Pain or pressure in the chest that doesn't go away  o Feeling confused like you haven't felt before, or not being able to wake up  o Bluish-colored lips or face    Where can I get more information?     [facility] - About COVID-19: [email or web address]     CDC - What to Do If You're Sick:     www.cdc.gov/coronavirus/2019-ncov/about/steps-when-sick.html    CDC - Ending Home Isolation:  https://www.cdc.gov/coronavirus/2019-ncov/your-health/quarantine-isolation.html    CDC - Caring for Someone:  www.cdc.gov/coronavirus/2019-ncov/if-you-are-sick/care-for-someone.html    [facility] clinical trials (COVID-19 research studies): clinicalaffairs.[facility]-clinical-trials    Below are the COVID-19 hotlines at the [facility] of Health ([facility]). Interpreters are available.  o For health questions: Call 796-043-0049 or 1-945.425.2461 (7 a.m. to 7 p.m.)  o For questions about schools and childcare: Call 396-425-1943 or 1-938.239.8484 (7 a.m. to 7 p.m.)

## 2022-01-13 ENCOUNTER — LAB (OUTPATIENT)
Dept: URGENT CARE | Facility: URGENT CARE | Age: 72
End: 2022-01-13
Attending: PHYSICIAN ASSISTANT
Payer: COMMERCIAL

## 2022-01-13 DIAGNOSIS — Z20.822 SUSPECTED COVID-19 VIRUS INFECTION: ICD-10-CM

## 2022-01-13 LAB — SARS-COV-2 RNA RESP QL NAA+PROBE: NEGATIVE

## 2022-01-13 PROCEDURE — U0005 INFEC AGEN DETEC AMPLI PROBE: HCPCS

## 2022-01-13 PROCEDURE — U0003 INFECTIOUS AGENT DETECTION BY NUCLEIC ACID (DNA OR RNA); SEVERE ACUTE RESPIRATORY SYNDROME CORONAVIRUS 2 (SARS-COV-2) (CORONAVIRUS DISEASE [COVID-19]), AMPLIFIED PROBE TECHNIQUE, MAKING USE OF HIGH THROUGHPUT TECHNOLOGIES AS DESCRIBED BY CMS-2020-01-R: HCPCS

## 2022-03-04 ENCOUNTER — MYC MEDICAL ADVICE (OUTPATIENT)
Dept: FAMILY MEDICINE | Facility: CLINIC | Age: 72
End: 2022-03-04
Payer: COMMERCIAL

## 2022-03-06 NOTE — TELEPHONE ENCOUNTER
4-7-22 appoint seems health maintenance visit.  He can schedule with me for checking BP separately from health maintenance visit(4-7-22). If he wants, please help him to schedule with me for BP at sameday slot, and keep 4-7-22 for CPE       thx

## 2022-03-07 DIAGNOSIS — E78.5 HYPERLIPIDEMIA LDL GOAL <100: ICD-10-CM

## 2022-03-07 RX ORDER — ROSUVASTATIN CALCIUM 20 MG/1
TABLET, COATED ORAL
Qty: 90 TABLET | Refills: 1 | Status: SHIPPED | OUTPATIENT
Start: 2022-03-07 | End: 2022-07-14

## 2022-03-07 NOTE — TELEPHONE ENCOUNTER
Routing refill request to provider for review/approval because:  Labs not current:  PILAR MENENDEZ RN  EP Triage

## 2022-03-09 ENCOUNTER — OFFICE VISIT (OUTPATIENT)
Dept: FAMILY MEDICINE | Facility: CLINIC | Age: 72
End: 2022-03-09
Payer: COMMERCIAL

## 2022-03-09 VITALS
BODY MASS INDEX: 32.25 KG/M2 | RESPIRATION RATE: 14 BRPM | WEIGHT: 237.8 LBS | DIASTOLIC BLOOD PRESSURE: 74 MMHG | OXYGEN SATURATION: 98 % | HEART RATE: 67 BPM | SYSTOLIC BLOOD PRESSURE: 130 MMHG | TEMPERATURE: 97.4 F

## 2022-03-09 DIAGNOSIS — S29.9XXA TRAUMATIC INJURY OF RIB: Primary | ICD-10-CM

## 2022-03-09 DIAGNOSIS — S22.42XA CLOSED FRACTURE OF MULTIPLE RIBS OF LEFT SIDE, INITIAL ENCOUNTER: ICD-10-CM

## 2022-03-09 DIAGNOSIS — I10 BENIGN ESSENTIAL HYPERTENSION: ICD-10-CM

## 2022-03-09 PROCEDURE — 99214 OFFICE O/P EST MOD 30 MIN: CPT | Performed by: FAMILY MEDICINE

## 2022-03-09 RX ORDER — GABAPENTIN 100 MG/1
100 CAPSULE ORAL
COMMUNITY

## 2022-03-09 ASSESSMENT — PAIN SCALES - GENERAL: PAINLEVEL: NO PAIN (0)

## 2022-03-09 NOTE — PROGRESS NOTES
Assessment & Plan     Traumatic injury of rib  Stable, has not been taking NSAIDS, will continue stretching exercise     Closed fracture of multiple ribs of left side, initial encounter  Stable,     Benign essential hypertension  Has been stable after taking off of  hydrochlorothiazide-triamterene   Encouraged him to continue monitoring and consider resume if BP goes above 135/85, will recheck in 1 year at CPE      {Provider  Link to [facility] Help Grid :378507}     BMI:   Estimated body mass index is 32.25 kg/m  as calculated from the following:    Height as of 9/16/21: 1.829 m (6').    Weight as of this encounter: 107.9 kg (237 lb 12.8 oz).   Weight management plan: Discussed healthy diet and exercise guidelines        No follow-ups on file.    Hipolito Alva MD  [facility] NEREYDA Perez is a 72 year old who presents for the following health issues     History of Present Illness       Reason for visit:  Medication consult  Symptoms include:  Not applicable    He eats 2-3 servings of fruits and vegetables daily.He consumes 1 sweetened beverage(s) daily.He exercises with enough effort to increase his heart rate 30 to 60 minutes per day.  He exercises with enough effort to increase his heart rate 7 days per week.   He is taking medications regularly.     Review of Systems   Constitutional, HEENT, cardiovascular, pulmonary, gi and gu systems are negative, except as otherwise noted.      Objective    /74 (BP Location: Left arm, Patient Position: Sitting, Cuff Size: Adult Large)   Pulse 67   Temp 97.4  F (36.3  C) (Tympanic)   Resp 14   Wt 107.9 kg (237 lb 12.8 oz)   SpO2 98%   BMI 32.25 kg/m    Body mass index is 32.25 kg/m .  Physical Exam   GENERAL: healthy, alert and no distress  EYES: Eyes grossly normal to inspection, PERRL and conjunctivae and sclerae normal  HENT: ear canals and TM's normal, nose and mouth without ulcers or lesions  NECK: no adenopathy, no asymmetry,  masses, or scars and thyroid normal to palpation  RESP: lungs clear to auscultation - no rales, rhonchi or wheezes  CV: regular rate and rhythm, normal S1 S2, no S3 or S4, no murmur, click or rub, no peripheral edema and peripheral pulses strong  ABDOMEN: soft, nontender, no hepatosplenomegaly, no masses and bowel sounds normal  MS: no gross musculoskeletal defects noted, no edema  SKIN: no suspicious lesions or rashes  NEURO: Normal strength and tone, mentation intact and speech normal

## 2022-03-16 ENCOUNTER — OFFICE VISIT (OUTPATIENT)
Dept: UROLOGY | Facility: CLINIC | Age: 72
End: 2022-03-16
Payer: COMMERCIAL

## 2022-03-16 VITALS
SYSTOLIC BLOOD PRESSURE: 130 MMHG | WEIGHT: 235 LBS | BODY MASS INDEX: 31.83 KG/M2 | HEIGHT: 72 IN | DIASTOLIC BLOOD PRESSURE: 70 MMHG

## 2022-03-16 DIAGNOSIS — R97.20 ELEVATED PROSTATE SPECIFIC ANTIGEN (PSA): Primary | ICD-10-CM

## 2022-03-16 DIAGNOSIS — N13.8 BPH WITH OBSTRUCTION/LOWER URINARY TRACT SYMPTOMS: ICD-10-CM

## 2022-03-16 DIAGNOSIS — N40.1 BPH WITH OBSTRUCTION/LOWER URINARY TRACT SYMPTOMS: ICD-10-CM

## 2022-03-16 LAB — PSA SERPL-MCNC: 9.2 UG/L (ref 0–4)

## 2022-03-16 PROCEDURE — 99214 OFFICE O/P EST MOD 30 MIN: CPT | Performed by: UROLOGY

## 2022-03-16 PROCEDURE — 36415 COLL VENOUS BLD VENIPUNCTURE: CPT | Performed by: UROLOGY

## 2022-03-16 PROCEDURE — 84153 ASSAY OF PSA TOTAL: CPT | Performed by: UROLOGY

## 2022-03-16 ASSESSMENT — PAIN SCALES - GENERAL: PAINLEVEL: NO PAIN (0)

## 2022-03-16 NOTE — LETTER
"3/16/2022       RE: Pranay Ratliff  [address]  [place] [address]     Dear Colleague,    Thank you for referring your patient, Pranay Ratliff, to the [facility] at [facility]. Please see a copy of my visit note below.    [place]   CHIEF COMPLAINT   It was my pleasure to see Pranay Ratliff who is a 72 year old male for follow-up of Elevated PSA and LUTS.      HPI   Pranay Ratliff is a very pleasant 72 year old male    Prior pt of Dr. Guajardo - last seen 9/16/21:  \"Pranay Ratliff is a very pleasant 71 year old male who presents with a history of Elevated PSA (Prostate Specific Antigen).  PSA 5.3 and had s/p TRUS biopsy 8/2019. Has done well since biopsy which demonstrated no malignancy.     Also has history of BPH with weak stream and LUTS. Did not tolerate tamsulosin on previous trial of this. Is bothered by his urinary symptoms.  Has been on several medications over the years, most recently managed by urologist in [place], [place]. He is now back in [place]. Most recently was taking tadalafil 5 mg daily and dutasteride. Stopped these a few weeks ago given concerns about light-headedness. Currently still bothered by weak stream, and nocturia.    Restarted on dutasteride\"    TODAY 3/16/22:  He never resumed the Dutasteride due to gynecomastia   He continues to have retrograde ejaculation   Urination is stable  Nocturia 2x/night  Overall, not too bothered by symptoms     He just moved back from [facility] last fall  He did have a cystoscopy 05/2021 with no concern for intravesical mass    PHYSICAL EXAM  Patient is a 72 year old  male   Vitals: There were no vitals taken for this visit.  There is no height or weight on file to calculate BMI.  General Appearance Adult:   Alert, no acute distress, oriented  HENT: throat/mouth:normal, good dentition  Lungs: no respiratory distress, or pursed lip breathing  Heart: No obvious jugular venous " distension present  Abdomen: soft, nontender, no organomegaly or masses  Musculoskeltal: extremities normal, no peripheral edema  Skin: no suspicious lesions or rashes  Neuro: Alert, oriented, speech and mentation normal  Psych: affect and mood normal    Component PSA PSA Diag Urologic Phys   Latest Ref Rng & Units 0.00 - 4.00 ug/L 0.00 - 4.00 ng/mL   1/30/2019 6.65 (H)    2/26/2019 6.32 (H)    4/22/2019  4.70 (H)   7/15/2019  5.30 (H)   9/16/2021 3.00    3/16/2022 9.20 (H)        PATHOLOGY:  TRUS 8/6/2019  FINAL DIAGNOSIS:   A - G. Prostate biopsy template and target:   - Benign prostate tissue     IMAGING:  All pertinent imaging reviewed:    All imaging studies reviewed by me.  I personally reviewed these imaging films.  A formal report from radiology will follow.    MRI Prostate 4/20/2019  FINDINGS:  Size: 73.5 grams  IMPRESSION:  1. Based on the most suspicious abnormality, this exam is  characterized as PIRADS 3 - The presence of clinically significant  cancer is equivocal.?The most suspicious abnormality is located at the  left posterior apex peripheral zone and there is no evidence of  extraprostatic extension.  2. No suspicious adenopathy or evidence of pelvic metastases.       ASSESSMENT and PLAN  72-year-old man with history of BPH and LUTS with elevated PSA    Elevated PSA  -Reviewed his PSA history and trend  -His PSA today demonstrates significant increase to 9.2 from 3.0 following stopping the dutasteride  -I reviewed his prior MRI and reviewed these images personally and agree with radiologist interpretation  -I reviewed his prior biopsy results which were negative from an MR fusion biopsy  -We discussed the need for a repeat prostate MRI, given that it has been 3 years since his last MRI and biopsy  -Order for MRI placed  -Plan for virtual visit follow-up to discuss the results with either myself or Dr. Acuña  -We discussed there is a high likelihood of requiring a repeat MR fusion biopsy  -This is a  chronic problem with possible progression or exacerbation    BPH and LUTS  -He is doing fairly well with minimal symptoms off of medications at present  -He has previously tried alpha blockers with bothersome side effects including worsening of his baseline dizziness.  He has previously tried 5 alpha reductase inhibitors with bothersome side effect of gynecomastia and retrograde ejaculation  -We discussed that should his symptoms worsen, the neck step in work-up would be an office cystoscopy to evaluate his prostatic urethra and discuss bladder outlet procedures  -We discussed the need for further work-up of his significant PSA elevation prior to any bladder outlet procedure        Time spent: 20 minutes spent on the date of the encounter doing chart review, history and exam, documentation and further activities as noted above.    Vin Sinha MD   Urology  [facility]  [facility] Phone: [phone]  [facility] Phone: [phone]

## 2022-03-16 NOTE — PROGRESS NOTES
"[facility]   CHIEF COMPLAINT   It was my pleasure to see Pranay Ratliff who is a 72 year old male for follow-up of Elevated PSA and LUTS.      HPI   Pranay Ratliff is a very pleasant 72 year old male    Prior pt of Dr. Guajardo - last seen 9/16/21:  \"Pranay Ratliff is a very pleasant 71 year old male who presents with a history of Elevated PSA (Prostate Specific Antigen).  PSA 5.3 and had s/p TRUS biopsy 8/2019. Has done well since biopsy which demonstrated no malignancy.     Also has history of BPH with weak stream and LUTS. Did not tolerate tamsulosin on previous trial of this. Is bothered by his urinary symptoms.  Has been on several medications over the years, most recently managed by urologist in [place], [place]. He is now back in [place]. Most recently was taking tadalafil 5 mg daily and dutasteride. Stopped these a few weeks ago given concerns about light-headedness. Currently still bothered by weak stream, and nocturia.    Restarted on dutasteride\"    TODAY 3/16/22:  He never resumed the Dutasteride due to gynecomastia   He continues to have retrograde ejaculation   Urination is stable  Nocturia 2x/night  Overall, not too bothered by symptoms     He just moved back from [facility] last fall  He did have a cystoscopy 05/2021 with no concern for intravesical mass    PHYSICAL EXAM  Patient is a 72 year old  male   Vitals: There were no vitals taken for this visit.  There is no height or weight on file to calculate BMI.  General Appearance Adult:   Alert, no acute distress, oriented  HENT: throat/mouth:normal, good dentition  Lungs: no respiratory distress, or pursed lip breathing  Heart: No obvious jugular venous distension present  Abdomen: soft, nontender, no organomegaly or masses  Musculoskeltal: extremities normal, no peripheral edema  Skin: no suspicious lesions or rashes  Neuro: Alert, oriented, speech and mentation normal  Psych: affect and mood normal    Component PSA PSA Diag Urologic Phys   Latest Ref Rng & Units " 0.00 - 4.00 ug/L 0.00 - 4.00 ng/mL   1/30/2019 6.65 (H)    2/26/2019 6.32 (H)    4/22/2019  4.70 (H)   7/15/2019  5.30 (H)   9/16/2021 3.00    3/16/2022 9.20 (H)        PATHOLOGY:  TRUS 8/6/2019  FINAL DIAGNOSIS:   A - G. Prostate biopsy template and target:   - Benign prostate tissue     IMAGING:  All pertinent imaging reviewed:    All imaging studies reviewed by me.  I personally reviewed these imaging films.  A formal report from radiology will follow.    MRI Prostate 4/20/2019  FINDINGS:  Size: 73.5 grams  IMPRESSION:  1. Based on the most suspicious abnormality, this exam is  characterized as PIRADS 3 - The presence of clinically significant  cancer is equivocal.?The most suspicious abnormality is located at the  left posterior apex peripheral zone and there is no evidence of  extraprostatic extension.  2. No suspicious adenopathy or evidence of pelvic metastases.       ASSESSMENT and PLAN  72-year-old man with history of BPH and LUTS with elevated PSA    Elevated PSA  -Reviewed his PSA history and trend  -His PSA today demonstrates significant increase to 9.2 from 3.0 following stopping the dutasteride  -I reviewed his prior MRI and reviewed these images personally and agree with radiologist interpretation  -I reviewed his prior biopsy results which were negative from an MR fusion biopsy  -We discussed the need for a repeat prostate MRI, given that it has been 3 years since his last MRI and biopsy  -Order for MRI placed  -Plan for virtual visit follow-up to discuss the results with either myself or Dr. Acuña  -We discussed there is a high likelihood of requiring a repeat MR fusion biopsy  -This is a chronic problem with possible progression or exacerbation    BPH and LUTS  -He is doing fairly well with minimal symptoms off of medications at present  -He has previously tried alpha blockers with bothersome side effects including worsening of his baseline dizziness.  He has previously tried 5 alpha reductase  inhibitors with bothersome side effect of gynecomastia and retrograde ejaculation  -We discussed that should his symptoms worsen, the neck step in work-up would be an office cystoscopy to evaluate his prostatic urethra and discuss bladder outlet procedures  -We discussed the need for further work-up of his significant PSA elevation prior to any bladder outlet procedure        Time spent: 20 minutes spent on the date of the encounter doing chart review, history and exam, documentation and further activities as noted above.    Vin Sinha MD   Urology  [facility]  [facility] Phone: [phone]  [facility] Phone: [phone]

## 2022-03-31 ENCOUNTER — ANCILLARY PROCEDURE (OUTPATIENT)
Dept: MRI IMAGING | Facility: CLINIC | Age: 72
End: 2022-03-31
Attending: UROLOGY
Payer: COMMERCIAL

## 2022-03-31 DIAGNOSIS — N13.8 BPH WITH OBSTRUCTION/LOWER URINARY TRACT SYMPTOMS: ICD-10-CM

## 2022-03-31 DIAGNOSIS — R97.20 ELEVATED PROSTATE SPECIFIC ANTIGEN (PSA): ICD-10-CM

## 2022-03-31 DIAGNOSIS — N40.1 BPH WITH OBSTRUCTION/LOWER URINARY TRACT SYMPTOMS: ICD-10-CM

## 2022-03-31 PROCEDURE — A9585 GADOBUTROL INJECTION: HCPCS | Performed by: RADIOLOGY

## 2022-03-31 PROCEDURE — 72197 MRI PELVIS W/O & W/DYE: CPT | Performed by: RADIOLOGY

## 2022-03-31 RX ORDER — GADOBUTROL 604.72 MG/ML
10 INJECTION INTRAVENOUS ONCE
Status: COMPLETED | OUTPATIENT
Start: 2022-03-31 | End: 2022-03-31

## 2022-03-31 RX ADMIN — GADOBUTROL 10 ML: 604.72 INJECTION INTRAVENOUS at 13:18

## 2022-04-01 ASSESSMENT — ENCOUNTER SYMPTOMS
NERVOUS/ANXIOUS: 0
DIZZINESS: 0
DYSURIA: 0
COUGH: 0
FREQUENCY: 1
SHORTNESS OF BREATH: 0
JOINT SWELLING: 0
HEADACHES: 0
HEMATOCHEZIA: 0
ARTHRALGIAS: 0
MYALGIAS: 0
DIARRHEA: 0
HEARTBURN: 0
CONSTIPATION: 0
PALPITATIONS: 0
NAUSEA: 0
SORE THROAT: 0
HEMATURIA: 0
WEAKNESS: 0
CHILLS: 0
ABDOMINAL PAIN: 0
PARESTHESIAS: 0
FEVER: 0
EYE PAIN: 0

## 2022-04-01 ASSESSMENT — ACTIVITIES OF DAILY LIVING (ADL): CURRENT_FUNCTION: NO ASSISTANCE NEEDED

## 2022-04-06 ENCOUNTER — VIRTUAL VISIT (OUTPATIENT)
Dept: UROLOGY | Facility: CLINIC | Age: 72
End: 2022-04-06
Payer: COMMERCIAL

## 2022-04-06 VITALS — WEIGHT: 230 LBS | BODY MASS INDEX: 31.15 KG/M2 | HEIGHT: 72 IN

## 2022-04-06 DIAGNOSIS — N13.8 BPH WITH OBSTRUCTION/LOWER URINARY TRACT SYMPTOMS: ICD-10-CM

## 2022-04-06 DIAGNOSIS — N40.1 BPH WITH OBSTRUCTION/LOWER URINARY TRACT SYMPTOMS: ICD-10-CM

## 2022-04-06 DIAGNOSIS — R97.20 ELEVATED PROSTATE SPECIFIC ANTIGEN (PSA): Primary | ICD-10-CM

## 2022-04-06 PROCEDURE — 99214 OFFICE O/P EST MOD 30 MIN: CPT | Mod: 95 | Performed by: UROLOGY

## 2022-04-06 RX ORDER — CIPROFLOXACIN 500 MG/1
500 TABLET, FILM COATED ORAL 2 TIMES DAILY
Qty: 6 TABLET | Refills: 0 | Status: SHIPPED | OUTPATIENT
Start: 2022-04-06 | End: 2022-10-03

## 2022-04-06 ASSESSMENT — PAIN SCALES - GENERAL: PAINLEVEL: NO PAIN (0)

## 2022-04-06 NOTE — LETTER
"4/6/2022       RE: Pranay Ratliff  [address]  [place] [address]     Dear Colleague,    Thank you for referring your patient, Pranay Ratliff, to the [facility] at [facility]. Please see a copy of my visit note below.    [place]   CHIEF COMPLAINT   It was my pleasure to see Pranay Ratliff who is a 72 year old male for follow-up of Elevated PSA and LUTS.      HPI   Pranay Ratliff is a very pleasant 72 year old male    Prior pt of Dr. Guajardo - last seen 9/16/21:  \"Pranay Ratliff is a very pleasant 71 year old male who presents with a history of Elevated PSA (Prostate Specific Antigen).  PSA 5.3 and had s/p TRUS biopsy 8/2019. Has done well since biopsy which demonstrated no malignancy.     Also has history of BPH with weak stream and LUTS. Did not tolerate tamsulosin on previous trial of this. Is bothered by his urinary symptoms.  Has been on several medications over the years, most recently managed by urologist in [place]. He is now back in [place]. Most recently was taking tadalafil 5 mg daily and dutasteride. Stopped these a few weeks ago given concerns about light-headedness. Currently still bothered by weak stream, and nocturia.    Restarted on dutasteride\"    3/16/22:  He never resumed the Dutasteride due to gynecomastia   He continues to have retrograde ejaculation   Urination is stable  Nocturia 2x/night  Overall, not too bothered by symptoms     He just moved back from [facility] last fall  He did have a cystoscopy 05/2021 with no concern for intravesical mass    TODAY 4/6/22:  Follow-up today to review his updated MRI  He had no issues with this MRI    PHYSICAL EXAM  Patient is a 72 year old  male   Vitals: Height 1.829 m (6'), weight 104.3 kg (230 lb).  Body mass index is 31.19 kg/m .  General Appearance Adult:   Alert, no acute distress, oriented  HENT: throat/mouth:normal, good dentition  Lungs: no respiratory " distress, or pursed lip breathing  Heart: No obvious jugular venous distension present  Abdomen: non - distended  Musculoskeltal: extremities normal, no peripheral edema  Skin: no suspicious lesions or rashes  Neuro: Alert, oriented, speech and mentation normal  Psych: affect and mood normal    Component PSA PSA Diag Urologic Phys   Latest Ref Rng & Units 0.00 - 4.00 ug/L 0.00 - 4.00 ng/mL   1/30/2019 6.65 (H)    2/26/2019 6.32 (H)    4/22/2019  4.70 (H)   7/15/2019  5.30 (H)   9/16/2021 3.00    3/16/2022 9.20 (H)        PATHOLOGY:  TRUS 8/6/2019  FINAL DIAGNOSIS:   A - G. Prostate biopsy template and target:   - Benign prostate tissue     IMAGING:  All pertinent imaging reviewed:    All imaging studies reviewed by me.  I personally reviewed these imaging films.  A formal report from radiology will follow.    MRI Prostate 4/20/2019  FINDINGS:  Size: 73.5 grams  IMPRESSION:  1. Based on the most suspicious abnormality, this exam is  characterized as PIRADS 3 - The presence of clinically significant  cancer is equivocal.?The most suspicious abnormality is located at the  left posterior apex peripheral zone and there is no evidence of  extraprostatic extension.  2. No suspicious adenopathy or evidence of pelvic metastases.     MRI PROSTATE 3/31/22:  FINDINGS:  Size: 4.2 x 5.1 x 6.0 cm, 66.8 grams  Hemorrhage: Absent  Peripheral zone: Heterogeneous on T2-weighted images. Suspicious  lesions as detailed below.  Transition zone: Enlarged with BPH changes. Transition zone nodules  which are circumscribed or mostly encapsulated without diffusion  restriction.  PI-RADS 2.  No highly suspicious nodules.     Lesion(s) in rank order of severity (highest score- to lowest score,  then by size)      Lesion 1:  Location: Left apex peripheral zone at the   5  o'clock position  relative to the urethra. Series 7 image 71.  Size: 6 mm  T2 description: Heterogeneous signal intensity  T2 numerical assessment: 3  DWI description:  Hypointense on ADC and hyperintense on high B-value  DWI  DWI numerical assessment: 3  DCE assessment: Positive    Prostate margin: No capsular abutment  Lesion overall PI-RADS category: 4     Neurovascular bundles: No suspicion of involvement by malignancy  Seminal vesicles: Not involved by tumor.  Lymph nodes: No adenopathy.  Bones: No suspicious lesions.  Other pelvic organs: Small fat-containing left inguinal hernia.                                                        IMPRESSION:  1. Based on the most suspicious abnormality, this exam is  characterized as PIRADS 4 - Clinically significant cancer is likely to  be present.?The most suspicious abnormality is located at the left  apex peripheral zone and there is no evidence of extraprostatic  extension. Compared to prior MR prostate 4/20/2019, this lesion is  similar in size and morphology, but now demonstrates focal  enhancement, raising the lesion to a PIRADS 4.  2. No suspicious adenopathy or evidence of pelvic metastases.       ASSESSMENT and PLAN  72-year-old man with history of BPH and LUTS with elevated PSA    Elevated PSA  -Reviewed his PSA history and trend  -His PSA today demonstrates significant increase to 9.2 from 3.0 following stopping the dutasteride  -I reviewed his prior MRI and reviewed these images personally and agree with radiologist interpretation  -I reviewed his prior biopsy results which were negative from an MR fusion biopsy  -I reviewed his updated MRI and reviewed these images personally.  I agree with radiologist interpretation.  We discussed the PI-RADS scoring system and that the lesion that had previously been described as a PI-RADS 3 now has some enhancement and is categorized as a PI-RADS 4 lesion.  We discussed that this will correlate with clinically significant prostate cancer about 80% of the time  -We discussed the need to perform a repeat MR fusion UroNav biopsy  -TRUS/Bx - we discussed the risks and benefits of the  prostate biopsy including the normal intermittent hematuria, blood per rectum, and blood with ejaculation as well as a 1-2% risk of post biopsy sepsis requiring hospitalization and IV antibiotics  -Prescription for ciprofloxacin sent to the pharmacy    BPH and LUTS  -He is doing fairly well with minimal symptoms off of medications at present    Time spent: 20 minutes spent on the date of the encounter doing chart review, history and exam, documentation and further activities as noted above.    Vin Sihna MD   Urology  [facility]  [facility] Phone: [phone]  [facility] Phone: [phone]        Chris is a 72 year old who is being evaluated via a billable video visit.      How would you like to obtain your AVS? MyChart  If the video visit is dropped, the invitation should be resent by: Text to cell phone: [phone]  Will anyone else be joining your video visit? No    Video-Visit Details    Type of service:  Video Visit  Video Start Time: 1:00 PM  Video End Time:1:12 PM  Originating Location (pt. Location): Home  Distant Location (provider location):  [facility] UROLOGY CLINIC [facility]ICE   Platform used for Video Visit: [employer]

## 2022-04-06 NOTE — PROGRESS NOTES
"[facility]   CHIEF COMPLAINT   It was my pleasure to see Pranay Ratliff who is a 72 year old male for follow-up of Elevated PSA and LUTS.      HPI   Pranay Ratliff is a very pleasant 72 year old male    Prior pt of Dr. Guajardo - last seen 9/16/21:  \"Pranay Ratliff is a very pleasant 71 year old male who presents with a history of Elevated PSA (Prostate Specific Antigen).  PSA 5.3 and had s/p TRUS biopsy 8/2019. Has done well since biopsy which demonstrated no malignancy.     Also has history of BPH with weak stream and LUTS. Did not tolerate tamsulosin on previous trial of this. Is bothered by his urinary symptoms.  Has been on several medications over the years, most recently managed by urologist in [place], [place]. He is now back in [place]. Most recently was taking tadalafil 5 mg daily and dutasteride. Stopped these a few weeks ago given concerns about light-headedness. Currently still bothered by weak stream, and nocturia.    Restarted on dutasteride\"    3/16/22:  He never resumed the Dutasteride due to gynecomastia   He continues to have retrograde ejaculation   Urination is stable  Nocturia 2x/night  Overall, not too bothered by symptoms     He just moved back from [facility] last fall  He did have a cystoscopy 05/2021 with no concern for intravesical mass    TODAY 4/6/22:  Follow-up today to review his updated MRI  He had no issues with this MRI    PHYSICAL EXAM  Patient is a 72 year old  male   Vitals: Height 1.829 m (6'), weight 104.3 kg (230 lb).  Body mass index is 31.19 kg/m .  General Appearance Adult:   Alert, no acute distress, oriented  HENT: throat/mouth:normal, good dentition  Lungs: no respiratory distress, or pursed lip breathing  Heart: No obvious jugular venous distension present  Abdomen: non - distended  Musculoskeltal: extremities normal, no peripheral edema  Skin: no suspicious lesions or rashes  Neuro: Alert, oriented, speech and mentation normal  Psych: affect and mood normal    Component PSA PSA Diag " Urologic Phys   Latest Ref Rng & Units 0.00 - 4.00 ug/L 0.00 - 4.00 ng/mL   1/30/2019 6.65 (H)    2/26/2019 6.32 (H)    4/22/2019  4.70 (H)   7/15/2019  5.30 (H)   9/16/2021 3.00    3/16/2022 9.20 (H)        PATHOLOGY:  TRUS 8/6/2019  FINAL DIAGNOSIS:   A - G. Prostate biopsy template and target:   - Benign prostate tissue     IMAGING:  All pertinent imaging reviewed:    All imaging studies reviewed by me.  I personally reviewed these imaging films.  A formal report from radiology will follow.    MRI Prostate 4/20/2019  FINDINGS:  Size: 73.5 grams  IMPRESSION:  1. Based on the most suspicious abnormality, this exam is  characterized as PIRADS 3 - The presence of clinically significant  cancer is equivocal.?The most suspicious abnormality is located at the  left posterior apex peripheral zone and there is no evidence of  extraprostatic extension.  2. No suspicious adenopathy or evidence of pelvic metastases.     MRI PROSTATE 3/31/22:  FINDINGS:  Size: 4.2 x 5.1 x 6.0 cm, 66.8 grams  Hemorrhage: Absent  Peripheral zone: Heterogeneous on T2-weighted images. Suspicious  lesions as detailed below.  Transition zone: Enlarged with BPH changes. Transition zone nodules  which are circumscribed or mostly encapsulated without diffusion  restriction.  PI-RADS 2.  No highly suspicious nodules.     Lesion(s) in rank order of severity (highest score- to lowest score,  then by size)      Lesion 1:  Location: Left apex peripheral zone at the   5  o'clock position  relative to the urethra. Series 7 image 71.  Size: 6 mm  T2 description: Heterogeneous signal intensity  T2 numerical assessment: 3  DWI description: Hypointense on ADC and hyperintense on high B-value  DWI  DWI numerical assessment: 3  DCE assessment: Positive    Prostate margin: No capsular abutment  Lesion overall PI-RADS category: 4     Neurovascular bundles: No suspicion of involvement by malignancy  Seminal vesicles: Not involved by tumor.  Lymph nodes: No  adenopathy.  Bones: No suspicious lesions.  Other pelvic organs: Small fat-containing left inguinal hernia.                                                        IMPRESSION:  1. Based on the most suspicious abnormality, this exam is  characterized as PIRADS 4 - Clinically significant cancer is likely to  be present.?The most suspicious abnormality is located at the left  apex peripheral zone and there is no evidence of extraprostatic  extension. Compared to prior MR prostate 4/20/2019, this lesion is  similar in size and morphology, but now demonstrates focal  enhancement, raising the lesion to a PIRADS 4.  2. No suspicious adenopathy or evidence of pelvic metastases.       ASSESSMENT and PLAN  72-year-old man with history of BPH and LUTS with elevated PSA    Elevated PSA  -Reviewed his PSA history and trend  -His PSA today demonstrates significant increase to 9.2 from 3.0 following stopping the dutasteride  -I reviewed his prior MRI and reviewed these images personally and agree with radiologist interpretation  -I reviewed his prior biopsy results which were negative from an MR fusion biopsy  -I reviewed his updated MRI and reviewed these images personally.  I agree with radiologist interpretation.  We discussed the PI-RADS scoring system and that the lesion that had previously been described as a PI-RADS 3 now has some enhancement and is categorized as a PI-RADS 4 lesion.  We discussed that this will correlate with clinically significant prostate cancer about 80% of the time  -We discussed the need to perform a repeat MR fusion UroNav biopsy  -TRUS/Bx - we discussed the risks and benefits of the prostate biopsy including the normal intermittent hematuria, blood per rectum, and blood with ejaculation as well as a 1-2% risk of post biopsy sepsis requiring hospitalization and IV antibiotics  -Prescription for ciprofloxacin sent to the pharmacy    BPH and LUTS  -He is doing fairly well with minimal symptoms off of  medications at present    Time spent: 20 minutes spent on the date of the encounter doing chart review, history and exam, documentation and further activities as noted above.    Vin Sinha MD   Urology  [facility]  [facility] Phone: [phone]  [facility] Phone: [phone]        Chris is a 72 year old who is being evaluated via a billable video visit.      How would you like to obtain your AVS? MyChart  If the video visit is dropped, the invitation should be resent by: Text to cell phone: [phone]  Will anyone else be joining your video visit? No        Video-Visit Details    Type of service:  Video Visit    Video Start Time: 1:00 PM    Video End Time:1:12 PM    Originating Location (pt. Location): Home    Distant Location (provider location):  [facility] UROLOGY CLINIC ALICE     Platform used for Video Visit: [employer]umistic

## 2022-04-07 ENCOUNTER — OFFICE VISIT (OUTPATIENT)
Dept: FAMILY MEDICINE | Facility: CLINIC | Age: 72
End: 2022-04-07
Payer: COMMERCIAL

## 2022-04-07 VITALS
HEIGHT: 70 IN | BODY MASS INDEX: 33.16 KG/M2 | HEART RATE: 67 BPM | SYSTOLIC BLOOD PRESSURE: 122 MMHG | TEMPERATURE: 97 F | DIASTOLIC BLOOD PRESSURE: 72 MMHG | OXYGEN SATURATION: 98 % | WEIGHT: 231.6 LBS

## 2022-04-07 DIAGNOSIS — I10 BENIGN ESSENTIAL HYPERTENSION: ICD-10-CM

## 2022-04-07 DIAGNOSIS — Z12.5 SCREENING FOR PROSTATE CANCER: ICD-10-CM

## 2022-04-07 DIAGNOSIS — Z00.00 HEALTH MAINTENANCE EXAMINATION: Primary | ICD-10-CM

## 2022-04-07 DIAGNOSIS — Z23 HIGH PRIORITY FOR 2019-NCOV VACCINE: ICD-10-CM

## 2022-04-07 DIAGNOSIS — Z12.11 SCREEN FOR COLON CANCER: ICD-10-CM

## 2022-04-07 DIAGNOSIS — H57.9 EYE PROBLEM: ICD-10-CM

## 2022-04-07 LAB
ALBUMIN SERPL-MCNC: 3.7 G/DL (ref 3.4–5)
ALP SERPL-CCNC: 74 U/L (ref 40–150)
ALT SERPL W P-5'-P-CCNC: 40 U/L (ref 0–70)
ANION GAP SERPL CALCULATED.3IONS-SCNC: 4 MMOL/L (ref 3–14)
AST SERPL W P-5'-P-CCNC: 28 U/L (ref 0–45)
BILIRUB SERPL-MCNC: 0.6 MG/DL (ref 0.2–1.3)
BUN SERPL-MCNC: 12 MG/DL (ref 7–30)
CALCIUM SERPL-MCNC: 9.2 MG/DL (ref 8.5–10.1)
CHLORIDE BLD-SCNC: 106 MMOL/L (ref 94–109)
CHOLEST SERPL-MCNC: 129 MG/DL
CO2 SERPL-SCNC: 28 MMOL/L (ref 20–32)
CREAT SERPL-MCNC: 0.89 MG/DL (ref 0.66–1.25)
ERYTHROCYTE [DISTWIDTH] IN BLOOD BY AUTOMATED COUNT: 14 % (ref 10–15)
FASTING STATUS PATIENT QL REPORTED: YES
GFR SERPL CREATININE-BSD FRML MDRD: >90 ML/MIN/1.73M2
GLUCOSE BLD-MCNC: 93 MG/DL (ref 70–99)
HCT VFR BLD AUTO: 41.9 % (ref 40–53)
HDLC SERPL-MCNC: 45 MG/DL
HGB BLD-MCNC: 14 G/DL (ref 13.3–17.7)
LDLC SERPL CALC-MCNC: 46 MG/DL
MCH RBC QN AUTO: 29.8 PG (ref 26.5–33)
MCHC RBC AUTO-ENTMCNC: 33.4 G/DL (ref 31.5–36.5)
MCV RBC AUTO: 89 FL (ref 78–100)
NONHDLC SERPL-MCNC: 84 MG/DL
PLATELET # BLD AUTO: 248 10E3/UL (ref 150–450)
POTASSIUM BLD-SCNC: 4.2 MMOL/L (ref 3.4–5.3)
PROT SERPL-MCNC: 7.5 G/DL (ref 6.8–8.8)
RBC # BLD AUTO: 4.7 10E6/UL (ref 4.4–5.9)
SODIUM SERPL-SCNC: 138 MMOL/L (ref 133–144)
TRIGL SERPL-MCNC: 189 MG/DL
WBC # BLD AUTO: 10.4 10E3/UL (ref 4–11)

## 2022-04-07 PROCEDURE — 99397 PER PM REEVAL EST PAT 65+ YR: CPT | Performed by: FAMILY MEDICINE

## 2022-04-07 PROCEDURE — 0054A COVID-19,PF,PFIZER (12+ YRS): CPT | Performed by: FAMILY MEDICINE

## 2022-04-07 PROCEDURE — 91305 COVID-19,PF,PFIZER (12+ YRS): CPT | Performed by: FAMILY MEDICINE

## 2022-04-07 PROCEDURE — 36415 COLL VENOUS BLD VENIPUNCTURE: CPT | Performed by: FAMILY MEDICINE

## 2022-04-07 PROCEDURE — 80053 COMPREHEN METABOLIC PANEL: CPT | Performed by: FAMILY MEDICINE

## 2022-04-07 PROCEDURE — 80061 LIPID PANEL: CPT | Performed by: FAMILY MEDICINE

## 2022-04-07 PROCEDURE — 85027 COMPLETE CBC AUTOMATED: CPT | Performed by: FAMILY MEDICINE

## 2022-04-07 RX ORDER — TRIAMTERENE/HYDROCHLOROTHIAZID 37.5-25 MG
0.5 TABLET ORAL DAILY
Qty: 45 TABLET | Refills: 3 | COMMUNITY
Start: 2022-04-07 | End: 2022-07-18

## 2022-04-07 ASSESSMENT — ENCOUNTER SYMPTOMS
WEAKNESS: 0
HEMATURIA: 0
SORE THROAT: 0
FEVER: 0
ABDOMINAL PAIN: 0
HEADACHES: 0
DIZZINESS: 0
PARESTHESIAS: 0
MYALGIAS: 0
HEMATOCHEZIA: 0
ARTHRALGIAS: 0
CHILLS: 0
SHORTNESS OF BREATH: 0
CONSTIPATION: 0
NAUSEA: 0
HEARTBURN: 0
JOINT SWELLING: 0
NERVOUS/ANXIOUS: 0
PALPITATIONS: 0
DIARRHEA: 0
DYSURIA: 0
COUGH: 0
EYE PAIN: 0
FREQUENCY: 1

## 2022-04-07 ASSESSMENT — ACTIVITIES OF DAILY LIVING (ADL): CURRENT_FUNCTION: NO ASSISTANCE NEEDED

## 2022-04-07 NOTE — PROGRESS NOTES
"SUBJECTIVE:   Pranay Ratliff is a 72 year old male who presents for Preventive Visit.      Patient has been advised of split billing requirements and indicates understanding: Yes  Are you in the first 12 months of your Medicare coverage?  No    Healthy Habits:     In general, how would you rate your overall health?  Good    Frequency of exercise:  6-7 days/week    Duration of exercise:  45-60 minutes    Do you usually eat at least 4 servings of fruit and vegetables a day, include whole grains    & fiber and avoid regularly eating high fat or \"junk\" foods?  Yes    Taking medications regularly:  Yes    Medication side effects:  None    Ability to successfully perform activities of daily living:  No assistance needed    Home Safety:  Throw rugs in the hallway and lack of grab bars in the bathroom    Hearing Impairment:  Difficulty following a conversation in a noisy restaurant or crowded room, feel that people are mumbling or not speaking clearly, difficulty following dialogue in the theater, difficult to understand a speaker at a public meeting or [employer] service, need to ask people to speak up or repeat themselves, find that men's voices are easier to understand than woman's and difficulty understanding soft or whispered speech    In the past 6 months, have you been bothered by leaking of urine?  No    In general, how would you rate your overall mental or emotional health?  Good      PHQ-2 Total Score: 0    Additional concerns today:  No    Do you feel safe in your environment? Yes    Have you ever done Advance Care Planning? (For example, a Health Directive, POLST, or a discussion with a medical provider or your loved ones about your wishes): Yes, patient states has an Advance Care Planning document and will bring a copy to the clinic.      Fall risk  Fallen 2 or more times in the past year?: No  Any fall with injury in the past year?: Yes  Timed Up and Go Test (>13.5 is fall risk; contact physician) : " 10    Cognitive Screening   1) Repeat 3 items (Leader, Season, Table)    2) Clock draw: ABNORMAL hands placed incorrectly   3) 3 item recall: Recalls 3 objects  Results: 3 items recalled: COGNITIVE IMPAIRMENT LESS LIKELY    Mini-CogTM Copyright S Aaron. Licensed by the author for use in [facility]; reprinted with permission (nikita@[facility]). All rights reserved.      Do you have sleep apnea, excessive snoring or daytime drowsiness?: yes    Reviewed and updated as needed this visit by clinical staff                  Reviewed and updated as needed this visit by Provider                 Social History     Tobacco Use     Smoking status: Never Smoker     Smokeless tobacco: Never Used   Substance Use Topics     Alcohol use: Yes     Comment: twice a week     If you drink alcohol do you typically have >3 drinks per day or >7 drinks per week? No    Alcohol Use 4/1/2022   Prescreen: >3 drinks/day or >7 drinks/week? No         Current providers sharing in care for this patient include:   Patient Care Team:  Hipolito Alva MD as PCP - General (Family Medicine)  Jaylan Guajardo MD as MD (Urology)  Hipolito Alva MD as Assigned PCP  Jaylan Guajardo MD as Assigned Cancer Care Provider  Vin iSnha MD as Assigned Surgical Provider    The following health maintenance items are reviewed in Epic and correct as of today:  Health Maintenance Due   Topic Date Due     ADVANCE CARE PLANNING  Never done     AORTIC ANEURYSM SCREENING (SYSTEM ASSIGNED)  Never done     MEDICARE ANNUAL WELLNESS VISIT  01/30/2020     COLORECTAL CANCER SCREENING  04/12/2021     BP Readings from Last 3 Encounters:   04/07/22 122/72   03/16/22 130/70   03/09/22 130/74    Wt Readings from Last 3 Encounters:   04/07/22 105.1 kg (231 lb 9.6 oz)   04/06/22 104.3 kg (230 lb)   03/16/22 106.6 kg (235 lb)                  Patient Active Problem List   Diagnosis     History of basal cell carcinoma     Benign essential hypertension      Hyperlipidemia LDL goal <100     Obstructive sleep apnea syndrome     Gastroesophageal reflux disease with esophagitis     Localized swelling of lower extremity     Vasovagal syncope     Elevated prostate specific antigen (PSA)     Concussion with loss of consciousness of 30 minutes or less, subsequent encounter     BPH with obstruction/lower urinary tract symptoms     Myofascial pain syndrome, cervical     Occipital neuralgia of right side     Fatigue due to old head injury     Post concussion syndrome     Cervical segment dysfunction     Thoracic segment dysfunction     Cephalgia     Somatic dysfunction of sacral region     Cervical pain     Dizziness     Past Surgical History:   Procedure Laterality Date     TESTICLE SURGERY       VASECTOMY         Social History     Tobacco Use     Smoking status: Never Smoker     Smokeless tobacco: Never Used   Substance Use Topics     Alcohol use: Yes     Comment: twice a week     Family History   Problem Relation Age of Onset     Skin Cancer Mother          from pneumonia     Abdominal Aortic Aneurysm Mother      Heart Failure Father      Diabetes Type 1 Brother         Possibly secondary to chemical exposure in [place]     Diabetes Type 2  Maternal Grandmother      Cancer Brother         Exposure to Agent Orange in [place]     Heart Disease Brother          Current Outpatient Medications   Medication Sig Dispense Refill     aspirin 81 MG tablet Take by mouth daily       carbamide peroxide (DEBROX) 6.5 % otic solution Place 5 drops into both ears daily as needed for other (impacted cerumen) 14.8 mL 11     clotrimazole-betamethasone (LOTRISONE) 1-0.05 % external cream APPLY TOPICALLY TWICE DAILY TO THE FEET. 45 g 0     diclofenac (VOLTAREN) 1 % topical gel Apply topically 4 times daily       dutasteride (AVODART) 0.5 MG capsule Take 0.5 mg by mouth daily        fluocinonide (LIDEX) 0.05 % external cream Apply topically 2 times daily 120 g 6     gabapentin (NEURONTIN)  100 MG capsule Take 100 mg by mouth Take 1 time daily at HS       multivitamin, therapeutic with minerals (MULTI-VITAMIN) TABS Take 1 tablet by mouth daily       Omega-3 Fatty Acids (OMEGA-3 FISH OIL PO)        pantoprazole (PROTONIX) 40 MG EC tablet Take 40 mg by mouth daily       rosuvastatin (CRESTOR) 20 MG tablet TAKE 1 TABLET BY MOUTH EVERY DAY 90 tablet 1     triamterene-HCTZ (MAXZIDE-25) 37.5-25 MG tablet Take 0.5 tablets by mouth daily 45 tablet 3     ciprofloxacin (CIPRO) 500 MG tablet Take 1 tablet (500 mg) by mouth 2 times daily Start taking the day before your prostate biopsy. (Patient not taking: Reported on 4/7/2022) 6 tablet 0     Allergies   Allergen Reactions     Johanna-Jana Plus Allergy [Diphenhydramine]      Patient has lip swollen ,has used tylenol in th past. No issues. Has used benadryl in the past no issues.  Never used phenylephrine.     Recent Labs   Lab Test 03/02/19  1636 01/30/19  0901   LDL  --  73   HDL  --  48   TRIG  --  181*   ALT  --  30   CR 1.08 0.96   GFRESTIMATED 70 80   GFRESTBLACK 81 >90   POTASSIUM 3.9 3.6   TSH 3.64  --       Pneumonia Vaccine:Adults age 65+ who received Pneumovax (PPSV23) at 65 years or older: Should be given PCV13 > 1 year after their most recent PPSV23        Review of Systems   Constitutional: Negative for chills and fever.   HENT: Positive for hearing loss. Negative for congestion and sore throat.    Eyes: Negative for pain and visual disturbance.   Respiratory: Negative for cough and shortness of breath.    Cardiovascular: Negative for chest pain, palpitations and peripheral edema.   Gastrointestinal: Negative for abdominal pain, constipation, diarrhea, heartburn, hematochezia and nausea.   Genitourinary: Positive for frequency, impotence and urgency. Negative for dysuria, genital sores, hematuria and penile discharge.   Musculoskeletal: Negative for arthralgias, joint swelling and myalgias.   Skin: Negative for rash.   Neurological: Negative for  "dizziness, weakness, headaches and paresthesias.   Psychiatric/Behavioral: Negative for mood changes. The patient is not nervous/anxious.      Constitutional, HEENT, cardiovascular, pulmonary, gi and gu systems are negative, except as otherwise noted.    OBJECTIVE:   /72   Pulse 67   Temp 97  F (36.1  C) (Tympanic)   Ht 1.778 m (5' 10\")   Wt 105.1 kg (231 lb 9.6 oz)   SpO2 98%   BMI 33.23 kg/m   Estimated body mass index is 33.23 kg/m  as calculated from the following:    Height as of this encounter: 1.778 m (5' 10\").    Weight as of this encounter: 105.1 kg (231 lb 9.6 oz).  Physical Exam  GENERAL: healthy, alert and no distress  EYES: Eyes grossly normal to inspection, PERRL and conjunctivae and sclerae normal  HENT: ear canals and TM's normal, nose and mouth without ulcers or lesions  NECK: no adenopathy, no asymmetry, masses, or scars and thyroid normal to palpation  RESP: lungs clear to auscultation - no rales, rhonchi or wheezes  CV: regular rate and rhythm, normal S1 S2, no S3 or S4, no murmur, click or rub, no peripheral edema and peripheral pulses strong  ABDOMEN: soft, nontender, no hepatosplenomegaly, no masses and bowel sounds normal  MS: no gross musculoskeletal defects noted, no edema  SKIN: no suspicious lesions or rashes  NEURO: Normal strength and tone, mentation intact and speech normal  BACK: no CVA tenderness, no paralumbar tenderness  PSYCH: mentation appears normal, affect normal/bright        ASSESSMENT / PLAN:       ICD-10-CM    1. Health maintenance examination  Z00.00 CBC with platelets     Comprehensive metabolic panel (BMP + Alb, Alk Phos, ALT, AST, Total. Bili, TP)     Lipid panel reflex to direct LDL Fasting   2. Screen for colon cancer  Z12.11    3. Screening for prostate cancer  Z12.5    4. Benign essential hypertension  I10 triamterene-HCTZ (MAXZIDE-25) 37.5-25 MG tablet   5. Eye problem  H57.9 Adult Eye Referral         COUNSELING:  Reviewed preventive health counseling, " "as reflected in patient instructions    Estimated body mass index is 33.23 kg/m  as calculated from the following:    Height as of this encounter: 1.778 m (5' 10\").    Weight as of this encounter: 105.1 kg (231 lb 9.6 oz).    Weight management plan: Discussed healthy diet and exercise guidelines    He reports that he has never smoked. He has never used smokeless tobacco.      Appropriate preventive services were discussed with this patient, including applicable screening as appropriate for cardiovascular disease, diabetes, osteopenia/osteoporosis, and glaucoma.  As appropriate for age/gender, discussed screening for colorectal cancer, prostate cancer, breast cancer, and cervical cancer. Checklist reviewing preventive services available has been given to the patient.    Reviewed patients plan of care and provided an AVS. The Basic Care Plan (routine screening as documented in Health Maintenance) for Pranay meets the Care Plan requirement. This Care Plan has been established and reviewed with the Patient.    Counseling Resources:  ATP IV Guidelines  Pooled Cohorts Equation Calculator  Breast Cancer Risk Calculator  Breast Cancer: Medication to Reduce Risk  FRAX Risk Assessment  ICSI Preventive Guidelines  Dietary Guidelines for Americans, 2010  USDA's MyPlate  ASA Prophylaxis  Lung CA Screening    Hipolito Alva MD  [facility]    Identified Health Risks:  "

## 2022-04-11 ENCOUNTER — TELEPHONE (OUTPATIENT)
Dept: FAMILY MEDICINE | Facility: CLINIC | Age: 72
End: 2022-04-11

## 2022-04-11 NOTE — TELEPHONE ENCOUNTER
Faxed received by [facility] ([facility])      Directive to Physicians    HIPAA Authorization    Medical Power of  Disclosure Statement    Documents stamped for scanning and given to Dr. Alva for review.     Corie Wick,  [facility]

## 2022-04-21 ENCOUNTER — DOCUMENTATION ONLY (OUTPATIENT)
Dept: OTHER | Facility: CLINIC | Age: 72
End: 2022-04-21
Payer: COMMERCIAL

## 2022-05-16 ENCOUNTER — OFFICE VISIT (OUTPATIENT)
Dept: UROLOGY | Facility: CLINIC | Age: 72
End: 2022-05-16
Payer: COMMERCIAL

## 2022-05-16 VITALS
HEART RATE: 64 BPM | HEIGHT: 70 IN | DIASTOLIC BLOOD PRESSURE: 64 MMHG | SYSTOLIC BLOOD PRESSURE: 122 MMHG | BODY MASS INDEX: 33.07 KG/M2 | WEIGHT: 231 LBS

## 2022-05-16 DIAGNOSIS — R97.20 ELEVATED PROSTATE SPECIFIC ANTIGEN (PSA): Primary | ICD-10-CM

## 2022-05-16 PROCEDURE — 55700 PR BIOPSY OF PROSTATE,NEEDLE/PUNCH: CPT | Performed by: UROLOGY

## 2022-05-16 PROCEDURE — 96372 THER/PROPH/DIAG INJ SC/IM: CPT | Mod: 59 | Performed by: UROLOGY

## 2022-05-16 PROCEDURE — 88341 IMHCHEM/IMCYTCHM EA ADD ANTB: CPT | Performed by: PATHOLOGY

## 2022-05-16 PROCEDURE — 88305 TISSUE EXAM BY PATHOLOGIST: CPT | Performed by: PATHOLOGY

## 2022-05-16 PROCEDURE — 88342 IMHCHEM/IMCYTCHM 1ST ANTB: CPT | Performed by: PATHOLOGY

## 2022-05-16 PROCEDURE — 76942 ECHO GUIDE FOR BIOPSY: CPT | Performed by: UROLOGY

## 2022-05-16 RX ORDER — GENTAMICIN 40 MG/ML
80 INJECTION, SOLUTION INTRAMUSCULAR; INTRAVENOUS ONCE
Status: COMPLETED | OUTPATIENT
Start: 2022-05-16 | End: 2022-05-16

## 2022-05-16 RX ORDER — CIPROFLOXACIN 250 MG/1
TABLET, FILM COATED ORAL
COMMUNITY
Start: 2022-04-06 | End: 2022-10-03

## 2022-05-16 RX ADMIN — LIDOCAINE HYDROCHLORIDE 10 ML: 10 INJECTION, SOLUTION INFILTRATION; PERINEURAL at 09:35

## 2022-05-16 RX ADMIN — GENTAMICIN 80 MG: 40 INJECTION, SOLUTION INTRAMUSCULAR; INTRAVENOUS at 09:00

## 2022-05-16 ASSESSMENT — PAIN SCALES - GENERAL: PAINLEVEL: NO PAIN (0)

## 2022-05-16 NOTE — NURSING NOTE
The following medication was given:     MEDICATION: Gentamicin   ROUTE: IM  SITE: LUQ - Gluteus  DOSE:80mg/2ml  LOT #: [id number]  : Onaro  EXPIRATION DATE: 03/23  NDC#:  49314-184-06  Was there drug waste? No  Multi-dose vial: Yes    Using a 1 1/2 inch 21 guage needle medication drawn up as ordered with sterile syringe. Using sterile technique, a new 1 1/2 needle 21 gauge placed on syringe and patient cleansed with alcohol pad. Site was mapped out using palm of hand on the greater trochanter and forefinger on iliac crest. Using V technique between middle finger and index finger. Skin was tracted and Injection was given using a 90 degree angle dart method and after aspiration of needle and no blood, medication was slowly given via IM injection. After 10 seconds needle was removed.  Patient tolerated injection well, and bandage placed on site following insertion removal.     Marisa Amador LPN

## 2022-05-16 NOTE — PATIENT INSTRUCTIONS
[facility]  Transrectal Ultrasound  Post Operative Information    The physician who performed your Transrectal Ultrasound is Dr. Sinha (telephone number [phone]).  Please contact this doctor if you have any problems or questions.  If unable to reach your doctor, please return to the Emergency Department.    Take one antibiotic the evening of the procedure and then as directed on your prescription.  Drink at least 6-8 glasses of fluids for the first 48 hours.  Avoid heavy lifting and strenuous activity for 48 hours.  Avoid sexual intercourse for the first 24 hours.  No aspirin or ibuprofen products (Motrin, Advil, Nuprin, ect.) for one week.  You may take acetaminophen (Tylenol) for pain.  You may notice a small amount of blood on the tissue after a bowel movement.  You may pass blood with clots in your urine following the procedure.  The amount will decrease with time but may be visible for up to two weeks.   You make have blood in your semen for 4 weeks after the procedure.  You may experience mild perineal (groin area) discomfort after the procedure.  Please call you doctor if you have any of the follow symptoms:  Fever  Increase in the amount of blood passed  Severe discomfort or pain

## 2022-05-16 NOTE — NURSING NOTE
Chief Complaint   Patient presents with     Elevated PSA     Patient is here for Transrectal Ultrasound/MRI Guided Prostate Biopsies      Procedure was explained to patient prior to performing said procedure.  The patient signed the [place] consent form and all questions were answered prior to the procedure.  Any pre-procedural antibiotics were given according to the performing physician's recommendation.  Patient reviewed information on the labels attached to samples and confirmed the accuracy of all of the labels.     Performing Physician:  Dr. Sinha  Antibiotic taken?  yes  Aspirin or other blood thinning medications discontinued 7-10 days:    Time of enema: 8:00am    Patient given Gentamicin intramuscular injection 30 minutes prior to procedure  Yes    Twelve samples were taken from the right and left, medial and lateral base, mid and apex of the prostate respectively.   additional samples were taken from .  Vitals were repeated prior to patient leaving and instructions for post TRUS care were explained to the patient.     Marisa Amador LPN

## 2022-05-16 NOTE — PROGRESS NOTES
PHYSICIANS NOTE: TRANSRECTAL ULTRASOUND AND BIOPSY PROCEDURE: 5/16/2022   LLOYD    Sign In   History and Physical Exam reviewed and is unchanged.     Primary Diagnosis: Elevated psa (primary encounter diagnosis)   Prostate cancer     Informed Consent Discussed: Yes. Risks, benefits, alternatives and personnel discussed with patient who consents to proceed.     Sign in Communication: Completed   Time Out: Team Confirms the Correct Patient,   Correct Procedure; Transrectal Ultrasound and Biopsy, Correct Site and Site Marking (not applicable), Correct Position.   Affirmation of Time Out: YES   Sign Out: Sign Out Discussion: Completed   Patient history and ROS confirmed unchanged from last office visit.   Family history for prostate cancer is: unknown   Audible Time Out: Yes     TRUS PROCEDURE WITH BIOPSY - MR FUSION URONAV BIOPSY    The patient was placed in the lateral decubitus position.     Digital rectal exam was normal.     The ultrasound probe was placed into the rectum and the prostate visualized.   Approximately five cc plain lidocaine was injected bilaterally into the perioprostatic nerves.     The prostate was visualized in both planes and a hypoechoic lesion identified corresponding to the MRI target.     The total prostate volume was 81.9 gm     The patient underwent biopsy removing 15 total cores. 3 cores from the MRI target and 12 standard cores.    PSA   Date Value Ref Range Status   02/26/2019 6.32 (H) 0 - 4 ug/L Final     Comment:     Assay Method:  Chemiluminescence using Siemens Vista analyzer     PSA Tumor Marker   Date Value Ref Range Status   03/16/2022 9.20 (H) 0.00 - 4.00 ug/L Final       ----------     Complications: None     Follow-up: He is on MyChart and would like to receive results before our scheduled visit. Follow up as scheduled.    Vin Sinha MD

## 2022-05-16 NOTE — LETTER
5/16/2022       RE: Pranay Ratliff  [address]  [place] [address]     Dear Colleague,    Thank you for referring your patient, Pranay Ratliff, to the [facility] [place] [facility]. Please see a copy of my visit note below.    PHYSICIANS NOTE: TRANSRECTAL ULTRASOUND AND BIOPSY PROCEDURE: 5/16/2022   LLOYD    Sign In   History and Physical Exam reviewed and is unchanged.     Primary Diagnosis: Elevated psa (primary encounter diagnosis)   Prostate cancer     Informed Consent Discussed: Yes. Risks, benefits, alternatives and personnel discussed with patient who consents to proceed.     Sign in Communication: Completed   Time Out: Team Confirms the Correct Patient,   Correct Procedure; Transrectal Ultrasound and Biopsy, Correct Site and Site Marking (not applicable), Correct Position.   Affirmation of Time Out: YES   Sign Out: Sign Out Discussion: Completed   Patient history and ROS confirmed unchanged from last office visit.   Family history for prostate cancer is: unknown   Audible Time Out: Yes     TRUS PROCEDURE WITH BIOPSY - MR FUSION URONAV BIOPSY    The patient was placed in the lateral decubitus position.     Digital rectal exam was normal.     The ultrasound probe was placed into the rectum and the prostate visualized.   Approximately five cc plain lidocaine was injected bilaterally into the perioprostatic nerves.     The prostate was visualized in both planes and a hypoechoic lesion identified corresponding to the MRI target.     The total prostate volume was 81.9 gm     The patient underwent biopsy removing 15 total cores. 3 cores from the MRI target and 12 standard cores.    PSA   Date Value Ref Range Status   02/26/2019 6.32 (H) 0 - 4 ug/L Final     Comment:     Assay Method:  Chemiluminescence using Siemens Vista analyzer     PSA Tumor Marker   Date Value Ref Range Status   03/16/2022 9.20 (H) 0.00 - 4.00 ug/L Final        ----------     Complications: None     Follow-up: He is on MyChart and would like to receive results before our scheduled visit. Follow up as scheduled.    Vin Sinha MD

## 2022-05-18 LAB
PATH REPORT.COMMENTS IMP SPEC: NORMAL
PATH REPORT.COMMENTS IMP SPEC: NORMAL
PATH REPORT.FINAL DX SPEC: NORMAL
PATH REPORT.GROSS SPEC: NORMAL
PATH REPORT.MICROSCOPIC SPEC OTHER STN: NORMAL
PATH REPORT.RELEVANT HX SPEC: NORMAL
PHOTO IMAGE: NORMAL

## 2022-05-18 RX ORDER — LIDOCAINE HYDROCHLORIDE 10 MG/ML
10 INJECTION, SOLUTION INFILTRATION; PERINEURAL ONCE
Status: COMPLETED | OUTPATIENT
Start: 2022-05-16 | End: 2022-05-16

## 2022-05-18 NOTE — NURSING NOTE
he following medication was given:     MEDICATION:  Lidocaine 1% Soln  ROUTE: Local Infiltration   SITE: Prostate  DOSE: 100mg/10ml   LOT #: [id number]  : Clipmarks  EXPIRATION DATE: 11/2023  NDC#: 55150-251-10  Was there drug waste? Yes  Amount of drug waste (mL): 10.  Reason for waste:  As per MD  Multi-dose vial: Yes    Marisa Amador LPN

## 2022-05-25 ENCOUNTER — VIRTUAL VISIT (OUTPATIENT)
Dept: UROLOGY | Facility: CLINIC | Age: 72
End: 2022-05-25
Payer: COMMERCIAL

## 2022-05-25 VITALS — HEIGHT: 70 IN | WEIGHT: 230 LBS | BODY MASS INDEX: 32.93 KG/M2

## 2022-05-25 DIAGNOSIS — N13.8 BPH WITH OBSTRUCTION/LOWER URINARY TRACT SYMPTOMS: ICD-10-CM

## 2022-05-25 DIAGNOSIS — R97.20 ELEVATED PROSTATE SPECIFIC ANTIGEN (PSA): Primary | ICD-10-CM

## 2022-05-25 DIAGNOSIS — N40.1 BPH WITH OBSTRUCTION/LOWER URINARY TRACT SYMPTOMS: ICD-10-CM

## 2022-05-25 PROCEDURE — 99214 OFFICE O/P EST MOD 30 MIN: CPT | Mod: 95 | Performed by: UROLOGY

## 2022-05-25 ASSESSMENT — PAIN SCALES - GENERAL: PAINLEVEL: NO PAIN (0)

## 2022-05-25 NOTE — PROGRESS NOTES
"[facility]   CHIEF COMPLAINT   It was my pleasure to see Pranay Ratliff who is a 72 year old male for follow-up of Elevated PSA and LUTS.      HPI   Pranay Ratliff is a very pleasant 72 year old male    Prior pt of Dr. Guajardo - last seen 9/16/21:  \"Pranay Ratliff is a very pleasant 71 year old male who presents with a history of Elevated PSA (Prostate Specific Antigen).  PSA 5.3 and had s/p TRUS biopsy 8/2019. Has done well since biopsy which demonstrated no malignancy.     Also has history of BPH with weak stream and LUTS. Did not tolerate tamsulosin on previous trial of this. Is bothered by his urinary symptoms.  Has been on several medications over the years, most recently managed by urologist in [place], [place]. He is now back in [place]. Most recently was taking tadalafil 5 mg daily and dutasteride. Stopped these a few weeks ago given concerns about light-headedness. Currently still bothered by weak stream, and nocturia.    Restarted on dutasteride\"    3/16/22:  He never resumed the Dutasteride due to gynecomastia   He continues to have retrograde ejaculation   Urination is stable  Nocturia 2x/night  Overall, not too bothered by symptoms     He just moved back from [facility] last fall  He did have a cystoscopy 05/2021 with no concern for intravesical mass    4/6/22:  Follow-up today to review his updated MRI  He had no issues with this MRI    TODAY 5/25/2022:  He did well after the biopsy without complications  Still occasional blood per urethra    PHYSICAL EXAM  Patient is a 72 year old  male   Vitals: Height 1.778 m (5' 10\"), weight 104.3 kg (230 lb).  Body mass index is 33 kg/m .  General Appearance Adult:   Alert, no acute distress, oriented  HENT: throat/mouth:normal, good dentition  Lungs: no respiratory distress, or pursed lip breathing  Heart: No obvious jugular venous distension present  Abdomen: non - distended  Musculoskeltal: extremities normal, no peripheral edema  Skin: no suspicious lesions or " cal  Neuro: Alert, oriented, speech and mentation normal  Psych: affect and mood normal    Component PSA PSA Diag Urologic Phys   Latest Ref Rng & Units 0.00 - 4.00 ug/L 0.00 - 4.00 ng/mL   1/30/2019 6.65 (H)    2/26/2019 6.32 (H)    4/22/2019  4.70 (H)   7/15/2019  5.30 (H)   9/16/2021 3.00    3/16/2022 9.20 (H)      PATHOLOGY:  TRUS 8/6/2019  FINAL DIAGNOSIS:   A - G. Prostate biopsy template and target:   - Benign prostate tissue     MR-FUSION URONAV 5/16/22:  Final Diagnosis   A - L: Prostate, TEMPLATE, needle core biopsy:  - Negative for malignancy.     M: Prostate, left lesion x3, needle core biopsy:  - Negative for malignancy.  - Acute and chronic inflammation present.     IMAGING:  All pertinent imaging reviewed:    All imaging studies reviewed by me.  I personally reviewed these imaging films.  A formal report from radiology will follow.    MRI Prostate 4/20/2019  FINDINGS:  Size: 73.5 grams  IMPRESSION:  1. Based on the most suspicious abnormality, this exam is  characterized as PIRADS 3 - The presence of clinically significant  cancer is equivocal.?The most suspicious abnormality is located at the  left posterior apex peripheral zone and there is no evidence of  extraprostatic extension.  2. No suspicious adenopathy or evidence of pelvic metastases.     MRI PROSTATE 3/31/22:  FINDINGS:  Size: 4.2 x 5.1 x 6.0 cm, 66.8 grams  Hemorrhage: Absent  Peripheral zone: Heterogeneous on T2-weighted images. Suspicious  lesions as detailed below.  Transition zone: Enlarged with BPH changes. Transition zone nodules  which are circumscribed or mostly encapsulated without diffusion  restriction.  PI-RADS 2.  No highly suspicious nodules.     Lesion(s) in rank order of severity (highest score- to lowest score,  then by size)      Lesion 1:  Location: Left apex peripheral zone at the   5  o'clock position  relative to the urethra. Series 7 image 71.  Size: 6 mm  T2 description: Heterogeneous signal intensity  T2  numerical assessment: 3  DWI description: Hypointense on ADC and hyperintense on high B-value  DWI  DWI numerical assessment: 3  DCE assessment: Positive    Prostate margin: No capsular abutment  Lesion overall PI-RADS category: 4     Neurovascular bundles: No suspicion of involvement by malignancy  Seminal vesicles: Not involved by tumor.  Lymph nodes: No adenopathy.  Bones: No suspicious lesions.  Other pelvic organs: Small fat-containing left inguinal hernia.                                                        IMPRESSION:  1. Based on the most suspicious abnormality, this exam is  characterized as PIRADS 4 - Clinically significant cancer is likely to  be present.?The most suspicious abnormality is located at the left  apex peripheral zone and there is no evidence of extraprostatic  extension. Compared to prior MR prostate 4/20/2019, this lesion is  similar in size and morphology, but now demonstrates focal  enhancement, raising the lesion to a PIRADS 4.  2. No suspicious adenopathy or evidence of pelvic metastases.       ASSESSMENT and PLAN  72-year-old man with history of BPH and LUTS with elevated PSA    Elevated PSA  -Reviewed his PSA history and trend  -His PSA in March demonstrates significant increase to 9.2 from 3.0 following stopping the dutasteride  -I reviewed his MRI report and images personally.  We discussed that the PI-RADS 4 lesion is slightly changed from what was described as a PI-RADS 3 lesion on his MRI from 2019  - I reviewed his pathology results from his MR fusion biopsy which were negative with no sign of prostate cancer  - We will plan for PSA recheck in the fall and I will send him the results via [employer]  - Then plan for follow-up with me in 1 year with a repeat PSA    BPH and LUTS  -He is doing fairly well with minimal symptoms off of medications at present    Time spent: 20 minutes spent on the date of the encounter doing chart review, history and exam, documentation and further  activities as noted above.    Vin Sinha MD   Urology  [facility]  [facility] Phone: [phone]  [facility] Phone: [phone]    Chrsi is a 72 year old who is being evaluated via a billable video visit.      How would you like to obtain your AVS? MyChart  If the video visit is dropped, the invitation should be resent by: Text to cell phone: [phone]  Will anyone else be joining your video visit? No        Video-Visit Details    Type of service:  Video Visit    Video Start Time: 11:28 AM    Video End Time:11:38 AM    Originating Location (pt. Location): Home    Distant Location (provider location):  [facility]     Platform used for Video Visit: [employer]

## 2022-05-25 NOTE — LETTER
"5/25/2022       RE: Pranay Ratliff  [id number]   [place] [address]     Dear Colleague,    Thank you for referring your patient, Pranay Ratliff, to the [facility] at [facility]. Please see a copy of my visit note below.    [place]   CHIEF COMPLAINT   It was my pleasure to see Pranay Ratliff who is a 72 year old male for follow-up of Elevated PSA and LUTS.      HPI   Pranay Ratliff is a very pleasant 72 year old male    Prior pt of Dr. Guajardo - last seen 9/16/21:  \"Pranay Ratliff is a very pleasant 71 year old male who presents with a history of Elevated PSA (Prostate Specific Antigen).  PSA 5.3 and had s/p TRUS biopsy 8/2019. Has done well since biopsy which demonstrated no malignancy.     Also has history of BPH with weak stream and LUTS. Did not tolerate tamsulosin on previous trial of this. Is bothered by his urinary symptoms.  Has been on several medications over the years, most recently managed by urologist in [place], [place]. He is now back in [place]. Most recently was taking tadalafil 5 mg daily and dutasteride. Stopped these a few weeks ago given concerns about light-headedness. Currently still bothered by weak stream, and nocturia.    Restarted on dutasteride\"    3/16/22:  He never resumed the Dutasteride due to gynecomastia   He continues to have retrograde ejaculation   Urination is stable  Nocturia 2x/night  Overall, not too bothered by symptoms     He just moved back from [facility] last fall  He did have a cystoscopy 05/2021 with no concern for intravesical mass    4/6/22:  Follow-up today to review his updated MRI  He had no issues with this MRI    TODAY 5/25/2022:  He did well after the biopsy without complications  Still occasional blood per urethra    PHYSICAL EXAM  Patient is a 72 year old  male   Vitals: Height 1.778 m (5' 10\"), weight 104.3 kg (230 lb).  Body mass index is 33 kg/m .  General Appearance Adult: "   Alert, no acute distress, oriented  HENT: throat/mouth:normal, good dentition  Lungs: no respiratory distress, or pursed lip breathing  Heart: No obvious jugular venous distension present  Abdomen: non - distended  Musculoskeltal: extremities normal, no peripheral edema  Skin: no suspicious lesions or rashes  Neuro: Alert, oriented, speech and mentation normal  Psych: affect and mood normal    Component PSA PSA Diag Urologic Phys   Latest Ref Rng & Units 0.00 - 4.00 ug/L 0.00 - 4.00 ng/mL   1/30/2019 6.65 (H)    2/26/2019 6.32 (H)    4/22/2019  4.70 (H)   7/15/2019  5.30 (H)   9/16/2021 3.00    3/16/2022 9.20 (H)      PATHOLOGY:  TRUS 8/6/2019  FINAL DIAGNOSIS:   A - G. Prostate biopsy template and target:   - Benign prostate tissue     MR-FUSION URONAV 5/16/22:  Final Diagnosis   A - L: Prostate, TEMPLATE, needle core biopsy:  - Negative for malignancy.     M: Prostate, left lesion x3, needle core biopsy:  - Negative for malignancy.  - Acute and chronic inflammation present.     IMAGING:  All pertinent imaging reviewed:    All imaging studies reviewed by me.  I personally reviewed these imaging films.  A formal report from radiology will follow.    MRI Prostate 4/20/2019  FINDINGS:  Size: 73.5 grams  IMPRESSION:  1. Based on the most suspicious abnormality, this exam is  characterized as PIRADS 3 - The presence of clinically significant  cancer is equivocal.?The most suspicious abnormality is located at the  left posterior apex peripheral zone and there is no evidence of  extraprostatic extension.  2. No suspicious adenopathy or evidence of pelvic metastases.     MRI PROSTATE 3/31/22:  FINDINGS:  Size: 4.2 x 5.1 x 6.0 cm, 66.8 grams  Hemorrhage: Absent  Peripheral zone: Heterogeneous on T2-weighted images. Suspicious  lesions as detailed below.  Transition zone: Enlarged with BPH changes. Transition zone nodules  which are circumscribed or mostly encapsulated without diffusion  restriction.  PI-RADS 2.  No highly  suspicious nodules.     Lesion(s) in rank order of severity (highest score- to lowest score,  then by size)      Lesion 1:  Location: Left apex peripheral zone at the   5  o'clock position  relative to the urethra. Series 7 image 71.  Size: 6 mm  T2 description: Heterogeneous signal intensity  T2 numerical assessment: 3  DWI description: Hypointense on ADC and hyperintense on high B-value  DWI  DWI numerical assessment: 3  DCE assessment: Positive    Prostate margin: No capsular abutment  Lesion overall PI-RADS category: 4     Neurovascular bundles: No suspicion of involvement by malignancy  Seminal vesicles: Not involved by tumor.  Lymph nodes: No adenopathy.  Bones: No suspicious lesions.  Other pelvic organs: Small fat-containing left inguinal hernia.                                                        IMPRESSION:  1. Based on the most suspicious abnormality, this exam is  characterized as PIRADS 4 - Clinically significant cancer is likely to  be present.?The most suspicious abnormality is located at the left  apex peripheral zone and there is no evidence of extraprostatic  extension. Compared to prior MR prostate 4/20/2019, this lesion is  similar in size and morphology, but now demonstrates focal  enhancement, raising the lesion to a PIRADS 4.  2. No suspicious adenopathy or evidence of pelvic metastases.       ASSESSMENT and PLAN  72-year-old man with history of BPH and LUTS with elevated PSA    Elevated PSA  -Reviewed his PSA history and trend  -His PSA in March demonstrates significant increase to 9.2 from 3.0 following stopping the dutasteride  -I reviewed his MRI report and images personally.  We discussed that the PI-RADS 4 lesion is slightly changed from what was described as a PI-RADS 3 lesion on his MRI from 2019  - I reviewed his pathology results from his MR fusion biopsy which were negative with no sign of prostate cancer  - We will plan for PSA recheck in the fall and I will send him the results  via [email or web address]  - Then plan for follow-up with me in 1 year with a repeat PSA    BPH and LUTS  -He is doing fairly well with minimal symptoms off of medications at present    Time spent: 20 minutes spent on the date of the encounter doing chart review, history and exam, documentation and further activities as noted above.    Vin Sinha MD   Urology  [facility]  [facility] Phone: [phone]  [facility] Phone: [phone]    Chris is a 72 year old who is being evaluated via a billable video visit.      How would you like to obtain your AVS? [email or web address]  If the video visit is dropped, the invitation should be resent by: Text to cell phone: [phone]  Will anyone else be joining your video visit? No        Video-Visit Details    Type of service:  Video Visit    Video Start Time: 11:28 AM    Video End Time:11:38 AM    Originating Location (pt. Location): Home    Distant Location (provider location):  [facility]     Platform used for Video Visit: [employer]

## 2022-06-02 ENCOUNTER — OFFICE VISIT (OUTPATIENT)
Dept: DERMATOLOGY | Facility: CLINIC | Age: 72
End: 2022-06-02
Payer: COMMERCIAL

## 2022-06-02 VITALS — OXYGEN SATURATION: 97 % | HEART RATE: 63 BPM | DIASTOLIC BLOOD PRESSURE: 90 MMHG | SYSTOLIC BLOOD PRESSURE: 147 MMHG

## 2022-06-02 DIAGNOSIS — L82.1 SEBORRHEIC KERATOSIS: ICD-10-CM

## 2022-06-02 DIAGNOSIS — D18.01 ANGIOMA OF SKIN: ICD-10-CM

## 2022-06-02 DIAGNOSIS — L81.4 LENTIGO: ICD-10-CM

## 2022-06-02 DIAGNOSIS — D23.9 DERMAL NEVUS: ICD-10-CM

## 2022-06-02 DIAGNOSIS — Z85.828 HISTORY OF SKIN CANCER: Primary | ICD-10-CM

## 2022-06-02 DIAGNOSIS — D04.30 SQUAMOUS CELL CARCINOMA IN SITU (SCCIS) OF SKIN OF FACE: ICD-10-CM

## 2022-06-02 PROCEDURE — 99213 OFFICE O/P EST LOW 20 MIN: CPT | Mod: 25 | Performed by: DERMATOLOGY

## 2022-06-02 PROCEDURE — 88331 PATH CONSLTJ SURG 1 BLK 1SPC: CPT | Performed by: DERMATOLOGY

## 2022-06-02 PROCEDURE — 11102 TANGNTL BX SKIN SINGLE LES: CPT | Performed by: DERMATOLOGY

## 2022-06-02 NOTE — PATIENT INSTRUCTIONS
Wound Care Instructions     FOR SUPERFICIAL WOUNDS     [facility] [phone]    [facility] [phone]                       AFTER 24 HOURS YOU SHOULD REMOVE THE BANDAGE AND BEGIN DAILY DRESSING CHANGES AS FOLLOWS:     1) Remove Dressing.     2) Clean and dry the area with tap water using a Q-tip or sterile gauze pad.     3) Apply Vaseline, Aquaphor, Polysporin ointment or Bacitracin ointment over entire wound.  Do NOT use Neosporin ointment.     4) Cover the wound with a band-aid, or a sterile non-stick gauze pad and micropore paper tape      REPEAT THESE INSTRUCTIONS AT LEAST ONCE A DAY UNTIL THE WOUND HAS COMPLETELY HEALED.    It is an old wives tale that a wound heals better when it is exposed to air and allowed to dry out. The wound will heal faster with a better cosmetic result if it is kept moist with ointment and covered with a bandage.    **Do not let the wound dry out.**      Supplies Needed:      *Cotton tipped applicators (Q-tips)    *Polysporin Ointment or Bacitracin Ointment (NOT NEOSPORIN)    *Band-aids or non-stick gauze pads and micropore paper tape.      PATIENT INFORMATION:    During the healing process you will notice a number of changes. All wounds develop a small halo of redness surrounding the wound.  This means healing is occurring. Severe itching with extensive redness usually indicates sensitivity to the ointment or bandage tape used to dress the wound.  You should call our office if this develops.      Swelling  and/or discoloration around your surgical site is common, particularly when performed around the eye.    All wounds normally drain.  The larger the wound the more drainage there will be.  After 7-10 days, you will notice the wound beginning to shrink and new skin will begin to grow.  The wound is healed when you can see skin has formed over the entire area.  A healed wound has a healthy, shiny look to the surface and is red to dark pink in color  to normalize.  Wounds may take approximately 4-6 weeks to heal.  Larger wounds may take 6-8 weeks.  After the wound is healed you may discontinue dressing changes.    You may experience a sensation of tightness as your wound heals. This is normal and will gradually subside.    Your healed wound may be sensitive to temperature changes. This sensitivity improves with time, but if you re having a lot of discomfort, try to avoid temperature extremes.    Patients frequently experience itching after their wound appears to have healed because of the continue healing under the skin.  Plain Vaseline will help relieve the itching.        POSSIBLE COMPLICATIONS    BLEEDING:    Leave the bandage in place.  Use tightly rolled up gauze or a cloth to apply direct pressure over the bandage for 30  minutes.  Reapply pressure for an additional 30 minutes if necessary  Use additional gauze and tape to maintain pressure once the bleeding has stopped.

## 2022-06-02 NOTE — PROGRESS NOTES
Pranay Ratliff is an extremely pleasant 72 year old year old male patient here today for hx of non-melanoma skin cancer.  He notes spot on eye today.   .   Patient states this has been present for a while.  Patient reports the following symptoms:  itching.  Patient reports the following previous treatments none.  These treatments did not work.  Patient reports the following modifying factors none.  Associated symptoms: none.  Patient has no other skin complaints today.  Remainder of the HPI, Meds, PMH, Allergies, FH, and SH was reviewed in chart.      Past Medical History:   Diagnosis Date     Basal cell carcinoma      Concussion with loss of consciousness of 30 minutes or less, subsequent encounter      Epididymitis, bilateral      Epididymitis, left      Epididymitis, right      Obstructive sleep apnea syndrome      Vasovagal syncope        Past Surgical History:   Procedure Laterality Date     TESTICLE SURGERY       VASECTOMY          Family History   Problem Relation Age of Onset     Skin Cancer Mother          from pneumonia     Abdominal Aortic Aneurysm Mother      Heart Failure Father      Diabetes Type 1 Brother         Possibly secondary to chemical exposure in [place]     Diabetes Type 2  Maternal Grandmother      Cancer Brother         Exposure to Agent Orange in [place]     Heart Disease Brother        Social History     Socioeconomic History     Marital status:      Spouse name: Not on file     Number of children: Not on file     Years of education: Not on file     Highest education level: Not on file   Occupational History     Not on file   Tobacco Use     Smoking status: Never Smoker     Smokeless tobacco: Never Used   Substance and Sexual Activity     Alcohol use: Yes     Comment: twice a week     Drug use: No     Sexual activity: Not Currently   Other Topics Concern     Parent/sibling w/ CABG, MI or angioplasty before 65F 55M? Not Asked   Social History Narrative     Not on file      Social Determinants of Health     Financial Resource Strain: Not on file   Food Insecurity: Not on file   Transportation Needs: Not on file   Physical Activity: Not on file   Stress: Not on file   Social Connections: Not on file   Intimate Partner Violence: Not on file   Housing Stability: Not on file       Outpatient Encounter Medications as of 6/2/2022   Medication Sig Dispense Refill     aspirin 81 MG tablet Take by mouth daily       carbamide peroxide (DEBROX) 6.5 % otic solution Place 5 drops into both ears daily as needed for other (impacted cerumen) 14.8 mL 11     clotrimazole-betamethasone (LOTRISONE) 1-0.05 % external cream APPLY TOPICALLY TWICE DAILY TO THE FEET. 45 g 0     diclofenac (VOLTAREN) 1 % topical gel Apply topically 4 times daily       fluocinonide (LIDEX) 0.05 % external cream Apply topically 2 times daily 120 g 6     gabapentin (NEURONTIN) 100 MG capsule Take 100 mg by mouth Take 1 time daily at HS       multivitamin w/minerals (THERA-VIT-M) tablet Take 1 tablet by mouth daily       Omega-3 Fatty Acids (OMEGA-3 FISH OIL PO)        pantoprazole (PROTONIX) 40 MG EC tablet Take 40 mg by mouth daily       rosuvastatin (CRESTOR) 20 MG tablet TAKE 1 TABLET BY MOUTH EVERY DAY 90 tablet 1     triamterene-HCTZ (MAXZIDE-25) 37.5-25 MG tablet Take 0.5 tablets by mouth daily 45 tablet 3     ciprofloxacin (CIPRO) 250 MG tablet TAKE 2 TABLETS (500 MG) BY MOUTH 2 TIMES DAILY START TAKING THE DAY BEFORE YOUR PROSTATE BIOPSY. (Patient not taking: Reported on 6/2/2022)       ciprofloxacin (CIPRO) 500 MG tablet Take 1 tablet (500 mg) by mouth 2 times daily Start taking the day before your prostate biopsy. (Patient not taking: Reported on 6/2/2022) 6 tablet 0     dutasteride (AVODART) 0.5 MG capsule Take 0.5 mg by mouth daily  (Patient not taking: No sig reported)       No facility-administered encounter medications on file as of 6/2/2022.             O:   NAD, WDWN, Alert & Oriented, Mood & Affect wnl,  Vitals stable   Here today alone   BP (!) 147/90   Pulse 63   SpO2 97%    General appearance normal   Vitals stable   Alert, oriented and in no acute distress      Following lymph nodes palpated: Occipital, Cervical, Supraclavicular no lad  R sup lid stuck on papule  r lateral brow ill-defined 1cm scaly papule      Stuck on papules and brown macules on trunk and ext   Red papules on trunk  Flesh colored papules on trunk     The remainder of the full exam was normal; the following areas were examined:  conjunctiva/lids, oral mucosa, neck, peripheral vascular system, abdomen, lymph nodes, digits/nails, eccrine and apocrine glands, scalp/hair, face, neck, chest, abdomen, buttocks, back, RUE, LUE, RLE, LLE       Eyes: Conjunctivae/lids:Normal     ENT: Lips, buccal mucosa, tongue: normal    MSK:Normal    Cardiovascular: peripheral edema none    Pulm: Breathing Normal    Lymph Nodes: No Head and Neck Lymphadenopathy     Neuro/Psych: Orientation:Alert and Orientedx3 ; Mood/Affect:normal       MICRO:   R lateral brow :There is hyperkeratosis & parakeratosis of the epidermis, with full thickness epidermal involvement by atypical keratinocytes with rare pale vacuolated cells.  Unremarkable dermis.    A/P:  1. Seborrheic keratosis, lentigo, angioma, dermal nevus, hx of non-melanoma skin cancer   2. R lat brow r/o squamous cell carcinoma   TANGENTIAL BIOPSY IN HOUSE:  After consent, anesthesia with LEC and prep, tangential excision performed and dx above confirmed with frozen section histology.  No complications and routine wound care.  Patient is on asa  anticoagulants and risk of bleeding discussed with patient.       I have personally reviewed all specimens and/or slides and used them with my medical judgement to determine or confirm the final diagnosis.     Patient told result squamous cell carcinoma in situ schedule excision .    It was a pleasure speaking to Pranay Ratliff today.  Previous clinic notes and pertinent  laboratory tests were reviewed prior to Pranay Ratliff's visit.  Nature and genetics of benign skin lesions dicussed with patient.  Signs and Symptoms of skin cancer discussed with patient.  Patient encouraged to perform monthly skin exams.  UV precautions reviewed with patient.  Risks of non-melanoma skin cancer discussed with patient   Return to clinic next appt

## 2022-06-02 NOTE — LETTER
2022         RE: Pranay Ratliff  [address]  [place] [address]        Dear Colleague,    Thank you for referring your patient, Pranay Ratliff, to the [facility]. Please see a copy of my visit note below.    Pranay Ratliff is an extremely pleasant 72 year old year old male patient here today for hx of non-melanoma skin cancer.  He notes spot on eye today.   .   Patient states this has been present for a while.  Patient reports the following symptoms:  itching.  Patient reports the following previous treatments none.  These treatments did not work.  Patient reports the following modifying factors none.  Associated symptoms: none.  Patient has no other skin complaints today.  Remainder of the HPI, Meds, PMH, Allergies, FH, and SH was reviewed in chart.      Past Medical History:   Diagnosis Date     Basal cell carcinoma      Concussion with loss of consciousness of 30 minutes or less, subsequent encounter      Epididymitis, bilateral      Epididymitis, left      Epididymitis, right      Obstructive sleep apnea syndrome      Vasovagal syncope        Past Surgical History:   Procedure Laterality Date     TESTICLE SURGERY       VASECTOMY          Family History   Problem Relation Age of Onset     Skin Cancer Mother          from pneumonia     Abdominal Aortic Aneurysm Mother      Heart Failure Father      Diabetes Type 1 Brother         Possibly secondary to chemical exposure in [place]     Diabetes Type 2  Maternal Grandmother      Cancer Brother         Exposure to Agent Orange in [place]     Heart Disease Brother        Social History     Socioeconomic History     Marital status:      Spouse name: Not on file     Number of children: Not on file     Years of education: Not on file     Highest education level: Not on file   Occupational History     Not on file   Tobacco Use     Smoking status: Never Smoker     Smokeless tobacco: Never Used   Substance  and Sexual Activity     Alcohol use: Yes     Comment: twice a week     Drug use: No     Sexual activity: Not Currently   Other Topics Concern     Parent/sibling w/ CABG, MI or angioplasty before 65F 55M? Not Asked   Social History Narrative     Not on file     Social Determinants of Health     Financial Resource Strain: Not on file   Food Insecurity: Not on file   Transportation Needs: Not on file   Physical Activity: Not on file   Stress: Not on file   Social Connections: Not on file   Intimate Partner Violence: Not on file   Housing Stability: Not on file       Outpatient Encounter Medications as of 6/2/2022   Medication Sig Dispense Refill     aspirin 81 MG tablet Take by mouth daily       carbamide peroxide (DEBROX) 6.5 % otic solution Place 5 drops into both ears daily as needed for other (impacted cerumen) 14.8 mL 11     clotrimazole-betamethasone (LOTRISONE) 1-0.05 % external cream APPLY TOPICALLY TWICE DAILY TO THE FEET. 45 g 0     diclofenac (VOLTAREN) 1 % topical gel Apply topically 4 times daily       fluocinonide (LIDEX) 0.05 % external cream Apply topically 2 times daily 120 g 6     gabapentin (NEURONTIN) 100 MG capsule Take 100 mg by mouth Take 1 time daily at HS       multivitamin w/minerals (THERA-VIT-M) tablet Take 1 tablet by mouth daily       Omega-3 Fatty Acids (OMEGA-3 FISH OIL PO)        pantoprazole (PROTONIX) 40 MG EC tablet Take 40 mg by mouth daily       rosuvastatin (CRESTOR) 20 MG tablet TAKE 1 TABLET BY MOUTH EVERY DAY 90 tablet 1     triamterene-HCTZ (MAXZIDE-25) 37.5-25 MG tablet Take 0.5 tablets by mouth daily 45 tablet 3     ciprofloxacin (CIPRO) 250 MG tablet TAKE 2 TABLETS (500 MG) BY MOUTH 2 TIMES DAILY START TAKING THE DAY BEFORE YOUR PROSTATE BIOPSY. (Patient not taking: Reported on 6/2/2022)       ciprofloxacin (CIPRO) 500 MG tablet Take 1 tablet (500 mg) by mouth 2 times daily Start taking the day before your prostate biopsy. (Patient not taking: Reported on 6/2/2022) 6 tablet  0     dutasteride (AVODART) 0.5 MG capsule Take 0.5 mg by mouth daily  (Patient not taking: No sig reported)       No facility-administered encounter medications on file as of 6/2/2022.             O:   NAD, WDWN, Alert & Oriented, Mood & Affect wnl, Vitals stable   Here today alone   BP (!) 147/90   Pulse 63   SpO2 97%    General appearance normal   Vitals stable   Alert, oriented and in no acute distress      Following lymph nodes palpated: Occipital, Cervical, Supraclavicular no lad  R sup lid stuck on papule  r lateral brow ill-defined 1cm scaly papule      Stuck on papules and brown macules on trunk and ext   Red papules on trunk  Flesh colored papules on trunk     The remainder of the full exam was normal; the following areas were examined:  conjunctiva/lids, oral mucosa, neck, peripheral vascular system, abdomen, lymph nodes, digits/nails, eccrine and apocrine glands, scalp/hair, face, neck, chest, abdomen, buttocks, back, RUE, LUE, RLE, LLE       Eyes: Conjunctivae/lids:Normal     ENT: Lips, buccal mucosa, tongue: normal    MSK:Normal    Cardiovascular: peripheral edema none    Pulm: Breathing Normal    Lymph Nodes: No Head and Neck Lymphadenopathy     Neuro/Psych: Orientation:Alert and Orientedx3 ; Mood/Affect:normal       MICRO:   R lateral brow :There is hyperkeratosis & parakeratosis of the epidermis, with full thickness epidermal involvement by atypical keratinocytes with rare pale vacuolated cells.  Unremarkable dermis.    A/P:  1. Seborrheic keratosis, lentigo, angioma, dermal nevus, hx of non-melanoma skin cancer   2. R lat brow r/o squamous cell carcinoma   TANGENTIAL BIOPSY IN HOUSE:  After consent, anesthesia with LEC and prep, tangential excision performed and dx above confirmed with frozen section histology.  No complications and routine wound care.  Patient is on asa  anticoagulants and risk of bleeding discussed with patient.       I have personally reviewed all specimens and/or slides and  used them with my medical judgement to determine or confirm the final diagnosis.     Patient told result squamous cell carcinoma in situ schedule excision .    It was a pleasure speaking to Pranay Ratliff today.  Previous clinic notes and pertinent laboratory tests were reviewed prior to Pranay Ratliff's visit.  Nature and genetics of benign skin lesions dicussed with patient.  Signs and Symptoms of skin cancer discussed with patient.  Patient encouraged to perform monthly skin exams.  UV precautions reviewed with patient.  Risks of non-melanoma skin cancer discussed with patient   Return to clinic next appt        Again, thank you for allowing me to participate in the care of your patient.        Sincerely,        Tato Gallagher MD

## 2022-06-03 ENCOUNTER — TELEPHONE (OUTPATIENT)
Dept: DERMATOLOGY | Facility: CLINIC | Age: 72
End: 2022-06-03
Payer: COMMERCIAL

## 2022-06-03 NOTE — TELEPHONE ENCOUNTER
Called and spoke with pt and informed him of path results and scheduled Mohs 7/21/22 at 8:15 at  with Dr. Gallagher.    Charline GALAN RN  Dermatology   [phone]

## 2022-06-03 NOTE — TELEPHONE ENCOUNTER
----- Message from Tato Gallagher MD sent at 6/2/2022  8:46 AM CDT -----  R lateral brow squamous cell carcinoma in situ schedule excision

## 2022-07-11 DIAGNOSIS — E78.5 HYPERLIPIDEMIA LDL GOAL <100: ICD-10-CM

## 2022-07-14 DIAGNOSIS — K21.9 GASTROESOPHAGEAL REFLUX DISEASE WITHOUT ESOPHAGITIS: ICD-10-CM

## 2022-07-14 DIAGNOSIS — I10 BENIGN ESSENTIAL HYPERTENSION: ICD-10-CM

## 2022-07-14 RX ORDER — ROSUVASTATIN CALCIUM 20 MG/1
TABLET, COATED ORAL
Qty: 90 TABLET | Refills: 1 | Status: SHIPPED | OUTPATIENT
Start: 2022-07-14 | End: 2023-02-03

## 2022-07-17 NOTE — TELEPHONE ENCOUNTER
Routing refill request to provider for review/approval because:  Medication is reported/historical  BP Readings from Last 3 Encounters:   06/02/22 (!) 147/90   05/16/22 122/64   04/07/22 122/72         Naeem Cortés RN  [facility] Triage Nurse

## 2022-07-18 RX ORDER — PANTOPRAZOLE SODIUM 40 MG/1
TABLET, DELAYED RELEASE ORAL
Qty: 90 TABLET | Refills: 3 | Status: SHIPPED | OUTPATIENT
Start: 2022-07-18 | End: 2023-06-16

## 2022-07-18 RX ORDER — TRIAMTERENE/HYDROCHLOROTHIAZID 37.5-25 MG
TABLET ORAL
Qty: 90 TABLET | Refills: 3 | Status: SHIPPED | OUTPATIENT
Start: 2022-07-18 | End: 2023-04-13

## 2022-07-19 NOTE — PROGRESS NOTES
Surgical Office Location:  [facility]  [address] [place] [id number]

## 2022-07-21 ENCOUNTER — OFFICE VISIT (OUTPATIENT)
Dept: DERMATOLOGY | Facility: CLINIC | Age: 72
End: 2022-07-21
Payer: COMMERCIAL

## 2022-07-21 VITALS — SYSTOLIC BLOOD PRESSURE: 126 MMHG | HEART RATE: 75 BPM | DIASTOLIC BLOOD PRESSURE: 84 MMHG | OXYGEN SATURATION: 98 %

## 2022-07-21 DIAGNOSIS — B35.3 TINEA PEDIS OF BOTH FEET: ICD-10-CM

## 2022-07-21 DIAGNOSIS — D04.30 SQUAMOUS CELL CARCINOMA IN SITU (SCCIS) OF SKIN OF FACE: Primary | ICD-10-CM

## 2022-07-21 PROCEDURE — 17311 MOHS 1 STAGE H/N/HF/G: CPT | Performed by: DERMATOLOGY

## 2022-07-21 PROCEDURE — 99207 PR NO CHARGE LOS: CPT | Performed by: DERMATOLOGY

## 2022-07-21 RX ORDER — CLOTRIMAZOLE 1 %
CREAM (GRAM) TOPICAL 2 TIMES DAILY
Qty: 85 G | Refills: 11 | Status: SHIPPED | OUTPATIENT
Start: 2022-07-21 | End: 2024-03-18

## 2022-07-21 NOTE — LETTER
2022         RE: Pranay Ratliff  [address]  [place] [address]        Dear Colleague,    Thank you for referring your patient, Pranay Ratliff, to the [facility]. Please see a copy of my visit note below.    Surgical Office Location:  [facility]  [address] [place] [id number]      Pranay Ratliff is an extremely pleasant 72 year old year old male patient here today for evaluation and managment of squamous cell carcinoma in situ on right lat brow.  Patient has no other skin complaints today.  Remainder of the HPI, Meds, PMH, Allergies, FH, and SH was reviewed in chart.      Past Medical History:   Diagnosis Date     Basal cell carcinoma      Concussion with loss of consciousness of 30 minutes or less, subsequent encounter      Epididymitis, bilateral      Epididymitis, left      Epididymitis, right      Obstructive sleep apnea syndrome      Squamous cell carcinoma of skin, unspecified      Vasovagal syncope        Past Surgical History:   Procedure Laterality Date     TESTICLE SURGERY       VASECTOMY          Family History   Problem Relation Age of Onset     Skin Cancer Mother          from pneumonia     Abdominal Aortic Aneurysm Mother      Heart Failure Father      Diabetes Type 1 Brother         Possibly secondary to chemical exposure in [place]     Diabetes Type 2  Maternal Grandmother      Cancer Brother         Exposure to Agent Orange in [place]     Heart Disease Brother        Social History     Socioeconomic History     Marital status:      Spouse name: Not on file     Number of children: Not on file     Years of education: Not on file     Highest education level: Not on file   Occupational History     Not on file   Tobacco Use     Smoking status: Never Smoker     Smokeless tobacco: Never Used   Substance and Sexual Activity     Alcohol use: Yes     Comment: twice a week     Drug use: No     Sexual activity: Not  Currently   Other Topics Concern     Parent/sibling w/ CABG, MI or angioplasty before 65F 55M? Not Asked   Social History Narrative     Not on file     Social Determinants of Health     Financial Resource Strain: Not on file   Food Insecurity: Not on file   Transportation Needs: Not on file   Physical Activity: Not on file   Stress: Not on file   Social Connections: Not on file   Intimate Partner Violence: Not on file   Housing Stability: Not on file       Outpatient Encounter Medications as of 7/21/2022   Medication Sig Dispense Refill     aspirin 81 MG tablet Take by mouth daily       carbamide peroxide (DEBROX) 6.5 % otic solution Place 5 drops into both ears daily as needed for other (impacted cerumen) 14.8 mL 11     ciprofloxacin (CIPRO) 250 MG tablet TAKE 2 TABLETS (500 MG) BY MOUTH 2 TIMES DAILY START TAKING THE DAY BEFORE YOUR PROSTATE BIOPSY. (Patient not taking: No sig reported)       ciprofloxacin (CIPRO) 500 MG tablet Take 1 tablet (500 mg) by mouth 2 times daily Start taking the day before your prostate biopsy. (Patient not taking: Reported on 6/2/2022) 6 tablet 0     clotrimazole-betamethasone (LOTRISONE) 1-0.05 % external cream APPLY TOPICALLY TWICE DAILY TO THE FEET. 45 g 0     diclofenac (VOLTAREN) 1 % topical gel Apply topically 4 times daily       dutasteride (AVODART) 0.5 MG capsule Take 0.5 mg by mouth daily  (Patient not taking: No sig reported)       fluocinonide (LIDEX) 0.05 % external cream Apply topically 2 times daily 120 g 6     gabapentin (NEURONTIN) 100 MG capsule Take 100 mg by mouth Take 1 time daily at HS       multivitamin w/minerals (THERA-VIT-M) tablet Take 1 tablet by mouth daily       Omega-3 Fatty Acids (OMEGA-3 FISH OIL PO)        pantoprazole (PROTONIX) 40 MG EC tablet TAKE 1 TABLET BY MOUTH ONCE DAILY 30 MIN PRIOR TO BREAKFAST 90 90 tablet 3     rosuvastatin (CRESTOR) 20 MG tablet TAKE 1 TABLET BY MOUTH EVERY DAY 90 tablet 1     triamterene-HCTZ (MAXZIDE-25) 37.5-25 MG  tablet TAKE 1 TABLET BY MOUTH EVERY DAY IN THE MORNING 90 tablet 3     No facility-administered encounter medications on file as of 7/21/2022.             O:   NAD, WDWN, Alert & Oriented, Mood & Affect wnl, Vitals stable   Here today alone   /84   Pulse 75   SpO2 98%    General appearance normal   Vitals stable   Alert, oriented and in no acute distress     R lat brow 1cm scaly papule       Eyes: Conjunctivae/lids:Normal     ENT: Lips, buccal mucosa, tongue: normal    MSK:Normal    Cardiovascular: peripheral edema none    Pulm: Breathing Normal    Neuro/Psych: Orientation:Alert and Orientedx3 ; Mood/Affect:normal       A/P:  1. R lat brow squamous cell carcinoma in situ   MOHS:   Location    The rationale for Mohs surgery was discussed with the patient and consent was obtained.  The risks and benefits as well as alternatives to therapy were discussed, in detail.  Specifically, the risks of infection, scarring, bleeding, prolonged wound healing, incomplete removal, allergy to anesthesia, nerve injury and recurrence were addressed.  Indication for Mohs was Location. Prior to the procedure, the treatment site was clearly identified and, if available, confirmed with previous photos and confirmed by the patient   All components of the Universal Protocol/PAUSE rule were completed.  The Mohs surgeon operated in two distinct and integrated capacities as the surgeon and pathologist.      The area was prepped with Betasept.  A rim of normal appearing skin was marked circumferentially around the lesion.  The area was infiltrated with local anesthesia.  The tumor was first debulked to remove all clinically apparent tumor.  An incision following the standard Mohs approach was done and the specimen was oriented,mapped and placed in 1 block(s).  Each specimen was then chromacoded and processed in the Mohs laboratory using standard Mohs technique and submitted for frozen section histology.  Frozen section analysis showed  no residual tumor but CLEAR MARGINS.      The tumor was excised using standard Mohs technique in 1 stages(s).  CLEAR MARGINS OBTAINED and Final defect size was 1.4 cm.     We discussed the options for wound management in full with the patient including risks/benefits/ possible outcomes.        REPAIR SECOND INTENT: We discussed the options for wound management in full with the patient including risks/benefits/possible outcomes. Decision made to allow the wound to heal by second intention. Cautery was used for for hemostasis. EBL minimal; complications none; wound care routine.  The patient was discharged in good condition and will return in one month or prn for wound evaluation.   2. Tinea pedis  Clotrimazole refill  It was a pleasure speaking to Pranay Ratliff today.  Previous clinic notes and pertinent laboratory tests were reviewed prior to Pranay Ratliff's visit.  Signs and Symptoms of skin cancer discussed with patient.  Patient encouraged to perform monthly skin exams.  UV precautions reviewed with patient.  Risks of non-melanoma skin cancer discussed with patient   Return to clinic 6 months        Again, thank you for allowing me to participate in the care of your patient.        Sincerely,        Tato Gallagher MD

## 2022-07-21 NOTE — PROGRESS NOTES
Pranay Ratliff is an extremely pleasant 72 year old year old male patient here today for evaluation and managment of squamous cell carcinoma in situ on right lat brow.  Patient has no other skin complaints today.  Remainder of the HPI, Meds, PMH, Allergies, FH, and SH was reviewed in chart.      Past Medical History:   Diagnosis Date     Basal cell carcinoma      Concussion with loss of consciousness of 30 minutes or less, subsequent encounter      Epididymitis, bilateral      Epididymitis, left      Epididymitis, right      Obstructive sleep apnea syndrome      Squamous cell carcinoma of skin, unspecified      Vasovagal syncope        Past Surgical History:   Procedure Laterality Date     TESTICLE SURGERY       VASECTOMY          Family History   Problem Relation Age of Onset     Skin Cancer Mother          from pneumonia     Abdominal Aortic Aneurysm Mother      Heart Failure Father      Diabetes Type 1 Brother         Possibly secondary to chemical exposure in [place]     Diabetes Type 2  Maternal Grandmother      Cancer Brother         Exposure to Agent Orange in [place]     Heart Disease Brother        Social History     Socioeconomic History     Marital status:      Spouse name: Not on file     Number of children: Not on file     Years of education: Not on file     Highest education level: Not on file   Occupational History     Not on file   Tobacco Use     Smoking status: Never Smoker     Smokeless tobacco: Never Used   Substance and Sexual Activity     Alcohol use: Yes     Comment: twice a week     Drug use: No     Sexual activity: Not Currently   Other Topics Concern     Parent/sibling w/ CABG, MI or angioplasty before 65F 55M? Not Asked   Social History Narrative     Not on file     Social Determinants of Health     Financial Resource Strain: Not on file   Food Insecurity: Not on file   Transportation Needs: Not on file   Physical Activity: Not on file   Stress: Not on file   Social Connections:  Not on file   Intimate Partner Violence: Not on file   Housing Stability: Not on file       Outpatient Encounter Medications as of 7/21/2022   Medication Sig Dispense Refill     aspirin 81 MG tablet Take by mouth daily       carbamide peroxide (DEBROX) 6.5 % otic solution Place 5 drops into both ears daily as needed for other (impacted cerumen) 14.8 mL 11     ciprofloxacin (CIPRO) 250 MG tablet TAKE 2 TABLETS (500 MG) BY MOUTH 2 TIMES DAILY START TAKING THE DAY BEFORE YOUR PROSTATE BIOPSY. (Patient not taking: No sig reported)       ciprofloxacin (CIPRO) 500 MG tablet Take 1 tablet (500 mg) by mouth 2 times daily Start taking the day before your prostate biopsy. (Patient not taking: Reported on 6/2/2022) 6 tablet 0     clotrimazole-betamethasone (LOTRISONE) 1-0.05 % external cream APPLY TOPICALLY TWICE DAILY TO THE FEET. 45 g 0     diclofenac (VOLTAREN) 1 % topical gel Apply topically 4 times daily       dutasteride (AVODART) 0.5 MG capsule Take 0.5 mg by mouth daily  (Patient not taking: No sig reported)       fluocinonide (LIDEX) 0.05 % external cream Apply topically 2 times daily 120 g 6     gabapentin (NEURONTIN) 100 MG capsule Take 100 mg by mouth Take 1 time daily at HS       multivitamin w/minerals (THERA-VIT-M) tablet Take 1 tablet by mouth daily       Omega-3 Fatty Acids (OMEGA-3 FISH OIL PO)        pantoprazole (PROTONIX) 40 MG EC tablet TAKE 1 TABLET BY MOUTH ONCE DAILY 30 MIN PRIOR TO BREAKFAST 90 90 tablet 3     rosuvastatin (CRESTOR) 20 MG tablet TAKE 1 TABLET BY MOUTH EVERY DAY 90 tablet 1     triamterene-HCTZ (MAXZIDE-25) 37.5-25 MG tablet TAKE 1 TABLET BY MOUTH EVERY DAY IN THE MORNING 90 tablet 3     No facility-administered encounter medications on file as of 7/21/2022.             O:   NAD, WDWN, Alert & Oriented, Mood & Affect wnl, Vitals stable   Here today alone   /84   Pulse 75   SpO2 98%    General appearance normal   Vitals stable   Alert, oriented and in no acute distress     R lat  brow 1cm scaly papule       Eyes: Conjunctivae/lids:Normal     ENT: Lips, buccal mucosa, tongue: normal    MSK:Normal    Cardiovascular: peripheral edema none    Pulm: Breathing Normal    Neuro/Psych: Orientation:Alert and Orientedx3 ; Mood/Affect:normal       A/P:  1. R lat brow squamous cell carcinoma in situ   MOHS:   Location    The rationale for Mohs surgery was discussed with the patient and consent was obtained.  The risks and benefits as well as alternatives to therapy were discussed, in detail.  Specifically, the risks of infection, scarring, bleeding, prolonged wound healing, incomplete removal, allergy to anesthesia, nerve injury and recurrence were addressed.  Indication for Mohs was Location. Prior to the procedure, the treatment site was clearly identified and, if available, confirmed with previous photos and confirmed by the patient   All components of the Universal Protocol/PAUSE rule were completed.  The Mohs surgeon operated in two distinct and integrated capacities as the surgeon and pathologist.      The area was prepped with Betasept.  A rim of normal appearing skin was marked circumferentially around the lesion.  The area was infiltrated with local anesthesia.  The tumor was first debulked to remove all clinically apparent tumor.  An incision following the standard Mohs approach was done and the specimen was oriented,mapped and placed in 1 block(s).  Each specimen was then chromacoded and processed in the Mohs laboratory using standard Mohs technique and submitted for frozen section histology.  Frozen section analysis showed no residual tumor but CLEAR MARGINS.      The tumor was excised using standard Mohs technique in 1 stages(s).  CLEAR MARGINS OBTAINED and Final defect size was 1.4 cm.     We discussed the options for wound management in full with the patient including risks/benefits/ possible outcomes.        REPAIR SECOND INTENT: We discussed the options for wound management in full with  the patient including risks/benefits/possible outcomes. Decision made to allow the wound to heal by second intention. Cautery was used for for hemostasis. EBL minimal; complications none; wound care routine.  The patient was discharged in good condition and will return in one month or prn for wound evaluation.   2. Tinea pedis  Clotrimazole refill  It was a pleasure speaking to Pranay Ratliff today.  Previous clinic notes and pertinent laboratory tests were reviewed prior to Pranay Ratliff's visit.  Signs and Symptoms of skin cancer discussed with patient.  Patient encouraged to perform monthly skin exams.  UV precautions reviewed with patient.  Risks of non-melanoma skin cancer discussed with patient   Return to clinic 6 months

## 2022-07-21 NOTE — PATIENT INSTRUCTIONS
Open Wound Care     For R lateral brow        No strenuous activity for 48 hours    Take Tylenol as needed for discomfort.                                                .         Do not drink alcoholic beverages for 48 hours.    Keep the pressure bandage in place for 24 hours. If the bandage becomes blood tinged or loose, reinforce it with gauze and tape.        (Refer to the reverse side of this page for management of bleeding).    Remove bandage in 24 hours and begin wound care as follows:     Clean area with tap water using a Q tip or gauze pad, (shower / bathe normally)  Dry wound with Q tip or gauze pad  Apply Aquaphor, Vaseline, Polysporin or Bacitracin Ointment with a Q tip  Do NOT use Neosporin Ointment *  Cover the wound with a band-aid or nonstick gauze pad and paper tape.  Repeat wound care once a day until wound is completely healed.    It is an old wives tale that a wound heals better when it is exposed to air and allowed to dry out. The wound will heal faster with a better cosmetic result if it is kept moist with ointment and covered with a bandage.  Do not let the wound dry out.      Supplies Needed:                Qtips or gauze pads                Polysporin or Bacitracin Ointment                Bandaids or nonstick gauze pads and paper tape    Wound care kits and brown paper tape are available for purchase at   the pharmacy.    BLEEDING:    Use tightly rolled up gauze or cloth to apply direct pressure over the bandage for 20   minutes.  Reapply pressure for an additional 20 minutes if necessary  Call the office or go to the nearest emergency room if pressure fails to stop the bleeding.  Use additional gauze and tape to maintain pressure once the bleeding has stopped.  Begin wound care 24 hours after surgery as directed.                  WOUND HEALING    One week after surgery a pink / red halo will form around the outside of the wound.   This is new skin.  The center of the wound will appear  yellowish white and produce some drainage.  The pink halo will slowly migrate in toward the center of the wound until the wound is covered with new shiny pink skin.  There will be no more drainage when the wound is completely healed.  It will take six months to one year for the redness to fade.  The scar may be itchy, tight and sensitive to extreme temperatures for a year after the surgery.  Massaging the area several times a day for several minutes after the wound is completely healed will help the scar soften and normalize faster. Begin massage only after healing is complete.      In case of emergency call: Dr Gallagher: [phone]    [facility]: [phone]    [facility]:[phone]

## 2022-09-08 ENCOUNTER — MYC MEDICAL ADVICE (OUTPATIENT)
Dept: FAMILY MEDICINE | Facility: CLINIC | Age: 72
End: 2022-09-08

## 2022-09-09 NOTE — TELEPHONE ENCOUNTER
Please see [employer] message and advise.   Per health maintenance pt is up to date on Covid vaccines. Does provider have recommendation for pt on 5th shot?  Charito CARREON RN  [facility]

## 2022-09-09 NOTE — TELEPHONE ENCOUNTER
He might be a candidate of the new COVID booster, which might be started after next week. He can take flu shot soon and plan getting the new booster COVID in 2 weeks after flu shot       thx

## 2022-09-12 NOTE — TELEPHONE ENCOUNTER
Patient notified of provider's response via [employer].     Shereen Murray RN  [facility] Triage Team

## 2022-09-28 ENCOUNTER — IMMUNIZATION (OUTPATIENT)
Dept: FAMILY MEDICINE | Facility: CLINIC | Age: 72
End: 2022-09-28
Payer: COMMERCIAL

## 2022-09-28 DIAGNOSIS — Z23 NEED FOR PROPHYLACTIC VACCINATION AND INOCULATION AGAINST INFLUENZA: Primary | ICD-10-CM

## 2022-09-28 PROCEDURE — G0008 ADMIN INFLUENZA VIRUS VAC: HCPCS

## 2022-09-28 PROCEDURE — 90662 IIV NO PRSV INCREASED AG IM: CPT

## 2022-10-03 ENCOUNTER — OFFICE VISIT (OUTPATIENT)
Dept: FAMILY MEDICINE | Facility: CLINIC | Age: 72
End: 2022-10-03
Payer: COMMERCIAL

## 2022-10-03 VITALS
HEIGHT: 70 IN | HEART RATE: 65 BPM | DIASTOLIC BLOOD PRESSURE: 76 MMHG | TEMPERATURE: 98.1 F | WEIGHT: 223 LBS | OXYGEN SATURATION: 98 % | BODY MASS INDEX: 31.92 KG/M2 | SYSTOLIC BLOOD PRESSURE: 132 MMHG

## 2022-10-03 DIAGNOSIS — F43.20 ADJUSTMENT DISORDER, UNSPECIFIED TYPE: ICD-10-CM

## 2022-10-03 DIAGNOSIS — Z63.0 MARITAL CONFLICT: Primary | ICD-10-CM

## 2022-10-03 DIAGNOSIS — M54.42 ACUTE BILATERAL LOW BACK PAIN WITH LEFT-SIDED SCIATICA: ICD-10-CM

## 2022-10-03 DIAGNOSIS — M25.552 HIP PAIN, LEFT: ICD-10-CM

## 2022-10-03 PROCEDURE — 99213 OFFICE O/P EST LOW 20 MIN: CPT | Performed by: PHYSICIAN ASSISTANT

## 2022-10-03 RX ORDER — METHYLPREDNISOLONE 4 MG
TABLET, DOSE PACK ORAL
Qty: 21 TABLET | Refills: 0 | Status: SHIPPED | OUTPATIENT
Start: 2022-10-03 | End: 2023-04-13

## 2022-10-03 SDOH — SOCIAL STABILITY - SOCIAL INSECURITY: PROBLEMS IN RELATIONSHIP WITH SPOUSE OR PARTNER: Z63.0

## 2022-10-03 ASSESSMENT — PAIN SCALES - GENERAL: PAINLEVEL: SEVERE PAIN (6)

## 2022-10-03 NOTE — PROGRESS NOTES
"  Assessment & Plan     Acute bilateral low back pain with left-sided sciatica  Plan for hip and back imaging.  No x ray today and so this was scheduled  Start medrol for pain/radiculopathy, he can also increase his gabapentin to 300 mg at night  Begin PT  Follow up in 4 weeks for follow up if these approaches are not resolving his symptoms  - XR Lumbar Spine 2/3 Views; Future  - Physical Therapy Referral; Future  - methylPREDNISolone (MEDROL DOSEPAK) 4 MG tablet therapy pack; Follow Package Directions    Hip pain, left  - XR Pelvis and Hip Left 1 View; Future  - Physical Therapy Referral; Future  - methylPREDNISolone (MEDROL DOSEPAK) 4 MG tablet therapy pack; Follow Package Directions    Marital conflict  Referral for therapist placed    Adjustment disorder, unspecified type  - Adult Mental Health  Referral; Future         BMI:   Estimated body mass index is 32 kg/m  as calculated from the following:    Height as of this encounter: 1.778 m (5' 10\").    Weight as of this encounter: 101.2 kg (223 lb).       Return in about 2 weeks (around 10/17/2022) for x rays.    Edu Camarena PA-C  [facility]   Chris is a 72 year old presenting for the following health issues:  Back Pain      History of Present Illness       Reason for visit:  Sciatica in right leg  Symptom onset:  More than a month  Symptoms include:  Numbness & burning sensations in right keg between hip & knee  Symptom intensity:  Moderate  Symptom progression:  Staying the same  Had these symptoms before:  Yes  Has tried/received treatment for these symptoms:  No  What makes it better:  Ice packs. Warm shower.    He eats 2-3 servings of fruits and vegetables daily.He consumes 1 sweetened beverage(s) daily.He exercises with enough effort to increase his heart rate 60 or more minutes per day.  He exercises with enough effort to increase his heart rate 7 days per week.   He is taking medications " "regularly.     Chris presents to the clinic with 2 week hx of left sided  Low back and hip pain that radiates along the side of his right leg and extends to the knee.  The pain feels like a tingling and burning sensation. It is constant and worse at night.  He is taking gabapentin 100 mg at night for another diagnosis, this does seem to improve his symptoms. No back or hip injury noted.   No hx of previous back or hip imaging.    He has been experiencing stress and some anxiety recently.  Recently moved to [place] and has has moved three times in the past year.  Recently  his long time male partner 1 year ago.  Marriage has been difficult, partner has been having some medical problems.  He is interested in meeting with a therapist    Review of Systems   Constitutional, HEENT, cardiovascular, pulmonary, GI, , musculoskeletal, neuro, skin, endocrine and psych systems are negative, except as otherwise noted.      Objective    /76   Pulse 65   Temp 98.1  F (36.7  C) (Temporal)   Ht 1.778 m (5' 10\")   Wt 101.2 kg (223 lb)   SpO2 98%   BMI 32.00 kg/m    Body mass index is 32 kg/m .  Physical Exam   GENERAL: healthy, alert and no distress  MS:  No TTP of midline lower back, TTP along lateral left hip, FROM of LE bilaterally with  With pain on left adduction, negative SLR, 5/5 strength of LE  NEURO: Normal strength and tone, mentation intact and speech normal, LE DTRs intact  PSYCH: mentation appears normal, affect normal/bright          "

## 2022-10-04 ENCOUNTER — TELEPHONE (OUTPATIENT)
Dept: BEHAVIORAL HEALTH | Facility: CLINIC | Age: 72
End: 2022-10-04

## 2022-10-04 ENCOUNTER — TELEPHONE (OUTPATIENT)
Dept: PSYCHIATRY | Facility: CLINIC | Age: 72
End: 2022-10-04

## 2022-10-04 NOTE — TELEPHONE ENCOUNTER
TC RN received a Referral for Transition Clinic for Therapy only.  This was forwarded to the TC Coordinators for processing.

## 2022-10-04 NOTE — TELEPHONE ENCOUNTER
Writer spoke with pt and scheduled initial TC therapy appointment on 10/10/22 @  1:00 pm. Writer sent intake documents via [employer]. Writer will reply to referral source.Tracker completed.    Adeline Jackson  10/04/22  335    ----- Message from Britton Vazquez RN sent at 10/4/2022  3:23 PM CDT -----  Regarding: FW: transition clinic    ----- Message -----  From: Beronica Schaeffer  Sent: 10/4/2022   1:24 PM CDT  To: Transition Clinic Rn Pool  Subject: transition clinic                                Transition Clinic Referral   [place] (Limited)        Please Check Type of Referral Requested:       __x__THERAPY: The Transition clinic is able to schedule patients without current medical insurance; these patient will be referred to our Social Work Care Coordinator for Medical Insurance              Assistance. We are open for referral for psychotherapy. Patient is referred from:  Outpatient Intake      ____MEDICATION:  Referrals for Medication are ONLY accepted from the following areas (select):                                        Suboxone and Opioid Management Referrals are automatically denied. TC Psychiatry cannot see patient without active medical insurance.         Referring Provider Contact Name: Edu Camarena; Phone Number: [phone]    Reason for Transition Clinic Referral: LGBTQ relationships, male provider, in person please.  Anxiety and depression    Next Level of Care Patient Will Be Transitioned To: [facility]  Provider(s)Nasir Anderson  Location [place]  Date/Time January 31st, 2023 (His birthday) 830am check in / 9am appt    What Would Be Helpful from the Transition Clinic: Bridge therapy      Needs: NO    Does Patient Have Access to Technology: yes    Patient E-mail Address: [email or web address]    Current Patient Phone Number: [phone];     Clinician Gender Preference (if applicable): YES: Male familiar with LGBTQ relationships issues    Beronica Schaeffer

## 2022-10-10 ENCOUNTER — VIRTUAL VISIT (OUTPATIENT)
Dept: BEHAVIORAL HEALTH | Facility: CLINIC | Age: 72
End: 2022-10-10
Payer: COMMERCIAL

## 2022-10-10 DIAGNOSIS — F39 MOOD DISORDER (H): Primary | ICD-10-CM

## 2022-10-10 NOTE — PROGRESS NOTES
[facility]   Mental Health & Addiction Services     Progress Note - Initial Visit    Patient  Name:  Pranay Ratliff Date: 10.10.2022         Service Type: Individual     Visit Start Time: 1245  Visit End Time: 1355    Visit #: 1    Attendees: Client attended alone    Service Modality:  Video Visit:      Provider verified identity through the following two step process.  Patient provided:  Patient photo and Patient     Telemedicine Visit: The patient's condition can be safely assessed and treated via synchronous audio and visual telemedicine encounter.      Reason for Telemedicine Visit: Patient has requested telehealth visit    Originating Site (Patient Location): Patient's home    Distant Site (Provider Location): [facility]: Transition Clinic    Consent:  The patient/guardian has verbally consented to: the potential risks and benefits of telemedicine (video visit) versus in person care; bill my insurance or make self-payment for services provided; and responsibility for payment of non-covered services.     Patient would like the video invitation sent by:  My Chart    Mode of Communication:  Video Conference via [employer]well    As the provider I attest to compliance with applicable laws and regulations related to telemedicine.       DATA:   Interactive Complexity: No   Crisis: No     Presenting Concerns/  Current Stressors:   Pt is referred for bridging services due to extended wait time for [facility] (2022). He reports seeking counseling due to intensified relational strain over the previous 6-12 mos, describing patterns of prioritizing others wants/needs above his own, lack of meaningful/effective communication with spouse, and emotional estrangement. He describes concurrent increase of depressive spectrum sx, notably depressed mood, social isolation, feelings of helplessness/hopelessness at times, amotivation, anhedonia. States he is unsure of the long-term outcome of current  "relationship, but also does not want to make \"precipitous\" decisions in his current emotional state. Cites a desire to improve overall sense of self, self-efficacy, and ability to more consistently have own wants/needs met.      ASSESSMENT:  Mental Status Assessment:  Appearance:   Appropriate   Eye Contact:   Good   Psychomotor Behavior: Normal   Attitude:   Cooperative   Orientation:   All  Speech   Rate / Production: Normal/ Responsive   Volume:  Normal   Mood:    Anxious  Sad   Affect:    Appropriate   Thought Content:  Clear   Thought Form:  Coherent  Goal Directed  Logical   Insight:    Good       Safety Issues and Plan for Safety and Risk Management:     Muir Suicide Severity Rating Scale (Lifetime/Recent)  Muir Suicide Severity Rating (Lifetime/Recent) 3/2/2019 10/4/2022   Q1 Wished to be Dead (Past Month) no -   Q2 Suicidal Thoughts (Past Month) no -   Q6 Suicide Behavior (Lifetime) no -   1. Wish to be Dead (Past 1 Month) - 0   2. Non-Specific Active Suicidal Thoughts (Past 1 Month) - 0   Calculated C-SSRS Risk Score (Lifetime/Recent) - No Risk Indicated     Patient denies current fears or concerns for personal safety.  Patient denies current or recent suicidal ideation or behaviors.  Patient denies current or recent homicidal ideation or behaviors.  Patient denies current or recent self injurious behavior or ideation.  Patient denies other safety concerns.  Recommended that patient call 911 or go to the local ED should there be a change in any of these risk factors.  Patient reports there are no firearms in the house.     Diagnostic Criteria:  Deferred pending further evaluation      DSM5 Diagnoses: (Sustained by DSM5 Criteria Listed Above)  Diagnoses: F39 - Unspecified mood [affective] d/o  Psychosocial & Contextual Factors: stage of life changes, relational strain, hx of trauma, ongoing effects of COVID-19 pandemic  WHODAS 2.0 (12 item):   WHODAS 2.0 Total Score 10/4/2022   Total Score 12 "   Total Score MyChart 12     Intervention:   Psychodynamic- Patient processed internal experiences  and Educated on treatment planning and started identifying goals and interventions for treatment plan  Collateral Reports Completed:  Not Applicable      PLAN: (Homework, other):  1. Provider will continue Diagnostic Assessment.  Patient was given the following to do until next session:  Self/worldview HW    2. Provider recommended the following referrals: n/a - in place prior to TC engagement.      3.  Suicide Risk and Safety Concerns were assessed for Pranay Ratliff.    Patient meets the following risk assessment and triage: Patient denied any current/recent/lifetime history of suicidal ideation and/or behaviors.  No safety plan indicated at this time.       RHETT ESPINOSA, [facility]  October 10, 2022

## 2022-10-17 ENCOUNTER — VIRTUAL VISIT (OUTPATIENT)
Dept: BEHAVIORAL HEALTH | Facility: CLINIC | Age: 72
End: 2022-10-17
Payer: COMMERCIAL

## 2022-10-17 DIAGNOSIS — F39 MOOD DISORDER (H): Primary | ICD-10-CM

## 2022-10-17 NOTE — PROGRESS NOTES
[facility]                                    Progress Note    Patient Name: Pranay Ratliff  Date: 10.17.2022         Service Type: Individual      Session Start Time: 1300  Session End Time: 1348     Session Length: 48m    Session #: 002    Attendees: Client attended alone    Service Modality:  Video Visit:      Provider verified identity through the following two step process.  Patient provided:  Patient is known previously to provider    Telemedicine Visit: The patient's condition can be safely assessed and treated via synchronous audio and visual telemedicine encounter.      Reason for Telemedicine Visit: Patient has requested telehealth visit    Originating Site (Patient Location): Patient's home    Distant Site (Provider Location): [facility]: Transition Clinic    Consent:  The patient/guardian has verbally consented to: the potential risks and benefits of telemedicine (video visit) versus in person care; bill my insurance or make self-payment for services provided; and responsibility for payment of non-covered services.     Patient would like the video invitation sent by:  My Chart    Mode of Communication:  Video Conference via Amwell    Distant Location (Provider):  Off-site    As the provider I attest to compliance with applicable laws and regulations related to telemedicine.    DATA  Interactive Complexity: No  Crisis: No        Progress Since Last Session (Related to Symptoms / Goals / Homework):   Symptoms: Improving - pt describes increased overall insight and receptivity to change process / more accurate conceptualization of barriers    Homework: Achieved / completed to satisfaction      Episode of Care Goals: Achieved / completed to satisfaction - PREPARATION (Decided to change - considering how); Intervened by negotiating a change plan and determining options / strategies for behavior change, identifying triggers, exploring social supports, and working  towards setting a date to begin behavior change     Current / Ongoing Stressors and Concerns:    Pt is referred for bridging services due to extended wait time for [facility] (01.31.2022). He reports seeking counseling due to intensified relational strain over the previous 6-12 mos, describing patterns of prioritizing others wants/needs above his own, lack of meaningful/effective communication with spouse, and emotional estrangement.     Pt presents to session reporting some perceived shifts to overall environmental stressors, citing efforts to reorient self to independently meaningful concepts and activities. Reports some efforts to present and act in a more authentic manner while establishing and maintaining interpersonal boundaries to more consistently meet own wants / needs. Identifies historical patterns of acting according to perceived obligation and feelings of guilt; is working to dispel these restrictive assumptions and further empower sense of self-efficacy. Currently working w/ spouse to improve overall effective communication; may potentially be receptive to couples' counseling in the future. Pt receptive to therapeutic tools and concepts presented in session.     Treatment Objective(s) Addressed in This Session:   Increase interest, engagement, and pleasure in doing things  Identify negative self-talk and behaviors: challenge core beliefs, myths, and actions  learn & utilize at least 1 assertive communication skills weekly       Intervention:   [place]af: Processed previous worldview HW; reoriented pt to social interest and actionable changes       ASSESSMENT: Current Emotional / Mental Status (status of significant symptoms):   Risk status (Self / Other harm or suicidal ideation)   Patient denies current fears or concerns for personal safety.   Patient denies current or recent suicidal ideation or behaviors.   Patient denies current or recent homicidal ideation or behaviors.   Patient denies current or recent  self injurious behavior or ideation.   Patient denies other safety concerns.   Patient reports there has been no change in risk factors since their last session.     Patient reports there has been no change in protective factors since their last session.     Recommended that patient call 911 or go to the local ED should there be a change in any of these risk factors.     Appearance:   Appropriate    Eye Contact:   Good    Psychomotor Behavior: Normal    Attitude:   Cooperative    Orientation:   All   Speech    Rate / Production: Normal     Volume:  Normal    Mood:    Anxious    Affect:    Appropriate    Thought Content:  Clear    Thought Form:  Coherent  Logical    Insight:    Good      Medication Review:   No changes to current psychiatric medication(s)     Medication Compliance:   Yes     Changes in Health Issues:   None reported     Chemical Use Review:   Substance Use: Chemical use reviewed, no active concerns identified      Tobacco Use: No current tobacco use.      Diagnosis:  1. F39 - Mood disorder (H)        Collateral Reports Completed:   Not Applicable    PLAN: (Patient Tasks / Therapist Tasks / Other)  Pt to follow-up on assigned HW; continue TC engagement until effectively connected with next LOC. [facility] appt moved up to 10.27.2022 per pt report.        RHETT ESPINOSA, [facility]                                                         ______________________________________________________________________    Individual Treatment Plan    Patient's Name: Pranay Ratliff  YOB: 1950    Date of Creation: 10.17.2022  Date Treatment Plan Last Reviewed/Revised: 10.17.2022    DSM5 Diagnoses: F39 - Unspecified mood [affective] d/o  Psychosocial / Contextual Factors: stage of life changes, relational strain, hx of trauma, ongoing effects of COVID-19 pandemic  PROMIS (reviewed every 90 days): Pending    Referral / Collaboration:  In place prior to TC engagement.    Anticipated number of session for this  episode of care: TBD pending waitlist  Anticipation frequency of session: Weekly  Anticipated Duration of each session: 38-52 minutes  Treatment plan will be reviewed in 90 days or when goals have been changed.       MeasurableTreatment Goal(s) related to diagnosis / functional impairment(s)  Goal 1: Patient will effectively utilize learned coping strategies to moderate anxious / depressive spectrum sx at 8/10 opportunities, and self-report improved subjective QoL    Objective #A (Patient Action)    Patient will use cognitive strategies identified in therapy to challenge anxious thoughts.  Status: New - Date: 10.17.2022     Intervention(s)  Therapist will teach assertiveness skills.  .    Objective #B  Patient will Identify negative self-talk and behaviors: challenge core beliefs, myths, and actions.  Status: New - Date: 10.17.2022     Intervention(s)  Therapist will teach emotional recognition/identification.  .    Objective #C  Patient will Improve concentration, focus, and mindfulness in daily activities .  Status: New - Date: 10.17.2022     Intervention(s)  Therapist will teach emotional regulation skills.  .        Patient has not reviewed nor agreed to the above plan. Review at next session.      RHETT ESPINOSA, [facility]  October 17, 2022

## 2022-10-18 ENCOUNTER — ANCILLARY PROCEDURE (OUTPATIENT)
Dept: GENERAL RADIOLOGY | Facility: CLINIC | Age: 72
End: 2022-10-18
Attending: PHYSICIAN ASSISTANT
Payer: COMMERCIAL

## 2022-10-18 ENCOUNTER — IMMUNIZATION (OUTPATIENT)
Dept: FAMILY MEDICINE | Facility: CLINIC | Age: 72
End: 2022-10-18
Payer: COMMERCIAL

## 2022-10-18 DIAGNOSIS — M54.42 ACUTE BILATERAL LOW BACK PAIN WITH LEFT-SIDED SCIATICA: ICD-10-CM

## 2022-10-18 DIAGNOSIS — Z23 HIGH PRIORITY FOR 2019-NCOV VACCINE: Primary | ICD-10-CM

## 2022-10-18 DIAGNOSIS — M25.552 HIP PAIN, LEFT: ICD-10-CM

## 2022-10-18 PROCEDURE — 72100 X-RAY EXAM L-S SPINE 2/3 VWS: CPT | Mod: TC | Performed by: RADIOLOGY

## 2022-10-18 PROCEDURE — 91312 COVID-19,PF,PFIZER BOOSTER BIVALENT: CPT

## 2022-10-18 PROCEDURE — 73502 X-RAY EXAM HIP UNI 2-3 VIEWS: CPT | Mod: TC | Performed by: RADIOLOGY

## 2022-10-18 PROCEDURE — 0124A COVID-19,PF,PFIZER BOOSTER BIVALENT: CPT

## 2022-10-21 ENCOUNTER — MYC MEDICAL ADVICE (OUTPATIENT)
Dept: FAMILY MEDICINE | Facility: CLINIC | Age: 72
End: 2022-10-21

## 2022-10-21 NOTE — TELEPHONE ENCOUNTER
Yes, patient should move forward with PT as a next step.  If symptoms have not improved with PT we will need to get an MRI for further assessment

## 2022-10-21 NOTE — TELEPHONE ENCOUNTER
MyChart reply sent to the patient.  Please also see MyChart update from the patient. Ann-Marie Rose RN

## 2022-10-21 NOTE — RESULT ENCOUNTER NOTE
Pranay-  Here are your recent results.     Your x ray of your Hip was normal.  Your lumbar spine x ray showed some areas of arthritis but was otherwise normal.  How are your symptoms now after the steroids and increase of gabapentin?    Edu Camarena PA-C

## 2022-10-24 ENCOUNTER — VIRTUAL VISIT (OUTPATIENT)
Dept: BEHAVIORAL HEALTH | Facility: CLINIC | Age: 72
End: 2022-10-24
Payer: COMMERCIAL

## 2022-10-24 DIAGNOSIS — Z63.0 PARTNER RELATIONSHIP PROBLEM: ICD-10-CM

## 2022-10-24 DIAGNOSIS — F39 MOOD DISORDER (H): Primary | ICD-10-CM

## 2022-10-24 SDOH — SOCIAL STABILITY - SOCIAL INSECURITY: PROBLEMS IN RELATIONSHIP WITH SPOUSE OR PARTNER: Z63.0

## 2022-10-24 NOTE — PROGRESS NOTES
Discharge Summary  Multiple Sessions    Client Name: Pranay Ratliff MRN#: [id number] YOB: 1950    Discharge Date:   October 24, 2022    Service Modality: Video Visit:      Provider verified identity through the following two step process.  Patient provided:  Patient is known previously to provider    Telemedicine Visit: The patient's condition can be safely assessed and treated via synchronous audio and visual telemedicine encounter.      Reason for Telemedicine Visit: Patient convenience (e.g. access to timely appointments / distance to available provider)    Originating Site (Patient Location): Patient's home    Distant Site (Provider Location): [facility] Clinics: Transition Clinic    Consent:  The patient/guardian has verbally consented to: the potential risks and benefits of telemedicine (video visit) versus in person care; bill my insurance or make self-payment for services provided; and responsibility for payment of non-covered services.     Patient would like the video invitation sent by:  My Chart    Mode of Communication:  Video Conference via Amwell    Distant Location (Provider):  Off-site    As the provider I attest to compliance with applicable laws and regulations related to telemedicine.    Service Type: Individual      Session Start Time: 1300  Session End Time: 1347      Session Length: 45 - 50     Session #: 003     Attendees: Client attended alone      Focus of Treatment Objective(s):  Client's presenting concerns included: Depressed Mood -    Anxiety -    Relational Problems related to: Conflict or difficulties with partner/spouse  Stage of Change at time of Discharge: PREPARATION (Decided to change - considering how)    Medication Adherence:  Yes    Chemical Use:  No    Assessment: Current Emotional / Mental Status (status of significant symptoms):    Risk status (Self / Other harm or suicidal ideation)  Client denies current fears or concerns for  personal safety.  Client denies current or recent suicidal ideation or behaviors.  Client denies current or recent homicidal ideation or behaviors.  Client denies current or recent self injurious behavior or ideation.  Client denies other safety concerns.  A safety and risk management plan has not been developed at this time, however client was given the after-hours number should there be a change in any of these risk factors.    Appearance:   Appropriate   Eye Contact:   Good   Psychomotor Behavior: Normal   Attitude:   Cooperative   Orientation:   All  Speech   Rate / Production: Normal    Volume:  Normal   Mood:    Normal  Affect:    Appropriate   Thought Content:  Clear   Thought Form:  Coherent  Logical   Insight:   Good     DSM5 Diagnoses: (Sustained by DSM5 Criteria Listed Above)  Diagnoses: F39 - Unspecified mood [affective] d/o, Z63.0 - Partner relational problem  Psychosocial & Contextual Factors: stage of life changes, relational strain, hx of trauma, ongoing effects of COVID-19 pandemic  WHODAS 2.0 (12 item) Score: Pt did not complete at discharge; refer to intake    Reason for Discharge:  Pt transitioning to long-term provider via [facility]      Aftercare Plan:  Client will followup with longer term provider; may resume TC services if needed pending pt/provider fit      RHETT ESPINOSA, [facility]  October 24, 2022

## 2022-10-24 NOTE — TELEPHONE ENCOUNTER
Patient notified of provider's response via [employer].    Shereen Murray RN  [facility]

## 2022-10-27 ENCOUNTER — VIRTUAL VISIT (OUTPATIENT)
Dept: PSYCHOLOGY | Facility: CLINIC | Age: 72
End: 2022-10-27
Attending: PHYSICIAN ASSISTANT
Payer: COMMERCIAL

## 2022-10-27 DIAGNOSIS — F43.20 ADJUSTMENT DISORDER, UNSPECIFIED TYPE: Primary | ICD-10-CM

## 2022-10-27 PROCEDURE — 90791 PSYCH DIAGNOSTIC EVALUATION: CPT | Mod: 95 | Performed by: MARRIAGE & FAMILY THERAPIST

## 2022-10-27 ASSESSMENT — COLUMBIA-SUICIDE SEVERITY RATING SCALE - C-SSRS
2. HAVE YOU ACTUALLY HAD ANY THOUGHTS OF KILLING YOURSELF?: NO
TOTAL  NUMBER OF INTERRUPTED ATTEMPTS LIFETIME: NO
6. HAVE YOU EVER DONE ANYTHING, STARTED TO DO ANYTHING, OR PREPARED TO DO ANYTHING TO END YOUR LIFE?: NO
ATTEMPT LIFETIME: NO
TOTAL  NUMBER OF ABORTED OR SELF INTERRUPTED ATTEMPTS LIFETIME: NO
1. HAVE YOU WISHED YOU WERE DEAD OR WISHED YOU COULD GO TO SLEEP AND NOT WAKE UP?: NO

## 2022-10-27 NOTE — PROGRESS NOTES
"    [facility]  Provider Name:  Nasir Justin     Credentials:  LMFT    PATIENT'S NAME: Pranay Ratliff  PREFERRED NAME: Chris  PRONOUNS:       MRN: [id number]  : 1950  ADDRESS:  [address] [address]  ACCT. NUMBER:  [id number]  DATE OF SERVICE: 10/27/22  START TIME: 9:01a  END TIME: 10:00a  PREFERRED PHONE: [phone]  May we leave a program related message: Yes  SERVICE MODALITY:  Video Visit:      Provider verified identity through the following two step process.  Patient provided:  Patient     Telemedicine Visit: The patient's condition can be safely assessed and treated via synchronous audio and visual telemedicine encounter.      Reason for Telemedicine Visit: Services only offered telehealth    Originating Site (Patient Location): Patient's home    Distant Site (Provider Location): Provider Remote Setting- Home Office    Consent:  The patient/guardian has verbally consented to: the potential risks and benefits of telemedicine (video visit) versus in person care; bill my insurance or make self-payment for services provided; and responsibility for payment of non-covered services.     Patient would like the video invitation sent by:  Send to e-mail at: [email or web address]    Mode of Communication:  Video Conference via Amwell    Distant Location (Provider):  Off-site    As the provider I attest to compliance with applicable laws and regulations related to telemedicine.    UNIVERSAL ADULT Mental Health DIAGNOSTIC ASSESSMENT    Identifying Information:  Patient is a 72 year old,   individual.    Patient was referred for an assessment by self.  Patient attended the session .    Chief Complaint:   The reason for seeking services at this time is: \"Stress related to marital conflict\".  The problem(s) began 10/20/21 but there have been some communication issues since pt retired in . After this pt was home more often and their time together increased, " there was ongoing medical issues as well. Pt feel they began to drift apart and his  became more distant for a few years. They moved to [place] from [place] in 2018 and they were  for about 1.5 years around COVID. Some of the big stressors have been financial, downsizing, real estate, and medical issues.   -Currently, pt notes feeling more stress due to being the primary source of care for his  due to medical issues. Chris had a major seizure which has led to increased issues.   There has been challenges with control within the relationship with the medical problems and both being male in the relationship. However, with his medication he is not able to do the normal home tasks that Chris likes to do.     Social/Family History:  Patient reported they grew up in other [place].  They were raised by biological parents  .  Parents were always together.      The patient describes their cultural background as .  Cultural influences and impact on patient's life structure, values, norms, and healthcare: I am dunham which in many environments poses challenges..     These factors will be addressed in the Preliminary Treatment plan. Patient identified their preferred language to be English. Patient reported they does not need the assistance of an  or other support involved in therapy.     Patient's highest education level was other MSW and SUKHWINDER.  Patient identified the following learning problems: none reported.  Modifications will not be used to assist communication in therapy.  Patient reports they are  able to understand written materials.    Patient's current relationship status is  for 1 year but together since 1999.   Patient identified their sexual orientation as dunham.  Patient reported having 1 child(cherelle). Patient identified partner; siblings; friends as part of their support system.  Patient identified the quality of these relationships as good,  .      Patient's current  living/housing situation involves staying in own home/apartment.  The immediate members of family and household include Chris Zapata, 81, and they report that housing is stable.    Patient is currently retired.  Patient reports their finances are obtained through other. Patient does not identify finances as a current stressor.      Patient reported that they have not been involved with the legal system.  Patient does not report being under probation/ parole/ jurisdiction. They are not under any current court jurisdiction. .    Patient's Strengths and Limitations:  Patient identified the following strengths or resources that will help them succeed in treatment: commitment to health and well being. Things that may interfere with the patient's success in treatment include: lack of family support.     Personal and Family Medical History:  Patient does report a family history of mental health concerns.  Patient reports family history includes Abdominal Aortic Aneurysm in his mother; Cancer in his brother; Diabetes Type 1 in his brother; Diabetes Type 2  in his maternal grandmother; Heart Disease in his brother; Heart Failure in his father; Skin Cancer in his mother..     Patient does not report Mental Health Diagnosis or Treatment.      Patient has had a physical exam to rule out medical causes for current symptoms.  Date of last physical exam was greater than a year ago and client was encouraged to schedule an exam with PCP. The patient has a [place] Primary Care Provider, who is named Hipolito Alva.  Patient reports no current medical concerns.  Patient denies any issues with pain..   There are not significant appetite / nutritional concerns / weight changes.   Patient does not report a history of head injury / trauma / cognitive impairment.      Patient reports not taking any current medications    Medication Adherence:  Patient reports taking.      Patient Allergies:    Allergies   Allergen Reactions      Johanna-[place] Plus Allergy [Diphenhydramine]      Patient has lip swollen ,has used tylenol in th past. No issues. Has used benadryl in the past no issues.  Never used phenylephrine.       Medical History:    Past Medical History:   Diagnosis Date     Basal cell carcinoma      Concussion with loss of consciousness of 30 minutes or less, subsequent encounter      Epididymitis, bilateral      Epididymitis, left      Epididymitis, right      Obstructive sleep apnea syndrome      Squamous cell carcinoma of skin, unspecified      Vasovagal syncope          Current Mental Status Exam:   Appearance:  Appropriate    Eye Contact:  Good   Psychomotor:  Normal       Gait / station:  no problem  Attitude / Demeanor: Cooperative   Speech      Rate / Production: Normal/ Responsive      Volume:  Normal  volume      Language:  intact  Mood:   Depressed   Affect:   Flat    Thought Content: Clear   Thought Process: Coherent       Associations: No loosening of associations  Insight:   Good   Judgment:  Intact   Orientation:  Person  Attention/concentration: Good      Patient reported the following problems as a result of their substance use: no problems, not applicable.    Substance Use: No symptoms    Based on the negative CAGE score and clinical interview there  are not indications of drug or alcohol abuse.      Significant Losses / Trauma / Abuse / Neglect Issues:   Patient did not  serve in the .  There are indications or report of significant loss, trauma, abuse or neglect issues related to: are no indications and client denies any losses, trauma, abuse, or neglect concerns.  Concerns for possible neglect are not present.     Safety Assessment:   Patient denies current homicidal ideation and behaviors.  Patient denies current self-injurious ideation and behaviors.    Patient denied risk behaviors associated with substance use.  Patient denies any high risk behaviors associated with mental health symptoms.  Patient reports  the following current concerns for their personal safety: None.  Patient reports there are not firearms in the house.       There are no firearms in the home..    History of Safety Concerns:  Patient denied a history of homicidal ideation.     Patient denied a history of personal safety concerns.    Patient denied a history of assaultive behaviors.    Patient denied a history of sexual assault behaviors.     Patient denied a history of risk behaviors associated with substance use.  Patient denies any history of high risk behaviors associated with mental health symptoms.  Patient reports the following protective factors: forward or future oriented thinking; dedication to family or friends; safe and stable environment; regular sleep; effectively controls impulses; regular physical activity; sense of belonging; purpose; secure attachment; help seeking behaviors when distressed; abstinence from substances; adherence with prescribed medication; living with other people; daily obligations; effective problem solving skills; commitment to well being; sense of meaning; positive social skills; healthy fear of risky behaviors or pain; financial stability; strong sense of self worth or esteem; sense of personal control or determination; access to a variety of clinical interventions and pets    Risk Plan:  See Recommendations for Safety and Risk Management Plan    Review of Symptoms per patient report:  Depression: Change in energy level and Feelings of hopelessness  Donna:  No Symptoms  Psychosis: No Symptoms  Anxiety: Ruminations  Panic:  No symptoms  Post Traumatic Stress Disorder:  No Symptoms   Eating Disorder: No Symptoms  ADD / ADHD:  No symptoms  Conduct Disorder: No symptoms  Autism Spectrum Disorder: No symptoms  Obsessive Compulsive Disorder: No Symptoms    Patient reports the following compulsive behaviors and treatment history: none.      Diagnostic Criteria:   Adjustment Disorder  A. The development of emotional or  behavioral symptoms in response to an identifiable stressor(s) occurring within 3 months of the onset of the stressor(s)  B. These symptoms or behaviors are clinically significant, as evidenced by one or both of the following:  C. The stress-related disturbance does not meet criteria for another disorder & is not not an exacerbation of another mental disorder  D. The symptoms do not represent normal bereavement  E. Once the stressor or its consequences have terminated, the symptoms do not persist for more than an additional 6 months      Functional Status:  Patient reports the following functional impairments:  management of the household and or completion of tasks and relationship(s).         Clinical Summary:  1. Reason for assessment: Ongoing relationships issues  .  2. Psychosocial, Cultural and Contextual Factors:   .  3. Principal DSM5 Diagnoses  (Sustained by DSM5 Criteria Listed Above):   Adjustment Disorders  309.9 (F43.9) Unspecified Trauman and Stressor Related Disorder.    6. Prognosis: Expect Improvement.  7. Likely consequences of symptoms if not treated: .  8. Client strengths include:  educated and employed .     Recommendations:     1. Plan for Safety and Risk Management:   Safety and Risk: Recommended that patient call 911 or go to the local ED should there be a change in any of these risk factors..          Report to child / adult protection services was NA.     8. Records:   These were reviewed at time of assessment.   Information in this assessment was obtained from the medical record and  provided by patient who is a good historian.    Patient will have open access to their mental health medical record.        Provider Name/ Credentials:  ОЛЬГА Gonzalez  October 27, 2022

## 2022-11-02 NOTE — PROGRESS NOTES
[facility] Dermatology Note  Encounter Date: Nov 3, 2022  Office Visit     Dermatology Problem List:  1.Hx NMSC  - SCCis, right lateral brow, s/p Mohs 7/21/22  - BCC, mid forehead, s/p Mohs 12/20/18  - BCC, right eyelid, 2008 (records unavailable -[place])  - Others, outside records unavailable   2. Asteatotic dermatitis  - lidex  4. Plantar warts  - WartPeel, cryotherapy   5. Tinea pedis   6. AKs, Efudex initiated to forehead/temples and nose 11/3/2022  ____________________________________________    Assessment & Plan:    # Verrucous plantaris, right plantar foot x 1  - Cryotherapy today, see procedure note below    # Diffuse actinic damage on forehead/nose  - Start Efudex BID for 14 days to the forehead, temples and nose. edu on potential adverse effects  - edu on etiology    # HX NMSC  - patient plans to have FBSE with Dr. Gallagher in next 6mo    Procedures Performed:   - Cryotherapy procedure note, location(s): see above. After verbal consent and discussion of risks and benefits including, but not limited to, dyspigmentation/scar, blister, and pain, 1 lesion(s) was(were) treated with 1-2 mm freeze border for 1-2 cycles with liquid nitrogen. Post cryotherapy instructions were provided.    Follow-up: 4 month(s) in-person, or earlier for new or changing lesions    Staff and Scribe:     Scribe Disclosure:  I, MARITZA PERDOMO, am serving as a scribe to document services personally performed by Aura Bazan PA-C based on data collection and the provider's statements to me.     Provider Disclosure:   The documentation recorded by the scribe accurately reflects the services I personally performed and the decisions made by me.    All risks, benefits and alternatives were discussed with patient.  Patient is in agreement and understands the assessment and plan.  All questions were answered.    Aura Bazan PA-C, MPAS  [facility] [place]:  Phone: [phone], Fax: [phone]  [facility]: Phone: [phone],  Fax: [phone]  [facility]: Phone: [phone], Fax: [phone]  ____________________________________________    CC: Wart (Right foot. Would like removed )    HPI:  Mr. Pranay Ratliff is a(n) 72 year old male who presents today as a return patient for wart follow up. Last seen by Dr. Gallagher on 7/2122, at which time the patient underwent Mohs surgery for treatment of an SCCis on the right lateral brow.    Today, the patient reports that he has not been using the Wartpeel. The wart on his right foot is still present but painful to step on.    Additionally, he notes some dry scaly skin on his forehead and left foot which he previously treated with lidex.    Patient is otherwise feeling well, without additional skin concerns.    Labs Reviewed:  N/A    Physical Exam:  Vitals: There were no vitals taken for this visit.  SKIN: Focused examination of the right foot was performed.  - There is a verrucous papule with thrombosed capillaries interrupting dermatoglyphics on the right plantar foot.   - There are erythematous macules with overyling adherent scale on the face.   - No other lesions of concern on areas examined.     Medications:  Current Outpatient Medications   Medication     aspirin 81 MG tablet     carbamide peroxide (DEBROX) 6.5 % otic solution     clotrimazole (LOTRIMIN) 1 % external cream     clotrimazole-betamethasone (LOTRISONE) 1-0.05 % external cream     diclofenac (VOLTAREN) 1 % topical gel     dutasteride (AVODART) 0.5 MG capsule     fluocinonide (LIDEX) 0.05 % external cream     gabapentin (NEURONTIN) 100 MG capsule     methylPREDNISolone (MEDROL DOSEPAK) 4 MG tablet therapy pack     multivitamin w/minerals (THERA-VIT-M) tablet     Omega-3 Fatty Acids (OMEGA-3 FISH OIL PO)     pantoprazole (PROTONIX) 40 MG EC tablet     rosuvastatin (CRESTOR) 20 MG tablet     triamterene-HCTZ  (MAXZIDE-25) 37.5-25 MG tablet     No current facility-administered medications for this visit.      Past Medical History:   Patient Active Problem List   Diagnosis     History of basal cell carcinoma     Benign essential hypertension     Hyperlipidemia LDL goal <100     Obstructive sleep apnea syndrome     Gastroesophageal reflux disease with esophagitis     Localized swelling of lower extremity     Vasovagal syncope     Elevated prostate specific antigen (PSA)     Concussion with loss of consciousness of 30 minutes or less, subsequent encounter     BPH with obstruction/lower urinary tract symptoms     Myofascial pain syndrome, cervical     Occipital neuralgia of right side     Fatigue due to old head injury     Post concussion syndrome     Cervical segment dysfunction     Thoracic segment dysfunction     Cephalgia     Somatic dysfunction of sacral region     Cervical pain     Dizziness     Marital conflict     Past Medical History:   Diagnosis Date     Basal cell carcinoma      Concussion with loss of consciousness of 30 minutes or less, subsequent encounter      Epididymitis, bilateral      Epididymitis, left      Epididymitis, right      Obstructive sleep apnea syndrome      Squamous cell carcinoma of skin, unspecified      Vasovagal syncope

## 2022-11-03 ENCOUNTER — OFFICE VISIT (OUTPATIENT)
Dept: FAMILY MEDICINE | Facility: CLINIC | Age: 72
End: 2022-11-03
Payer: COMMERCIAL

## 2022-11-03 DIAGNOSIS — B07.0 PLANTAR WART: Primary | ICD-10-CM

## 2022-11-03 DIAGNOSIS — L57.0 ACTINIC KERATOSIS: ICD-10-CM

## 2022-11-03 PROCEDURE — 99213 OFFICE O/P EST LOW 20 MIN: CPT | Mod: 25 | Performed by: PHYSICIAN ASSISTANT

## 2022-11-03 PROCEDURE — 17110 DESTRUCTION B9 LES UP TO 14: CPT | Performed by: PHYSICIAN ASSISTANT

## 2022-11-03 RX ORDER — FLUOROURACIL 50 MG/G
CREAM TOPICAL
Qty: 40 G | Refills: 0 | Status: SHIPPED | OUTPATIENT
Start: 2022-11-03 | End: 2023-04-13

## 2022-11-03 ASSESSMENT — PAIN SCALES - GENERAL: PAINLEVEL: NO PAIN (0)

## 2022-11-03 NOTE — LETTER
11/3/2022         RE: Pranay Ratliff  [address]  [place] [address]        Dear Colleague,    Thank you [facility] your [facility]iff[facility]. Please see a copy of my visit note below.    [facility] Dermatology Note  Encounter Date: Nov 3, 2022  Office Visit     Dermatology Problem List:  1.Hx NMSC  - SCCis, right lateral brow, s/p Mohs 7/21/22  - BCC, mid forehead, s/p Mohs 12/20/18  - BCC, right eyelid, 2008 (records unavailable -[place])  - Others, outside records unavailable   2. Asteatotic dermatitis  - lidex  4. Plantar warts  - WartPeel, cryotherapy   5. Tinea pedis   6. AKs, Efudex initiated to forehead/temples and nose 11/3/2022  ____________________________________________    Assessment & Plan:    # Verrucous plantaris, right plantar foot x 1  - Cryotherapy today, see procedure note below    # Diffuse actinic damage on forehead/nose  - Start Efudex BID for 14 days to the forehead, temples and nose. edu on potential adverse effects  - edu on etiology    # HX NMSC  - patient plans to have FBSE with Dr. Gallagher in next 6mo    Procedures Performed:   - Cryotherapy procedure note, location(s): see above. After verbal consent and discussion of risks and benefits including, but not limited to, dyspigmentation/scar, blister, and pain, 1 lesion(s) was(were) treated with 1-2 mm freeze border for 1-2 cycles with liquid nitrogen. Post cryotherapy instructions were provided.    Follow-up: 4 month(s) in-person, or earlier for new or changing lesions    Staff and Scribe:     Scribe Disclosure:  I, MARITZA PERDOMO, am serving as a scribe to document services personally performed by Aura Bazan PA-C based on data collection and the provider's statements to me.     Provider Disclosure:   The documentation recorded by the scribe accurately reflects the services I personally performed and the decisions made by me.    All risks, benefits and  alternatives were discussed with patient.  Patient is in agreement and understands the assessment and plan.  All questions were answered.    Aura Bazan PA-C, MPAS  [facility] [place]: Phone: [phone], Fax: [phone]  [facility]: Phone: [phone],  Fax: [phone]  [facility]: Phone: [phone], Fax: [phone]  ____________________________________________    CC: Wart (Right foot. Would like removed )    HPI:  Mr. Pranay Ratliff is a(n) 72 year old male who presents today as a return patient for wart follow up. Last seen by Dr. Gallagher on 7/2122, at which time the patient underwent Mohs surgery for treatment of an SCCis on the right lateral brow.    Today, the patient reports that he has not been using the Wartpeel. The wart on his right foot is still present but painful to step on.    Additionally, he notes some dry scaly skin on his forehead and left foot which he previously treated with lidex.    Patient is otherwise feeling well, without additional skin concerns.    Labs Reviewed:  N/A    Physical Exam:  Vitals: There were no vitals taken for this visit.  SKIN: Focused examination of the right foot was performed.  - There is a verrucous papule with thrombosed capillaries interrupting dermatoglyphics on the right plantar foot.   - There are erythematous macules with overyling adherent scale on the face.   - No other lesions of concern on areas examined.     Medications:  Current Outpatient Medications   Medication     aspirin 81 MG tablet     carbamide peroxide (DEBROX) 6.5 % otic solution     clotrimazole (LOTRIMIN) 1 % external cream     clotrimazole-betamethasone (LOTRISONE) 1-0.05 % external cream     diclofenac (VOLTAREN) 1 % topical gel     dutasteride (AVODART) 0.5 MG capsule     fluocinonide (LIDEX) 0.05 % external cream     gabapentin (NEURONTIN) 100 MG capsule      methylPREDNISolone (MEDROL DOSEPAK) 4 MG tablet therapy pack     multivitamin w/minerals (THERA-VIT-M) tablet     Omega-3 Fatty Acids (OMEGA-3 FISH OIL PO)     pantoprazole (PROTONIX) 40 MG EC tablet     rosuvastatin (CRESTOR) 20 MG tablet     triamterene-HCTZ (MAXZIDE-25) 37.5-25 MG tablet     No current facility-administered medications for this visit.      Past Medical History:   Patient Active Problem List   Diagnosis     History of basal cell carcinoma     Benign essential hypertension     Hyperlipidemia LDL goal <100     Obstructive sleep apnea syndrome     Gastroesophageal reflux disease with esophagitis     Localized swelling of lower extremity     Vasovagal syncope     Elevated prostate specific antigen (PSA)     Concussion with loss of consciousness of 30 minutes or less, subsequent encounter     BPH with obstruction/lower urinary tract symptoms     Myofascial pain syndrome, cervical     Occipital neuralgia of right side     Fatigue due to old head injury     Post concussion syndrome     Cervical segment dysfunction     Thoracic segment dysfunction     Cephalgia     Somatic dysfunction of sacral region     Cervical pain     Dizziness     Marital conflict     Past Medical History:   Diagnosis Date     Basal cell carcinoma      Concussion with loss of consciousness of 30 minutes or less, subsequent encounter      Epididymitis, bilateral      Epididymitis, left      Epididymitis, right      Obstructive sleep apnea syndrome      Squamous cell carcinoma of skin, unspecified      Vasovagal syncope               Again, thank you for allowing me to participate in the care of your patient.        Sincerely,        Aura Bazan PA-C

## 2022-11-03 NOTE — PATIENT INSTRUCTIONS
Cryotherapy    What is it?  Use of a very cold liquid, such as liquid nitrogen, to freeze and destroy abnormal skin cells that need to be removed    What should I expect?  Tenderness and redness  A small blister that might grow and fill with dark purple blood. There may be crusting.  More than one treatment may be needed if the lesions do not go away.    How do I care for the treated area?  Gently wash the area with your hands when bathing.  Use a thin layer of Vaseline to help with healing. You may use a Band-Aid.   The area should heal within 7-10 days and may leave behind a pink or lighter color.   Do not use an antibiotic or Neosporin ointment.   You may take acetaminophen (Tylenol) for pain.     Call your doctor if you have:  Severe pain  Signs of infection (warmth, redness, cloudy yellow drainage, and or a bad smell)  Questions or concerns    Who should I call with questions?      [facility]: [phone]      [facility]: [phone]      For urgent needs outside of business hours call the [facility] at [phone] and ask for the dermatology resident on call

## 2022-11-07 ENCOUNTER — THERAPY VISIT (OUTPATIENT)
Dept: PHYSICAL THERAPY | Facility: CLINIC | Age: 72
End: 2022-11-07
Attending: PHYSICIAN ASSISTANT
Payer: COMMERCIAL

## 2022-11-07 DIAGNOSIS — M25.552 HIP PAIN, LEFT: ICD-10-CM

## 2022-11-07 DIAGNOSIS — G89.29 CHRONIC LEFT-SIDED LOW BACK PAIN WITHOUT SCIATICA: ICD-10-CM

## 2022-11-07 DIAGNOSIS — M54.50 CHRONIC LEFT-SIDED LOW BACK PAIN WITHOUT SCIATICA: ICD-10-CM

## 2022-11-07 DIAGNOSIS — M54.42 ACUTE BILATERAL LOW BACK PAIN WITH LEFT-SIDED SCIATICA: ICD-10-CM

## 2022-11-07 PROCEDURE — 97161 PT EVAL LOW COMPLEX 20 MIN: CPT | Mod: GP | Performed by: PHYSICAL THERAPIST

## 2022-11-07 PROCEDURE — 97110 THERAPEUTIC EXERCISES: CPT | Mod: GP | Performed by: PHYSICAL THERAPIST

## 2022-11-07 PROCEDURE — 97112 NEUROMUSCULAR REEDUCATION: CPT | Mod: GP | Performed by: PHYSICAL THERAPIST

## 2022-11-07 NOTE — PROGRESS NOTES
Physical Therapy Initial Evaluation  Subjective:  The history is provided by the patient. No  was used.   Patient Health History  Pranay Ratliff being seen for Left leg sciatica.     Problem began: 9/19/2022.      Pain is reported as 3/10 on pain scale.  General health as reported by patient is good.  Pertinent medical history includes: numbness/tingling and pain at night/rest.   Red flags:  None as reported by patient.  Medical allergies: none.   Surgeries include:  Other. Other surgery history details: Skin cancer surgery..    Current medications:  High blood pressure medication, pain medication and other. Other medications details: RosovuStatin. Pantoprazole..    Current occupation is Retired..   Primary job tasks include:  Prolonged sitting and other.   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 Walking..                Therapist Generated HPI Evaluation  Problem details: Insidious onset of L lateral hip and thigh pain and numbness to knee 09/19/2022.  He does report he has noticed the numbness for several months prior to onset of the pain.  He reports he had been moving from one house to another around that time which may have contributed.         Type of problem:  Lumbar (L hip and thigh).    This is a new condition.  Condition occurred with:  Insidious onset.  Where condition occurred: for unknown reasons.  Site of Pain: L lateral hip.  Pain is described as burning and aching and is constant.  Pain radiates to:  Thigh left (lateral). Pain is worse in the P.M..  Since onset symptoms are gradually worsening.  Associated symptoms:  Numbness (L lateral hip, thigh to knee). Exacerbated by: sitting but tolerance not limited--has increased pain the longer he sits, rising from sitting, lying down/not moving.  and relieved by NSAID's, analgesics, other, ice and heat (rubbing thigh).  Special tests included:  X-ray (L hip--mild bone spurs).  Past treatment: prednisone--helped some.  Improved with treatment: slight improvement with prednisone.  Work activity restrictions: none.  Barriers include:  None as reported by patient.                        Objective:  System    Physical Exam      Theodora Lumbar Evaluation    Posture:  Sitting: fair  Standing: fair  Lordosis: Reduced  Lateral Shift: no  Correction of Posture: better    Movement Loss:  Flexion (Flex): nil  Extension (EXT): mod, major and pain  Side Bonaparte R (SG R): min  Side Glide L (SG L): min  Test Movements:        EIL: During: no effect  After: no effect  Mechanical Response: no effect  Repeat EIL: During: decreases  After: better  Mechanical Response: IncROM        Conclusion: derangement  Principle of Treatment:  Posture Correction: use of lumbar roll in sitting    Extension: REIL x10 reps, every 2 hours                                         Lumbar extension mobilizations--p. Upper lumbar pain, NW, abolished L knee, centralized to L hip  ROS    Assessment/Plan:    Patient is a 72 year old male with lumbar and L LE complaints.  Provisional classification of derangement with directional preference for extension.  He had a good response to repeated lumbar extension in lying with decreased L hip/thigh pain and numbness noted after.  He should make good progress with treatment focusing on a lumbar extension program in addition to posture training to improve mechanics, decrease pain, and improve function.      Patient has the following significant findings with corresponding treatment plan.                Diagnosis 1:  L hip/thigh pain--lumbar  Pain -  self management, education, directional preference exercise and home program  Decreased ROM/flexibility - manual therapy, therapeutic exercise and home program  Decreased function - therapeutic activities and home program  Impaired posture - neuro re-education and home program    Cumulative Therapy Evaluation is: Low complexity.    Previous and current functional limitations:  (See Goal  Flow Sheet for this information)    Short term and Long term goals: (See Goal Flow Sheet for this information)     Communication ability:  Patient appears to be able to clearly communicate and understand verbal and written communication and follow directions correctly.  Treatment Explanation - The following has been discussed with the patient:   RX ordered/plan of care  Anticipated outcomes  Possible risks and side effects  This patient would benefit from PT intervention to resume normal activities.   Rehab potential is good.    Frequency:  1 X week, once daily  Duration:  for 6 weeks  Discharge Plan:  Achieve all LTG.  Independent in home treatment program.  Reach maximal therapeutic benefit.    Please refer to the daily flowsheet for treatment today, total treatment time and time spent performing 1:1 timed codes.

## 2022-11-08 ENCOUNTER — VIRTUAL VISIT (OUTPATIENT)
Dept: PSYCHOLOGY | Facility: CLINIC | Age: 72
End: 2022-11-08
Payer: COMMERCIAL

## 2022-11-08 DIAGNOSIS — F43.20 ADJUSTMENT DISORDER, UNSPECIFIED TYPE: Primary | ICD-10-CM

## 2022-11-08 PROCEDURE — 90834 PSYTX W PT 45 MINUTES: CPT | Mod: 95 | Performed by: MARRIAGE & FAMILY THERAPIST

## 2022-11-08 NOTE — PROGRESS NOTES
[facility]                                     Progress Note    Patient Name: Pranay Ratliff  Date: 22         Service Type: Individual      Session Start Time: 9:02a  Session End Time: 9:48a     Session Length: 46    Session #: 2    Attendees: Client attended alone    Service Modality:  Video Visit:      Provider verified identity through the following two step process.  Patient provided:  Patient     Telemedicine Visit: The patient's condition can be safely assessed and treated via synchronous audio and visual telemedicine encounter.      Reason for Telemedicine Visit: Services only offered telehealth    Originating Site (Patient Location): Patient's home    Distant Site (Provider Location): Provider Remote Setting- Home Office    Consent:  The patient/guardian has verbally consented to: the potential risks and benefits of telemedicine (video visit) versus in person care; bill my insurance or make self-payment for services provided; and responsibility for payment of non-covered services.     Patient would like the video invitation sent by:  Send to e-mail at: [email or web address]    Mode of Communication:  Video Conference via Amwell    Distant Location (Provider):  Off-site    As the provider I attest to compliance with applicable laws and regulations related to telemedicine.    DATA  Interactive Complexity: No  Crisis: No        Progress Since Last Session (Related to Symptoms / Goals / Homework):   Symptoms: Improving Mood at 6/10    Homework: Achieved / completed to satisfaction      Episode of Care Goals: Achieved / completed to satisfaction - ACTION (Actively working towards change); Intervened by reinforcing change plan / affirming steps taken     Current / Ongoing Stressors and Concerns:   Last session 10/27, pt notes he and partner had some positive conversations about their relationship and pt is feeling slightly more positive about where things are currently at. PT did ask  Chris about joining for an appt but he declined which pt accepts. PT is working to do more things by himself and meeting new people outside of his relationship.      Treatment Objective(s) Addressed in This Session:   participate in 1 activities to improve mood       Intervention:   Motivational Interviewing    MI Intervention: Supported Autonomy, Collaboration, Evocation and Permission to raise concern or advise     Change Talk Expressed by the Patient: Reasons to change Need to change Committment to change    Provider Response to Change Talk: E - Evoked more info from patient about behavior change, A - Affirmed patient's thoughts, decisions, or attempts at behavior change and R - Reflected patient's change talk      ASSESSMENT: Current Emotional / Mental Status (status of significant symptoms):   Risk status (Self / Other harm or suicidal ideation)   Patient denies current fears or concerns for personal safety.   Patient denies current or recent suicidal ideation or behaviors.   Patient denies current or recent homicidal ideation or behaviors.   Patient denies current or recent self injurious behavior or ideation.   Patient denies other safety concerns.   Patient reports there has been no change in risk factors since their last session.     Patient reports there has been no change in protective factors since their last session.     Recommended that patient call 911 or go to the local ED should there be a change in any of these risk factors.     Appearance:   Appropriate    Eye Contact:   Good    Psychomotor Behavior: Normal    Attitude:   Cooperative    Orientation:   All   Speech    Rate / Production: Normal     Volume:  Normal    Mood:    Normal   Affect:    Appropriate    Thought Content:  Clear    Thought Form:  Coherent  Logical    Insight:    Good      Medication Review:   No current psychiatric medications prescribed     Medication Compliance:   NA     Changes in Health Issues:   None  reported     Chemical Use Review:   Substance Use: Chemical use reviewed, no active concerns identified      Tobacco Use: No current tobacco use.      Diagnosis:  Adjustment disorder, unspecified type    Collateral Reports Completed:   Not Applicable    PLAN: (Patient Tasks / Therapist Tasks / Other)  PT will discharge from [facility] services per his request. No safety concerns, goals accomplished        Nasir Anderson, ОЛЬГА   11/8/22                                                         ______________________________________________________________________

## 2022-11-08 NOTE — PROGRESS NOTES
[facility]    OUTPATIENT Physical Therapy ORTHOPEDIC EVALUATION  PLAN OF TREATMENT FOR OUTPATIENT REHABILITATION  (COMPLETE FOR INITIAL CLAIMS ONLY)  Patient's Last Name, First Name, M.I.  YOB: 1950  Pranay Ratliff       Provider s Name:  [facility]   Medical Record No.  [id number]   Start of Care Date:  11/07/22   Onset Date:   09/19/22   Treatment Diagnosis:  L hip/thigh pain--lumbar Medical Diagnosis:     Acute bilateral low back pain with left-sided sciatica  Hip pain, left  Chronic left-sided low back pain without sciatica       Goals:     11/07/22 0500   Body Part   Goals listed below are for LB, L thigh   Goal #1   Goal #1 self cares/transfers/bed mobility   Previous Functional Level No restrictions   Current Functional Level Able    Performance Level to rise from sitting--L hip/thigh pain 3/10   STG Target Performance Be able to ; transfer in and out of a chair   Performance Level L hip/thigh pain 1/10   Rationale   (for normal moblity and transitional movements)   Due Date 11/28/22   LTG Target Performance Be able to ; transfer in and out of a chair   Performance level no L hip/thigh pain   Rationale   (for normal mobility and transitional movements)   Due Date 12/19/22       Therapy Frequency:  1x/week  Predicted Duration of Therapy Intervention:  6 weeks    Yoly Gan, PT                 I CERTIFY THE NEED FOR THESE SERVICES FURNISHED UNDER        THIS PLAN OF TREATMENT AND WHILE UNDER MY CARE     (Physician attestation of this document indicates review and certification of the therapy plan).                     Certification Date From:  11/07/22   Certification Date To:  12/19/22    Referring Provider:  Edu Camarena    Initial Assessment        See Epic Evaluation SOC Date: 11/07/22

## 2022-11-15 ENCOUNTER — THERAPY VISIT (OUTPATIENT)
Dept: PHYSICAL THERAPY | Facility: CLINIC | Age: 72
End: 2022-11-15
Payer: COMMERCIAL

## 2022-11-15 DIAGNOSIS — M54.50 CHRONIC LEFT-SIDED LOW BACK PAIN WITHOUT SCIATICA: Primary | ICD-10-CM

## 2022-11-15 DIAGNOSIS — G89.29 CHRONIC LEFT-SIDED LOW BACK PAIN WITHOUT SCIATICA: Primary | ICD-10-CM

## 2022-11-15 PROCEDURE — 97530 THERAPEUTIC ACTIVITIES: CPT | Mod: GP | Performed by: PHYSICAL THERAPIST

## 2022-11-15 PROCEDURE — 97140 MANUAL THERAPY 1/> REGIONS: CPT | Mod: GP | Performed by: PHYSICAL THERAPIST

## 2022-11-15 PROCEDURE — 97110 THERAPEUTIC EXERCISES: CPT | Mod: GP | Performed by: PHYSICAL THERAPIST

## 2022-11-21 ENCOUNTER — LAB (OUTPATIENT)
Dept: LAB | Facility: CLINIC | Age: 72
End: 2022-11-21
Payer: COMMERCIAL

## 2022-11-21 DIAGNOSIS — R97.20 ELEVATED PROSTATE SPECIFIC ANTIGEN (PSA): ICD-10-CM

## 2022-11-21 LAB — PSA SERPL-MCNC: 6.5 UG/L (ref 0–4)

## 2022-11-21 PROCEDURE — 36415 COLL VENOUS BLD VENIPUNCTURE: CPT

## 2022-11-21 PROCEDURE — 84153 ASSAY OF PSA TOTAL: CPT

## 2022-11-22 ENCOUNTER — THERAPY VISIT (OUTPATIENT)
Dept: PHYSICAL THERAPY | Facility: CLINIC | Age: 72
End: 2022-11-22
Payer: COMMERCIAL

## 2022-11-22 DIAGNOSIS — G89.29 CHRONIC LEFT-SIDED LOW BACK PAIN WITHOUT SCIATICA: Primary | ICD-10-CM

## 2022-11-22 DIAGNOSIS — M54.50 CHRONIC LEFT-SIDED LOW BACK PAIN WITHOUT SCIATICA: Primary | ICD-10-CM

## 2022-11-22 PROCEDURE — 97140 MANUAL THERAPY 1/> REGIONS: CPT | Mod: GP | Performed by: PHYSICAL THERAPIST

## 2022-11-22 PROCEDURE — 97112 NEUROMUSCULAR REEDUCATION: CPT | Mod: GP | Performed by: PHYSICAL THERAPIST

## 2022-11-22 PROCEDURE — 97110 THERAPEUTIC EXERCISES: CPT | Mod: GP | Performed by: PHYSICAL THERAPIST

## 2022-11-29 ENCOUNTER — THERAPY VISIT (OUTPATIENT)
Dept: PHYSICAL THERAPY | Facility: CLINIC | Age: 72
End: 2022-11-29
Payer: COMMERCIAL

## 2022-11-29 DIAGNOSIS — G89.29 CHRONIC LEFT-SIDED LOW BACK PAIN WITHOUT SCIATICA: Primary | ICD-10-CM

## 2022-11-29 DIAGNOSIS — M54.50 CHRONIC LEFT-SIDED LOW BACK PAIN WITHOUT SCIATICA: Primary | ICD-10-CM

## 2022-11-29 PROCEDURE — 97140 MANUAL THERAPY 1/> REGIONS: CPT | Mod: GP | Performed by: PHYSICAL THERAPIST

## 2022-11-29 PROCEDURE — 97110 THERAPEUTIC EXERCISES: CPT | Mod: GP | Performed by: PHYSICAL THERAPIST

## 2022-12-05 ENCOUNTER — MYC MEDICAL ADVICE (OUTPATIENT)
Dept: FAMILY MEDICINE | Facility: CLINIC | Age: 72
End: 2022-12-05

## 2022-12-05 NOTE — PROGRESS NOTES
[facility]atology Note  Encounter Date: Dec 6, 2022  Office Visit     Dermatology Problem List:  1.Hx NMSC  - Multiple (outside records unavailable)  - BCC, right eyelid, 2008 (records unavailable -[place])  - BCC, mid forehead, s/p Mohs 12/20/2018  - SCCis, right lateral brow, s/p Mohs 7/21/2022  2. Asteatotic dermatitis  - Current tx: fluocinonide 0.05% cream  3. Verruca plantaris, right foot s/p cryotherapy  - Current tx: WartPeel   4. Tinea pedis   5. AKs, forehead, temples, and nose  - Current tx: Efudex 5% cream (initiated 11/3/2022)  6. Rash, most consistent with tinea pedis, left medial foot s/p KOH prep 12/6/2022  ____________________________________________    Assessment & Plan:    # Rash, most consistent with tinea pedis, left medial foot.  - Photodocumentation today.  - KOH prep today; see procedure note below.  Clinically this was tinea pedis and likely results were negative due to topical steroid use.   - Treatment pending KOH prep results.    Procedures Performed:   - KOH prep was obtained and interpreted as  Negative from the lab    Follow-up: pending KOH prep results    Staff and Scribe:     Scribe Disclosure:  Gary MISTRY, am serving as a scribe to document services personally performed by Alise Saeed PA-C based on data collection and the provider's statements to me.     Provider Disclosure:   The documentation recorded by the scribe accurately reflects the services I personally performed and the decisions made by me.    All risks, benefits and alternatives were discussed with patient.  Patient is in agreement and understands the assessment and plan.  All questions were answered.  Sun Screen Education was given.   Return to Clinic in 4 months or sooner as needed, as previously scheduled.   Alise Saeed PA-C   [facility]   ____________________________________________    CC: Derm Problem (Red spot on l foot increasing in  size)    HPI:  Mr. Pranay Ratliff is a(n) 72 year old male who presents today as a return patient for a spot check. Last seen in dermatology by Aura Bazan PA-C on 11/3/2022, at which time patient received cryotherapy for treatment of a plantar wart on the right foot. He was also started on Efudex BID for 14 days for treatment of diffuse actinic damage on the forehead and nose.    Today, patient endorses a large spot on the left inner foot. It crusts over on occasion. He has used fluocinonide on it, which has not helped. He feels like it may have increased in size after applying fluocinonide.    Patient is otherwise feeling well, without additional skin concerns.    Labs Reviewed:  N/A    Physical Exam:  Vitals: There were no vitals taken for this visit.  SKIN: Focused examination of the left foot was performed.  - Pink scaly plaque on the left medial foot with soft scale and annular appearance at the superior aspect.  On the left 4th nail plate there is a white opaque linear streak.   - No other lesions of concern on areas examined.         Medications:  Current Outpatient Medications   Medication     aspirin 81 MG tablet     carbamide peroxide (DEBROX) 6.5 % otic solution     clotrimazole (LOTRIMIN) 1 % external cream     clotrimazole-betamethasone (LOTRISONE) 1-0.05 % external cream     diclofenac (VOLTAREN) 1 % topical gel     dutasteride (AVODART) 0.5 MG capsule     fluocinonide (LIDEX) 0.05 % external cream     fluorouracil (EFUDEX) 5 % external cream     gabapentin (NEURONTIN) 100 MG capsule     multivitamin w/minerals (THERA-VIT-M) tablet     Omega-3 Fatty Acids (OMEGA-3 FISH OIL PO)     pantoprazole (PROTONIX) 40 MG EC tablet     rosuvastatin (CRESTOR) 20 MG tablet     triamterene-HCTZ (MAXZIDE-25) 37.5-25 MG tablet     methylPREDNISolone (MEDROL DOSEPAK) 4 MG tablet therapy pack     No current facility-administered medications for this visit.      Past Medical History:   Patient Active Problem List    Diagnosis     History of basal cell carcinoma     Benign essential hypertension     Hyperlipidemia LDL goal <100     Obstructive sleep apnea syndrome     Gastroesophageal reflux disease with esophagitis     Localized swelling of lower extremity     Vasovagal syncope     Elevated prostate specific antigen (PSA)     Concussion with loss of consciousness of 30 minutes or less, subsequent encounter     BPH with obstruction/lower urinary tract symptoms     Myofascial pain syndrome, cervical     Occipital neuralgia of right side     Fatigue due to old head injury     Post concussion syndrome     Cervical segment dysfunction     Thoracic segment dysfunction     Cephalgia     Somatic dysfunction of sacral region     Cervical pain     Dizziness     Marital conflict     Chronic left-sided low back pain without sciatica     Past Medical History:   Diagnosis Date     Basal cell carcinoma      Concussion with loss of consciousness of 30 minutes or less, subsequent encounter      Epididymitis, bilateral      Epididymitis, left      Epididymitis, right      Obstructive sleep apnea syndrome      Squamous cell carcinoma of skin, unspecified      Vasovagal syncope         CC No referring provider defined for this encounter. on close of this encounter.

## 2022-12-06 ENCOUNTER — TELEPHONE (OUTPATIENT)
Dept: FAMILY MEDICINE | Facility: CLINIC | Age: 72
End: 2022-12-06

## 2022-12-06 ENCOUNTER — OFFICE VISIT (OUTPATIENT)
Dept: FAMILY MEDICINE | Facility: CLINIC | Age: 72
End: 2022-12-06
Payer: COMMERCIAL

## 2022-12-06 ENCOUNTER — THERAPY VISIT (OUTPATIENT)
Dept: PHYSICAL THERAPY | Facility: CLINIC | Age: 72
End: 2022-12-06
Payer: COMMERCIAL

## 2022-12-06 DIAGNOSIS — B35.3 TINEA PEDIS OF LEFT FOOT: ICD-10-CM

## 2022-12-06 DIAGNOSIS — M54.50 CHRONIC LEFT-SIDED LOW BACK PAIN WITHOUT SCIATICA: Primary | ICD-10-CM

## 2022-12-06 DIAGNOSIS — R21 RASH: Primary | ICD-10-CM

## 2022-12-06 DIAGNOSIS — G89.29 CHRONIC LEFT-SIDED LOW BACK PAIN WITHOUT SCIATICA: Primary | ICD-10-CM

## 2022-12-06 LAB
KOH PREPARATION: NORMAL
KOH PREPARATION: NORMAL

## 2022-12-06 PROCEDURE — 99213 OFFICE O/P EST LOW 20 MIN: CPT | Performed by: PHYSICIAN ASSISTANT

## 2022-12-06 PROCEDURE — 97140 MANUAL THERAPY 1/> REGIONS: CPT | Mod: GP | Performed by: PHYSICAL THERAPIST

## 2022-12-06 PROCEDURE — 87220 TISSUE EXAM FOR FUNGI: CPT | Performed by: PHYSICIAN ASSISTANT

## 2022-12-06 PROCEDURE — 97110 THERAPEUTIC EXERCISES: CPT | Mod: GP | Performed by: PHYSICAL THERAPIST

## 2022-12-06 RX ORDER — ECONAZOLE NITRATE 10 MG/G
CREAM TOPICAL DAILY
Qty: 85 G | Refills: 1 | Status: SHIPPED | OUTPATIENT
Start: 2022-12-06 | End: 2024-03-18

## 2022-12-06 ASSESSMENT — PAIN SCALES - GENERAL: PAINLEVEL: NO PAIN (0)

## 2022-12-06 NOTE — TELEPHONE ENCOUNTER
----- Message from Alise Saeed PA-C sent at 12/6/2022  4:13 PM CST -----  You can let Chris know the fungal scraping test was negative. Clinically this looked fungal so I would want him to start on an antifungal cream once daily after the shower. The fungal tests can sometimes be negative if a steroid has been used recently on the rash, as in his case.     I sent his script for econazole 1% cream to his pharmacy.  Thanks, Alise

## 2022-12-06 NOTE — LETTER
12/6/2022         RE: Pranay Ratliff  [address]  [place] [address]        Dear Colleague,    Thank you for referring your patient, Pranay Ratliff, to the [facility]. Please see a copy of my visit note below.    [facility] Dermatology Note  Encounter Date: Dec 6, 2022  Office Visit     Dermatology Problem List:  1.Hx NMSC  - Multiple (outside records unavailable)  - BCC, right eyelid, 2008 (records unavailable -[place])  - BCC, mid forehead, s/p Mohs 12/20/2018  - SCCis, right lateral brow, s/p Mohs 7/21/2022  2. Asteatotic dermatitis  - Current tx: fluocinonide 0.05% cream  3. Verruca plantaris, right foot s/p cryotherapy  - Current tx: WartPeel   4. Tinea pedis   5. AKs, forehead, temples, and nose  - Current tx: Efudex 5% cream (initiated 11/3/2022)  6. Rash, most consistent with tinea pedis, left medial foot s/p KOH prep 12/6/2022  ____________________________________________    Assessment & Plan:    # Rash, most consistent with tinea pedis, left medial foot.  - Photodocumentation today.  - KOH prep today; see procedure note below.  Clinically this was tinea pedis and likely results were negative due to topical steroid use.   - Treatment pending KOH prep results.    Procedures Performed:   - KOH prep was obtained and interpreted as  Negative from the lab    Follow-up: pending KOH prep results    Staff and Scribe:     Scribe Disclosure:  I, Gary Riggins, am serving as a scribe to document services personally performed by Alise Saeed PA-C based on data collection and the provider's statements to me.     Provider Disclosure:   The documentation recorded by the scribe accurately reflects the services I personally performed and the decisions made by me.    All risks, benefits and alternatives were discussed with patient.  Patient is in agreement and understands the assessment and plan.  All questions were answered.  Sun Screen Education was given.    Return to Clinic in 4 months or sooner as needed, as previously scheduled.   Alise Saeed PA-C   [facility]   ____________________________________________    CC: Derm Problem (Red spot on l foot increasing in size)    HPI:  Mr. Pranay Ratliff is a(n) 72 year old male who presents today as a return patient for a spot check. Last seen in dermatology by Aura Bazan PA-C on 11/3/2022, at which time patient received cryotherapy for treatment of a plantar wart on the right foot. He was also started on Efudex BID for 14 days for treatment of diffuse actinic damage on the forehead and nose.    Today, patient endorses a large spot on the left inner foot. It crusts over on occasion. He has used fluocinonide on it, which has not helped. He feels like it may have increased in size after applying fluocinonide.    Patient is otherwise feeling well, without additional skin concerns.    Labs Reviewed:  N/A    Physical Exam:  Vitals: There were no vitals taken for this visit.  SKIN: Focused examination of the left foot was performed.  - Pink scaly plaque on the left medial foot with soft scale and annular appearance at the superior aspect.  On the left 4th nail plate there is a white opaque linear streak.   - No other lesions of concern on areas examined.         Medications:  Current Outpatient Medications   Medication     aspirin 81 MG tablet     carbamide peroxide (DEBROX) 6.5 % otic solution     clotrimazole (LOTRIMIN) 1 % external cream     clotrimazole-betamethasone (LOTRISONE) 1-0.05 % external cream     diclofenac (VOLTAREN) 1 % topical gel     dutasteride (AVODART) 0.5 MG capsule     fluocinonide (LIDEX) 0.05 % external cream     fluorouracil (EFUDEX) 5 % external cream     gabapentin (NEURONTIN) 100 MG capsule     multivitamin w/minerals (THERA-VIT-M) tablet     Omega-3 Fatty Acids (OMEGA-3 FISH OIL PO)     pantoprazole (PROTONIX) 40 MG EC tablet     rosuvastatin (CRESTOR) 20 MG  tablet     triamterene-HCTZ (MAXZIDE-25) 37.5-25 MG tablet     methylPREDNISolone (MEDROL DOSEPAK) 4 MG tablet therapy pack     No current facility-administered medications for this visit.      Past Medical History:   Patient Active Problem List   Diagnosis     History of basal cell carcinoma     Benign essential hypertension     Hyperlipidemia LDL goal <100     Obstructive sleep apnea syndrome     Gastroesophageal reflux disease with esophagitis     Localized swelling of lower extremity     Vasovagal syncope     Elevated prostate specific antigen (PSA)     Concussion with loss of consciousness of 30 minutes or less, subsequent encounter     BPH with obstruction/lower urinary tract symptoms     Myofascial pain syndrome, cervical     Occipital neuralgia of right side     Fatigue due to old head injury     Post concussion syndrome     Cervical segment dysfunction     Thoracic segment dysfunction     Cephalgia     Somatic dysfunction of sacral region     Cervical pain     Dizziness     Marital conflict     Chronic left-sided low back pain without sciatica     Past Medical History:   Diagnosis Date     Basal cell carcinoma      Concussion with loss of consciousness of 30 minutes or less, subsequent encounter      Epididymitis, bilateral      Epididymitis, left      Epididymitis, right      Obstructive sleep apnea syndrome      Squamous cell carcinoma of skin, unspecified      Vasovagal syncope         CC No referring provider defined for this encounter. on close of this encounter.      Again, thank you for allowing me to participate in the care of your patient.        Sincerely,        Alise Saeed PA-C

## 2022-12-06 NOTE — TELEPHONE ENCOUNTER
S/w pt and gave Alise's message below.  Advised rx was sent to [facility] and pt states he will  tomorrow.  Pt states understanding.    Jenifer MENENDEZ RN  [facility]  [phone]

## 2022-12-20 ENCOUNTER — THERAPY VISIT (OUTPATIENT)
Dept: PHYSICAL THERAPY | Facility: CLINIC | Age: 72
End: 2022-12-20
Payer: COMMERCIAL

## 2022-12-20 DIAGNOSIS — G89.29 CHRONIC LEFT-SIDED LOW BACK PAIN WITHOUT SCIATICA: Primary | ICD-10-CM

## 2022-12-20 DIAGNOSIS — M54.50 CHRONIC LEFT-SIDED LOW BACK PAIN WITHOUT SCIATICA: Primary | ICD-10-CM

## 2022-12-20 PROCEDURE — 97530 THERAPEUTIC ACTIVITIES: CPT | Mod: GP | Performed by: PHYSICAL THERAPIST

## 2022-12-20 PROCEDURE — 97110 THERAPEUTIC EXERCISES: CPT | Mod: GP | Performed by: PHYSICAL THERAPIST

## 2023-02-02 ENCOUNTER — OFFICE VISIT (OUTPATIENT)
Dept: DERMATOLOGY | Facility: CLINIC | Age: 73
End: 2023-02-02
Payer: COMMERCIAL

## 2023-02-02 DIAGNOSIS — D18.01 ANGIOMA OF SKIN: ICD-10-CM

## 2023-02-02 DIAGNOSIS — L81.4 LENTIGO: ICD-10-CM

## 2023-02-02 DIAGNOSIS — L92.0 GRANULOMA ANNULARE: ICD-10-CM

## 2023-02-02 DIAGNOSIS — Z85.828 HISTORY OF SKIN CANCER: Primary | ICD-10-CM

## 2023-02-02 DIAGNOSIS — D23.9 DERMAL NEVUS: ICD-10-CM

## 2023-02-02 DIAGNOSIS — L82.1 SEBORRHEIC KERATOSIS: ICD-10-CM

## 2023-02-02 PROCEDURE — 99213 OFFICE O/P EST LOW 20 MIN: CPT | Performed by: DERMATOLOGY

## 2023-02-02 ASSESSMENT — PAIN SCALES - GENERAL: PAINLEVEL: NO PAIN (0)

## 2023-02-02 NOTE — PROGRESS NOTES
Pranay Ratliff is an extremely pleasant 73 year old year old male patient here today for rash on left foot.   .   Patient states this has been present for a while.  Patient reports the following symptoms:  red.  Patient reports the following previous treatments antifungal.  These treatments did not work.  Patient reports the following modifying factors none.  Associated symptoms: none.  Patient has no other skin complaints today.  Remainder of the HPI, Meds, PMH, Allergies, FH, and SH was reviewed in chart.      Past Medical History:   Diagnosis Date     Basal cell carcinoma      Concussion with loss of consciousness of 30 minutes or less, subsequent encounter      Epididymitis, bilateral      Epididymitis, left      Epididymitis, right      Obstructive sleep apnea syndrome      Squamous cell carcinoma of skin, unspecified      Vasovagal syncope        Past Surgical History:   Procedure Laterality Date     TESTICLE SURGERY       VASECTOMY          Family History   Problem Relation Age of Onset     Skin Cancer Mother          from pneumonia     Abdominal Aortic Aneurysm Mother      Heart Failure Father      Diabetes Type 1 Brother         Possibly secondary to chemical exposure in [place]     Diabetes Type 2  Maternal Grandmother      Cancer Brother         Exposure to Agent Orange in [facility]     Heart Disease Brother        Social History     Socioeconomic History     Marital status:      Spouse name: Not on file     Number of children: Not on file     Years of education: Not on file     Highest education level: Not on file   Occupational History     Not on file   Tobacco Use     Smoking status: Never     Smokeless tobacco: Never   Vaping Use     Vaping Use: Never used   Substance and Sexual Activity     Alcohol use: Yes     Comment: twice a week     Drug use: No     Sexual activity: Not Currently   Other Topics Concern     Parent/sibling w/ CABG, MI or angioplasty before 65F 55M? Not Asked   Social  History Narrative     Not on file     Social Determinants of Health     Financial Resource Strain: Not on file   Food Insecurity: Not on file   Transportation Needs: Not on file   Physical Activity: Not on file   Stress: Not on file   Social Connections: Not on file   Intimate Partner Violence: Not on file   Housing Stability: Not on file       Outpatient Encounter Medications as of 2/2/2023   Medication Sig Dispense Refill     aspirin 81 MG tablet Take by mouth daily       carbamide peroxide (DEBROX) 6.5 % otic solution Place 5 drops into both ears daily as needed for other (impacted cerumen) 14.8 mL 11     clotrimazole (LOTRIMIN) 1 % external cream Apply topically 2 times daily 85 g 11     clotrimazole-betamethasone (LOTRISONE) 1-0.05 % external cream APPLY TOPICALLY TWICE DAILY TO THE FEET. 45 g 0     diclofenac (VOLTAREN) 1 % topical gel Apply topically 4 times daily       dutasteride (AVODART) 0.5 MG capsule Take 0.5 mg by mouth daily       econazole nitrate 1 % external cream Apply topically daily To the left foot until rashes resolve 85 g 1     fluocinonide (LIDEX) 0.05 % external cream Apply topically 2 times daily 120 g 6     fluorouracil (EFUDEX) 5 % external cream Apply topically BID to the forehead, temples and nose for 10-14 days, or until the onset of irritation. Wash hands after use. DO NOT confuse with fluocinonide. 40 g 0     gabapentin (NEURONTIN) 100 MG capsule Take 100 mg by mouth Take 1 time daily at HS       methylPREDNISolone (MEDROL DOSEPAK) 4 MG tablet therapy pack Follow Package Directions (Patient not taking: Reported on 12/6/2022) 21 tablet 0     multivitamin w/minerals (THERA-VIT-M) tablet Take 1 tablet by mouth daily       Omega-3 Fatty Acids (OMEGA-3 FISH OIL PO)        pantoprazole (PROTONIX) 40 MG EC tablet TAKE 1 TABLET BY MOUTH ONCE DAILY 30 MIN PRIOR TO BREAKFAST 90 90 tablet 3     rosuvastatin (CRESTOR) 20 MG tablet TAKE 1 TABLET BY MOUTH EVERY DAY 90 tablet 1      triamterene-HCTZ (MAXZIDE-25) 37.5-25 MG tablet TAKE 1 TABLET BY MOUTH EVERY DAY IN THE MORNING 90 tablet 3     No facility-administered encounter medications on file as of 2/2/2023.             O:   NAD, WDWN, Alert & Oriented, Mood & Affect wnl, Vitals stable   Here today alone   General appearance normal   Vitals stable   Alert, oriented and in no acute distress     L dorsal foot annular violaceous plaque     Stuck on papules and brown macules on trunk and ext   Red papules on trunk  Flesh colored papules on trunk     The remainder of the full exam was normal; the following areas were examined:  conjunctiva/lids, oral mucosa, neck, peripheral vascular system, abdomen, lymph nodes, digits/nails, eccrine and apocrine glands, scalp/hair, face, neck, chest, abdomen, buttocks, back, RUE, LUE, RLE, LLE       Eyes: Conjunctivae/lids:Normal     ENT: Lips, buccal mucosa, tongue: normal    MSK:Normal    Cardiovascular: peripheral edema none    Pulm: Breathing Normal    Lymph Nodes: No Head and Neck Lymphadenopathy     Neuro/Psych: Orientation:Alert and Orientedx3 ; Mood/Affect:normal       A/P:  1. Seborrheic keratosis, lentigo, angioma, dermal nevus, hx of non-melanoma skin cancer   2. Granuloma annulare   Pathophysiology discussed with pateint and information provided   tx discussed with patient   It was a pleasure speaking to Pranay Ratliff today.  Previous clinic notes and pertinent laboratory tests were reviewed prior to Pranay Ratliff's visit.  Signs and Symptoms of skin cancer discussed with patient.  Patient encouraged to perform monthly skin exams.  UV precautions reviewed with patient.  Risks of non-melanoma skin cancer discussed with patient   Return to clinic 12 months

## 2023-02-03 DIAGNOSIS — E78.5 HYPERLIPIDEMIA LDL GOAL <100: ICD-10-CM

## 2023-02-03 RX ORDER — ROSUVASTATIN CALCIUM 20 MG/1
TABLET, COATED ORAL
Qty: 90 TABLET | Refills: 0 | Status: SHIPPED | OUTPATIENT
Start: 2023-02-03 | End: 2023-05-01

## 2023-04-06 ASSESSMENT — ENCOUNTER SYMPTOMS
JOINT SWELLING: 0
CONSTIPATION: 0
SORE THROAT: 0
ABDOMINAL PAIN: 0
FEVER: 0
PARESTHESIAS: 0
MYALGIAS: 0
HEARTBURN: 0
FREQUENCY: 1
SHORTNESS OF BREATH: 0
HEMATURIA: 0
NAUSEA: 0
WEAKNESS: 0
NERVOUS/ANXIOUS: 0
HEMATOCHEZIA: 0
ARTHRALGIAS: 0
PALPITATIONS: 0
DIARRHEA: 0
HEADACHES: 0
COUGH: 0
EYE PAIN: 0
DIZZINESS: 0
DYSURIA: 0
CHILLS: 0

## 2023-04-06 ASSESSMENT — ACTIVITIES OF DAILY LIVING (ADL): CURRENT_FUNCTION: NO ASSISTANCE NEEDED

## 2023-04-13 ENCOUNTER — MYC MEDICAL ADVICE (OUTPATIENT)
Dept: FAMILY MEDICINE | Facility: CLINIC | Age: 73
End: 2023-04-13

## 2023-04-13 ENCOUNTER — OFFICE VISIT (OUTPATIENT)
Dept: FAMILY MEDICINE | Facility: CLINIC | Age: 73
End: 2023-04-13
Payer: COMMERCIAL

## 2023-04-13 VITALS
RESPIRATION RATE: 16 BRPM | WEIGHT: 224 LBS | BODY MASS INDEX: 32.07 KG/M2 | OXYGEN SATURATION: 98 % | TEMPERATURE: 97.6 F | HEART RATE: 66 BPM | HEIGHT: 70 IN | DIASTOLIC BLOOD PRESSURE: 85 MMHG | SYSTOLIC BLOOD PRESSURE: 137 MMHG

## 2023-04-13 DIAGNOSIS — Z00.01 ENCOUNTER FOR GENERAL ADULT MEDICAL EXAMINATION WITH ABNORMAL FINDINGS: Primary | ICD-10-CM

## 2023-04-13 DIAGNOSIS — Z12.11 SCREEN FOR COLON CANCER: ICD-10-CM

## 2023-04-13 DIAGNOSIS — Z13.6 ENCOUNTER FOR ABDOMINAL AORTIC ANEURYSM (AAA) SCREENING: ICD-10-CM

## 2023-04-13 DIAGNOSIS — Z12.5 SCREENING FOR PROSTATE CANCER: ICD-10-CM

## 2023-04-13 DIAGNOSIS — I10 BENIGN ESSENTIAL HYPERTENSION: ICD-10-CM

## 2023-04-13 LAB
ALBUMIN SERPL BCG-MCNC: 4.4 G/DL (ref 3.5–5.2)
ALP SERPL-CCNC: 63 U/L (ref 40–129)
ALT SERPL W P-5'-P-CCNC: 25 U/L (ref 10–50)
ANION GAP SERPL CALCULATED.3IONS-SCNC: 13 MMOL/L (ref 7–15)
AST SERPL W P-5'-P-CCNC: 32 U/L (ref 10–50)
BILIRUB SERPL-MCNC: 0.4 MG/DL
BUN SERPL-MCNC: 14.1 MG/DL (ref 8–23)
CALCIUM SERPL-MCNC: 9.6 MG/DL (ref 8.8–10.2)
CHLORIDE SERPL-SCNC: 103 MMOL/L (ref 98–107)
CHOLEST SERPL-MCNC: 147 MG/DL
CREAT SERPL-MCNC: 0.96 MG/DL (ref 0.67–1.17)
DEPRECATED HCO3 PLAS-SCNC: 23 MMOL/L (ref 22–29)
ERYTHROCYTE [DISTWIDTH] IN BLOOD BY AUTOMATED COUNT: 13.7 % (ref 10–15)
GFR SERPL CREATININE-BSD FRML MDRD: 83 ML/MIN/1.73M2
GLUCOSE SERPL-MCNC: 90 MG/DL (ref 70–99)
HCT VFR BLD AUTO: 40.9 % (ref 40–53)
HDLC SERPL-MCNC: 49 MG/DL
HGB BLD-MCNC: 14.3 G/DL (ref 13.3–17.7)
LDLC SERPL CALC-MCNC: 72 MG/DL
MCH RBC QN AUTO: 30.6 PG (ref 26.5–33)
MCHC RBC AUTO-ENTMCNC: 35 G/DL (ref 31.5–36.5)
MCV RBC AUTO: 87 FL (ref 78–100)
NONHDLC SERPL-MCNC: 98 MG/DL
PLATELET # BLD AUTO: 255 10E3/UL (ref 150–450)
POTASSIUM SERPL-SCNC: 3.9 MMOL/L (ref 3.4–5.3)
PROT SERPL-MCNC: 7.2 G/DL (ref 6.4–8.3)
PSA SERPL DL<=0.01 NG/ML-MCNC: 4.79 NG/ML (ref 0–6.5)
RBC # BLD AUTO: 4.68 10E6/UL (ref 4.4–5.9)
SODIUM SERPL-SCNC: 139 MMOL/L (ref 136–145)
TRIGL SERPL-MCNC: 130 MG/DL
WBC # BLD AUTO: 9.5 10E3/UL (ref 4–11)

## 2023-04-13 PROCEDURE — 36415 COLL VENOUS BLD VENIPUNCTURE: CPT | Performed by: FAMILY MEDICINE

## 2023-04-13 PROCEDURE — 80053 COMPREHEN METABOLIC PANEL: CPT | Performed by: FAMILY MEDICINE

## 2023-04-13 PROCEDURE — 85027 COMPLETE CBC AUTOMATED: CPT | Performed by: FAMILY MEDICINE

## 2023-04-13 PROCEDURE — G0103 PSA SCREENING: HCPCS | Performed by: FAMILY MEDICINE

## 2023-04-13 PROCEDURE — G0439 PPPS, SUBSEQ VISIT: HCPCS | Performed by: FAMILY MEDICINE

## 2023-04-13 PROCEDURE — 80061 LIPID PANEL: CPT | Performed by: FAMILY MEDICINE

## 2023-04-13 RX ORDER — TRIAMTERENE/HYDROCHLOROTHIAZID 37.5-25 MG
0.5 TABLET ORAL EVERY MORNING
Qty: 90 TABLET | Refills: 3 | COMMUNITY
Start: 2023-04-13 | End: 2024-02-09

## 2023-04-13 ASSESSMENT — ENCOUNTER SYMPTOMS
PARESTHESIAS: 0
FREQUENCY: 1
DIZZINESS: 0
JOINT SWELLING: 0
ABDOMINAL PAIN: 0
EYE PAIN: 0
HEMATOCHEZIA: 0
DIARRHEA: 0
CHILLS: 0
ARTHRALGIAS: 0
NAUSEA: 0
COUGH: 0
HEMATURIA: 0
SHORTNESS OF BREATH: 0
DYSURIA: 0
PALPITATIONS: 0
SORE THROAT: 0
WEAKNESS: 0
HEARTBURN: 0
NERVOUS/ANXIOUS: 0
HEADACHES: 0
CONSTIPATION: 0
MYALGIAS: 0
FEVER: 0

## 2023-04-13 ASSESSMENT — PAIN SCALES - GENERAL: PAINLEVEL: NO PAIN (0)

## 2023-04-13 ASSESSMENT — ACTIVITIES OF DAILY LIVING (ADL): CURRENT_FUNCTION: NO ASSISTANCE NEEDED

## 2023-04-13 NOTE — PROGRESS NOTES
"    The patient was provided with written information regarding signs of hearing loss.  Answers for HPI/ROS submitted by the patient on 4/6/2023  In general, how would you rate your overall physical health?: excellent  Frequency of exercise:: 6-7 days/week  Do you usually eat at least 4 servings of fruit and vegetables a day, include whole grains & fiber, and avoid regularly eating high fat or \"junk\" foods? : Yes  Taking medications regularly:: Yes  Medication side effects:: None  Activities of Daily Living: no assistance needed  Home safety: lack of grab bars in the bathroom  Hearing Impairment:: difficulty following a conversation in a noisy restaurant or crowded room, feel that people are mumbling or not speaking clearly, find that men's voices are easier to understand than woman's, difficulty understanding soft or whispered speech  In the past 6 months, have you been bothered by leaking of urine?: No  abdominal pain: No  Blood in stool: No  Blood in urine: No  chest pain: No  chills: No  congestion: No  constipation: No  cough: No  diarrhea: No  dizziness: No  ear pain: No  eye pain: No  nervous/anxious: No  fever: No  frequency: Yes  genital sores: No  headaches: No  hearing loss: Yes  heartburn: No  arthralgias: No  joint swelling: No  peripheral edema: No  mood changes: No  myalgias: No  nausea: No  dysuria: No  palpitations: No  Skin sensation changes: No  sore throat: No  urgency: No  rash: No  shortness of breath: No  visual disturbance: No  weakness: No  impotence: No  penile discharge: No  In general, how would you rate your overall mental or emotional health?: excellent  Additional concerns today:: No  Duration of exercise:: Greater than 60 minutes        "

## 2023-04-13 NOTE — PATIENT INSTRUCTIONS
Patient Education   Personalized Prevention Plan  You are due for the preventive services outlined below.  Your care team is available to assist you in scheduling these services.  If you have already completed any of these items, please share that information with your care team to update in your medical record.  Health Maintenance Due   Topic Date Due     Colorectal Cancer Screening  04/12/2014     AORTIC ANEURYSM SCREENING (SYSTEM ASSIGNED)  Never done     ANNUAL REVIEW OF HM ORDERS  07/06/2022       Signs of Hearing Loss  Hearing loss is a problem shared by many people. In fact, it's one of the most common health problems, particularly as people age. Most people aged 65 and older have some hearing loss. By age 80, almost everyone does. Hearing loss often occurs slowly over the years. So, you may not realize your hearing has gotten worse.   When sudden hearing loss occurs, it's important to contact your healthcare provider right away. Your provider will do a medical exam and a hearing exam as soon as possible. This is to help find the cause and type of your sudden hearing loss. Based on your diagnosis, your healthcare provider will discuss possible treatments.      Hearing much better with one ear can be a sign of hearing loss.     Have your hearing checked  Call your healthcare provider if you:     Have to strain to hear normal conversation    Have to watch other people s faces very carefully to follow what they re saying    Need to ask people to repeat what they ve said    Often misunderstand what people are saying    Turn the volume of the television or radio up so high that others complain    Feel that people are mumbling when they re talking to you    Find that the effort to hear leaves you feeling tired and irritated    Notice, when using the phone, that you hear better with one ear than the other  Amada last reviewed this educational content on 6/1/2022 2000-2022 [employer]. All  rights reserved. This information is not intended as a substitute for professional medical care. Always follow your healthcare professional's instructions.

## 2023-04-13 NOTE — PROGRESS NOTES
"SUBJECTIVE:   Chris is a 73 year old who presents for Preventive Visit.      4/13/2023     8:49 AM   Additional Questions   Roomed by Naa Murrieta     Patient has been advised of split billing requirements and indicates understanding: Yes  Are you in the first 12 months of your Medicare coverage?  No    Healthy Habits:     In general, how would you rate your overall health?  Excellent    Frequency of exercise:  6-7 days/week    Duration of exercise:  Greater than 60 minutes    Do you usually eat at least 4 servings of fruit and vegetables a day, include whole grains    & fiber and avoid regularly eating high fat or \"junk\" foods?  Yes    Taking medications regularly:  Yes    Medication side effects:  None    Ability to successfully perform activities of daily living:  No assistance needed    Home Safety:  Lack of grab bars in the bathroom    Hearing Impairment:  Difficulty following a conversation in a noisy restaurant or crowded room, feel that people are mumbling or not speaking clearly, find that men's voices are easier to understand than woman's and difficulty understanding soft or whispered speech    In the past 6 months, have you been bothered by leaking of urine?  No    In general, how would you rate your overall mental or emotional health?  Excellent      PHQ-2 Total Score: 0    Additional concerns today:  No      Have you ever done Advance Care Planning? (For example, a Health Directive, POLST, or a discussion with a medical provider or your loved ones about your wishes): Yes, advance care planning is on file.       Fall risk  Fallen 2 or more times in the past year?: No  Any fall with injury in the past year?: No    Cognitive Screening   1) Repeat 3 items (Leader, Season, Table)    2) Clock draw: NORMAL  3) 3 item recall: Recalls 2 objects   Results: NORMAL clock, 1-2 items recalled: COGNITIVE IMPAIRMENT LESS LIKELY    Mini-CogTM Copyright S Aaron. Licensed by the author for use in [facility] " Services; reprinted with permission (soob@.[facility]). All rights reserved.      Do you have sleep apnea, excessive snoring or daytime drowsiness?: no    Reviewed and updated as needed this visit by clinical staff                  Reviewed and updated as needed this visit by Provider                 Social History     Tobacco Use     Smoking status: Never     Smokeless tobacco: Never   Vaping Use     Vaping status: Never Used   Substance Use Topics     Alcohol use: Yes     Comment: twice a week             4/6/2023     1:22 PM   Alcohol Use   Prescreen: >3 drinks/day or >7 drinks/week? No     Do you have a current opioid prescription? No  Do you use any other controlled substances or medications that are not prescribed by a provider? None      Current providers sharing in care for this patient include:   Patient Care Team:  Hipolito Alva MD as PCP - General (Family Medicine)  Jaylan Guajardo MD as MD (Urology)  Hipolito Alva MD as Assigned PCP  Peewee Arreola [facility] as Assigned Behavioral Health Provider  Tato Gallagher MD as Assigned Surgical Provider  Vin Sinha MD as MD (Urology)    The following health maintenance items are reviewed in Epic and correct as of today:  Health Maintenance   Topic Date Due     COLORECTAL CANCER SCREENING  04/12/2014     AORTIC ANEURYSM SCREENING (SYSTEM ASSIGNED)  Never done     ANNUAL REVIEW OF HM ORDERS  07/06/2022     MEDICARE ANNUAL WELLNESS VISIT  04/07/2023     FALL RISK ASSESSMENT  04/13/2024     LIPID  04/07/2027     ADVANCE CARE PLANNING  04/21/2027     DTAP/TDAP/TD IMMUNIZATION (4 - Td or Tdap) 09/12/2030     HEPATITIS C SCREENING  Completed     PHQ-2 (once per calendar year)  Completed     INFLUENZA VACCINE  Completed     Pneumococcal Vaccine: 65+ Years  Completed     ZOSTER IMMUNIZATION  Completed     COVID-19 Vaccine  Completed     IPV IMMUNIZATION  Aged Out     MENINGITIS IMMUNIZATION  Aged Out     BP Readings from Last 3 Encounters:    23 137/85   10/03/22 132/76   22 126/84    Wt Readings from Last 3 Encounters:   23 101.6 kg (224 lb)   10/03/22 101.2 kg (223 lb)   22 104.3 kg (230 lb)                  Patient Active Problem List   Diagnosis     History of basal cell carcinoma     Benign essential hypertension     Hyperlipidemia LDL goal <100     Obstructive sleep apnea syndrome     Gastroesophageal reflux disease with esophagitis     Localized swelling of lower extremity     Vasovagal syncope     Elevated prostate specific antigen (PSA)     Concussion with loss of consciousness of 30 minutes or less, subsequent encounter     BPH with obstruction/lower urinary tract symptoms     Myofascial pain syndrome, cervical     Occipital neuralgia of right side     Fatigue due to old head injury     Post concussion syndrome     Cervical segment dysfunction     Thoracic segment dysfunction     Cephalgia     Somatic dysfunction of sacral region     Cervical pain     Dizziness     Marital conflict     Chronic left-sided low back pain without sciatica     Past Surgical History:   Procedure Laterality Date     TESTICLE SURGERY       VASECTOMY         Social History     Tobacco Use     Smoking status: Never     Smokeless tobacco: Never   Vaping Use     Vaping status: Never Used   Substance Use Topics     Alcohol use: Yes     Comment: twice a week     Family History   Problem Relation Age of Onset     Skin Cancer Mother          from pneumonia     Abdominal Aortic Aneurysm Mother      Heart Failure Father      Diabetes Type 1 Brother         Possibly secondary to chemical exposure in [place]     Diabetes Type 2  Maternal Grandmother      Cancer Brother         Exposure to Agent Orange in [place]     Heart Disease Brother          Current Outpatient Medications   Medication Sig Dispense Refill     triamterene-HCTZ (MAXZIDE-25) 37.5-25 MG tablet Take 0.5 tablets by mouth every morning 90 tablet 3     aspirin 81 MG tablet Take by mouth  daily       clotrimazole (LOTRIMIN) 1 % external cream Apply topically 2 times daily 85 g 11     diclofenac (VOLTAREN) 1 % topical gel Apply topically 4 times daily       econazole nitrate 1 % external cream Apply topically daily To the left foot until rashes resolve 85 g 1     fluocinonide (LIDEX) 0.05 % external cream Apply topically 2 times daily 120 g 6     gabapentin (NEURONTIN) 100 MG capsule Take 100 mg by mouth Take 1 time daily at HS (Patient not taking: Reported on 2/2/2023)       multivitamin w/minerals (THERA-VIT-M) tablet Take 1 tablet by mouth daily       Omega-3 Fatty Acids (OMEGA-3 FISH OIL PO)        pantoprazole (PROTONIX) 40 MG EC tablet TAKE 1 TABLET BY MOUTH ONCE DAILY 30 MIN PRIOR TO BREAKFAST 90 90 tablet 3     rosuvastatin (CRESTOR) 20 MG tablet TAKE 1 TABLET BY MOUTH EVERY DAY 90 tablet 0     Allergies   Allergen Reactions     Johanna-[place] Plus Allergy [Diphenhydramine]      Patient has lip swollen ,has used tylenol in th past. No issues. Has used benadryl in the past no issues.  Never used phenylephrine.     Recent Labs   Lab Test 04/07/22  1000 03/02/19  1636 01/30/19  0901   LDL 46  --  73   HDL 45  --  48   TRIG 189*  --  181*   ALT 40  --  30   CR 0.89 1.08 0.96   GFRESTIMATED >90 70 80   GFRESTBLACK  --  81 >90   POTASSIUM 4.2 3.9 3.6   TSH  --  3.64  --                 Review of Systems   Constitutional: Negative for chills and fever.   HENT: Positive for hearing loss. Negative for congestion, ear pain and sore throat.    Eyes: Negative for pain and visual disturbance.   Respiratory: Negative for cough and shortness of breath.    Cardiovascular: Negative for chest pain, palpitations and peripheral edema.   Gastrointestinal: Negative for abdominal pain, constipation, diarrhea, heartburn, hematochezia and nausea.   Genitourinary: Positive for frequency. Negative for dysuria, genital sores, hematuria, impotence, penile discharge and urgency.   Musculoskeletal: Negative for arthralgias,  "joint swelling and myalgias.   Skin: Negative for rash.   Neurological: Negative for dizziness, weakness, headaches and paresthesias.   Psychiatric/Behavioral: Negative for mood changes. The patient is not nervous/anxious.          OBJECTIVE:   /85   Pulse 66   Temp 97.6  F (36.4  C) (Temporal)   Resp 16   Ht 1.778 m (5' 10\")   Wt 101.6 kg (224 lb)   SpO2 98%   BMI 32.14 kg/m   Estimated body mass index is 32 kg/m  as calculated from the following:    Height as of 10/3/22: 1.778 m (5' 10\").    Weight as of 10/3/22: 101.2 kg (223 lb).  Physical Exam  GENERAL: healthy, alert and no distress  EYES: Eyes grossly normal to inspection, PERRL and conjunctivae and sclerae normal  HENT: ear canals and TM's normal, nose and mouth without ulcers or lesions  NECK: no adenopathy, no asymmetry, masses, or scars and thyroid normal to palpation  RESP: lungs clear to auscultation - no rales, rhonchi or wheezes  CV: regular rate and rhythm, normal S1 S2, no S3 or S4, no murmur, click or rub, no peripheral edema and peripheral pulses strong  ABDOMEN: soft, nontender, no hepatosplenomegaly, no masses and bowel sounds normal  MS: no gross musculoskeletal defects noted, no edema  SKIN: no suspicious lesions or rashes  NEURO: Normal strength and tone, mentation intact and speech normal  BACK: no CVA tenderness, no paralumbar tenderness  PSYCH: mentation appears normal, affect normal/bright        ASSESSMENT / PLAN:       ICD-10-CM    1. Encounter for general adult medical examination with abnormal findings  Z00.01 PRIMARY CARE FOLLOW-UP SCHEDULING     CBC with platelets     Comprehensive metabolic panel (BMP + Alb, Alk Phos, ALT, AST, Total. Bili, TP)     Lipid panel reflex to direct LDL Fasting     PSA, screen     [place] Aorta Medicare AAA Screening     PSA, screen     Lipid panel reflex to direct LDL Fasting     Comprehensive metabolic panel (BMP + Alb, Alk Phos, ALT, AST, Total. Bili, TP)     CBC with platelets      2. Screen " "for colon cancer  Z12.11       3. Screening for prostate cancer  Z12.5 PRIMARY CARE FOLLOW-UP SCHEDULING     PSA, screen     PSA, screen      4. Encounter for abdominal aortic aneurysm (AAA) screening  Z13.6 [place] Aorta Medicare AAA Screening      5. Benign essential hypertension  I10 [place] Aorta Medicare AAA Screening     triamterene-HCTZ (MAXZIDE-25) 37.5-25 MG tablet          Patient has been advised of split billing requirements and indicates understanding: Yes      COUNSELING:  Reviewed preventive health counseling, as reflected in patient instructions      BMI:   Estimated body mass index is 32.14 kg/m  as calculated from the following:    Height as of this encounter: 1.778 m (5' 10\").    Weight as of this encounter: 101.6 kg (224 lb).   Weight management plan: Discussed healthy diet and exercise guidelines      He reports that he has never smoked. He has never used smokeless tobacco.      Appropriate preventive services were discussed with this patient, including applicable screening as appropriate for cardiovascular disease, diabetes, osteopenia/osteoporosis, and glaucoma.  As appropriate for age/gender, discussed screening for colorectal cancer, prostate cancer, breast cancer, and cervical cancer. Checklist reviewing preventive services available has been given to the patient.    Reviewed patients plan of care and provided an AVS. The Basic Care Plan (routine screening as documented in Health Maintenance) for Pranay meets the Care Plan requirement. This Care Plan has been established and reviewed with the Patient.          Hipolito Alva MD  [facility]    Identified Health Risks:    I have reviewed Opioid Use Disorder and Substance Use Disorder risk factors and made any needed referrals.     "

## 2023-04-17 ENCOUNTER — MYC MEDICAL ADVICE (OUTPATIENT)
Dept: FAMILY MEDICINE | Facility: CLINIC | Age: 73
End: 2023-04-17
Payer: COMMERCIAL

## 2023-04-18 ENCOUNTER — ANCILLARY PROCEDURE (OUTPATIENT)
Dept: ULTRASOUND IMAGING | Facility: CLINIC | Age: 73
End: 2023-04-18
Attending: FAMILY MEDICINE
Payer: COMMERCIAL

## 2023-04-18 DIAGNOSIS — Z00.01 ENCOUNTER FOR GENERAL ADULT MEDICAL EXAMINATION WITH ABNORMAL FINDINGS: ICD-10-CM

## 2023-04-18 DIAGNOSIS — I10 BENIGN ESSENTIAL HYPERTENSION: ICD-10-CM

## 2023-04-18 DIAGNOSIS — Z13.6 ENCOUNTER FOR ABDOMINAL AORTIC ANEURYSM (AAA) SCREENING: ICD-10-CM

## 2023-04-18 PROCEDURE — 76706 US ABDL AORTA SCREEN AAA: CPT

## 2023-04-20 NOTE — TELEPHONE ENCOUNTER
It needs billing code, so he should be seen by provider.   Please help him to schedule Friday or early next week at the current remaining opening for ear check/removal wax       thx  
Please see MyChart message and advise.     Patient was seen by provider on 4/13. Is PCP able to put orders in the chart for eye cleaning and patient by scheduled on MA schedule or does patient need an appointment with PCP?    Shereen VILLALTA RN  [facility] Triage Team    
Pt has been scheduled.Fltecher DYER, CMA    
none

## 2023-04-21 ENCOUNTER — OFFICE VISIT (OUTPATIENT)
Dept: FAMILY MEDICINE | Facility: CLINIC | Age: 73
End: 2023-04-21
Payer: COMMERCIAL

## 2023-04-21 VITALS
OXYGEN SATURATION: 97 % | RESPIRATION RATE: 14 BRPM | HEART RATE: 70 BPM | WEIGHT: 226 LBS | HEIGHT: 70 IN | SYSTOLIC BLOOD PRESSURE: 120 MMHG | BODY MASS INDEX: 32.35 KG/M2 | TEMPERATURE: 97.2 F | DIASTOLIC BLOOD PRESSURE: 78 MMHG

## 2023-04-21 DIAGNOSIS — H61.20 WAX IN EAR: Primary | ICD-10-CM

## 2023-04-21 PROCEDURE — 69210 REMOVE IMPACTED EAR WAX UNI: CPT | Mod: LT | Performed by: FAMILY MEDICINE

## 2023-04-21 PROCEDURE — 2894A VOIDCORRECT: CPT | Mod: 59 | Performed by: FAMILY MEDICINE

## 2023-04-21 ASSESSMENT — PAIN SCALES - GENERAL: PAINLEVEL: NO PAIN (0)

## 2023-04-21 NOTE — PROGRESS NOTES
"  Assessment & Plan     Wax in ear  Removed with ENT alligator forceps and flushed with water without complication   - REMOVAL OF IMPACTED WAX MD           FUTURE APPOINTMENTS:       - Follow-up visit in 1 year     Hipolito Alva MD  [facility] is [facility] 73 year old, presenting for the following health issues:  Ear Problem (Ear wash , both)        4/21/2023    10:36 AM   Additional Questions   Roomed by Zoë AHN     History of Present Illness       Reason for visit:  Cleaning ears of wax build up    He eats 2-3 servings of fruits and vegetables daily.He consumes 1 sweetened beverage(s) daily.He exercises with enough effort to increase his heart rate 60 or more minutes per day.  He exercises with enough effort to increase his heart rate 7 days per week.   He is taking medications regularly.       Concern - wax  Onset: 2 months   Description: blocked   Intensity: moderate  Progression of Symptoms:  constant  Accompanying Signs & Symptoms: hearing difficulty   Previous history of similar problem: none   Precipitating factors:        Worsened by: earbuds   Alleviating factors:        Improved by: none  Therapies tried and outcome:  none           Review of Systems   Constitutional, HEENT, cardiovascular, pulmonary, gi and gu systems are negative, except as otherwise noted.      Objective    /78   Pulse 70   Temp 97.2  F (36.2  C) (Temporal)   Resp 14   Ht 1.783 m (5' 10.2\")   Wt 102.5 kg (226 lb)   SpO2 97%   BMI 32.25 kg/m    Body mass index is 32.25 kg/m .  Physical Exam   GENERAL: healthy, alert and no distress  NECK: no adenopathy, no asymmetry, masses, or scars and thyroid normal to palpation  RESP: lungs clear to auscultation - no rales, rhonchi or wheezes  CV: regular rate and rhythm, normal S1 S2, no S3 or S4, no murmur, click or rub, no peripheral edema and peripheral pulses strong  ABDOMEN: soft, nontender, no hepatosplenomegaly, no masses and bowel sounds " normal  MS: no gross musculoskeletal defects noted, no edema

## 2023-04-21 NOTE — PROGRESS NOTES
Patient identified using two patient identifiers.  Ear exam showing wax occlusion completed by provider.  Solution: warm water was placed in the bilateral ear(s) via irrigation tool: elephant ear.    Umberto Jones CMA on 4/21/2023 at 11:08 AM

## 2023-04-22 NOTE — PROGRESS NOTES
"Subjective:  HPI  Physical Exam                    Objective:  System    Physical Exam    General     ROS    Assessment/Plan:    DISCHARGE REPORT    Progress reporting period is from 11/07/2022 to 12/20/2022.       SUBJECTIVE  Subjective:  When last seen on 12/20/2022, patient reported,  \"Pretty similar.  The numbness is always there, the pain is less.\"  He had been doing standing extension and REIL at home.  L thigh felt better afterwards, but the REIL exercises caused LBP.  Sitting would increase his L thigh symptoms usually after 2 hours--4/10 pain sometimes, but he could continue to sit despite this.  He had been doing yoga and jonah chi classes and thought the movement helped.  Current status is unknown as patient did not return for additional follow-up visits.  Current Pain level: As of 12/20/2022:  3/10.     Initial Pain level: 3/10.   Changes in function:  Unknown as patient did not return for additional follow-up visits.  Adverse reaction to treatment or activity: Unknown as patient did not return for additional follow-up visits.      OBJECTIVE  Objective: Lumbar AROM:  flexion min  loss, flexes L knee; extension mod loss, increase L thigh pain; R SG nil loss, NE; L SG min loss, increase L thigh.  Decreased L thigh pain after CARO x3'.  Pt to try at home every 2 hours, 3-5 min at a time.     ASSESSMENT/PLAN  Patient was seen for 6 visits with treatment focusing on lumbar extension exercises, posture, and core strengthening exercises.  Progress was slow throughout treatment, and he received consistent benefit from extension exercises, however, long-term relief was slow and minimal.  He has not returned for additional follow-up visits so current assessment is unavailable.  Updated problem list and treatment plan: Diagnosis 1:  LBP, L radiculopathy   No updated problem list or treatment plan as patient did not return for additional visits and is discharged from PT at this time.    STG/LTGs have been met or " progress has been made towards goals:  Unknown as patient did not return for additional follow-up visits.  Assessment of Progress: The patient has not returned to therapy. Current status is unknown.  Self Management Plans:  Patient has been instructed in a home treatment program.  Patient  has been instructed in self management of symptoms.  Pranay continues to require the following intervention to meet STG and LTG's:  PT intervention is no longer required to meet STG/LTG.    Recommendations:  Patient is discharged from PT as he did not return for further follow-up visits.    Please refer to the daily flowsheet for treatment today, total treatment time and time spent performing 1:1 timed codes.

## 2023-05-01 DIAGNOSIS — E78.5 HYPERLIPIDEMIA LDL GOAL <100: ICD-10-CM

## 2023-05-01 RX ORDER — ROSUVASTATIN CALCIUM 20 MG/1
TABLET, COATED ORAL
Qty: 90 TABLET | Refills: 1 | Status: SHIPPED | OUTPATIENT
Start: 2023-05-01 | End: 2023-10-27

## 2023-05-31 ENCOUNTER — MYC MEDICAL ADVICE (OUTPATIENT)
Dept: FAMILY MEDICINE | Facility: CLINIC | Age: 73
End: 2023-05-31
Payer: COMMERCIAL

## 2023-06-01 ENCOUNTER — VIRTUAL VISIT (OUTPATIENT)
Dept: INTERNAL MEDICINE | Facility: CLINIC | Age: 73
End: 2023-06-01
Payer: COMMERCIAL

## 2023-06-01 ENCOUNTER — NURSE TRIAGE (OUTPATIENT)
Dept: FAMILY MEDICINE | Facility: CLINIC | Age: 73
End: 2023-06-01

## 2023-06-01 ENCOUNTER — LAB (OUTPATIENT)
Dept: LAB | Facility: CLINIC | Age: 73
End: 2023-06-01
Payer: COMMERCIAL

## 2023-06-01 DIAGNOSIS — N34.2 URETHRITIS: Primary | ICD-10-CM

## 2023-06-01 DIAGNOSIS — N39.0 UTI (URINARY TRACT INFECTION): ICD-10-CM

## 2023-06-01 LAB
ALBUMIN UR-MCNC: NEGATIVE MG/DL
APPEARANCE UR: CLEAR
BACTERIA #/AREA URNS HPF: NORMAL /HPF
BILIRUB UR QL STRIP: NEGATIVE
COLOR UR AUTO: YELLOW
GLUCOSE UR STRIP-MCNC: NEGATIVE MG/DL
HGB UR QL STRIP: ABNORMAL
KETONES UR STRIP-MCNC: NEGATIVE MG/DL
LEUKOCYTE ESTERASE UR QL STRIP: NEGATIVE
NITRATE UR QL: NEGATIVE
PH UR STRIP: 7 [PH] (ref 5–7)
RBC #/AREA URNS AUTO: NORMAL /HPF
SP GR UR STRIP: 1.01 (ref 1–1.03)
SQUAMOUS #/AREA URNS AUTO: NORMAL /LPF
UROBILINOGEN UR STRIP-ACNC: 0.2 E.U./DL
WBC #/AREA URNS AUTO: NORMAL /HPF

## 2023-06-01 PROCEDURE — 81001 URINALYSIS AUTO W/SCOPE: CPT

## 2023-06-01 PROCEDURE — 99442 PR PHYSICIAN TELEPHONE EVALUATION 11-20 MIN: CPT | Mod: 93

## 2023-06-01 RX ORDER — DOXYCYCLINE HYCLATE 100 MG
100 TABLET ORAL 2 TIMES DAILY
Qty: 14 TABLET | Refills: 0 | Status: SHIPPED | OUTPATIENT
Start: 2023-06-01 | End: 2024-03-18

## 2023-06-01 NOTE — TELEPHONE ENCOUNTER
[facility] Triage (supporting Hipolito Alva MD) Yesterday (11:15 AM)       I think I m at the start of a urinary tract infection. I have some pain in the top of my urethra. I started drinking unsweetened cranberry juice yesterday along with taking cranberry urinary tract support supplements. Im taking ibuprofen as well. I m wondering if I need labs & antibiotics?  It s been a long time since I ve had one of these & antibiotics worked then. Thanks!   Duplicate encounter.   Ann-Marie oRse RN

## 2023-06-01 NOTE — TELEPHONE ENCOUNTER
See triage telephone encounter. [employer] message sent to the patient advising to call triage. Ann-Marie Rose RN

## 2023-06-01 NOTE — PROGRESS NOTES
Chris is a 73 year old who is being evaluated via a billable telephone visit.      What phone number would you like to be contacted at? [phone]  How would you like to obtain your AVS? German  Distant Location (provider location):  On-site    Assessment & Plan     UTI (urinary tract infection)  Patient UA was negative for infecion but did have trace blood  - UA Macroscopic with reflex to Microscopic and Culture; Future    Urethritis  Suspected urethritis..  Patient has had urethritis previously and he states that this feels similar to that experience.  Patient says that symptoms have been improving for the past couple of days.  I asked that if they continue to improve he does not take antibiotic.  He states that he is not sexually active and I will not ask to complete STI screening.    Patient had been using new lotion recently and is unsure if some has entered the urethra which may have provoked symptoms.  As to keep the area clean and dry. Patient has urologist- if treatment is ineffective I recommend following with them  - doxycycline hyclate (VIBRA-TABS) 100 MG tablet; Take 1 tablet (100 mg) by mouth 2 times daily    Umberto Mccauley NP  [facility]    Subjective   Chris is a 73 year old, presenting for the following health issues:  UTI (He has a dull burning pain when urinate since Monday this week.)        4/21/2023    10:36 AM   Additional Questions   Roomed by Zoë AGUILERA     Onset: monday  Characteristics: pain in top or tip of penis. Dull ache on top of penis     Joined the gym may first, has been swimming, yoga and other activities   Not sexually active  Masturbates occasionally   Pain is only present when urinating. Pain is starting to go away. Pain   Pain has been improving since started  Has been using cranberry supplement  Denies rash  Denies discharge     urethitis many years ago in [place]- similar to current      Review of Systems   Constitutional, HEENT,  cardiovascular, pulmonary, gi and gu systems are negative, except as otherwise noted.      Objective           Vitals:  No vitals were obtained today due to virtual visit.    Physical Exam    alert and no distress  PSYCH: Alert and oriented times 3; coherent speech, normal   rate and volume, able to articulate logical thoughts, able   to abstract reason, no tangential thoughts, no hallucinations   or delusions  His affect is normal  RESP: No cough, no audible wheezing, able to talk in full sentences  Remainder of exam unable to be completed due to telephone visits          Phone call duration: 13 minutes

## 2023-06-06 ENCOUNTER — MYC MEDICAL ADVICE (OUTPATIENT)
Dept: FAMILY MEDICINE | Facility: CLINIC | Age: 73
End: 2023-06-06
Payer: COMMERCIAL

## 2023-06-07 ENCOUNTER — IMMUNIZATION (OUTPATIENT)
Dept: FAMILY MEDICINE | Facility: CLINIC | Age: 73
End: 2023-06-07
Payer: COMMERCIAL

## 2023-06-07 DIAGNOSIS — Z23 HIGH PRIORITY FOR 2019-NCOV VACCINE: Primary | ICD-10-CM

## 2023-06-07 PROCEDURE — 0124A COVID-19 BIVALENT 12+ (PFIZER): CPT

## 2023-06-07 PROCEDURE — 99207 PR NO CHARGE NURSE ONLY: CPT

## 2023-06-07 PROCEDURE — 91312 COVID-19 BIVALENT 12+ (PFIZER): CPT

## 2023-06-16 DIAGNOSIS — K21.9 GASTROESOPHAGEAL REFLUX DISEASE WITHOUT ESOPHAGITIS: ICD-10-CM

## 2023-06-16 RX ORDER — PANTOPRAZOLE SODIUM 40 MG/1
TABLET, DELAYED RELEASE ORAL
Qty: 90 TABLET | Refills: 1 | Status: SHIPPED | OUTPATIENT
Start: 2023-06-16 | End: 2024-06-27

## 2023-07-17 ENCOUNTER — OFFICE VISIT (OUTPATIENT)
Dept: UROLOGY | Facility: CLINIC | Age: 73
End: 2023-07-17
Payer: COMMERCIAL

## 2023-07-17 VITALS
HEART RATE: 74 BPM | DIASTOLIC BLOOD PRESSURE: 64 MMHG | OXYGEN SATURATION: 98 % | HEIGHT: 72 IN | SYSTOLIC BLOOD PRESSURE: 130 MMHG | WEIGHT: 220 LBS | BODY MASS INDEX: 29.8 KG/M2

## 2023-07-17 DIAGNOSIS — N40.1 BPH WITH OBSTRUCTION/LOWER URINARY TRACT SYMPTOMS: Primary | ICD-10-CM

## 2023-07-17 DIAGNOSIS — N13.8 BPH WITH OBSTRUCTION/LOWER URINARY TRACT SYMPTOMS: Primary | ICD-10-CM

## 2023-07-17 PROCEDURE — 99214 OFFICE O/P EST MOD 30 MIN: CPT | Performed by: UROLOGY

## 2023-07-17 RX ORDER — TAMSULOSIN HYDROCHLORIDE 0.4 MG/1
0.4 CAPSULE ORAL DAILY
Qty: 90 CAPSULE | Refills: 3 | Status: SHIPPED | OUTPATIENT
Start: 2023-07-17 | End: 2024-03-18

## 2023-07-17 ASSESSMENT — PAIN SCALES - GENERAL: PAINLEVEL: NO PAIN (0)

## 2023-07-17 NOTE — LETTER
"7/17/2023       RE: Pranay Ratliff  [address]  [place] [address]     Dear Colleague,    Thank you for referring your patient, Pranay Ratliff, to the [facility] at [facility]. Please see a copy of my visit note below.    [place]   CHIEF COMPLAINT   It was my pleasure to see Pranay Ratliff who is a 73 year old male for follow-up of Elevated PSA and LUTS.      HPI   Pranay Ratliff is a very pleasant 73 year old male    Prior pt of Dr. Guajardo - last seen 9/16/21:  \"Pranay Ratliff is a very pleasant 71 year old male who presents with a history of Elevated PSA (Prostate Specific Antigen).  PSA 5.3 and had s/p TRUS biopsy 8/2019. Has done well since biopsy which demonstrated no malignancy.     Also has history of BPH with weak stream and LUTS. Did not tolerate tamsulosin on previous trial of this. Is bothered by his urinary symptoms.  Has been on several medications over the years, most recently managed by urologist in [place], [place]. He is now back in [place]. Most recently was taking tadalafil 5 mg daily and dutasteride. Stopped these a few weeks ago given concerns about light-headedness. Currently still bothered by weak stream, and nocturia.    Restarted on dutasteride\"    3/16/22:  He never resumed the Dutasteride due to gynecomastia   He continues to have retrograde ejaculation   Urination is stable  Nocturia 2x/night  Overall, not too bothered by symptoms     He just moved back from [facility] last fall  He did have a cystoscopy 05/2021 with no concern for intravesical mass    4/6/22:  Follow-up today to review his updated MRI  He had no issues with this MRI    5/25/2022:  He did well after the biopsy without complications  Still occasional blood per urethra    TODAY 7/17/2023:  He does feel like he is urinating more frequently   At times with some double voiding  Overall he is mildly bothered by this    AUASS: 7-7-4-0-0-0-2 = 5  QOL = " "2    PHYSICAL EXAM  Patient is a 73 year old  male   Vitals: Blood pressure 130/64, pulse 74, height 1.816 m (5' 11.5\"), weight 99.8 kg (220 lb), SpO2 98 %.  Body mass index is 30.26 kg/m .  General Appearance Adult:   Alert, no acute distress, oriented  HENT: throat/mouth:normal, good dentition  Lungs: no respiratory distress, or pursed lip breathing  Heart: No obvious jugular venous distension present  Abdomen: non - distended  Musculoskeltal: extremities normal, no peripheral edema  Skin: no suspicious lesions or rashes  Neuro: Alert, oriented, speech and mentation normal  Psych: affect and mood normal     PSA PSA Diag Urologic Phys PSA Tumor Marker   Latest Ref Rng 0.00 - 4.00 ug/L 0.00 - 4.00 ng/mL 0.00 - 6.50 ng/mL   1/30/2019 6.65 (H)      2/26/2019 6.32 (H)      4/22/2019  4.70 (H)     7/15/2019  5.30 (H)     9/16/2021 3.00      3/16/2022 9.20 (H)      11/21/2022 6.50 (H)      4/13/2023   4.79        PATHOLOGY:  TRUS 8/6/2019  FINAL DIAGNOSIS:   A - G. Prostate biopsy template and target:   - Benign prostate tissue     MR-FUSION URONAV 5/16/22:  Final Diagnosis   A - L: Prostate, TEMPLATE, needle core biopsy:  - Negative for malignancy.     M: Prostate, left lesion x3, needle core biopsy:  - Negative for malignancy.  - Acute and chronic inflammation present.     IMAGING:  All pertinent imaging reviewed:    All imaging studies reviewed by me.  I personally reviewed these imaging films.  A formal report from radiology will follow.    MRI Prostate 4/20/2019  FINDINGS:  Size: 73.5 grams  IMPRESSION:  1. Based on the most suspicious abnormality, this exam is  characterized as PIRADS 3 - The presence of clinically significant  cancer is equivocal.?The most suspicious abnormality is located at the  left posterior apex peripheral zone and there is no evidence of  extraprostatic extension.  2. No suspicious adenopathy or evidence of pelvic metastases.     MRI PROSTATE 3/31/22:  FINDINGS:  Size: 4.2 x 5.1 x 6.0 cm, " 66.8 grams  Hemorrhage: Absent  Peripheral zone: Heterogeneous on T2-weighted images. Suspicious  lesions as detailed below.  Transition zone: Enlarged with BPH changes. Transition zone nodules  which are circumscribed or mostly encapsulated without diffusion  restriction.  PI-RADS 2.  No highly suspicious nodules.     Lesion(s) in rank order of severity (highest score- to lowest score,  then by size)      Lesion 1:  Location: Left apex peripheral zone at the   5  o'clock position  relative to the urethra. Series 7 image 71.  Size: 6 mm  T2 description: Heterogeneous signal intensity  T2 numerical assessment: 3  DWI description: Hypointense on ADC and hyperintense on high B-value  DWI  DWI numerical assessment: 3  DCE assessment: Positive    Prostate margin: No capsular abutment  Lesion overall PI-RADS category: 4     Neurovascular bundles: No suspicion of involvement by malignancy  Seminal vesicles: Not involved by tumor.  Lymph nodes: No adenopathy.  Bones: No suspicious lesions.  Other pelvic organs: Small fat-containing left inguinal hernia.                                                        IMPRESSION:  1. Based on the most suspicious abnormality, this exam is  characterized as PIRADS 4 - Clinically significant cancer is likely to  be present.?The most suspicious abnormality is located at the left  apex peripheral zone and there is no evidence of extraprostatic  extension. Compared to prior MR prostate 4/20/2019, this lesion is  similar in size and morphology, but now demonstrates focal  enhancement, raising the lesion to a PIRADS 4.  2. No suspicious adenopathy or evidence of pelvic metastases.       ASSESSMENT and PLAN  73-year-old man with history of BPH and LUTS with elevated PSA    Elevated PSA  -Reviewed his PSA history and trend  -His most recent PSA is reassuring and has come down to 4.79  - I reviewed his prior MRI and reviewed these images personally.  I agree with radiologist interpretation  - I  reviewed his prior biopsy which was negative for any malignancy  - Continue with annual PSA monitoring    BPH and LUTS  -I reviewed his MRI and we discussed that he does have an enlarged prostate which most recently measured 66.8 grams on MRI  - We discussed the pathophysiology of the bladder and the prostate normal changes associated with the development of BPH and LUTS  - He had previously been on dutasteride with bothersome breast tenderness and gynecomastia  - We discussed the option for tamsulosin 0.4 mg daily we discussed the potential side effects of this medication.  He would like to try this.  Prescription sent to the pharmacy    Follow-up in 1 year for symptom check and PSA review    Time spent: 20 minutes spent on the date of the encounter doing chart review, history and exam, documentation and further activities as noted above.    Vin Sinha MD   Urology  [facility]  [facility] Phone: [phone]  [facility] Phone: [phone]

## 2023-07-17 NOTE — PROGRESS NOTES
"[facility]   CHIEF COMPLAINT   It was my pleasure to see Pranay Ratliff who is a 73 year old male for follow-up of Elevated PSA and LUTS.      HPI   Pranay Ratliff is a very pleasant 73 year old male    Prior pt of Dr. Guajardo - last seen 9/16/21:  \"Pranay Ratliff is a very pleasant 71 year old male who presents with a history of Elevated PSA (Prostate Specific Antigen).  PSA 5.3 and had s/p TRUS biopsy 8/2019. Has done well since biopsy which demonstrated no malignancy.     Also has history of BPH with weak stream and LUTS. Did not tolerate tamsulosin on previous trial of this. Is bothered by his urinary symptoms.  Has been on several medications over the years, most recently managed by urologist in [place], [place]. He is now back in [place]. Most recently was taking tadalafil 5 mg daily and dutasteride. Stopped these a few weeks ago given concerns about light-headedness. Currently still bothered by weak stream, and nocturia.    Restarted on dutasteride\"    3/16/22:  He never resumed the Dutasteride due to gynecomastia   He continues to have retrograde ejaculation   Urination is stable  Nocturia 2x/night  Overall, not too bothered by symptoms     He just moved back from [facility] last fall  He did have a cystoscopy 05/2021 with no concern for intravesical mass    4/6/22:  Follow-up today to review his updated MRI  He had no issues with this MRI    5/25/2022:  He did well after the biopsy without complications  Still occasional blood per urethra    TODAY 7/17/2023:  He does feel like he is urinating more frequently   At times with some double voiding  Overall he is mildly bothered by this    AUASS: 5-1-5-0-0-0-2 = 5  QOL = 2    PHYSICAL EXAM  Patient is a 73 year old  male   Vitals: Blood pressure 130/64, pulse 74, height 1.816 m (5' 11.5\"), weight 99.8 kg (220 lb), SpO2 98 %.  Body mass index is 30.26 kg/m .  General Appearance Adult:   Alert, no acute distress, oriented  HENT: throat/mouth:normal, good dentition  Lungs: no " respiratory distress, or pursed lip breathing  Heart: No obvious jugular venous distension present  Abdomen: non - distended  Musculoskeltal: extremities normal, no peripheral edema  Skin: no suspicious lesions or rashes  Neuro: Alert, oriented, speech and mentation normal  Psych: affect and mood normal     PSA PSA Diag Urologic Phys PSA Tumor Marker   Latest Ref Rng 0.00 - 4.00 ug/L 0.00 - 4.00 ng/mL 0.00 - 6.50 ng/mL   1/30/2019 6.65 (H)      2/26/2019 6.32 (H)      4/22/2019  4.70 (H)     7/15/2019  5.30 (H)     9/16/2021 3.00      3/16/2022 9.20 (H)      11/21/2022 6.50 (H)      4/13/2023   4.79        PATHOLOGY:  TRUS 8/6/2019  FINAL DIAGNOSIS:   A - G. Prostate biopsy template and target:   - Benign prostate tissue     MR-FUSION URONAV 5/16/22:  Final Diagnosis   A - L: Prostate, TEMPLATE, needle core biopsy:  - Negative for malignancy.     M: Prostate, left lesion x3, needle core biopsy:  - Negative for malignancy.  - Acute and chronic inflammation present.     IMAGING:  All pertinent imaging reviewed:    All imaging studies reviewed by me.  I personally reviewed these imaging films.  A formal report from radiology will follow.    MRI Prostate 4/20/2019  FINDINGS:  Size: 73.5 grams  IMPRESSION:  1. Based on the most suspicious abnormality, this exam is  characterized as PIRADS 3 - The presence of clinically significant  cancer is equivocal.?The most suspicious abnormality is located at the  left posterior apex peripheral zone and there is no evidence of  extraprostatic extension.  2. No suspicious adenopathy or evidence of pelvic metastases.     MRI PROSTATE 3/31/22:  FINDINGS:  Size: 4.2 x 5.1 x 6.0 cm, 66.8 grams  Hemorrhage: Absent  Peripheral zone: Heterogeneous on T2-weighted images. Suspicious  lesions as detailed below.  Transition zone: Enlarged with BPH changes. Transition zone nodules  which are circumscribed or mostly encapsulated without diffusion  restriction.  PI-RADS 2.  No highly suspicious  nodules.     Lesion(s) in rank order of severity (highest score- to lowest score,  then by size)      Lesion 1:  Location: Left apex peripheral zone at the   5  o'clock position  relative to the urethra. Series 7 image 71.  Size: 6 mm  T2 description: Heterogeneous signal intensity  T2 numerical assessment: 3  DWI description: Hypointense on ADC and hyperintense on high B-value  DWI  DWI numerical assessment: 3  DCE assessment: Positive    Prostate margin: No capsular abutment  Lesion overall PI-RADS category: 4     Neurovascular bundles: No suspicion of involvement by malignancy  Seminal vesicles: Not involved by tumor.  Lymph nodes: No adenopathy.  Bones: No suspicious lesions.  Other pelvic organs: Small fat-containing left inguinal hernia.                                                        IMPRESSION:  1. Based on the most suspicious abnormality, this exam is  characterized as PIRADS 4 - Clinically significant cancer is likely to  be present.?The most suspicious abnormality is located at the left  apex peripheral zone and there is no evidence of extraprostatic  extension. Compared to prior MR prostate 4/20/2019, this lesion is  similar in size and morphology, but now demonstrates focal  enhancement, raising the lesion to a PIRADS 4.  2. No suspicious adenopathy or evidence of pelvic metastases.       ASSESSMENT and PLAN  73-year-old man with history of BPH and LUTS with elevated PSA    Elevated PSA  -Reviewed his PSA history and trend  -His most recent PSA is reassuring and has come down to 4.79  - I reviewed his prior MRI and reviewed these images personally.  I agree with radiologist interpretation  - I reviewed his prior biopsy which was negative for any malignancy  - Continue with annual PSA monitoring    BPH and LUTS  -I reviewed his MRI and we discussed that he does have an enlarged prostate which most recently measured 66.8 grams on MRI  - We discussed the pathophysiology of the bladder and the  prostate normal changes associated with the development of BPH and LUTS  - He had previously been on dutasteride with bothersome breast tenderness and gynecomastia  - We discussed the option for tamsulosin 0.4 mg daily we discussed the potential side effects of this medication.  He would like to try this.  Prescription sent to the pharmacy    Follow-up in 1 year for symptom check and PSA review    Time spent: 20 minutes spent on the date of the encounter doing chart review, history and exam, documentation and further activities as noted above.    Vin Sinha MD   Urology  [facility]  [facility] Phone: [phone]  [facility] Phone: [phone]

## 2023-07-17 NOTE — NURSING NOTE
Chief Complaint   Patient presents with     Elevated PSA   Patient states he is urinating about every 2 hours. Patient also states he gets up x3 during the night to void.  Екатерина Palacios LPN

## 2023-08-10 ENCOUNTER — MYC MEDICAL ADVICE (OUTPATIENT)
Dept: FAMILY MEDICINE | Facility: CLINIC | Age: 73
End: 2023-08-10
Payer: COMMERCIAL

## 2023-09-21 ENCOUNTER — IMMUNIZATION (OUTPATIENT)
Dept: FAMILY MEDICINE | Facility: CLINIC | Age: 73
End: 2023-09-21
Payer: COMMERCIAL

## 2023-09-21 DIAGNOSIS — Z23 NEED FOR PROPHYLACTIC VACCINATION AND INOCULATION AGAINST INFLUENZA: Primary | ICD-10-CM

## 2023-09-21 PROCEDURE — G0008 ADMIN INFLUENZA VIRUS VAC: HCPCS

## 2023-09-21 PROCEDURE — 99207 PR NO CHARGE NURSE ONLY: CPT

## 2023-09-21 PROCEDURE — 90662 IIV NO PRSV INCREASED AG IM: CPT

## 2023-10-11 ENCOUNTER — MYC MEDICAL ADVICE (OUTPATIENT)
Dept: FAMILY MEDICINE | Facility: CLINIC | Age: 73
End: 2023-10-11
Payer: COMMERCIAL

## 2023-10-12 ENCOUNTER — MYC MEDICAL ADVICE (OUTPATIENT)
Dept: FAMILY MEDICINE | Facility: CLINIC | Age: 73
End: 2023-10-12
Payer: COMMERCIAL

## 2023-10-13 ENCOUNTER — ALLIED HEALTH/NURSE VISIT (OUTPATIENT)
Dept: FAMILY MEDICINE | Facility: CLINIC | Age: 73
End: 2023-10-13
Payer: COMMERCIAL

## 2023-10-13 DIAGNOSIS — Z23 HIGH PRIORITY FOR 2019-NCOV VACCINE: Primary | ICD-10-CM

## 2023-10-13 PROCEDURE — 90480 ADMN SARSCOV2 VAC 1/ONLY CMP: CPT

## 2023-10-13 PROCEDURE — 91320 SARSCV2 VAC 30MCG TRS-SUC IM: CPT

## 2023-10-16 ENCOUNTER — OFFICE VISIT (OUTPATIENT)
Dept: DERMATOLOGY | Facility: CLINIC | Age: 73
End: 2023-10-16
Payer: COMMERCIAL

## 2023-10-16 DIAGNOSIS — L81.4 LENTIGINES: ICD-10-CM

## 2023-10-16 DIAGNOSIS — L82.0 INFLAMED SEBORRHEIC KERATOSIS: Primary | ICD-10-CM

## 2023-10-16 DIAGNOSIS — L57.8 ACTINIC SKIN DAMAGE: ICD-10-CM

## 2023-10-16 DIAGNOSIS — D48.5 NEOPLASM OF UNCERTAIN BEHAVIOR OF SKIN: ICD-10-CM

## 2023-10-16 PROCEDURE — 99213 OFFICE O/P EST LOW 20 MIN: CPT | Mod: 25 | Performed by: STUDENT IN AN ORGANIZED HEALTH CARE EDUCATION/TRAINING PROGRAM

## 2023-10-16 PROCEDURE — 17110 DESTRUCTION B9 LES UP TO 14: CPT | Performed by: STUDENT IN AN ORGANIZED HEALTH CARE EDUCATION/TRAINING PROGRAM

## 2023-10-16 ASSESSMENT — PAIN SCALES - GENERAL: PAINLEVEL: NO PAIN (0)

## 2023-10-16 NOTE — LETTER
10/16/2023         RE: Pranay Ratliff  [address]  [facility] [address]        Dear Colleague,    Thank you for referring your patient, Pranay Ratliff, to the [facility]. Please see a copy of my visit note below.    [facility] Dermatology Note    Encounter Date: Oct 16, 2023    Dermatology Problem List:  #GA;     Major PMHx  -   ______________________________________    Impression/Plan:  Chris was seen today for lesion.    Diagnoses and all orders for this visit:    Inflamed seborrheic keratosis  -     DESTRUCT BENIGN LESION, UP TO 14  - x1 L arm     Neoplasm of uncertain behavior of skin  - L lower eyelid  - ddx BCC vs other  - offered bx, pt prefers to wait until follow up visit in feb as it hasn't been growing rapidly. Discussed he should contact me right away if it stops behaving indolently.     Actinic skin damage  Lentigines  - Reviewed the compounding benefits of incremental changes to sun protective clothing behaviors including increased frequency of sunscreen and sun protective clothing like broad brimmed hats and longsleeved UPF containing clothing      Follow-up in Feb.       Staff Involved:  Staff Only    Solomon Willett MD   of Dermatology  Department of Dermatology  [facility]      CC:   Chief Complaint   Patient presents with     Lesion       History of Present Illness:  Mr. Pranay Ratliff is a 73 year old male who presents as a return3 patient.    Apically sees Dr. Gallagher.  Has a follow-up in February.  Presented today for concerns about a warty-like area on his left forearm.  Additionally he has a spot underneath his left eyelid that is been present for months has been growing rapidly but has not gone ago gone away either.  Occasionally it will bleed if he rubs the area    Labs:      Physical exam:  Vitals: There were no vitals taken for this visit.  GEN: well developed, well-nourished, in  no acute distress, in a pleasant mood.     SKIN: Hudson phototype 1  - Sun-exposed skin, which includes the head/face, neck, both arms, digits, and/or nails was examined.   - 2mm pearly papule w. Punctate area of hemorrhage  - Stuck on brown papules on trunk and extremities   - No other lesions of concern on areas examined.     Past Medical History:   Past Medical History:   Diagnosis Date     Basal cell carcinoma      Concussion with loss of consciousness of 30 minutes or less, subsequent encounter      Epididymitis, bilateral      Epididymitis, left      Epididymitis, right      Obstructive sleep apnea syndrome      Squamous cell carcinoma of skin, unspecified      Vasovagal syncope      Past Surgical History:   Procedure Laterality Date     TESTICLE SURGERY       VASECTOMY         Social History:   reports that he has never smoked. He has never used smokeless tobacco. He reports current alcohol use. He reports that he does not use drugs.    Family History:  Family History   Problem Relation Age of Onset     Skin Cancer Mother          from pneumonia     Abdominal Aortic Aneurysm Mother      Heart Failure Father      Diabetes Type 1 Brother         Possibly secondary to chemical exposure in [facility]     Diabetes Type 2  Maternal Grandmother      Cancer Brother         Exposure to Agent Orange in [facility]     Heart Disease Brother        Medications:  Current Outpatient Medications   Medication Sig Dispense Refill     aspirin 81 MG tablet Take by mouth daily       clotrimazole (LOTRIMIN) 1 % external cream Apply topically 2 times daily (Patient not taking: Reported on 10/16/2023) 85 g 11     diclofenac (VOLTAREN) 1 % topical gel Apply topically 4 times daily       doxycycline hyclate (VIBRA-TABS) 100 MG tablet Take 1 tablet (100 mg) by mouth 2 times daily (Patient not taking: Reported on 10/16/2023) 14 tablet 0     econazole nitrate 1 % external cream Apply topically daily To the left foot until rashes  resolve (Patient not taking: Reported on 10/16/2023) 85 g 1     fluocinonide (LIDEX) 0.05 % external cream Apply topically 2 times daily (Patient not taking: Reported on 10/16/2023) 120 g 6     gabapentin (NEURONTIN) 100 MG capsule Take 100 mg by mouth Take 1 time daily at HS       multivitamin w/minerals (THERA-VIT-M) tablet Take 1 tablet by mouth daily       Omega-3 Fatty Acids (OMEGA-3 FISH OIL PO)        pantoprazole (PROTONIX) 40 MG EC tablet TAKE 1 TABLET BY MOUTH ONCE DAILY 30 MIN PRIOR TO BREAKFAST 90 tablet 1     rosuvastatin (CRESTOR) 20 MG tablet TAKE 1 TABLET BY MOUTH EVERY DAY 90 tablet 1     tamsulosin (FLOMAX) 0.4 MG capsule Take 1 capsule (0.4 mg) by mouth daily 90 capsule 3     triamterene-HCTZ (MAXZIDE-25) 37.5-25 MG tablet Take 0.5 tablets by mouth every morning 90 tablet 3     Allergies   Allergen Reactions     Johanna-Packwood Plus Allergy [Diphenhydramine]      Patient has lip swollen ,has used tylenol in th past. No issues. Has used benadryl in the past no issues.  Never used phenylephrine.               Again, thank you for allowing me to participate in the care of your patient.        Sincerely,        Solomon Willett MD

## 2023-10-16 NOTE — PROGRESS NOTES
[facility] Dermatology Note    Encounter Date: Oct 16, 2023    Dermatology Problem List:  #GA;     Major PMHx  -   ______________________________________    Impression/Plan:  Chris was seen today for lesion.    Diagnoses and all orders for this visit:    Inflamed seborrheic keratosis  -     DESTRUCT BENIGN LESION, UP TO 14  - x1 L arm     Neoplasm of uncertain behavior of skin  - L lower eyelid  - ddx BCC vs other  - offered bx, pt prefers to wait until follow up visit in feb as it hasn't been growing rapidly. Discussed he should contact me right away if it stops behaving indolently.     Actinic skin damage  Lentigines  - Reviewed the compounding benefits of incremental changes to sun protective clothing behaviors including increased frequency of sunscreen and sun protective clothing like broad brimmed hats and longsleeved UPF containing clothing      Follow-up in Feb.       Staff Involved:  Staff Only    Solomon Willett MD   of Dermatology  Department of Dermatology  [facility]      CC:   Chief Complaint   Patient presents with    Lesion       History of Present Illness:  Mr. Pranay Ratliff is a 73 year old male who presents as a return3 patient.    Apically sees Dr. Gallagher.  Has a follow-up in February.  Presented today for concerns about a warty-like area on his left forearm.  Additionally he has a spot underneath his left eyelid that is been present for months has been growing rapidly but has not gone ago gone away either.  Occasionally it will bleed if he rubs the area    Labs:      Physical exam:  Vitals: There were no vitals taken for this visit.  GEN: well developed, well-nourished, in no acute distress, in a pleasant mood.     SKIN: Hudson phototype 1  - Sun-exposed skin, which includes the head/face, neck, both arms, digits, and/or nails was examined.   - 2mm pearly papule w. Punctate area of hemorrhage  - Stuck on brown papules on  trunk and extremities   - No other lesions of concern on areas examined.     Past Medical History:   Past Medical History:   Diagnosis Date    Basal cell carcinoma     Concussion with loss of consciousness of 30 minutes or less, subsequent encounter     Epididymitis, bilateral     Epididymitis, left     Epididymitis, right     Obstructive sleep apnea syndrome     Squamous cell carcinoma of skin, unspecified     Vasovagal syncope      Past Surgical History:   Procedure Laterality Date    TESTICLE SURGERY      VASECTOMY         Social History:   reports that he has never smoked. He has never used smokeless tobacco. He reports current alcohol use. He reports that he does not use drugs.    Family History:  Family History   Problem Relation Age of Onset    Skin Cancer Mother          from pneumonia    Abdominal Aortic Aneurysm Mother     Heart Failure Father     Diabetes Type 1 Brother         Possibly secondary to chemical exposure in [facility]    Diabetes Type 2  Maternal Grandmother     Cancer Brother         Exposure to Agent Orange in [facility]    Heart Disease Brother        Medications:  Current Outpatient Medications   Medication Sig Dispense Refill    aspirin 81 MG tablet Take by mouth daily      clotrimazole (LOTRIMIN) 1 % external cream Apply topically 2 times daily (Patient not taking: Reported on 10/16/2023) 85 g 11    diclofenac (VOLTAREN) 1 % topical gel Apply topically 4 times daily      doxycycline hyclate (VIBRA-TABS) 100 MG tablet Take 1 tablet (100 mg) by mouth 2 times daily (Patient not taking: Reported on 10/16/2023) 14 tablet 0    econazole nitrate 1 % external cream Apply topically daily To the left foot until rashes resolve (Patient not taking: Reported on 10/16/2023) 85 g 1    fluocinonide (LIDEX) 0.05 % external cream Apply topically 2 times daily (Patient not taking: Reported on 10/16/2023) 120 g 6    gabapentin (NEURONTIN) 100 MG capsule Take 100 mg by mouth Take 1 time daily at HS       multivitamin w/minerals (THERA-VIT-M) tablet Take 1 tablet by mouth daily      Omega-3 Fatty Acids (OMEGA-3 FISH OIL PO)       pantoprazole (PROTONIX) 40 MG EC tablet TAKE 1 TABLET BY MOUTH ONCE DAILY 30 MIN PRIOR TO BREAKFAST 90 tablet 1    rosuvastatin (CRESTOR) 20 MG tablet TAKE 1 TABLET BY MOUTH EVERY DAY 90 tablet 1    tamsulosin (FLOMAX) 0.4 MG capsule Take 1 capsule (0.4 mg) by mouth daily 90 capsule 3    triamterene-HCTZ (MAXZIDE-25) 37.5-25 MG tablet Take 0.5 tablets by mouth every morning 90 tablet 3     Allergies   Allergen Reactions    Johanna-Jana Plus Allergy [Diphenhydramine]      Patient has lip swollen ,has used tylenol in th past. No issues. Has used benadryl in the past no issues.  Never used phenylephrine.

## 2023-10-27 DIAGNOSIS — E78.5 HYPERLIPIDEMIA LDL GOAL <100: ICD-10-CM

## 2023-10-27 RX ORDER — ROSUVASTATIN CALCIUM 20 MG/1
TABLET, COATED ORAL
Qty: 90 TABLET | Refills: 1 | Status: SHIPPED | OUTPATIENT
Start: 2023-10-27 | End: 2024-04-22

## 2023-12-29 ENCOUNTER — NURSE TRIAGE (OUTPATIENT)
Dept: FAMILY MEDICINE | Facility: CLINIC | Age: 73
End: 2023-12-29

## 2023-12-29 ENCOUNTER — E-VISIT (OUTPATIENT)
Dept: FAMILY MEDICINE | Facility: CLINIC | Age: 73
End: 2023-12-29
Payer: COMMERCIAL

## 2023-12-29 DIAGNOSIS — H60.501 ACUTE OTITIS EXTERNA OF RIGHT EAR, UNSPECIFIED TYPE: Primary | ICD-10-CM

## 2023-12-29 PROCEDURE — 99422 OL DIG E/M SVC 11-20 MIN: CPT | Performed by: INTERNAL MEDICINE

## 2023-12-29 RX ORDER — OFLOXACIN 3 MG/ML
5 SOLUTION AURICULAR (OTIC) 4 TIMES DAILY
Qty: 5 ML | Refills: 0 | Status: SHIPPED | OUTPATIENT
Start: 2023-12-29 | End: 2024-03-18

## 2023-12-29 NOTE — TELEPHONE ENCOUNTER
"No appts available in-clinic today.   Pt agreeable to complete an E-Visit for ear pain symptoms.   Assisted pt over the phone with this process.     Reason for Disposition   All other earaches  (Exceptions: Earache lasting < 1 hour, and earache from air travel.)    Additional Information   Negative: Sounds like a life-threatening emergency to the triager   Negative: Moving the earlobe or touching the ear clearly increases the pain   Negative: Foreign body stuck in the ear (e.g., bug, piece of cotton)   Negative: Pink or red swelling behind the ear   Negative: Stiff neck (can't touch chin to chest)   Negative: Patient sounds very sick or weak to the triager   Negative: Severe earache pain   Negative: Fever > 103 F (39.4 C)   Negative: Pointed object was inserted into the ear canal (e.g., a pencil, stick, or wire)   Negative: White, yellow, or green discharge   Negative: Diabetes mellitus or a weak immune system (e.g., HIV positive, cancer chemotherapy, transplant patient)   Negative: Bloody discharge or unexplained bleeding from ear canal   Negative: New blurred vision or vision changes    Answer Assessment - Initial Assessment Questions  1. LOCATION: \"Which ear is involved?\"      Right ear   2. ONSET: \"When did the ear start hurting\"       Yesterday   3. SEVERITY: \"How bad is the pain?\"  (Scale 1-10; mild, moderate or severe)    - MILD (1-3): doesn't interfere with normal activities     - MODERATE (4-7): interferes with normal activities or awakens from sleep     - SEVERE (8-10): excruciating pain, unable to do any normal activities       4/10  4. URI SYMPTOMS: \"Do you have a runny nose or cough?\"      No  5. FEVER: \"Do you have a fever?\" If Yes, ask: \"What is your temperature, how was it measured, and when did it start?\"      No  6. CAUSE: \"Have you been swimming recently?\", \"How often do you use Q-TIPS?\", \"Have you had any recent air travel or scuba diving?\"      Pool on Wednesday  7. OTHER SYMPTOMS: \"Do you have " "any other symptoms?\" (e.g., headache, stiff neck, dizziness, vomiting, runny nose, decreased hearing)      Stiff neck, headache, throat scratchiness (no pain)  8. PREGNANCY: \"Is there any chance you are pregnant?\" \"When was your last menstrual period?\"      N/A    Protocols used: Earache-A-OH  Thank you,  Valencia Vanegas RN    "

## 2023-12-29 NOTE — PATIENT INSTRUCTIONS
Thank you for choosing us for your care. I have placed an order for a prescription so that you can start treatment. View your full visit summary for details by clicking on the link below. Your pharmacist will able to address any questions you may have about the medication.     If you're not feeling better within 5-7 days, please schedule an appointment.  You can schedule an appointment right here in [facility], or call [phone]  If the visit is for the same symptoms as your eVisit, we'll refund the cost of your eVisit if seen within seven days.

## 2023-12-29 NOTE — TELEPHONE ENCOUNTER
Provider E-Visit time total (minutes): 11 minutes      Sent in Where's Up message as below       Jun Perez,     I am Covering physician for your PCP is out of office    I am happy to help your concerns, given your history reviewed with ongoing right ear pain along with some headache and throat scratchiness which you mention possibly you are developing a viral upper respiratory infection soon.      But given the swimming history it is okay to try antibiotic eardrops for at least the next 5 days for possible otitis externa / Swimmers ear causing this.  If no improvement please make an in person visit for checking your ears and doing further recommendations.    Please let me know if you have any questions.    Thank you  Idalmis Wilcox MD on 12/29/2023 at 9:28 AM

## 2024-02-08 ENCOUNTER — OFFICE VISIT (OUTPATIENT)
Dept: DERMATOLOGY | Facility: CLINIC | Age: 74
End: 2024-02-08
Payer: COMMERCIAL

## 2024-02-08 DIAGNOSIS — L81.4 LENTIGO: ICD-10-CM

## 2024-02-08 DIAGNOSIS — L82.0 INFLAMED SEBORRHEIC KERATOSIS: ICD-10-CM

## 2024-02-08 DIAGNOSIS — D18.01 ANGIOMA OF SKIN: ICD-10-CM

## 2024-02-08 DIAGNOSIS — L82.1 SEBORRHEIC KERATOSES: ICD-10-CM

## 2024-02-08 DIAGNOSIS — D23.9 DERMAL NEVUS: Primary | ICD-10-CM

## 2024-02-08 DIAGNOSIS — Z85.828 HISTORY OF SKIN CANCER: ICD-10-CM

## 2024-02-08 DIAGNOSIS — L30.0 NUMMULAR DERMATITIS: ICD-10-CM

## 2024-02-08 PROCEDURE — 17110 DESTRUCTION B9 LES UP TO 14: CPT | Performed by: DERMATOLOGY

## 2024-02-08 PROCEDURE — 99213 OFFICE O/P EST LOW 20 MIN: CPT | Mod: 25 | Performed by: DERMATOLOGY

## 2024-02-08 RX ORDER — FLUOCINONIDE 0.5 MG/G
CREAM TOPICAL 2 TIMES DAILY
Qty: 120 G | Refills: 6 | Status: SHIPPED | OUTPATIENT
Start: 2024-02-08

## 2024-02-08 NOTE — LETTER
2024         RE: Pranay Ratliff   [address]  [facility] [address]        Dear Colleague,    Thank you for referring your patient, Pranay Ratliff, to the [facility]. Please see a copy of my visit note below.    Pranay Ratliff is an extremely pleasant 74 year old year old male patient here today for rash on foot and hx of non-melanoma skin cancer.  Patient has no other skin complaints today.  Remainder of the HPI, Meds, PMH, Allergies, FH, and SH was reviewed in chart.      Past Medical History:   Diagnosis Date     Basal cell carcinoma      Concussion with loss of consciousness of 30 minutes or less, subsequent encounter      Epididymitis, bilateral      Epididymitis, left      Epididymitis, right      Obstructive sleep apnea syndrome      Squamous cell carcinoma of skin, unspecified      Vasovagal syncope        Past Surgical History:   Procedure Laterality Date     TESTICLE SURGERY       VASECTOMY          Family History   Problem Relation Age of Onset     Skin Cancer Mother          from pneumonia     Abdominal Aortic Aneurysm Mother      Heart Failure Father      Diabetes Type 1 Brother         Possibly secondary to chemical exposure in [place]     Diabetes Type 2  Maternal Grandmother      Cancer Brother         Exposure to Agent Orange in [place]     Heart Disease Brother        Social History     Socioeconomic History     Marital status:      Spouse name: Not on file     Number of children: Not on file     Years of education: Not on file     Highest education level: Not on file   Occupational History     Not on file   Tobacco Use     Smoking status: Never     Smokeless tobacco: Never   Vaping Use     Vaping Use: Never used   Substance and Sexual Activity     Alcohol use: Yes     Comment: twice a week     Drug use: No     Sexual activity: Not Currently   Other Topics Concern     Parent/sibling w/ CABG, MI or angioplasty before 65F 55M? Not Asked   Social  History Narrative     Not on file     Social Determinants of Health     Financial Resource Strain: Not on file   Food Insecurity: Not on file   Transportation Needs: Not on file   Physical Activity: Not on file   Stress: Not on file   Social Connections: Not on file   Interpersonal Safety: Not on file   Housing Stability: Not on file       Outpatient Encounter Medications as of 2/8/2024   Medication Sig Dispense Refill     aspirin 81 MG tablet Take by mouth daily       diclofenac (VOLTAREN) 1 % topical gel Apply topically 4 times daily       gabapentin (NEURONTIN) 100 MG capsule Take 100 mg by mouth Take 1 time daily at HS       multivitamin w/minerals (THERA-VIT-M) tablet Take 1 tablet by mouth daily       ofloxacin (FLOXIN) 0.3 % otic solution Place 5 drops into the right ear 4 times daily For 5 days 5 mL 0     Omega-3 Fatty Acids (OMEGA-3 FISH OIL PO)        pantoprazole (PROTONIX) 40 MG EC tablet TAKE 1 TABLET BY MOUTH ONCE DAILY 30 MIN PRIOR TO BREAKFAST 90 tablet 1     rosuvastatin (CRESTOR) 20 MG tablet TAKE 1 TABLET BY MOUTH EVERY DAY 90 tablet 1     tamsulosin (FLOMAX) 0.4 MG capsule Take 1 capsule (0.4 mg) by mouth daily 90 capsule 3     triamterene-HCTZ (MAXZIDE-25) 37.5-25 MG tablet Take 0.5 tablets by mouth every morning 90 tablet 3     clotrimazole (LOTRIMIN) 1 % external cream Apply topically 2 times daily (Patient not taking: Reported on 10/16/2023) 85 g 11     doxycycline hyclate (VIBRA-TABS) 100 MG tablet Take 1 tablet (100 mg) by mouth 2 times daily (Patient not taking: Reported on 10/16/2023) 14 tablet 0     econazole nitrate 1 % external cream Apply topically daily To the left foot until rashes resolve (Patient not taking: Reported on 10/16/2023) 85 g 1     fluocinonide (LIDEX) 0.05 % external cream Apply topically 2 times daily (Patient not taking: Reported on 10/16/2023) 120 g 6     No facility-administered encounter medications on file as of 2/8/2024.             O:   NAD, WDWN, Alert &  Oriented, Mood & Affect wnl, Vitals stable   General appearance normal   Vitals stable   Alert, oriented and in no acute distress     L cheek inflamed seborrheic keratosis   Nummular derm on feet and legs    Stuck on papules and brown macules on trunk and ext   Red papules on trunk  Flesh colored papules on trunk     The remainder of the full exam was normal; the following areas were examined:  conjunctiva/lids, , neck, peripheral vascular system, abdomen, lymph nodes, digits/nails, eccrine and apocrine glands, scalp/hair, face, neck, chest, abdomen, buttocks, back, RUE, LUE, RLE, LLE       Eyes: Conjunctivae/lids:Normal     ENT: Lips, buccal mucosa, tongue: normal    MSK:Normal    Cardiovascular: peripheral edema none    Pulm: Breathing Normal    Lymph Nodes: No Head and Neck Lymphadenopathy     Neuro/Psych: Orientation:Alert and Orientedx3 ; Mood/Affect:normal       A/P:  1. Seborrheic keratosis, lentigo, angioma, dermal nevus, hx of non-melanoma skin cancer   2. Nummular derm  Lidex twice daily   3. L cheek inflamed seborrheic keratosis   LN2:  Treated with LN2 for 5s for 1-2 cycles. Warned risks of blistering, pain, pigment change, scarring, and incomplete resolution.  Advised patient to return if lesions do not completely resolve.  Wound care sheet given.    It was a pleasure speaking to Pranay Ratliff today.  Previous clinic notes and pertinent laboratory tests were reviewed prior to Pranay Ratliff's visit.  Nature and genetics of benign skin lesions dicussed with patient.  Signs and Symptoms of skin cancer discussed with patient.  Patient encouraged to perform monthly skin exams.  UV precautions reviewed with patient.  Skin care regimen reviewed with patient: Eliminate harsh soaps, i.e. Dial, zest, irsih spring; Mild soaps such as Cetaphil or Dove sensitive skin, avoid hot or cold showers, aggressive use of emollients including vanicream, cetaphil or cerave discussed with patient.    Risks of non-melanoma  skin cancer discussed with patient   Return to clinic 12 months      Again, thank you for allowing me to participate in the care of your patient.        Sincerely,        Tato Gallagher MD

## 2024-02-08 NOTE — PROGRESS NOTES
Pranay Ratliff is an extremely pleasant 74 year old year old male patient here today for rash on foot and hx of non-melanoma skin cancer.  Patient has no other skin complaints today.  Remainder of the HPI, Meds, PMH, Allergies, FH, and SH was reviewed in chart.      Past Medical History:   Diagnosis Date    Basal cell carcinoma     Concussion with loss of consciousness of 30 minutes or less, subsequent encounter     Epididymitis, bilateral     Epididymitis, left     Epididymitis, right     Obstructive sleep apnea syndrome     Squamous cell carcinoma of skin, unspecified     Vasovagal syncope        Past Surgical History:   Procedure Laterality Date    TESTICLE SURGERY      VASECTOMY          Family History   Problem Relation Age of Onset    Skin Cancer Mother          from pneumonia    Abdominal Aortic Aneurysm Mother     Heart Failure Father     Diabetes Type 1 Brother         Possibly secondary to chemical exposure in [place]    Diabetes Type 2  Maternal Grandmother     Cancer Brother         Exposure to Agent Orange in [place]    Heart Disease Brother        Social History     Socioeconomic History    Marital status:      Spouse name: Not on file    Number of children: Not on file    Years of education: Not on file    Highest education level: Not on file   Occupational History    Not on file   Tobacco Use    Smoking status: Never    Smokeless tobacco: Never   Vaping Use    Vaping Use: Never used   Substance and Sexual Activity    Alcohol use: Yes     Comment: twice a week    Drug use: No    Sexual activity: Not Currently   Other Topics Concern    Parent/sibling w/ CABG, MI or angioplasty before 65F 55M? Not Asked   Social History Narrative    Not on file     Social Determinants of Health     Financial Resource Strain: Not on file   Food Insecurity: Not on file   Transportation Needs: Not on file   Physical Activity: Not on file   Stress: Not on file   Social Connections: Not on file   Interpersonal  Safety: Not on file   Housing Stability: Not on file       Outpatient Encounter Medications as of 2/8/2024   Medication Sig Dispense Refill    aspirin 81 MG tablet Take by mouth daily      diclofenac (VOLTAREN) 1 % topical gel Apply topically 4 times daily      gabapentin (NEURONTIN) 100 MG capsule Take 100 mg by mouth Take 1 time daily at HS      multivitamin w/minerals (THERA-VIT-M) tablet Take 1 tablet by mouth daily      ofloxacin (FLOXIN) 0.3 % otic solution Place 5 drops into the right ear 4 times daily For 5 days 5 mL 0    Omega-3 Fatty Acids (OMEGA-3 FISH OIL PO)       pantoprazole (PROTONIX) 40 MG EC tablet TAKE 1 TABLET BY MOUTH ONCE DAILY 30 MIN PRIOR TO BREAKFAST 90 tablet 1    rosuvastatin (CRESTOR) 20 MG tablet TAKE 1 TABLET BY MOUTH EVERY DAY 90 tablet 1    tamsulosin (FLOMAX) 0.4 MG capsule Take 1 capsule (0.4 mg) by mouth daily 90 capsule 3    triamterene-HCTZ (MAXZIDE-25) 37.5-25 MG tablet Take 0.5 tablets by mouth every morning 90 tablet 3    clotrimazole (LOTRIMIN) 1 % external cream Apply topically 2 times daily (Patient not taking: Reported on 10/16/2023) 85 g 11    doxycycline hyclate (VIBRA-TABS) 100 MG tablet Take 1 tablet (100 mg) by mouth 2 times daily (Patient not taking: Reported on 10/16/2023) 14 tablet 0    econazole nitrate 1 % external cream Apply topically daily To the left foot until rashes resolve (Patient not taking: Reported on 10/16/2023) 85 g 1    fluocinonide (LIDEX) 0.05 % external cream Apply topically 2 times daily (Patient not taking: Reported on 10/16/2023) 120 g 6     No facility-administered encounter medications on file as of 2/8/2024.             O:   NAD, WDWN, Alert & Oriented, Mood & Affect wnl, Vitals stable   General appearance normal   Vitals stable   Alert, oriented and in no acute distress     L cheek inflamed seborrheic keratosis   Nummular derm on feet and legs    Stuck on papules and brown macules on trunk and ext   Red papules on trunk  Flesh colored  papules on trunk     The remainder of the full exam was normal; the following areas were examined:  conjunctiva/lids, , neck, peripheral vascular system, abdomen, lymph nodes, digits/nails, eccrine and apocrine glands, scalp/hair, face, neck, chest, abdomen, buttocks, back, RUE, LUE, RLE, LLE       Eyes: Conjunctivae/lids:Normal     ENT: Lips, buccal mucosa, tongue: normal    MSK:Normal    Cardiovascular: peripheral edema none    Pulm: Breathing Normal    Lymph Nodes: No Head and Neck Lymphadenopathy     Neuro/Psych: Orientation:Alert and Orientedx3 ; Mood/Affect:normal       A/P:  1. Seborrheic keratosis, lentigo, angioma, dermal nevus, hx of non-melanoma skin cancer   2. Nummular derm  Lidex twice daily   3. L cheek inflamed seborrheic keratosis   LN2:  Treated with LN2 for 5s for 1-2 cycles. Warned risks of blistering, pain, pigment change, scarring, and incomplete resolution.  Advised patient to return if lesions do not completely resolve.  Wound care sheet given.    It was a pleasure speaking to Pranay Ratliff today.  Previous clinic notes and pertinent laboratory tests were reviewed prior to Pranay Ratliff's visit.  Nature and genetics of benign skin lesions dicussed with patient.  Signs and Symptoms of skin cancer discussed with patient.  Patient encouraged to perform monthly skin exams.  UV precautions reviewed with patient.  Skin care regimen reviewed with patient: Eliminate harsh soaps, i.e. Dial, zest, irsih spring; Mild soaps such as Cetaphil or Dove sensitive skin, avoid hot or cold showers, aggressive use of emollients including vanicream, cetaphil or cerave discussed with patient.    Risks of non-melanoma skin cancer discussed with patient   Return to clinic 12 months

## 2024-02-09 ENCOUNTER — MYC MEDICAL ADVICE (OUTPATIENT)
Dept: FAMILY MEDICINE | Facility: CLINIC | Age: 74
End: 2024-02-09
Payer: COMMERCIAL

## 2024-02-09 DIAGNOSIS — I10 BENIGN ESSENTIAL HYPERTENSION: ICD-10-CM

## 2024-02-09 RX ORDER — TRIAMTERENE/HYDROCHLOROTHIAZID 37.5-25 MG
0.5 TABLET ORAL EVERY MORNING
Qty: 45 TABLET | Refills: 0 | Status: SHIPPED | OUTPATIENT
Start: 2024-02-09 | End: 2024-04-22

## 2024-02-09 NOTE — TELEPHONE ENCOUNTER
Prescription approved per [facility] Refill Protocol.  Jany Medina, RN  [facility] Triage Nurse

## 2024-03-18 ENCOUNTER — OFFICE VISIT (OUTPATIENT)
Dept: FAMILY MEDICINE | Facility: CLINIC | Age: 74
End: 2024-03-18
Payer: COMMERCIAL

## 2024-03-18 VITALS
HEART RATE: 72 BPM | RESPIRATION RATE: 13 BRPM | SYSTOLIC BLOOD PRESSURE: 120 MMHG | DIASTOLIC BLOOD PRESSURE: 78 MMHG | OXYGEN SATURATION: 99 % | TEMPERATURE: 98.3 F | BODY MASS INDEX: 31.21 KG/M2 | HEIGHT: 70 IN | WEIGHT: 218 LBS

## 2024-03-18 DIAGNOSIS — E78.5 HYPERLIPIDEMIA LDL GOAL <100: ICD-10-CM

## 2024-03-18 DIAGNOSIS — K21.00 GASTROESOPHAGEAL REFLUX DISEASE WITH ESOPHAGITIS WITHOUT HEMORRHAGE: ICD-10-CM

## 2024-03-18 DIAGNOSIS — Z12.11 SCREEN FOR COLON CANCER: Primary | ICD-10-CM

## 2024-03-18 PROCEDURE — 99214 OFFICE O/P EST MOD 30 MIN: CPT | Performed by: FAMILY MEDICINE

## 2024-03-18 ASSESSMENT — PAIN SCALES - GENERAL: PAINLEVEL: NO PAIN (0)

## 2024-03-18 NOTE — PROGRESS NOTES
"  Assessment & Plan     Screen for colon cancer    - Colonoscopy Screening  Referral; Future    Hyperlipidemia LDL goal <100  Will check on with he returns for CPE    Gastroesophageal reflux disease with esophagitis without hemorrhage  Has been worsening with heartburns for last 1-2 months,  encouraged him to check on EGD with GI  Encouraged him to try cyclic therapy among TUMS, H2b and PPI  - Adult GI  Referral - Procedure Only; Future        BMI  Estimated body mass index is 31.56 kg/m  as calculated from the following:    Height as of this encounter: 1.77 m (5' 9.69\").    Weight as of this encounter: 98.9 kg (218 lb).   Weight management plan: Discussed healthy diet and exercise guidelines      FUTURE APPOINTMENTS:       - Follow-up visit in 1 month for CPE    Mary Ann Perez is a 74 year old, presenting for the following health issues:  Gastrophageal Reflux (Pt is having pain in upper chest area and back. Wants to know if related. ) and Referral (Referral for colonsocopy )        3/18/2024     3:48 PM   Additional Questions   Roomed by Zoë AHN     History of Present Illness       Reason for visit:  Gastro esophogeal reflux disease    He eats 2-3 servings of fruits and vegetables daily.He consumes 1 sweetened beverage(s) daily.He exercises with enough effort to increase his heart rate 60 or more minutes per day.  He exercises with enough effort to increase his heart rate 7 days per week.   He is taking medications regularly.          Review of Systems  Constitutional, HEENT, cardiovascular, pulmonary, GI, , musculoskeletal, neuro, skin, endocrine and psych systems are negative, except as otherwise noted.      Objective    /78 (BP Location: Right arm, Patient Position: Sitting, Cuff Size: Adult Large)   Pulse 72   Temp 98.3  F (36.8  C) (Tympanic)   Resp 13   Ht 1.77 m (5' 9.69\")   Wt 98.9 kg (218 lb)   SpO2 99%   BMI 31.56 kg/m    Body mass index is 31.56 kg/m .  Physical Exam "   GENERAL: alert and no distress  EYES: Eyes grossly normal to inspection, PERRL and conjunctivae and sclerae normal  HENT: ear canals and TM's normal, nose and mouth without ulcers or lesions  NECK: no adenopathy, no asymmetry, masses, or scars  RESP: lungs clear to auscultation - no rales, rhonchi or wheezes  CV: regular rate and rhythm, normal S1 S2, no S3 or S4, no murmur, click or rub, no peripheral edema  ABDOMEN: soft, nontender, no hepatosplenomegaly, no masses and bowel sounds normal  MS: no gross musculoskeletal defects noted, no edema  SKIN: no suspicious lesions or rashes  NEURO: Normal strength and tone, mentation intact and speech normal            Signed Electronically by: Hipolito Alva MD

## 2024-03-22 ENCOUNTER — TELEPHONE (OUTPATIENT)
Dept: GASTROENTEROLOGY | Facility: CLINIC | Age: 74
End: 2024-03-22
Payer: COMMERCIAL

## 2024-03-22 NOTE — TELEPHONE ENCOUNTER
"Endoscopy Scheduling Screen    Have you had a positive Covid test in the last 14 days?  No    What is your communication preference for Instructions and/or Bowel Prep?   MyChart    What insurance is in the chart?  Other:  [facility]    Ordering/Referring Provider: NAIN RUGGIERO   (If ordering provider performs procedure, schedule with ordering provider unless otherwise instructed. )    BMI: Estimated body mass index is 31.56 kg/m  as calculated from the following:    Height as of 3/18/24: 1.77 m (5' 9.69\").    Weight as of 3/18/24: 98.9 kg (218 lb).     Sedation Ordered  moderate sedation.   If patient BMI > 50 do not schedule in [facility].    If patient BMI > 45 do not schedule at [facility].    Are you taking methadone or Suboxone?  No    Have you had difficulties, pain, or discomfort during past endoscopy procedures?  No    Are you taking any prescription medications for pain 3 or more times per week?   NO, No RN review required.    Do you have a history of malignant hyperthermia?  No    (Females) Are you currently pregnant?   No     Have you been diagnosed or told you have pulmonary hypertension?   No    Do you have an LVAD?  No    Have you been told you have moderate to severe sleep apnea?  Yes (RN Review required for scheduling unless scheduling in Hospital.)    Have you been told you have COPD, asthma, or any other lung disease?  No    Do you have any heart conditions?  No     Have you ever had or are you waiting for an organ transplant?  No. Continue scheduling, no site restrictions.    Have you had a stroke or transient ischemic attack (TIA aka \"mini stroke\" in the last 6 months?   No    Have you been diagnosed with or been told you have cirrhosis of the liver?   No    Are you currently on dialysis?   No    Do you need assistance transferring?   No    BMI: Estimated body mass index is 31.56 kg/m  as calculated from the following:    Height as of 3/18/24: 1.77 m (5' 9.69\").    Weight as of 3/18/24: 98.9 kg (218 lb).     Is " patients BMI > 40 and scheduling location [facility]?  No    Do you take an injectable medication for weight loss or diabetes (excluding insulin)?  No    Do you take the medication Naltrexone?  No    Do you take blood thinners?  No       Prep   Are you currently on dialysis or do you have chronic kidney disease?  No    Do you have a diagnosis of diabetes?  No    Do you have a diagnosis of cystic fibrosis (CF)?  No    On a regular basis do you go 3 -5 days between bowel movements?  No    BMI > 40?  No    Preferred Pharmacy:    [facility]/pharmacy #[id number] - [place] - [address] AT [place] [address]  [address]  [place] [address]  Phone: [phone] Fax: [phone]      Final Scheduling Details     Procedure scheduled  Colonoscopy / Upper endoscopy (EGD)    Surgeon:  KENNY     Date of procedure:  6/27/24     Pre-OP / PAC:   No - Not required for this site.    Location  SH - Per order.    Sedation   Moderate Sedation - Per order.      Patient Reminders:   You will receive a call from a Nurse to review instructions and health history.  This assessment must be completed prior to your procedure.  Failure to complete the Nurse assessment may result in the procedure being cancelled.      On the day of your procedure, please designate an adult(s) who can drive you home stay with you for the next 24 hours. The medicines used in the exam will make you sleepy. You will not be able to drive.      You cannot take public transportation, ride share services, or non-medical taxi service without a responsible caregiver.  Medical transport services are allowed with the requirement that a responsible caregiver will receive you at your destination.  We require that drivers and caregivers are confirmed prior to your procedure.

## 2024-03-30 ENCOUNTER — MYC MEDICAL ADVICE (OUTPATIENT)
Dept: FAMILY MEDICINE | Facility: CLINIC | Age: 74
End: 2024-03-30
Payer: COMMERCIAL

## 2024-03-30 DIAGNOSIS — R04.0 BLEEDING NOSE: Primary | ICD-10-CM

## 2024-04-01 NOTE — TELEPHONE ENCOUNTER
It is rare but it could make lowering platelet counts. I will have him to check on PLT count, please have him to schedule lab visit.   If it showed normal PLT, he can resume Famotidine        thx

## 2024-04-01 NOTE — TELEPHONE ENCOUNTER
"Called patient to get more information. He stated that his breathing was more \"noticeable\" after taking the medication but is fine now. Patient stopped taking the medication 3/30/24.     Patient denies having any dizziness or headache after having the nosebleed.     Should patient restart medication or does provider recommend a different medication?    Shereen VILLALTA RN  [facility] Triage Team    "

## 2024-04-03 ENCOUNTER — LAB (OUTPATIENT)
Dept: LAB | Facility: CLINIC | Age: 74
End: 2024-04-03
Payer: COMMERCIAL

## 2024-04-03 DIAGNOSIS — E78.5 HYPERLIPIDEMIA LDL GOAL <100: Primary | ICD-10-CM

## 2024-04-03 DIAGNOSIS — R04.0 BLEEDING NOSE: ICD-10-CM

## 2024-04-03 LAB — PLATELET # BLD AUTO: 216 10E3/UL (ref 150–450)

## 2024-04-03 PROCEDURE — 80061 LIPID PANEL: CPT

## 2024-04-03 PROCEDURE — 36415 COLL VENOUS BLD VENIPUNCTURE: CPT

## 2024-04-03 PROCEDURE — 85049 AUTOMATED PLATELET COUNT: CPT

## 2024-04-04 LAB
CHOLEST SERPL-MCNC: 129 MG/DL
FASTING STATUS PATIENT QL REPORTED: NO
HDLC SERPL-MCNC: 42 MG/DL
LDLC SERPL CALC-MCNC: 44 MG/DL
NONHDLC SERPL-MCNC: 87 MG/DL
TRIGL SERPL-MCNC: 217 MG/DL

## 2024-04-11 ENCOUNTER — OFFICE VISIT (OUTPATIENT)
Dept: FAMILY MEDICINE | Facility: CLINIC | Age: 74
End: 2024-04-11
Payer: COMMERCIAL

## 2024-04-11 DIAGNOSIS — B07.0 PLANTAR WART: Primary | ICD-10-CM

## 2024-04-11 PROCEDURE — 17110 DESTRUCTION B9 LES UP TO 14: CPT | Performed by: PHYSICIAN ASSISTANT

## 2024-04-11 ASSESSMENT — PAIN SCALES - GENERAL: PAINLEVEL: NO PAIN (0)

## 2024-04-11 NOTE — LETTER
4/11/2024         RE: Pranay Ratliff  [address]  [place] [address]        Dear Colleague,    Thank you for referring your patient, Pranay Ratliff, to [facility]. Please see a copy of my visit note below.    [facility] Dermatology Note  Encounter Date: Apr 11, 2024  Office Visit     Reviewed patients past medical history and pertinent chart review prior to patients visit today.     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 Asteatotic dermatitis  - Current tx: fluocinonide 0.05% cream  3. Verruca plantaris, right foot s/p cryotherapy  - Current tx: WartPeel   4. Tinea pedis   5. AKs, forehead, temples, and nose  - Current tx: Efudex 5% cream (initiated 11/3/2022)  6. Rash, most consistent with tinea pedis, left medial foot s/p KOH prep 12/6/2022    ____________________________________________    Assessment & Plan:     # Verruca vulgaris, right plantar foot x1.     Counseled on the viral etiology of this condition. Counseled that these are often recalcitrant to treatment. Discussed all treatment options that we offer as well as side effects of each. Advised that highest rates of success can be observed with combination monthly in office treatment and home treatments.    - Cryotherapy: Patient elects to treat warts today with cryotherapy. After verbal consent and discussion of risks and benefits including but no limited to dyspigmentation/scar, blister, and pain. A total of 1 warts were treated with 1-2mm freeze border for 3 cycles with liquid nitrogen. Post cryotherapy instructions were provided.     - Start salicylic acid 40% (such as wart stick) nightly followed by duct tape or bandage.  Pare down after removal of occlusive cover for deeper penetration of medication.      Follow-up: 1 month(s) in-person, or earlier for new or changing lesions.      All risks, benefits and alternatives were discussed with patient.  Patient is in agreement and understands the assessment and plan.  All questions were answered.  Amparo Bowman PA-C  [facility] Dermatology  _______________________________________    CC: RECHECK (Wart tx)    HPI:  Mr. Pranay Ratliff is a(n) 74 year old male who presents today as a return patient for a wart on the right foot.  The wart is longstanding.  The patient is not currently using any treatments at home.  Patient is otherwise feeling well, without additional skin concerns.      Physical Exam:  SKIN: Focused examination of bilateral feet was performed.  - Verrucal papule(s) with thrombosed capillaries involving the right plantar foot.     - No other lesions of concern on areas examined.     Medications:  Current Outpatient Medications   Medication Sig Dispense Refill     aspirin 81 MG tablet Take by mouth daily       diclofenac (VOLTAREN) 1 % topical gel Apply topically 4 times daily       fluocinonide (LIDEX) 0.05 % external cream Apply topically 2 times daily 120 g 6     gabapentin (NEURONTIN) 100 MG capsule Take 100 mg by mouth Take 1 time daily at HS       multivitamin w/minerals (THERA-VIT-M) tablet Take 1 tablet by mouth daily       Omega-3 Fatty Acids (OMEGA-3 FISH OIL PO)        pantoprazole (PROTONIX) 40 MG EC tablet TAKE 1 TABLET BY MOUTH ONCE DAILY 30 MIN PRIOR TO BREAKFAST 90 tablet 1     rosuvastatin (CRESTOR) 20 MG tablet TAKE 1 TABLET BY MOUTH EVERY DAY 90 tablet 1     triamterene-HCTZ (MAXZIDE-25) 37.5-25 MG tablet Take 0.5 tablets by mouth every morning 45 tablet 0     No current facility-administered medications for this visit.      Past Medical History:   Patient Active Problem List   Diagnosis     History of basal cell carcinoma     Benign essential hypertension     Hyperlipidemia LDL goal <100     Obstructive sleep apnea syndrome      Gastroesophageal reflux disease with esophagitis     Localized swelling of lower extremity     Vasovagal syncope     Elevated prostate specific antigen (PSA)     Concussion with loss of consciousness of 30 minutes or less, subsequent encounter     BPH with obstruction/lower urinary tract symptoms     Myofascial pain syndrome, cervical     Occipital neuralgia of right side     Fatigue due to old head injury     Post concussion syndrome     Cervical segment dysfunction     Thoracic segment dysfunction     Cephalgia     Somatic dysfunction of sacral region     Cervical pain     Dizziness     Marital conflict     Chronic left-sided low back pain without sciatica     Past Medical History:   Diagnosis Date     Basal cell carcinoma      Concussion with loss of consciousness of 30 minutes or less, subsequent encounter      Epididymitis, bilateral      Epididymitis, left      Epididymitis, right      Obstructive sleep apnea syndrome      Squamous cell carcinoma of skin, unspecified      Vasovagal syncope        CC Referred Self, MD  No address on file on close of this encounter.       Again, thank you for allowing me to participate in the care of your patient.        Sincerely,        Amparo Bowman PA-C

## 2024-04-11 NOTE — PATIENT INSTRUCTIONS
Patient Education       Proper skin care from [facility]:    -Eliminate harsh soaps as they strip the natural oils from the skin, often resulting in dry itchy skin ( i.e. Dial, Zest, Pashto Spring)  -Use mild soaps such as Cetaphil or Dove Sensitive Skin in the shower. You do not need to use soap on arms, legs, and trunk every time you shower unless visibly soiled.   -Avoid hot or cold showers.  -After showering, lightly dry off and apply moisturizing within 2-3 minutes. This will help trap moisture in the skin.   -Aggressive use of a moisturizer at least 1-2 times a day to the entire body (including -Vanicream, Cetaphil, Aquaphor or Cerave) and moisturize hands after every washing.  -We recommend using moisturizers that come in a tub that needs to be scooped out, not a pump. This has more of an oil base. It will hold moisture in your skin much better than a water base moisturizer. The above recommended are non-pore clogging.      Wear a sunscreen with at least SPF 30 on your face, ears, neck and V of the chest daily. Wear sunscreen on other areas of the body if those areas are exposed to the sun throughout the day. Sunscreens can contain physical and/or chemical blockers. Physical blockers are less likely to clog pores, these include zinc oxide and titanium dioxide. Reapply every two hour and after swimming.     Sunscreen examples: https://www.ewg.org/sunscreen/    UV radiation  UVA radiation remains constant throughout the day and throughout the year. It is a longer wavelength than UVB and therefore penetrates deeper into the skin leading to immediate and delayed tanning, photoaging, and skin cancer. 70-80% of UVA and UVB radiation occurs between the hours of 10am-2pm.  UVB radiation  UVB radiation causes the most harmful effects and is more significant during the summer months. However, snow and ice can reflect UVB radiation leading to skin damage during the winter months as well. UVB radiation is  responsible for tanning, burning, inflammation, delayed erythema (pinkness), pigmentation (brown spots), and skin cancer.     I recommend self monthly full body exams and yearly full body exams with a dermatology provider. If you develop a new or changing lesion please follow up for examination. Most skin cancers are pink and scaly or pink and pearly. However, we do see blue/brown/black skin cancers.  Consider the ABCDEs of melanoma when giving yourself your monthly full body exam ( don't forget the groin, buttocks, feet, toes, etc). A-asymmetry, B-borders, C-color, D-diameter, E-elevation or evolving. If you see any of these changes please follow up in clinic. If you cannot see your back I recommend purchasing a hand held mirror to use with a larger wall mirror.       Checking for Skin Cancer  You can find cancer early by checking your skin each month. There are 3 kinds of skin cancer. They are melanoma, basal cell carcinoma, and squamous cell carcinoma. Doing monthly skin checks is the best way to find new marks or skin changes. Follow the instructions below for checking your skin.   The ABCDEs of checking moles for melanoma   Check your moles or growths for signs of melanoma using ABCDE:   Asymmetry: the sides of the mole or growth don t match  Border: the edges are ragged, notched, or blurred  Color: the color within the mole or growth varies  Diameter: the mole or growth is larger than 6 mm (size of a pencil eraser)  Evolving: the size, shape, or color of the mole or growth is changing (evolving is not shown in the images below)    Checking for other types of skin cancer  Basal cell carcinoma or squamous cell carcinoma have symptoms such as:     A spot or mole that looks different from all other marks on your skin  Changes in how an area feels, such as itching, tenderness, or pain  Changes in the skin's surface, such as oozing, bleeding, or scaliness  A sore that does not heal  New swelling or redness beyond  the border of a mole    Who s at risk?  Anyone can get skin cancer. But you are at greater risk if you have:   Fair skin, light-colored hair, or light-colored eyes  Many moles or abnormal moles on your skin  A history of sunburns from sunlight or tanning beds  A family history of skin cancer  A history of exposure to radiation or chemicals  A weakened immune system  If you have had skin cancer in the past, you are at risk for recurring skin cancer.   How to check your skin  Do your monthly skin checkups in front of a full-length mirror. Check all parts of your body, including your:   Head (ears, face, neck, and scalp)  Torso (front, back, and sides)  Arms (tops, undersides, upper, and lower armpits)  Hands (palms, backs, and fingers, including under the nails)  Buttocks and genitals  Legs (front, back, and sides)  Feet (tops, soles, toes, including under the nails, and between toes)  If you have a lot of moles, take digital photos of them each month. Make sure to take photos both up close and from a distance. These can help you see if any moles change over time.   Most skin changes are not cancer. But if you see any changes in your skin, call your doctor right away. Only he or she can diagnose a problem. If you have skin cancer, seeing your doctor can be the first step toward getting the treatment that could save your life.   [employer] last reviewed this educational content on 4/1/2019 2000-2020 [employer]. [address] [place] [address], [place] [address]. All rights reserved. This information is not intended as a substitute for professional medical care. Always follow your healthcare professional's instructions.       When should I call my doctor?  If you are worsening or not improving, please, contact us or seek urgent care as noted below.     Who should I call with questions (adults)?  [facility] (adult and pediatric): [phone]  [facility] (adult): [phone]  [facility] ([place], [place], [place] and [place]) [phone]  For urgent needs outside of business hours call the [facility] at [phone] and ask for the dermatology resident on call to be paged  If this is a medical emergency and you are unable to reach an ER, Call [phone]      If you need a prescription refill, please contact your pharmacy. Refills are approved or denied by our Physicians during normal business hours, Monday through Fridays  Per office policy, refills will not be granted if you have not been seen within the past year (or sooner depending on your child's condition)

## 2024-04-11 NOTE — PROGRESS NOTES
[facility] Dermatology Note  Encounter Date: Apr 11, 2024  Office Visit     Reviewed patients past medical history and pertinent chart review prior to patients visit today.     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 Asteatotic dermatitis  - Current tx: fluocinonide 0.05% cream  3. Verruca plantaris, right foot s/p cryotherapy  - Current tx: WartPeel   4. Tinea pedis   5. AKs, forehead, temples, and nose  - Current tx: Efudex 5% cream (initiated 11/3/2022)  6. Rash, most consistent with tinea pedis, left medial foot s/p KOH prep 12/6/2022    ____________________________________________    Assessment & Plan:     # Verruca vulgaris, right plantar foot x1.     Counseled on the viral etiology of this condition. Counseled that these are often recalcitrant to treatment. Discussed all treatment options that we offer as well as side effects of each. Advised that highest rates of success can be observed with combination monthly in office treatment and home treatments.    - Cryotherapy: Patient elects to treat warts today with cryotherapy. After verbal consent and discussion of risks and benefits including but no limited to dyspigmentation/scar, blister, and pain. A total of 1 warts were treated with 1-2mm freeze border for 3 cycles with liquid nitrogen. Post cryotherapy instructions were provided.     - Start salicylic acid 40% (such as wart stick) nightly followed by duct tape or bandage.  Pare down after removal of occlusive cover for deeper penetration of medication.     Follow-up: 1 month(s) in-person, or earlier for new or changing lesions.      All risks, benefits and alternatives were discussed with patient.  Patient is in agreement and understands the assessment and plan.  All questions were answered.  Amparo  ROBERTA Bowman  [facility]  _______________________________________    CC: JOSELUIS (Wart tx)    HPI:  Mr. Pranay Ratliff is a(n) 74 year old male who presents today as a return patient for a wart on the right foot.  The wart is longstanding.  The patient is not currently using any treatments at home.  Patient is otherwise feeling well, without additional skin concerns.      Physical Exam:  SKIN: Focused examination of bilateral feet was performed.  - Verrucal papule(s) with thrombosed capillaries involving the right plantar foot.     - No other lesions of concern on areas examined.     Medications:  Current Outpatient Medications   Medication Sig Dispense Refill    aspirin 81 MG tablet Take by mouth daily      diclofenac (VOLTAREN) 1 % topical gel Apply topically 4 times daily      fluocinonide (LIDEX) 0.05 % external cream Apply topically 2 times daily 120 g 6    gabapentin (NEURONTIN) 100 MG capsule Take 100 mg by mouth Take 1 time daily at HS      multivitamin w/minerals (THERA-VIT-M) tablet Take 1 tablet by mouth daily      Omega-3 Fatty Acids (OMEGA-3 FISH OIL PO)       pantoprazole (PROTONIX) 40 MG EC tablet TAKE 1 TABLET BY MOUTH ONCE DAILY 30 MIN PRIOR TO BREAKFAST 90 tablet 1    rosuvastatin (CRESTOR) 20 MG tablet TAKE 1 TABLET BY MOUTH EVERY DAY 90 tablet 1    triamterene-HCTZ (MAXZIDE-25) 37.5-25 MG tablet Take 0.5 tablets by mouth every morning 45 tablet 0     No current facility-administered medications for this visit.      Past Medical History:   Patient Active Problem List   Diagnosis    History of basal cell carcinoma    Benign essential hypertension    Hyperlipidemia LDL goal <100    Obstructive sleep apnea syndrome    Gastroesophageal reflux disease with esophagitis    Localized swelling of lower extremity    Vasovagal syncope    Elevated prostate specific antigen (PSA)    Concussion with loss of consciousness of 30 minutes or less, subsequent encounter    BPH with  obstruction/lower urinary tract symptoms    Myofascial pain syndrome, cervical    Occipital neuralgia of right side    Fatigue due to old head injury    Post concussion syndrome    Cervical segment dysfunction    Thoracic segment dysfunction    Cephalgia    Somatic dysfunction of sacral region    Cervical pain    Dizziness    Marital conflict    Chronic left-sided low back pain without sciatica     Past Medical History:   Diagnosis Date    Basal cell carcinoma     Concussion with loss of consciousness of 30 minutes or less, subsequent encounter     Epididymitis, bilateral     Epididymitis, left     Epididymitis, right     Obstructive sleep apnea syndrome     Squamous cell carcinoma of skin, unspecified     Vasovagal syncope        CC Referred Self, MD  No address on file on close of this encounter.

## 2024-04-15 SDOH — HEALTH STABILITY: PHYSICAL HEALTH: ON AVERAGE, HOW MANY MINUTES DO YOU ENGAGE IN EXERCISE AT THIS LEVEL?: 120 MIN

## 2024-04-15 SDOH — HEALTH STABILITY: PHYSICAL HEALTH: ON AVERAGE, HOW MANY DAYS PER WEEK DO YOU ENGAGE IN MODERATE TO STRENUOUS EXERCISE (LIKE A BRISK WALK)?: 7 DAYS

## 2024-04-15 ASSESSMENT — SOCIAL DETERMINANTS OF HEALTH (SDOH): HOW OFTEN DO YOU GET TOGETHER WITH FRIENDS OR RELATIVES?: ONCE A WEEK

## 2024-04-22 ENCOUNTER — OFFICE VISIT (OUTPATIENT)
Dept: FAMILY MEDICINE | Facility: CLINIC | Age: 74
End: 2024-04-22
Attending: FAMILY MEDICINE
Payer: COMMERCIAL

## 2024-04-22 VITALS
BODY MASS INDEX: 30.52 KG/M2 | HEIGHT: 71 IN | OXYGEN SATURATION: 98 % | TEMPERATURE: 97.8 F | SYSTOLIC BLOOD PRESSURE: 108 MMHG | HEART RATE: 63 BPM | WEIGHT: 218 LBS | DIASTOLIC BLOOD PRESSURE: 76 MMHG | RESPIRATION RATE: 18 BRPM

## 2024-04-22 DIAGNOSIS — I10 BENIGN ESSENTIAL HYPERTENSION: ICD-10-CM

## 2024-04-22 DIAGNOSIS — Z12.5 SCREENING FOR PROSTATE CANCER: ICD-10-CM

## 2024-04-22 DIAGNOSIS — E78.5 HYPERLIPIDEMIA LDL GOAL <100: ICD-10-CM

## 2024-04-22 DIAGNOSIS — R73.09 ELEVATED GLUCOSE: ICD-10-CM

## 2024-04-22 DIAGNOSIS — Z00.01 ENCOUNTER FOR GENERAL ADULT MEDICAL EXAMINATION WITH ABNORMAL FINDINGS: Primary | ICD-10-CM

## 2024-04-22 LAB
ALBUMIN SERPL BCG-MCNC: 4.4 G/DL (ref 3.5–5.2)
ALP SERPL-CCNC: 62 U/L (ref 40–150)
ALT SERPL W P-5'-P-CCNC: 37 U/L (ref 0–70)
ANION GAP SERPL CALCULATED.3IONS-SCNC: 10 MMOL/L (ref 7–15)
AST SERPL W P-5'-P-CCNC: 36 U/L (ref 0–45)
BILIRUB SERPL-MCNC: 0.4 MG/DL
BUN SERPL-MCNC: 12.5 MG/DL (ref 8–23)
CALCIUM SERPL-MCNC: 9.5 MG/DL (ref 8.8–10.2)
CHLORIDE SERPL-SCNC: 103 MMOL/L (ref 98–107)
CHOLEST SERPL-MCNC: 153 MG/DL
CREAT SERPL-MCNC: 0.95 MG/DL (ref 0.67–1.17)
DEPRECATED HCO3 PLAS-SCNC: 26 MMOL/L (ref 22–29)
EGFRCR SERPLBLD CKD-EPI 2021: 84 ML/MIN/1.73M2
ERYTHROCYTE [DISTWIDTH] IN BLOOD BY AUTOMATED COUNT: 13 % (ref 10–15)
FASTING STATUS PATIENT QL REPORTED: YES
GLUCOSE SERPL-MCNC: 94 MG/DL (ref 70–99)
HBA1C MFR BLD: 5.6 % (ref 0–5.6)
HCT VFR BLD AUTO: 40.9 % (ref 40–53)
HDLC SERPL-MCNC: 47 MG/DL
HGB BLD-MCNC: 14.3 G/DL (ref 13.3–17.7)
LDLC SERPL CALC-MCNC: 70 MG/DL
MCH RBC QN AUTO: 31 PG (ref 26.5–33)
MCHC RBC AUTO-ENTMCNC: 35 G/DL (ref 31.5–36.5)
MCV RBC AUTO: 89 FL (ref 78–100)
NONHDLC SERPL-MCNC: 106 MG/DL
PLATELET # BLD AUTO: 241 10E3/UL (ref 150–450)
POTASSIUM SERPL-SCNC: 4.5 MMOL/L (ref 3.4–5.3)
PROT SERPL-MCNC: 7.1 G/DL (ref 6.4–8.3)
PSA SERPL DL<=0.01 NG/ML-MCNC: 4.68 NG/ML (ref 0–6.5)
RBC # BLD AUTO: 4.62 10E6/UL (ref 4.4–5.9)
SODIUM SERPL-SCNC: 139 MMOL/L (ref 135–145)
TRIGL SERPL-MCNC: 179 MG/DL
WBC # BLD AUTO: 9.2 10E3/UL (ref 4–11)

## 2024-04-22 PROCEDURE — 80061 LIPID PANEL: CPT | Performed by: FAMILY MEDICINE

## 2024-04-22 PROCEDURE — 85027 COMPLETE CBC AUTOMATED: CPT | Performed by: FAMILY MEDICINE

## 2024-04-22 PROCEDURE — 91320 SARSCV2 VAC 30MCG TRS-SUC IM: CPT | Performed by: FAMILY MEDICINE

## 2024-04-22 PROCEDURE — 83036 HEMOGLOBIN GLYCOSYLATED A1C: CPT | Performed by: FAMILY MEDICINE

## 2024-04-22 PROCEDURE — G0439 PPPS, SUBSEQ VISIT: HCPCS | Performed by: FAMILY MEDICINE

## 2024-04-22 PROCEDURE — 80053 COMPREHEN METABOLIC PANEL: CPT | Performed by: FAMILY MEDICINE

## 2024-04-22 PROCEDURE — 36415 COLL VENOUS BLD VENIPUNCTURE: CPT | Performed by: FAMILY MEDICINE

## 2024-04-22 PROCEDURE — 90480 ADMN SARSCOV2 VAC 1/ONLY CMP: CPT | Performed by: FAMILY MEDICINE

## 2024-04-22 PROCEDURE — G0103 PSA SCREENING: HCPCS | Performed by: FAMILY MEDICINE

## 2024-04-22 RX ORDER — ROSUVASTATIN CALCIUM 20 MG/1
20 TABLET, COATED ORAL DAILY
Qty: 90 TABLET | Refills: 1 | Status: SHIPPED | OUTPATIENT
Start: 2024-04-22

## 2024-04-22 RX ORDER — TRIAMTERENE/HYDROCHLOROTHIAZID 37.5-25 MG
0.5 TABLET ORAL EVERY MORNING
Qty: 45 TABLET | Refills: 1 | Status: SHIPPED | OUTPATIENT
Start: 2024-04-22

## 2024-04-22 NOTE — PROGRESS NOTES
Covid  Preventive Care Visit  [facility] NEREYDA Alva MD, Family Medicine  Apr 22, 2024      Assessment & Plan     Screening for prostate cancer    - PRIMARY CARE FOLLOW-UP SCHEDULING  - PSA, screen; Future    Encounter for general adult medical examination with abnormal findings    - PRIMARY CARE FOLLOW-UP SCHEDULING  - CBC with platelets; Future  - Comprehensive metabolic panel (BMP + Alb, Alk Phos, ALT, AST, Total. Bili, TP); Future  - PSA, screen; Future  - Lipid panel reflex to direct LDL Fasting; Future  - Hemoglobin A1c; Future    Elevated glucose    - Hemoglobin A1c; Future    Hyperlipidemia LDL goal <100    - rosuvastatin (CRESTOR) 20 MG tablet; Take 1 tablet (20 mg) by mouth daily    Benign essential hypertension    - triamterene-HCTZ (MAXZIDE-25) 37.5-25 MG tablet; Take 0.5 tablets by mouth every morning    Patient has been advised of split billing requirements and indicates understanding: Yes          Counseling  Appropriate preventive services were discussed with this patient, including applicable screening as appropriate for fall prevention, nutrition, physical activity, Tobacco-use cessation, weight loss and cognition.  Checklist reviewing preventive services available has been given to the patient.  Reviewed patient's diet, addressing concerns and/or questions.   The patient was provided with written information regarding signs of hearing loss.       FUTURE APPOINTMENTS:       - Follow-up visit in 6 months for BP and GERD     Subjective   Chris is a 74 year old, presenting for the following:  Physical        4/22/2024     8:43 AM   Additional Questions   Roomed by Denice TATE       Health Care Directive  Patient has a Health Care Directive on file  Advance care planning document is on file and is current.    HPI    Check lymph nodes under armpits- some discomfort- not sure if related to Famotidine, today is last day for the two week trial    Some upper back pain, onset a few  months.     Is fasting today.    Hypertension Follow-up    Do you check your blood pressure regularly outside of the clinic? No   Are you following a low salt diet? Yes  Are your blood pressures ever more than 140 on the top number (systolic) OR more   than 90 on the bottom number (diastolic), for example 140/90? No      Hyperlipidemia Follow-Up    Are you regularly taking any medication or supplement to lower your cholesterol?   Yes- Crestor  Are you having muscle aches or other side effects that you think could be caused by your cholesterol lowering medication?  No        4/15/2024   General Health   How would you rate your overall physical health? Good   Feel stress (tense, anxious, or unable to sleep) To some extent   (!) STRESS CONCERN      4/15/2024   Nutrition   Diet: Low fat/cholesterol    Carbohydrate counting    Other   If other, please elaborate: Avoid foods that cause heartburn         4/15/2024   Exercise   Days per week of moderate/strenous exercise 7 days   Average minutes spent exercising at this level 120 min         4/15/2024   Social Factors   Frequency of gathering with friends or relatives Once a week   Worry food won't last until get money to buy more No   Food not last or not have enough money for food? No   Do you have housing?  Yes   Are you worried about losing your housing? No   Lack of transportation? No   Unable to get utilities (heat,electricity)? No         4/19/2024   Fall Risk   Fallen 2 or more times in the past year? No   Trouble with walking or balance? No          4/15/2024   Activities of Daily Living- Home Safety   Needs help with the following daily activites None of the above   Safety concerns in the home None of the above         4/15/2024   Dental   Dentist two times every year? Yes         4/15/2024   Hearing Screening   Hearing concerns? (!) IT'S HARD TO FOLLOW A CONVERSATION IN A NOISY RESTAURANT OR CROWDED ROOM.    (!) TROUBLE UNDERSTANDING SOFT OR WHISPERED SPEECH.          4/15/2024   Driving Risk Screening   Patient/family members have concerns about driving No         4/15/2024   General Alertness/Fatigue Screening   Have you been more tired than usual lately? No         4/15/2024   Urinary Incontinence Screening   Bothered by leaking urine in past 6 months No         4/15/2024   TB Screening   Were you born outside of the [place]? No         Today's PHQ-2 Score:       4/22/2024     8:42 AM   PHQ-2 ( 1999 [employer])   Q1: Little interest or pleasure in doing things 0   Q2: Feeling down, depressed or hopeless 0   PHQ-2 Score 0   Q1: Little interest or pleasure in doing things Not at all   Q2: Feeling down, depressed or hopeless Not at all   PHQ-2 Score 0           4/15/2024   Substance Use   Alcohol more than 3/day or more than 7/wk No   Do you have a current opioid prescription? No   How severe/bad is pain from 1 to 10? 2/10   Do you use any other substances recreationally? No     Social History     Tobacco Use    Smoking status: Never    Smokeless tobacco: Never    Tobacco comments:     Smoked cigarettes in [employer]  socially. Not often.   Vaping Use    Vaping status: Never Used   Substance Use Topics    Alcohol use: Yes     Comment: Very infrequent. Wine usually    Drug use: No           4/15/2024   AAA Screening   Family history of Abdominal Aortic Aneurysm (AAA)? No   ASCVD Risk   The ASCVD Risk score (Leonard CARCAMO, et al., 2019) failed to calculate for the following reasons:    The valid total cholesterol range is 130 to 320 mg/dL            Reviewed and updated as needed this visit by Provider                    Past Medical History:   Diagnosis Date    Basal cell carcinoma     Concussion with loss of consciousness of 30 minutes or less, subsequent encounter     Epididymitis, bilateral     Epididymitis, left     Epididymitis, right     Obstructive sleep apnea syndrome     Squamous cell carcinoma of skin, unspecified     Vasovagal syncope      Past Surgical  History:   Procedure Laterality Date    COLONOSCOPY  2021    Next will be     TESTICLE SURGERY      VASECTOMY       BP Readings from Last 3 Encounters:   24 108/76   24 120/78   23 130/64    Wt Readings from Last 3 Encounters:   24 98.9 kg (218 lb)   24 98.9 kg (218 lb)   23 99.8 kg (220 lb)                  Patient Active Problem List   Diagnosis    History of basal cell carcinoma    Benign essential hypertension    Hyperlipidemia LDL goal <100    Obstructive sleep apnea syndrome    Gastroesophageal reflux disease with esophagitis    Localized swelling of lower extremity    Vasovagal syncope    Elevated prostate specific antigen (PSA)    Concussion with loss of consciousness of 30 minutes or less, subsequent encounter    BPH with obstruction/lower urinary tract symptoms    Myofascial pain syndrome, cervical    Occipital neuralgia of right side    Fatigue due to old head injury    Post concussion syndrome    Cervical segment dysfunction    Thoracic segment dysfunction    Cephalgia    Somatic dysfunction of sacral region    Cervical pain    Dizziness    Marital conflict    Chronic left-sided low back pain without sciatica     Past Surgical History:   Procedure Laterality Date    COLONOSCOPY  2021    Next will be     TESTICLE SURGERY      VASECTOMY         Social History     Tobacco Use    Smoking status: Never    Smokeless tobacco: Never    Tobacco comments:     Smoked cigarettes in [employer]  socially. Not often.   Substance Use Topics    Alcohol use: Yes     Comment: Very infrequent. Wine usually     Family History   Problem Relation Age of Onset    Skin Cancer Mother          from pneumonia    Abdominal Aortic Aneurysm Mother     Heart Failure Father     Diabetes Type 1 Brother         Possibly secondary to chemical exposure in [place]    Diabetes Type 2  Maternal Grandmother     Cancer Brother         Exposure to Agent Orange in [place]    Heart Disease  Brother          Current Outpatient Medications   Medication Sig Dispense Refill    aspirin 81 MG tablet Take by mouth daily      diclofenac (VOLTAREN) 1 % topical gel Apply topically 4 times daily      fluocinonide (LIDEX) 0.05 % external cream Apply topically 2 times daily 120 g 6    gabapentin (NEURONTIN) 100 MG capsule Take 100 mg by mouth Take 1 time daily at HS      multivitamin w/minerals (THERA-VIT-M) tablet Take 1 tablet by mouth daily      Omega-3 Fatty Acids (OMEGA-3 FISH OIL PO)       pantoprazole (PROTONIX) 40 MG EC tablet TAKE 1 TABLET BY MOUTH ONCE DAILY 30 MIN PRIOR TO BREAKFAST 90 tablet 1    rosuvastatin (CRESTOR) 20 MG tablet Take 1 tablet (20 mg) by mouth daily 90 tablet 1    triamterene-HCTZ (MAXZIDE-25) 37.5-25 MG tablet Take 0.5 tablets by mouth every morning 45 tablet 1     Allergies   Allergen Reactions    Johanna-Copen Plus Allergy [Diphenhydramine]      Patient has lip swollen ,has used tylenol in th past. No issues. Has used benadryl in the past no issues.  Never used phenylephrine.     Recent Labs   Lab Test 04/03/24  1447 04/13/23  0927 04/07/22  1000 03/02/19  1636 01/30/19  0901   LDL 44 72 46  --  73   HDL 42 49 45  --  48   TRIG 217* 130 189*  --  181*   ALT  --  25 40  --  30   CR  --  0.96 0.89 1.08 0.96   GFRESTIMATED  --  83 >90 70 80   GFRESTBLACK  --   --   --  81 >90   POTASSIUM  --  3.9 4.2 3.9 3.6   TSH  --   --   --  3.64  --       Current providers sharing in care for this patient include:  Patient Care Team:  Hipolito Alva MD as PCP - General (Family Medicine)  Jaylan Guajardo MD as MD (Urology)  Hipolito Alva MD as Assigned PCP  Vin Sinha MD as MD (Urology)  Nasir Anderson LMFT as Assigned Behavioral Health Provider  Tato Gallagher MD as Assigned Surgical Provider  Amparo Bowman PA-C as Physician Assistant (Dermatology)    The following health maintenance items are reviewed in Epic and correct as of today:  Health Maintenance  "  Topic Date Due    HEPATITIS B IMMUNIZATION (1 of 3 - Risk 3-dose series) Never done    COLORECTAL CANCER SCREENING  04/12/2014    MEDICARE ANNUAL WELLNESS VISIT  04/13/2024    ANNUAL REVIEW OF HM ORDERS  03/18/2025    LIPID  04/03/2025    FALL RISK ASSESSMENT  04/22/2025    GLUCOSE  04/13/2026    ADVANCE CARE PLANNING  04/13/2028    DTAP/TDAP/TD IMMUNIZATION (4 - Td or Tdap) 09/12/2030    HEPATITIS C SCREENING  Completed    PHQ-2 (once per calendar year)  Completed    INFLUENZA VACCINE  Completed    Pneumococcal Vaccine: 65+ Years  Completed    ZOSTER IMMUNIZATION  Completed    HEPATITIS A IMMUNIZATION  Completed    RSV VACCINE (Pregnancy & 60+)  Completed    COVID-19 Vaccine  Completed    HPV IMMUNIZATION  Aged Out    MENINGITIS IMMUNIZATION  Aged Out    RSV MONOCLONAL ANTIBODY  Aged Out    IPV IMMUNIZATION  Discontinued         Review of Systems  Constitutional, HEENT, cardiovascular, pulmonary, GI, , musculoskeletal, neuro, skin, endocrine and psych systems are negative, except as otherwise noted.     Objective    Exam  /76   Pulse 63   Temp 97.8  F (36.6  C)   Resp 18   Ht 1.791 m (5' 10.5\")   Wt 98.9 kg (218 lb)   SpO2 98%   BMI 30.84 kg/m     Estimated body mass index is 30.84 kg/m  as calculated from the following:    Height as of this encounter: 1.791 m (5' 10.5\").    Weight as of this encounter: 98.9 kg (218 lb).    Physical Exam  GENERAL: alert and no distress  EYES: Eyes grossly normal to inspection, PERRL and conjunctivae and sclerae normal  HENT: ear canals and TM's normal, nose and mouth without ulcers or lesions  NECK: no adenopathy, no asymmetry, masses, or scars  RESP: lungs clear to auscultation - no rales, rhonchi or wheezes  CV: regular rate and rhythm, normal S1 S2, no S3 or S4, no murmur, click or rub, no peripheral edema  ABDOMEN: soft, nontender, no hepatosplenomegaly, no masses and bowel sounds normal  MS: no gross musculoskeletal defects noted, no edema  SKIN: no suspicious " lesions or rashes  NEURO: Normal strength and tone, mentation intact and speech normal  BACK: no CVA tenderness, no paralumbar tenderness  PSYCH: mentation appears normal, affect normal/bright  LYMPH: no cervical, supraclavicular, axillary, or inguinal adenopathy        4/22/2024   Mini Cog   Clock Draw Score 2 Normal   3 Item Recall 3 objects recalled   Mini Cog Total Score 5              Signed Electronically by: Hipolito Alva MD

## 2024-04-22 NOTE — PATIENT INSTRUCTIONS
Preventive Care Advice   This is general advice given by our system to help you stay healthy. However, your care team may have specific advice just for you. Please talk to your care team about your preventive care needs.  Nutrition  Eat 5 or more servings of fruits and vegetables each day.  Try wheat bread, brown rice and whole grain pasta (instead of white bread, rice, and pasta).  Get enough calcium and vitamin D. Check the label on foods and aim for 100% of the RDA (recommended daily allowance).  Lifestyle  Exercise at least 150 minutes each week   (30 minutes a day, 5 days a week).  Do muscle strengthening activities 2 days a week. These help control your weight and prevent disease.  No smoking.  Wear sunscreen to prevent skin cancer.  Have a dental exam and cleaning every 6 months.  Yearly exams  See your health care team every year to talk about:  Any changes in your health.  Any medicines your care team has prescribed.  Preventive care, family planning, and ways to prevent chronic diseases.  Shots (vaccines)   HPV shots (up to age 26), if you've never had them before.  Hepatitis B shots (up to age 59), if you've never had them before.  COVID-19 shot: Get this shot when it's due.  Flu shot: Get a flu shot every year.  Tetanus shot: Get a tetanus shot every 10 years.  Pneumococcal, hepatitis A, and RSV shots: Ask your care team if you need these based on your risk.  Shingles shot (for age 50 and up).  General health tests  Diabetes screening:  Starting at age 35, Get screened for diabetes at least every 3 years.  If you are younger than age 35, ask your care team if you should be screened for diabetes.  Cholesterol test: At age 39, start having a cholesterol test every 5 years, or more often if advised.  Bone density scan (DEXA): At age 50, ask your care team if you should have this scan for osteoporosis (brittle bones).  Hepatitis C: Get tested at least once in your life.  STIs (sexually transmitted  infections)  Before age 24: Ask your care team if you should be screened for STIs.  After age 24: Get screened for STIs if you're at risk. You are at risk for STIs (including HIV) if:  You are sexually active with more than one person.  You don't use condoms every time.  You or a partner was diagnosed with a sexually transmitted infection.  If you are at risk for HIV, ask about PrEP medicine to prevent HIV.  Get tested for HIV at least once in your life, whether you are at risk for HIV or not.  Cancer screening tests  Cervical cancer screening: If you have a cervix, begin getting regular cervical cancer screening tests at age 21. Most people who have regular screenings with normal results can stop after age 65. Talk about this with your provider.  Breast cancer scan (mammogram): If you've ever had breasts, begin having regular mammograms starting at age 40. This is a scan to check for breast cancer.  Colon cancer screening: It is important to start screening for colon cancer at age 45.  Have a colonoscopy test every 10 years (or more often if you're at risk) Or, ask your provider about stool tests like a FIT test every year or Cologuard test every 3 years.  To learn more about your testing options, visit: https://www.[employer]/194119.pdf.  For help making a decision, visit: https://bit.ly/pv83340.  Prostate cancer screening test: If you have a prostate and are age 55 to 69, ask your provider if you would benefit from a yearly prostate cancer screening test.  Lung cancer screening: If you are a current or former smoker age 50 to 80, ask your care team if ongoing lung cancer screenings are right for you.  For informational purposes only. Not to replace the advice of your health care provider. Copyright   2023 [employer]. All rights reserved. Clinically reviewed by the [facility] Transitions Program. [employer] 803909 - REV 01/24.    Hearing Loss: Care Instructions  Overview     Hearing loss is a  sudden or slow decrease in how well you hear. It can range from slight to profound. Permanent hearing loss can occur with aging. It also can happen when you are exposed long-term to loud noise. Examples include listening to loud music, riding motorcycles, or being around other loud machines.  Hearing loss can affect your work and home life. It can make you feel lonely or depressed. You may feel that you have lost your independence. But hearing aids and other devices can help you hear better and feel connected to others.  Follow-up care is a key part of your treatment and safety. Be sure to make and go to all appointments, and call your doctor if you are having problems. It's also a good idea to know your test results and keep a list of the medicines you take.  How can you care for yourself at home?  Avoid loud noises whenever possible. This helps keep your hearing from getting worse.  Always wear hearing protection around loud noises.  Wear a hearing aid as directed.  A professional can help you pick a hearing aid that will work best for you.  You can also get hearing aids over the counter for mild to moderate hearing loss.  Have hearing tests as your doctor suggests. They can show whether your hearing has changed. Your hearing aid may need to be adjusted.  Use other devices as needed. These may include:  Telephone amplifiers and hearing aids that can connect to a television, stereo, radio, or microphone.  Devices that use lights or vibrations. These alert you to the doorbell, a ringing telephone, or a baby monitor.  Television closed-captioning. This shows the words at the bottom of the screen. Most new TVs can do this.  TTY (text telephone). This lets you type messages back and forth on the telephone instead of talking or listening. These devices are also called TDD. When messages are typed on the keyboard, they are sent over the phone line to a receiving TTY. The message is shown on a monitor.  Use text  "messaging, social media, and email if it is hard for you to communicate by telephone.  Try to learn a listening technique called speechreading. It is not lipreading. You pay attention to people's gestures, expressions, posture, and tone of voice. These clues can help you understand what a person is saying. Face the person you are talking to, and have them face you. Make sure the lighting is good. You need to see the other person's face clearly.  Think about counseling if you need help to adjust to your hearing loss.  When should you call for help?  Watch closely for changes in your health, and be sure to contact your doctor if:    You think your hearing is getting worse.     You have new symptoms, such as dizziness or nausea.   Where can you learn more?  Go to https://www.[employer].net/patiented  Enter R798 in the search box to learn more about \"Hearing Loss: Care Instructions.\"  Current as of: September 27, 2023               Content Version: 14.0    3489-0193 [employer].   Care instructions adapted under license by your healthcare professional. If you have questions about a medical condition or this instruction, always ask your healthcare professional. [employer] disclaims any warranty or liability for your use of this information.      Learning About Stress  What is stress?     Stress is your body's response to a hard situation. Your body can have a physical, emotional, or mental response. Stress is a fact of life for most people, and it affects everyone differently. What causes stress for you may not be stressful for someone else.  A lot of things can cause stress. You may feel stress when you go on a job interview, take a test, or run a race. This kind of short-term stress is normal and even useful. It can help you if you need to work hard or react quickly. For example, stress can help you finish an important job on time.  Long-term stress is caused by ongoing stressful situations " or events. Examples of long-term stress include long-term health problems, ongoing problems at work, or conflicts in your family. Long-term stress can harm your health.  How does stress affect your health?  When you are stressed, your body responds as though you are in danger. It makes hormones that speed up your heart, make you breathe faster, and give you a burst of energy. This is called the fight-or-flight stress response. If the stress is over quickly, your body goes back to normal and no harm is done.  But if stress happens too often or lasts too long, it can have bad effects. Long-term stress can make you more likely to get sick, and it can make symptoms of some diseases worse. If you tense up when you are stressed, you may develop neck, shoulder, or low back pain. Stress is linked to high blood pressure and heart disease.  Stress also harms your emotional health. It can make you dc, tense, or depressed. Your relationships may suffer, and you may not do well at work or school.  What can you do to manage stress?  You can try these things to help manage stress:   Do something active. Exercise or activity can help reduce stress. Walking is a great way to get started. Even everyday activities such as housecleaning or yard work can help.  Try yoga or jonah chi. These techniques combine exercise and meditation. You may need some training at first to learn them.  Do something you enjoy. For example, listen to music or go to a movie. Practice your hobby or do volunteer work.  Meditate. This can help you relax, because you are not worrying about what happened before or what may happen in the future.  Do guided imagery. Imagine yourself in any setting that helps you feel calm. You can use online videos, books, or a teacher to guide you.  Do breathing exercises. For example:  From a standing position, bend forward from the waist with your knees slightly bent. Let your arms dangle close to the floor.  Breathe in slowly  "and deeply as you return to a standing position. Roll up slowly and lift your head last.  Hold your breath for just a few seconds in the standing position.  Breathe out slowly and bend forward from the waist.  Let your feelings out. Talk, laugh, cry, and express anger when you need to. Talking with supportive friends or family, a counselor, or a alex leader about your feelings is a healthy way to relieve stress. Avoid discussing your feelings with people who make you feel worse.  Write. It may help to write about things that are bothering you. This helps you find out how much stress you feel and what is causing it. When you know this, you can find better ways to cope.  What can you do to prevent stress?  You might try some of these things to help prevent stress:  Manage your time. This helps you find time to do the things you want and need to do.  Get enough sleep. Your body recovers from the stresses of the day while you are sleeping.  Get support. Your family, friends, and community can make a difference in how you experience stress.  Limit your news feed. Avoid or limit time on social media or news that may make you feel stressed.  Do something active. Exercise or activity can help reduce stress. Walking is a great way to get started.  Where can you learn more?  Go to https://www.[employer].net/patiented  Enter N032 in the search box to learn more about \"Learning About Stress.\"  Current as of: October 24, 2023               Content Version: 14.0    [id number] [employer].   Care instructions adapted under license by your healthcare professional. If you have questions about a medical condition or this instruction, always ask your healthcare professional. [employer] disclaims any warranty or liability for your use of this information.      "

## 2024-05-09 ENCOUNTER — OFFICE VISIT (OUTPATIENT)
Dept: FAMILY MEDICINE | Facility: CLINIC | Age: 74
End: 2024-05-09
Payer: COMMERCIAL

## 2024-05-09 DIAGNOSIS — L71.9 ROSACEA: Primary | ICD-10-CM

## 2024-05-09 DIAGNOSIS — B07.0 PLANTAR WART: ICD-10-CM

## 2024-05-09 PROCEDURE — 17110 DESTRUCTION B9 LES UP TO 14: CPT | Performed by: PHYSICIAN ASSISTANT

## 2024-05-09 PROCEDURE — 99214 OFFICE O/P EST MOD 30 MIN: CPT | Mod: 25 | Performed by: PHYSICIAN ASSISTANT

## 2024-05-09 RX ORDER — AZELAIC ACID 0.15 G/G
GEL TOPICAL
Qty: 50 G | Refills: 4 | Status: SHIPPED | OUTPATIENT
Start: 2024-05-09

## 2024-05-09 NOTE — PROGRESS NOTES
[facility] Dermatology Note  Encounter Date: May 9, 2024  Office Visit     Reviewed patients past medical history and pertinent chart review prior to patients visit today.     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 Asteatotic dermatitis  - Current tx: fluocinonide 0.05% cream  3. Verruca plantaris, right foot s/p cryotherapy  - Current tx: WartPeel   4. Tinea pedis   5. AKs, forehead, temples, and nose  - Current tx: Efudex 5% cream (initiated 11/3/2022)  6. Rash, most consistent with tinea pedis, left medial foot s/p KOH prep 12/6/2022    ____________________________________________    Assessment & Plan:     # Verruca vulgaris, right plantar foot x1.   - Cryotherapy: Patient elects to treat warts today with cryotherapy. After verbal consent and discussion of risks and benefits including but no limited to dyspigmentation/scar, blister, and pain. A total of 1 warts were treated with 1-2mm freeze border for 3 cycles with liquid nitrogen. Post cryotherapy instructions were provided.      - Start salicylic acid 40% (such as wart stick) nightly followed by duct tape or bandage.  Pare down after removal of occlusive cover for deeper penetration of medication.     # Rosacea  - Discussed the chronic nature of the condition.   - azelaic acid 15% gel twice daily if tolerated, discussed potential irritation.   - Discussed option of cosmetic derm for laser in the future.      All risks, benefits and alternatives were discussed with patient.  Patient is in agreement and understands the assessment and plan.  All questions were answered.  Amparo Bowman PA-C  [facility] Dermatology  _______________________________________    CC: Derm Problem (Follow up plantar wart on R foot)    HPI:  Mr. Pranay Ratliff is a(n) 74  year old male who presents today as a return patient for a wart on the right foot. The wart has improved.     Additionally, the patient reports longstanding redness of the nose and cheeks. He is interested in treatment options for this. Patient is otherwise feeling well, without additional skin concerns.      Physical Exam:  SKIN: Focused examination of right foot and face was performed.  - Verrucal papule(s) with thrombosed capillaries involving the right foot x1.   - The medial cheeks and nose demonstrate background telangiectasias, consistent with rosacea.     - No other lesions of concern on areas examined.     Medications:  Current Outpatient Medications   Medication Sig Dispense Refill    aspirin 81 MG tablet Take by mouth daily      diclofenac (VOLTAREN) 1 % topical gel Apply topically 4 times daily      fluocinonide (LIDEX) 0.05 % external cream Apply topically 2 times daily 120 g 6    gabapentin (NEURONTIN) 100 MG capsule Take 100 mg by mouth Take 1 time daily at HS      multivitamin w/minerals (THERA-VIT-M) tablet Take 1 tablet by mouth daily      Omega-3 Fatty Acids (OMEGA-3 FISH OIL PO)       pantoprazole (PROTONIX) 40 MG EC tablet TAKE 1 TABLET BY MOUTH ONCE DAILY 30 MIN PRIOR TO BREAKFAST 90 tablet 1    rosuvastatin (CRESTOR) 20 MG tablet Take 1 tablet (20 mg) by mouth daily 90 tablet 1    triamterene-HCTZ (MAXZIDE-25) 37.5-25 MG tablet Take 0.5 tablets by mouth every morning 45 tablet 1     No current facility-administered medications for this visit.      Past Medical History:   Patient Active Problem List   Diagnosis    History of basal cell carcinoma    Benign essential hypertension    Hyperlipidemia LDL goal <100    Obstructive sleep apnea syndrome    Gastroesophageal reflux disease with esophagitis    Localized swelling of lower extremity    Vasovagal syncope    Elevated prostate specific antigen (PSA)    Concussion with loss of consciousness of 30 minutes or less, subsequent encounter    BPH with  obstruction/lower urinary tract symptoms    Myofascial pain syndrome, cervical    Occipital neuralgia of right side    Fatigue due to old head injury    Post concussion syndrome    Cervical segment dysfunction    Thoracic segment dysfunction    Cephalgia    Somatic dysfunction of sacral region    Cervical pain    Dizziness    Marital conflict    Chronic left-sided low back pain without sciatica     Past Medical History:   Diagnosis Date    Basal cell carcinoma     Concussion with loss of consciousness of 30 minutes or less, subsequent encounter     Epididymitis, bilateral     Epididymitis, left     Epididymitis, right     Obstructive sleep apnea syndrome     Squamous cell carcinoma of skin, unspecified     Vasovagal syncope        CC Referred Self, MD  No address on file on close of this encounter.

## 2024-05-09 NOTE — PATIENT INSTRUCTIONS
Cryotherapy    What is it?  Use of a very cold liquid, such as liquid nitrogen, to freeze and destroy abnormal skin cells that need to be removed    What should I expect?  Tenderness and redness  A small blister that might grow and fill with dark purple blood. There may be crusting.  More than one treatment may be needed if the lesions do not go away.    How do I care for the treated area?  Gently wash the area with your hands when bathing.  Use a thin layer of Vaseline to help with healing. You may use a Band-Aid.   The area should heal within 7-10 days and may leave behind a pink or lighter color.   Do not use an antibiotic or Neosporin ointment.   You may take acetaminophen (Tylenol) for pain.     Call your Doctor if you have:  Severe pain  Signs of infection (warmth, redness, cloudy yellow drainage, and or a bad smell)  Questions or concerns    Who should I call with questions?      [facility]: [phone]      [facility]: [phone]      For urgent needs outside of business hours call the [facility] at [phone]        and ask for the dermatology resident on call

## 2024-05-09 NOTE — LETTER
5/9/2024         RE: Pranay Ratliff  [address]  [place] [address]        Dear Colleague,    Thank you [facility] referring your patient, [facility] Ratliff[facility]. Please see a copy of my visit note below.    [facility] Dermatology Note  Encounter Date: May 9, 2024  Office Visit     Reviewed patients past medical history and pertinent chart review prior to patients visit today.     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 Asteatotic dermatitis  - Current tx: fluocinonide 0.05% cream  3. Verruca plantaris, right foot s/p cryotherapy  - Current tx: WartPeel   4. Tinea pedis   5. AKs, forehead, temples, and nose  - Current tx: Efudex 5% cream (initiated 11/3/2022)  6. Rash, most consistent with tinea pedis, left medial foot s/p KOH prep 12/6/2022    ____________________________________________    Assessment & Plan:     # Verruca vulgaris, right plantar foot x1.   - Cryotherapy: Patient elects to treat warts today with cryotherapy. After verbal consent and discussion of risks and benefits including but no limited to dyspigmentation/scar, blister, and pain. A total of 1 warts were treated with 1-2mm freeze border for 3 cycles with liquid nitrogen. Post cryotherapy instructions were provided.      - Start salicylic acid 40% (such as wart stick) nightly followed by duct tape or bandage.  Pare down after removal of occlusive cover for deeper penetration of medication.     # Rosacea  - Discussed the chronic nature of the condition.   - azelaic acid 15% gel twice daily if tolerated, discussed potential irritation.   - Discussed option of cosmetic derm for laser in the future.      All risks, benefits and alternatives were discussed with patient.  Patient is in agreement and  understands the assessment and plan.  All questions were answered.  Amparo Bowman PA-C  [facility] Dermatology  _______________________________________    CC: Derm Problem (Follow up plantar wart on R foot)    HPI:  Mr. Pranay Ratliff is a(n) 74 year old male who presents today as a return patient for a wart on the right foot. The wart has improved.     Additionally, the patient reports longstanding redness of the nose and cheeks. He is interested in treatment options for this. Patient is otherwise feeling well, without additional skin concerns.      Physical Exam:  SKIN: Focused examination of right foot and face was performed.  - Verrucal papule(s) with thrombosed capillaries involving the right foot x1.   - The medial cheeks and nose demonstrate background telangiectasias, consistent with rosacea.     - No other lesions of concern on areas examined.     Medications:  Current Outpatient Medications   Medication Sig Dispense Refill     aspirin 81 MG tablet Take by mouth daily       diclofenac (VOLTAREN) 1 % topical gel Apply topically 4 times daily       fluocinonide (LIDEX) 0.05 % external cream Apply topically 2 times daily 120 g 6     gabapentin (NEURONTIN) 100 MG capsule Take 100 mg by mouth Take 1 time daily at HS       multivitamin w/minerals (THERA-VIT-M) tablet Take 1 tablet by mouth daily       Omega-3 Fatty Acids (OMEGA-3 FISH OIL PO)        pantoprazole (PROTONIX) 40 MG EC tablet TAKE 1 TABLET BY MOUTH ONCE DAILY 30 MIN PRIOR TO BREAKFAST 90 tablet 1     rosuvastatin (CRESTOR) 20 MG tablet Take 1 tablet (20 mg) by mouth daily 90 tablet 1     triamterene-HCTZ (MAXZIDE-25) 37.5-25 MG tablet Take 0.5 tablets by mouth every morning 45 tablet 1     No current facility-administered medications for this visit.      Past Medical History:   Patient Active Problem List   Diagnosis     History of basal cell carcinoma     Benign essential hypertension     Hyperlipidemia LDL goal <100     Obstructive sleep  apnea syndrome     Gastroesophageal reflux disease with esophagitis     Localized swelling of lower extremity     Vasovagal syncope     Elevated prostate specific antigen (PSA)     Concussion with loss of consciousness of 30 minutes or less, subsequent encounter     BPH with obstruction/lower urinary tract symptoms     Myofascial pain syndrome, cervical     Occipital neuralgia of right side     Fatigue due to old head injury     Post concussion syndrome     Cervical segment dysfunction     Thoracic segment dysfunction     Cephalgia     Somatic dysfunction of sacral region     Cervical pain     Dizziness     Marital conflict     Chronic left-sided low back pain without sciatica     Past Medical History:   Diagnosis Date     Basal cell carcinoma      Concussion with loss of consciousness of 30 minutes or less, subsequent encounter      Epididymitis, bilateral      Epididymitis, left      Epididymitis, right      Obstructive sleep apnea syndrome      Squamous cell carcinoma of skin, unspecified      Vasovagal syncope        CC Referred Self, MD  No address on file on close of this encounter.       Again, thank you for allowing me to participate in the care of your patient.        Sincerely,        Amparo Bowman PA-C

## 2024-06-13 ENCOUNTER — OFFICE VISIT (OUTPATIENT)
Dept: FAMILY MEDICINE | Facility: CLINIC | Age: 74
End: 2024-06-13
Payer: COMMERCIAL

## 2024-06-13 DIAGNOSIS — B07.0 PLANTAR WART: Primary | ICD-10-CM

## 2024-06-13 PROCEDURE — 17110 DESTRUCTION B9 LES UP TO 14: CPT | Performed by: PHYSICIAN ASSISTANT

## 2024-06-13 ASSESSMENT — PAIN SCALES - GENERAL: PAINLEVEL: NO PAIN (0)

## 2024-06-13 NOTE — PROGRESS NOTES
[facility] Dermatology Note  Encounter Date: Jun 13, 2024  Office Visit     Reviewed patients past medical history and pertinent chart review prior to patients visit today.     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 Asteatotic dermatitis  - Current tx: fluocinonide 0.05% cream  3. Verruca plantaris, right foot s/p cryotherapy  - Current tx: WartPeel   4. Tinea pedis   5. AKs, forehead, temples, and nose  - Current tx: Efudex 5% cream (initiated 11/3/2022)  6. Rash, most consistent with tinea pedis, left medial foot s/p KOH prep 12/6/2022  7. Verruca plantaris  - cryotherapy    ____________________________________________    Assessment & Plan:     # Verruca vulgaris, right plantar foot x1.   - Cryotherapy: Patient elects to treat warts today with cryotherapy. After verbal consent and discussion of risks and benefits including but no limited to dyspigmentation/scar, blister, and pain. A total of 2 warts were treated with 1-2mm freeze border for 3 cycles with liquid nitrogen. Post cryotherapy instructions were provided.      - Continue salicylic acid 40% (such as wart stick) nightly followed by duct tape or bandage.  Pare down after removal of occlusive cover for deeper penetration of medication.     All risks, benefits and alternatives were discussed with patient.  Patient is in agreement and understands the assessment and plan.  All questions were answered.  Amparo Bowman PA-C  [facility] Dermatology  _______________________________________    CC: Derm Problem (Follow up warts on R foot)    HPI:  Mr. Pranay Ratliff is a(n) 74 year old male who presents today as a return patient for wart on the right foot. He is unsure of if the wart is still there. Patient is otherwise feeling well, without additional  skin concerns.      Physical Exam:  SKIN: Focused examination of bilateral feet was performed.  - The right plantar foot x2 demonstrates verrucous papules.     - No other lesions of concern on areas examined.     Medications:  Current Outpatient Medications   Medication Sig Dispense Refill    aspirin 81 MG tablet Take by mouth daily      azelaic acid (FINACIA) 15 % external gel Apply to the face twice daily. 50 g 4    diclofenac (VOLTAREN) 1 % topical gel Apply topically 4 times daily      fluocinonide (LIDEX) 0.05 % external cream Apply topically 2 times daily 120 g 6    gabapentin (NEURONTIN) 100 MG capsule Take 100 mg by mouth Take 1 time daily at HS      multivitamin w/minerals (THERA-VIT-M) tablet Take 1 tablet by mouth daily      Omega-3 Fatty Acids (OMEGA-3 FISH OIL PO)       pantoprazole (PROTONIX) 40 MG EC tablet TAKE 1 TABLET BY MOUTH ONCE DAILY 30 MIN PRIOR TO BREAKFAST 90 tablet 1    rosuvastatin (CRESTOR) 20 MG tablet Take 1 tablet (20 mg) by mouth daily 90 tablet 1    triamterene-HCTZ (MAXZIDE-25) 37.5-25 MG tablet Take 0.5 tablets by mouth every morning 45 tablet 1     No current facility-administered medications for this visit.      Past Medical History:   Patient Active Problem List   Diagnosis    History of basal cell carcinoma    Benign essential hypertension    Hyperlipidemia LDL goal <100    Obstructive sleep apnea syndrome    Gastroesophageal reflux disease with esophagitis    Localized swelling of lower extremity    Vasovagal syncope    Elevated prostate specific antigen (PSA)    Concussion with loss of consciousness of 30 minutes or less, subsequent encounter    BPH with obstruction/lower urinary tract symptoms    Myofascial pain syndrome, cervical    Occipital neuralgia of right side    Fatigue due to old head injury    Post concussion syndrome    Cervical segment dysfunction    Thoracic segment dysfunction    Cephalgia    Somatic dysfunction of sacral region    Cervical pain    Dizziness     Marital conflict    Chronic left-sided low back pain without sciatica     Past Medical History:   Diagnosis Date    Basal cell carcinoma     Concussion with loss of consciousness of 30 minutes or less, subsequent encounter     Epididymitis, bilateral     Epididymitis, left     Epididymitis, right     Obstructive sleep apnea syndrome     Squamous cell carcinoma of skin, unspecified     Vasovagal syncope        CC Referred Self, MD  No address on file on close of this encounter.

## 2024-06-13 NOTE — LETTER
6/13/2024      Pranay Ratliff  [address]  [place] [facility] [place] [address]      Dear Colleague,    Thank you for referring your patient, Pranay Ratliff, to the [facility]. Please see a copy of my visit note below.    [facility] Dermatology Note  Encounter Date: Jun 13, 2024  Office Visit     Reviewed patients past medical history and pertinent chart review prior to patients visit today.     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 Asteatotic dermatitis  - Current tx: fluocinonide 0.05% cream  3. Verruca plantaris, right foot s/p cryotherapy  - Current tx: WartPeel   4. Tinea pedis   5. AKs, forehead, temples, and nose  - Current tx: Efudex 5% cream (initiated 11/3/2022)  6. Rash, most consistent with tinea pedis, left medial foot s/p KOH prep 12/6/2022  7. Verruca plantaris  - cryotherapy    ____________________________________________    Assessment & Plan:     # Verruca vulgaris, right plantar foot x1.   - Cryotherapy: Patient elects to treat warts today with cryotherapy. After verbal consent and discussion of risks and benefits including but no limited to dyspigmentation/scar, blister, and pain. A total of 2 warts were treated with 1-2mm freeze border for 3 cycles with liquid nitrogen. Post cryotherapy instructions were provided.      - Continue salicylic acid 40% (such as wart stick) nightly followed by duct tape or bandage.  Pare down after removal of occlusive cover for deeper penetration of medication.     All risks, benefits and alternatives were discussed with patient.  Patient is in agreement and understands the assessment and plan.  All questions were answered.  Amparo Bowman PA-C  [facility] Dermatology  _______________________________________    CC: Derm Problem  (Follow up warts on R foot)    HPI:  Mr. Pranay Ratliff is a(n) 74 year old male who presents today as a return patient for wart on the right foot. He is unsure of if the wart is still there. Patient is otherwise feeling well, without additional skin concerns.      Physical Exam:  SKIN: Focused examination of bilateral feet was performed.  - The right plantar foot x2 demonstrates verrucous papules.     - No other lesions of concern on areas examined.     Medications:  Current Outpatient Medications   Medication Sig Dispense Refill     aspirin 81 MG tablet Take by mouth daily       azelaic acid (FINACIA) 15 % external gel Apply to the face twice daily. 50 g 4     diclofenac (VOLTAREN) 1 % topical gel Apply topically 4 times daily       fluocinonide (LIDEX) 0.05 % external cream Apply topically 2 times daily 120 g 6     gabapentin (NEURONTIN) 100 MG capsule Take 100 mg by mouth Take 1 time daily at HS       multivitamin w/minerals (THERA-VIT-M) tablet Take 1 tablet by mouth daily       Omega-3 Fatty Acids (OMEGA-3 FISH OIL PO)        pantoprazole (PROTONIX) 40 MG EC tablet TAKE 1 TABLET BY MOUTH ONCE DAILY 30 MIN PRIOR TO BREAKFAST 90 tablet 1     rosuvastatin (CRESTOR) 20 MG tablet Take 1 tablet (20 mg) by mouth daily 90 tablet 1     triamterene-HCTZ (MAXZIDE-25) 37.5-25 MG tablet Take 0.5 tablets by mouth every morning 45 tablet 1     No current facility-administered medications for this visit.      Past Medical History:   Patient Active Problem List   Diagnosis     History of basal cell carcinoma     Benign essential hypertension     Hyperlipidemia LDL goal <100     Obstructive sleep apnea syndrome     Gastroesophageal reflux disease with esophagitis     Localized swelling of lower extremity     Vasovagal syncope     Elevated prostate specific antigen (PSA)     Concussion with loss of consciousness of 30 minutes or less, subsequent encounter     BPH with obstruction/lower urinary tract symptoms     Myofascial pain  syndrome, cervical     Occipital neuralgia of right side     Fatigue due to old head injury     Post concussion syndrome     Cervical segment dysfunction     Thoracic segment dysfunction     Cephalgia     Somatic dysfunction of sacral region     Cervical pain     Dizziness     Marital conflict     Chronic left-sided low back pain without sciatica     Past Medical History:   Diagnosis Date     Basal cell carcinoma      Concussion with loss of consciousness of 30 minutes or less, subsequent encounter      Epididymitis, bilateral      Epididymitis, left      Epididymitis, right      Obstructive sleep apnea syndrome      Squamous cell carcinoma of skin, unspecified      Vasovagal syncope        CC Referred Self, MD  No address on file on close of this encounter.       Again, thank you for allowing me to participate in the care of your patient.        Sincerely,        Amparo Bowman PA-C

## 2024-06-19 ENCOUNTER — TELEPHONE (OUTPATIENT)
Dept: GASTROENTEROLOGY | Facility: CLINIC | Age: 74
End: 2024-06-19
Payer: COMMERCIAL

## 2024-06-19 ENCOUNTER — DOCUMENTATION ONLY (OUTPATIENT)
Dept: OTHER | Facility: CLINIC | Age: 74
End: 2024-06-19
Payer: COMMERCIAL

## 2024-06-19 NOTE — TELEPHONE ENCOUNTER
Pre visit planning completed.      Procedure details:    Patient scheduled for Colonoscopy/Upper endoscopy (EGD) on 6/27/24.     Arrival time: 1015. Procedure time 1100    Facility location: [facility]; [address] [place] [address] [place] [address]. Check in location: [facility].     Sedation type: Conscious sedation     Pre op exam needed? N/A    Indication for procedure: EGD: Gerd, COLON: screening      Chart review:     Electronic implanted devices? No    Recent diagnosis of diverticulitis within the last 6 weeks? No    Diabetic? No      Medication review:    Anticoagulants? No    NSAIDS? No    Other medication HOLDING recommendations:  N/A      Prep for procedure:     Bowel prep recommendation: Standard Miralax  Due to: standard bowel prep.    Prep instructions sent via [employer] by [facility] on 5/30/24        Tania Garzon RN  Endoscopy Procedure Pre Assessment RN  [phone] option 4

## 2024-06-20 NOTE — TELEPHONE ENCOUNTER
Pre assessment completed for upcoming procedure.   (Please see previous telephone encounter notes for complete details)      Procedure details:    Arrival time and facility location reviewed.    Pre op exam needed? N/A    Designated  policy reviewed. Instructed to have someone stay 6 hours post procedure.       Medication review:    Medications reviewed. Please see supporting documentation below. Holding recommendations discussed (if applicable).   NSAID medication(s): Ibuprofen (Advil, Motrin): HOLD 1 day before procedure.  Fiber supplements (Psyllium per patient): HOLD 7 days before procedure.      Prep for procedure:     Patient read the prep instructions to writer over the phone. Reviewed any questions that patient had.    Patient was instructed to call if any additional questions or concerns arise.       Any additional information needed:  N/A      Patient verbalized understanding and had no questions or concerns at this time.      Gaby Sloan RN  Endoscopy Procedure Pre Assessment   [phone] option 4

## 2024-06-27 ENCOUNTER — HOSPITAL ENCOUNTER (OUTPATIENT)
Facility: CLINIC | Age: 74
Discharge: HOME | End: 2024-06-27
Attending: INTERNAL MEDICINE | Admitting: INTERNAL MEDICINE
Payer: COMMERCIAL

## 2024-06-27 ENCOUNTER — MYC REFILL (OUTPATIENT)
Dept: FAMILY MEDICINE | Facility: CLINIC | Age: 74
End: 2024-06-27
Payer: COMMERCIAL

## 2024-06-27 VITALS
HEIGHT: 71 IN | HEART RATE: 73 BPM | DIASTOLIC BLOOD PRESSURE: 68 MMHG | RESPIRATION RATE: 8 BRPM | SYSTOLIC BLOOD PRESSURE: 102 MMHG | WEIGHT: 218 LBS | BODY MASS INDEX: 30.52 KG/M2 | OXYGEN SATURATION: 97 %

## 2024-06-27 DIAGNOSIS — K21.9 GASTROESOPHAGEAL REFLUX DISEASE WITHOUT ESOPHAGITIS: ICD-10-CM

## 2024-06-27 LAB
COLONOSCOPY: NORMAL
UPPER GI ENDOSCOPY: NORMAL

## 2024-06-27 PROCEDURE — 88305 TISSUE EXAM BY PATHOLOGIST: CPT | Mod: 26 | Performed by: PATHOLOGY

## 2024-06-27 PROCEDURE — 258N000003 HC RX IP 258 OP 636: Performed by: INTERNAL MEDICINE

## 2024-06-27 PROCEDURE — 45380 COLONOSCOPY AND BIOPSY: CPT | Mod: PT | Performed by: INTERNAL MEDICINE

## 2024-06-27 PROCEDURE — 250N000011 HC RX IP 250 OP 636: Mod: JZ | Performed by: INTERNAL MEDICINE

## 2024-06-27 PROCEDURE — G0500 MOD SEDAT ENDO SERVICE >5YRS: HCPCS | Mod: PT | Performed by: INTERNAL MEDICINE

## 2024-06-27 PROCEDURE — 88305 TISSUE EXAM BY PATHOLOGIST: CPT | Mod: TC | Performed by: INTERNAL MEDICINE

## 2024-06-27 PROCEDURE — 43239 EGD BIOPSY SINGLE/MULTIPLE: CPT | Performed by: INTERNAL MEDICINE

## 2024-06-27 PROCEDURE — 250N000009 HC RX 250: Performed by: INTERNAL MEDICINE

## 2024-06-27 RX ORDER — SODIUM CHLORIDE 9 MG/ML
INJECTION, SOLUTION INTRAVENOUS CONTINUOUS PRN
Status: DISCONTINUED | OUTPATIENT
Start: 2024-06-27 | End: 2024-06-27 | Stop reason: HOSPADM

## 2024-06-27 RX ORDER — PANTOPRAZOLE SODIUM 40 MG/1
40 TABLET, DELAYED RELEASE ORAL DAILY
Qty: 90 TABLET | Refills: 2 | Status: SHIPPED | OUTPATIENT
Start: 2024-06-27

## 2024-06-27 RX ORDER — FENTANYL CITRATE 50 UG/ML
INJECTION, SOLUTION INTRAMUSCULAR; INTRAVENOUS PRN
Status: DISCONTINUED | OUTPATIENT
Start: 2024-06-27 | End: 2024-06-27 | Stop reason: HOSPADM

## 2024-06-27 ASSESSMENT — ACTIVITIES OF DAILY LIVING (ADL)
ADLS_ACUITY_SCORE: 35
ADLS_ACUITY_SCORE: 35

## 2024-07-02 ENCOUNTER — OFFICE VISIT (OUTPATIENT)
Dept: UROLOGY | Facility: CLINIC | Age: 74
End: 2024-07-02
Payer: COMMERCIAL

## 2024-07-02 DIAGNOSIS — R97.20 ELEVATED PROSTATE SPECIFIC ANTIGEN (PSA): ICD-10-CM

## 2024-07-02 DIAGNOSIS — N40.1 BPH WITH OBSTRUCTION/LOWER URINARY TRACT SYMPTOMS: Primary | ICD-10-CM

## 2024-07-02 DIAGNOSIS — N13.8 BPH WITH OBSTRUCTION/LOWER URINARY TRACT SYMPTOMS: Primary | ICD-10-CM

## 2024-07-02 PROCEDURE — 99214 OFFICE O/P EST MOD 30 MIN: CPT | Performed by: UROLOGY

## 2024-07-02 NOTE — NURSING NOTE
Pranay Ratliff's goals for this visit include:   Chief Complaint   Patient presents with    RECHECK     elevated PSA follow up 4.68 resulted on 4/22       He requests these members of his care team be copied on today's visit information:     PCP: Hipolito Alva    Referring Provider:  Referred Self, MD  No address on file    There were no vitals taken for this visit.    Do you need any medication refills at today's visit?     Nena Brown MA on 7/2/2024 at 9:20 AM

## 2024-07-02 NOTE — PROGRESS NOTES
"[facility]   CHIEF COMPLAINT   It was my pleasure to see Pranay Ratliff who is a 74 year old male for follow-up of Elevated PSA and LUTS.      HPI   Pranay Ratliff is a very pleasant 74 year old male    Prior pt of Dr. Guajardo - last seen 9/16/21:  \"Pranay Ratliff is a very pleasant 71 year old male who presents with a history of Elevated PSA (Prostate Specific Antigen).  PSA 5.3 and had s/p TRUS biopsy 8/2019. Has done well since biopsy which demonstrated no malignancy.     Also has history of BPH with weak stream and LUTS. Did not tolerate tamsulosin on previous trial of this. Is bothered by his urinary symptoms.  Has been on several medications over the years, most recently managed by urologist in [place], [place]. He is now back in [place]. Most recently was taking tadalafil 5 mg daily and dutasteride. Stopped these a few weeks ago given concerns about light-headedness. Currently still bothered by weak stream, and nocturia.    Restarted on dutasteride\"    3/16/22:  He never resumed the Dutasteride due to gynecomastia   He continues to have retrograde ejaculation   Urination is stable  Nocturia 2x/night  Overall, not too bothered by symptoms     He just moved back from [facility] last fall  He did have a cystoscopy 05/2021 with no concern for intravesical mass    4/6/22:  Follow-up today to review his updated MRI  He had no issues with this MRI    5/25/2022:  He did well after the biopsy without complications  Still occasional blood per urethra    7/17/2023:  He does feel like he is urinating more frequently   At times with some double voiding  Overall he is mildly bothered by this    AUASS: 4-3-4-0-0-0-2 = 5  QOL = 2    TODAY 7/2/2024:  Nocturia 2-3x/night  During the day he is voiding almost every hour  He can go for a long walk without issue  He is able to hold when needed  Drinks water, tea, cup of decaf coffee in the morning    He does have FISH and uses his CPAP nightly     PHYSICAL EXAM  Patient is a 74 year old  male "   Vitals: There were no vitals taken for this visit.  There is no height or weight on file to calculate BMI.  General Appearance Adult:   Alert, no acute distress, oriented  HENT: throat/mouth:normal, good dentition  Lungs: no respiratory distress, or pursed lip breathing  Heart: No obvious jugular venous distension present  Abdomen: non - distended  Musculoskeltal: extremities normal, no peripheral edema  Neuro: Alert, oriented, speech and mentation normal  Psych: affect and mood normal     PSA PSA Diag Urologic Phys PSA Tumor Marker   Latest Ref Rng 0.00 - 4.00 ug/L 0.00 - 4.00 ng/mL 0.00 - 6.50 ng/mL   1/30/2019 6.65 (H)      2/26/2019 6.32 (H)      4/22/2019  4.70 (H)     7/15/2019  5.30 (H)     9/16/2021 3.00      3/16/2022 9.20 (H)      11/21/2022 6.50 (H)      4/13/2023   4.79    4/22/2024   4.68       PATHOLOGY:  TRUS 8/6/2019  FINAL DIAGNOSIS:   A - G. Prostate biopsy template and target:   - Benign prostate tissue     MR-FUSION URONAV 5/16/22:  Final Diagnosis   A - L: Prostate, TEMPLATE, needle core biopsy:  - Negative for malignancy.     M: Prostate, left lesion x3, needle core biopsy:  - Negative for malignancy.  - Acute and chronic inflammation present.     IMAGING:  All pertinent imaging reviewed:    All imaging studies reviewed by me.  I personally reviewed these imaging films.  A formal report from radiology will follow.    MRI Prostate 4/20/2019  FINDINGS:  Size: 73.5 grams  IMPRESSION:  1. Based on the most suspicious abnormality, this exam is  characterized as PIRADS 3 - The presence of clinically significant  cancer is equivocal.?The most suspicious abnormality is located at the  left posterior apex peripheral zone and there is no evidence of  extraprostatic extension.  2. No suspicious adenopathy or evidence of pelvic metastases.     MRI PROSTATE 3/31/22:  FINDINGS:  Size: 4.2 x 5.1 x 6.0 cm, 66.8 grams  Hemorrhage: Absent  Peripheral zone: Heterogeneous on T2-weighted images.  Suspicious  lesions as detailed below.  Transition zone: Enlarged with BPH changes. Transition zone nodules  which are circumscribed or mostly encapsulated without diffusion  restriction.  PI-RADS 2.  No highly suspicious nodules.     Lesion(s) in rank order of severity (highest score- to lowest score,  then by size)      Lesion 1:  Location: Left apex peripheral zone at the   5  o'clock position  relative to the urethra. Series 7 image 71.  Size: 6 mm  T2 description: Heterogeneous signal intensity  T2 numerical assessment: 3  DWI description: Hypointense on ADC and hyperintense on high B-value  DWI  DWI numerical assessment: 3  DCE assessment: Positive    Prostate margin: No capsular abutment  Lesion overall PI-RADS category: 4     Neurovascular bundles: No suspicion of involvement by malignancy  Seminal vesicles: Not involved by tumor.  Lymph nodes: No adenopathy.  Bones: No suspicious lesions.  Other pelvic organs: Small fat-containing left inguinal hernia.                                                        IMPRESSION:  1. Based on the most suspicious abnormality, this exam is  characterized as PIRADS 4 - Clinically significant cancer is likely to  be present.?The most suspicious abnormality is located at the left  apex peripheral zone and there is no evidence of extraprostatic  extension. Compared to prior MR prostate 4/20/2019, this lesion is  similar in size and morphology, but now demonstrates focal  enhancement, raising the lesion to a PIRADS 4.  2. No suspicious adenopathy or evidence of pelvic metastases.       ASSESSMENT and PLAN  74-year-old man with history of BPH and LUTS with elevated PSA    Elevated PSA  -Reviewed his PSA history and trend  - His PSA has been reassuring for the last 2 years with a PSA of 4.7  - I reviewed his prior MRI and reviewed these images personally.  I agree with radiologist interpretation we discussed that his prostate size measured about 67 g making his PSA density less  than 0.1  - We discussed that for him to be comfortable with a PSA less than 6.5-7  - We also discussed guideline recommendations for cessation of annual PSA monitoring after the age of 75  - He may follow-up with me on a as needed basis    BPH and LUTS  -He has been doing fairly well from a urinary standpoint  - Trial of tamsulosin last year with some bothersome side effects and he has since stopped this  - We discussed the impacts of tea and coffee  - He may follow-up with me on appearing basis      Time spent: 15 minutes spent on the date of the encounter doing chart review, history and exam, documentation and further activities as noted above.    Note copy attestation: the elements have been reviewed, edited as needed, and remain pertinent to today's visit.     Vin Sinha MD   Urology  [facility]  [facility] Phone: [phone]  [facility] Phone: [phone]

## 2024-07-18 ENCOUNTER — OFFICE VISIT (OUTPATIENT)
Dept: DERMATOLOGY | Facility: CLINIC | Age: 74
End: 2024-07-18
Payer: COMMERCIAL

## 2024-07-18 DIAGNOSIS — L57.0 ACTINIC KERATOSES: ICD-10-CM

## 2024-07-18 DIAGNOSIS — B07.0 PLANTAR WART: Primary | ICD-10-CM

## 2024-07-18 PROCEDURE — 17000 DESTRUCT PREMALG LESION: CPT | Mod: 59 | Performed by: PHYSICIAN ASSISTANT

## 2024-07-18 PROCEDURE — 17110 DESTRUCTION B9 LES UP TO 14: CPT | Performed by: PHYSICIAN ASSISTANT

## 2024-07-18 NOTE — PATIENT INSTRUCTIONS
Patient Education        Proper skin care from [facility]:     -Eliminate harsh soaps as they strip the natural oils from the skin, often resulting in dry itchy skin ( i.e. Dial, Zest, [place] Spring)  -Use mild soaps such as Cetaphil or Dove Sensitive Skin in the shower. You do not need to use soap on arms, legs, and trunk every time you shower unless visibly soiled.   -Avoid hot or cold showers.  -After showering, lightly dry off and apply moisturizing within 2-3 minutes. This will help trap moisture in the skin.   -Aggressive use of a moisturizer at least 1-2 times a day to the entire body (including -Vanicream, Cetaphil, Aquaphor or Cerave) and moisturize hands after every washing.  -We recommend using moisturizers that come in a tub that needs to be scooped out, not a pump. This has more of an oil base. It will hold moisture in your skin much better than a water base moisturizer. The above recommended are non-pore clogging.        Wear a sunscreen with at least SPF 30 on your face, ears, neck and V of the chest daily. Wear sunscreen on other areas of the body if those areas are exposed to the sun throughout the day. Sunscreens can contain physical and/or chemical blockers. Physical blockers are less likely to clog pores, these include zinc oxide and titanium dioxide. Reapply every two hour and after swimming.      Sunscreen examples: https://www.ewg.org/sunscreen/     UV radiation  UVA radiation remains constant throughout the day and throughout the year. It is a longer wavelength than UVB and therefore penetrates deeper into the skin leading to immediate and delayed tanning, photoaging, and skin cancer. 70-80% of UVA and UVB radiation occurs between the hours of 10am-2pm.  UVB radiation  UVB radiation causes the most harmful effects and is more significant during the summer months. However, snow and ice can reflect UVB radiation leading to skin damage during the winter months as well. UVB radiation is  responsible for tanning, burning, inflammation, delayed erythema (pinkness), pigmentation (brown spots), and skin cancer.      I recommend self monthly full body exams and yearly full body exams with a dermatology provider. If you develop a new or changing lesion please follow up for examination. Most skin cancers are pink and scaly or pink and pearly. However, we do see blue/brown/black skin cancers.  Consider the ABCDEs of melanoma when giving yourself your monthly full body exam ( don't forget the groin, buttocks, feet, toes, etc). A-asymmetry, B-borders, C-color, D-diameter, E-elevation or evolving. If you see any of these changes please follow up in clinic. If you cannot see your back I recommend purchasing a hand held mirror to use with a larger wall mirror.       Checking for Skin Cancer  You can find cancer early by checking your skin each month. There are 3 kinds of skin cancer. They are melanoma, basal cell carcinoma, and squamous cell carcinoma. Doing monthly skin checks is the best way to find new marks or skin changes. Follow the instructions below for checking your skin.   The ABCDEs of checking moles for melanoma   Check your moles or growths for signs of melanoma using ABCDE:   Asymmetry: the sides of the mole or growth don t match  Border: the edges are ragged, notched, or blurred  Color: the color within the mole or growth varies  Diameter: the mole or growth is larger than 6 mm (size of a pencil eraser)  Evolving: the size, shape, or color of the mole or growth is changing (evolving is not shown in the images below)    Checking for other types of skin cancer  Basal cell carcinoma or squamous cell carcinoma have symptoms such as:      A spot or mole that looks different from all other marks on your skin  Changes in how an area feels, such as itching, tenderness, or pain  Changes in the skin's surface, such as oozing, bleeding, or scaliness  A sore that does not heal  New swelling or redness beyond  the border of a mole     Who s at risk?  Anyone can get skin cancer. But you are at greater risk if you have:   Fair skin, light-colored hair, or light-colored eyes  Many moles or abnormal moles on your skin  A history of sunburns from sunlight or tanning beds  A family history of skin cancer  A history of exposure to radiation or chemicals  A weakened immune system  If you have had skin cancer in the past, you are at risk for recurring skin cancer.   How to check your skin  Do your monthly skin checkups in front of a full-length mirror. Check all parts of your body, including your:   Head (ears, face, neck, and scalp)  Torso (front, back, and sides)  Arms (tops, undersides, upper, and lower armpits)  Hands (palms, backs, and fingers, including under the nails)  Buttocks and genitals  Legs (front, back, and sides)  Feet (tops, soles, toes, including under the nails, and between toes)  If you have a lot of moles, take digital photos of them each month. Make sure to take photos both up close and from a distance. These can help you see if any moles change over time.   Most skin changes are not cancer. But if you see any changes in your skin, call your doctor right away. Only he or she can diagnose a problem. If you have skin cancer, seeing your doctor can be the first step toward getting the treatment that could save your life.   [employer] last reviewed this educational content on 4/1/2019 2000-2020 [employer] [address] [place] [address], [place] [address]. All rights reserved. This information is not intended as a substitute for professional medical care. Always follow your healthcare professional's instructions.        When should I call my doctor?  If you are worsening or not improving, please, contact us or seek urgent care as noted below.      Who should I call with questions (adults)?  [facility] (adult and pediatric): [phone]  [facility]  [place] (adult): [phone]  [facility] ([place], [place], [place] and [place]) [phone]  For urgent needs outside of business hours call the [facility] at [phone] and ask for the dermatology resident on call to be paged  If this is a medical emergency and you are unable to reach an ER, Call 911        If you need a prescription refill, please contact your pharmacy. Refills are approved or denied by our Physicians during normal business hours, Monday through Fridays  Per office policy, refills will not be granted if you have not been seen within the past year (or sooner depending on your child's condition)

## 2024-07-18 NOTE — PROGRESS NOTES
[facility] Dermatology Note  Encounter Date: Jul 18, 2024  Office Visit     Reviewed patients past medical history and pertinent chart review prior to patients visit today.     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 Asteatotic dermatitis  - Current tx: fluocinonide 0.05% cream  3. Verruca plantaris, right foot s/p cryotherapy  - Current tx: WartPeel   4. Tinea pedis   5. AKs, forehead, temples, and nose  - Current tx: Efudex 5% cream (initiated 11/3/2022)  6. Rash, most consistent with tinea pedis, left medial foot s/p KOH prep 12/6/2022  7. Verruca plantaris  - cryotherapy    ____________________________________________    Assessment & Plan:     # Verruca vulgaris, right plantar foot x2.   - Cryotherapy: Patient elects to treat warts today with cryotherapy. After verbal consent and discussion of risks and benefits including but no limited to dyspigmentation/scar, blister, and pain. A total of 2 warts were treated with 1-2mm freeze border for 3 cycles with liquid nitrogen. Post cryotherapy instructions were provided.      - Continue salicylic acid 40% (such as wart stick) nightly followed by duct tape or bandage.  Pare down after removal of occlusive cover for deeper penetration of medication.     #Actinic keratosis x1  - We discussed the precancerous nature of the skin lesions.  I recommended liquid nitrogen cryotherapy and the patient was agreeable.      Cryotherapy procedure note, location(s): left neck x2 After verbal consent and discussion of risks and benefits including, but not limited to, dyspigmentation/scar, blister, and pain, the lesion(s) was(were) treated with 1-2 mm freeze border for 1-2 cycles with liquid nitrogen. Post cryotherapy instructions were provided.      All risks, benefits and  alternatives were discussed with patient.  Patient is in agreement and understands the assessment and plan.  All questions were answered.  Amparo Bowman PA-C  [facility]  _______________________________________    CC: Wart (Follow up wart on R foot)    HPI:  Mr. Pranay Ratliff is a(n) 74 year old male who presents today as a return patient for warts on the right foot. He has been applying the salicylic acid at home. Additionally, he notes a new rough spot on the left neck that is scaly and somewhat sore. Patient is otherwise feeling well, without additional skin concerns.      Physical Exam:  SKIN: Focused examination of left neck and right plantar foot was performed.  - The right plantar foot x2 demonstrates verrucous papules.   - Pink macule(s) with gritty scale involving the left neck, consistent with actinic keratosis.     - No other lesions of concern on areas examined.     Medications:  Current Outpatient Medications   Medication Sig Dispense Refill    aspirin 81 MG tablet Take by mouth daily      azelaic acid (FINACIA) 15 % external gel Apply to the face twice daily. 50 g 4    diclofenac (VOLTAREN) 1 % topical gel Apply topically 4 times daily      fluocinonide (LIDEX) 0.05 % external cream Apply topically 2 times daily 120 g 6    gabapentin (NEURONTIN) 100 MG capsule Take 100 mg by mouth Take 1 time daily at HS      multivitamin w/minerals (THERA-VIT-M) tablet Take 1 tablet by mouth daily      Omega-3 Fatty Acids (OMEGA-3 FISH OIL PO)       pantoprazole (PROTONIX) 40 MG EC tablet Take 1 tablet (40 mg) by mouth daily 90 tablet 2    rosuvastatin (CRESTOR) 20 MG tablet Take 1 tablet (20 mg) by mouth daily 90 tablet 1    triamterene-HCTZ (MAXZIDE-25) 37.5-25 MG tablet Take 0.5 tablets by mouth every morning 45 tablet 1     No current facility-administered medications for this visit.      Past Medical History:   Patient Active Problem List   Diagnosis    History of basal cell carcinoma     Benign essential hypertension    Hyperlipidemia LDL goal <100    Obstructive sleep apnea syndrome    Gastroesophageal reflux disease with esophagitis    Localized swelling of lower extremity    Vasovagal syncope    Elevated prostate specific antigen (PSA)    Concussion with loss of consciousness of 30 minutes or less, subsequent encounter    BPH with obstruction/lower urinary tract symptoms    Myofascial pain syndrome, cervical    Occipital neuralgia of right side    Fatigue due to old head injury    Post concussion syndrome    Cervical segment dysfunction    Thoracic segment dysfunction    Cephalgia    Somatic dysfunction of sacral region    Cervical pain    Dizziness    Marital conflict    Chronic left-sided low back pain without sciatica     Past Medical History:   Diagnosis Date    Basal cell carcinoma     Concussion with loss of consciousness of 30 minutes or less, subsequent encounter     Congestive heart failure (H)     Epididymitis, bilateral     Epididymitis, left     Epididymitis, right     Obstructive sleep apnea syndrome     Squamous cell carcinoma of skin, unspecified     Vasovagal syncope        CC Referred Self, MD  No address on file on close of this encounter.

## 2024-07-18 NOTE — LETTER
7/18/2024      Pranay Ratliff  [address]  [place] [address]      Dear Colleague,    Thank you for referring your patient, Pranay Ratliff, to the [facility]. Please see a copy of my visit note below.    [facility] D[facility]at[facility] Note  Encounter Date: Jul 18, 2024  Office Visit     Reviewed patients past medical history and pertinent chart review prior to patients visit today.     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 Asteatotic dermatitis  - Current tx: fluocinonide 0.05% cream  3. Verruca plantaris, right foot s/p cryotherapy  - Current tx: WartPeel   4. Tinea pedis   5. AKs, forehead, temples, and nose  - Current tx: Efudex 5% cream (initiated 11/3/2022)  6. Rash, most consistent with tinea pedis, left medial foot s/p KOH prep 12/6/2022  7. Verruca plantaris  - cryotherapy    ____________________________________________    Assessment & Plan:     # Verruca vulgaris, right plantar foot x2.   - Cryotherapy: Patient elects to treat warts today with cryotherapy. After verbal consent and discussion of risks and benefits including but no limited to dyspigmentation/scar, blister, and pain. A total of 2 warts were treated with 1-2mm freeze border for 3 cycles with liquid nitrogen. Post cryotherapy instructions were provided.      - Continue salicylic acid 40% (such as wart stick) nightly followed by duct tape or bandage.  Pare down after removal of occlusive cover for deeper penetration of medication.     #Actinic keratosis x1  - We discussed the precancerous nature of the skin lesions.  I recommended liquid nitrogen cryotherapy and the patient was agreeable.      Cryotherapy procedure note, location(s): left neck x2 After verbal consent and discussion of risks and benefits  including, but not limited to, dyspigmentation/scar, blister, and pain, the lesion(s) was(were) treated with 1-2 mm freeze border for 1-2 cycles with liquid nitrogen. Post cryotherapy instructions were provided.      All risks, benefits and alternatives were discussed with patient.  Patient is in agreement and understands the assessment and plan.  All questions were answered.  Amparo Bowman PA-C  [facility]  _______________________________________    CC: Wart (Follow up wart on R foot)    HPI:  Mr. Pranay Ratliff is a(n) 74 year old male who presents today as a return patient for warts on the right foot. He has been applying the salicylic acid at home. Additionally, he notes a new rough spot on the left neck that is scaly and somewhat sore. Patient is otherwise feeling well, without additional skin concerns.      Physical Exam:  SKIN: Focused examination of left neck and right plantar foot was performed.  - The right plantar foot x2 demonstrates verrucous papules.   - Pink macule(s) with gritty scale involving the left neck, consistent with actinic keratosis.     - No other lesions of concern on areas examined.     Medications:  Current Outpatient Medications   Medication Sig Dispense Refill     aspirin 81 MG tablet Take by mouth daily       azelaic acid (FINACIA) 15 % external gel Apply to the face twice daily. 50 g 4     diclofenac (VOLTAREN) 1 % topical gel Apply topically 4 times daily       fluocinonide (LIDEX) 0.05 % external cream Apply topically 2 times daily 120 g 6     gabapentin (NEURONTIN) 100 MG capsule Take 100 mg by mouth Take 1 time daily at HS       multivitamin w/minerals (THERA-VIT-M) tablet Take 1 tablet by mouth daily       Omega-3 Fatty Acids (OMEGA-3 FISH OIL PO)        pantoprazole (PROTONIX) 40 MG EC tablet Take 1 tablet (40 mg) by mouth daily 90 tablet 2     rosuvastatin (CRESTOR) 20 MG tablet Take 1 tablet (20 mg) by mouth daily 90 tablet 1     triamterene-HCTZ  (MAXZIDE-25) 37.5-25 MG tablet Take 0.5 tablets by mouth every morning 45 tablet 1     No current facility-administered medications for this visit.      Past Medical History:   Patient Active Problem List   Diagnosis     History of basal cell carcinoma     Benign essential hypertension     Hyperlipidemia LDL goal <100     Obstructive sleep apnea syndrome     Gastroesophageal reflux disease with esophagitis     Localized swelling of lower extremity     Vasovagal syncope     Elevated prostate specific antigen (PSA)     Concussion with loss of consciousness of 30 minutes or less, subsequent encounter     BPH with obstruction/lower urinary tract symptoms     Myofascial pain syndrome, cervical     Occipital neuralgia of right side     Fatigue due to old head injury     Post concussion syndrome     Cervical segment dysfunction     Thoracic segment dysfunction     Cephalgia     Somatic dysfunction of sacral region     Cervical pain     Dizziness     Marital conflict     Chronic left-sided low back pain without sciatica     Past Medical History:   Diagnosis Date     Basal cell carcinoma      Concussion with loss of consciousness of 30 minutes or less, subsequent encounter      Congestive heart failure (H)      Epididymitis, bilateral      Epididymitis, left      Epididymitis, right      Obstructive sleep apnea syndrome      Squamous cell carcinoma of skin, unspecified      Vasovagal syncope        CC Referred Self, MD  No address on file on close of this encounter.       Again, thank you for allowing me to participate in the care of your patient.        Sincerely,        Amparo Bowman PA-C

## 2024-07-29 ENCOUNTER — TELEPHONE (OUTPATIENT)
Dept: SCHEDULING | Facility: CLINIC | Age: 74
End: 2024-07-29
Payer: COMMERCIAL

## 2024-07-29 NOTE — TELEPHONE ENCOUNTER
Reason for Call:  Appointment Request    Patient requesting this type of appt:  Office visit    Requested provider: Hipolito Alva    Reason patient unable to be scheduled: Not within requested timeframe    When does patient want to be seen/preferred time: 1-2 weeks    Comments: Pt is requesting appointment for cramping/stiffness in hands over last month, as well as right knee discomfort.    Could we send this information to you in [facility] or would you prefer to receive a phone call?:   Patient would prefer a phone call   Okay to leave a detailed message?: Yes at Cell number on file:    Telephone Information:   Mobile [phone]       Call taken on 7/29/2024 at 3:32 PM by Di Clifton

## 2024-08-07 ENCOUNTER — OFFICE VISIT (OUTPATIENT)
Dept: FAMILY MEDICINE | Facility: CLINIC | Age: 74
End: 2024-08-07
Payer: COMMERCIAL

## 2024-08-07 VITALS
TEMPERATURE: 98.2 F | SYSTOLIC BLOOD PRESSURE: 135 MMHG | DIASTOLIC BLOOD PRESSURE: 87 MMHG | WEIGHT: 228.5 LBS | BODY MASS INDEX: 31.87 KG/M2 | OXYGEN SATURATION: 98 % | HEART RATE: 68 BPM | RESPIRATION RATE: 12 BRPM

## 2024-08-07 DIAGNOSIS — E55.9 VITAMIN D DEFICIENCY: ICD-10-CM

## 2024-08-07 DIAGNOSIS — M62.838 NECK MUSCLE SPASM: ICD-10-CM

## 2024-08-07 DIAGNOSIS — M15.3 SECONDARY MULTIPLE ARTHRITIS: Primary | ICD-10-CM

## 2024-08-07 LAB
CRP SERPL-MCNC: <3 MG/L
ERYTHROCYTE [SEDIMENTATION RATE] IN BLOOD BY WESTERGREN METHOD: 8 MM/HR (ref 0–20)
RHEUMATOID FACT SERPL-ACNC: <10 IU/ML
VIT D+METAB SERPL-MCNC: 36 NG/ML (ref 20–50)

## 2024-08-07 PROCEDURE — 85652 RBC SED RATE AUTOMATED: CPT | Performed by: FAMILY MEDICINE

## 2024-08-07 PROCEDURE — 86431 RHEUMATOID FACTOR QUANT: CPT | Performed by: FAMILY MEDICINE

## 2024-08-07 PROCEDURE — 86140 C-REACTIVE PROTEIN: CPT | Performed by: FAMILY MEDICINE

## 2024-08-07 PROCEDURE — 82306 VITAMIN D 25 HYDROXY: CPT | Performed by: FAMILY MEDICINE

## 2024-08-07 PROCEDURE — 36415 COLL VENOUS BLD VENIPUNCTURE: CPT | Performed by: FAMILY MEDICINE

## 2024-08-07 PROCEDURE — 99214 OFFICE O/P EST MOD 30 MIN: CPT | Performed by: FAMILY MEDICINE

## 2024-08-07 ASSESSMENT — PAIN SCALES - GENERAL: PAINLEVEL: MODERATE PAIN (4)

## 2024-08-15 ENCOUNTER — OFFICE VISIT (OUTPATIENT)
Dept: DERMATOLOGY | Facility: CLINIC | Age: 74
End: 2024-08-15
Payer: COMMERCIAL

## 2024-08-15 DIAGNOSIS — D49.2 NEOPLASM OF UNSPECIFIED BEHAVIOR OF BONE, SOFT TISSUE, AND SKIN: ICD-10-CM

## 2024-08-15 DIAGNOSIS — L82.0 INFLAMED SEBORRHEIC KERATOSIS: ICD-10-CM

## 2024-08-15 DIAGNOSIS — B07.0 PLANTAR WART: ICD-10-CM

## 2024-08-15 DIAGNOSIS — L57.0 ACTINIC KERATOSES: Primary | ICD-10-CM

## 2024-08-15 PROCEDURE — 17110 DESTRUCTION B9 LES UP TO 14: CPT | Performed by: PHYSICIAN ASSISTANT

## 2024-08-15 PROCEDURE — 11102 TANGNTL BX SKIN SINGLE LES: CPT | Mod: XS | Performed by: PHYSICIAN ASSISTANT

## 2024-08-15 PROCEDURE — 17000 DESTRUCT PREMALG LESION: CPT | Mod: XS | Performed by: PHYSICIAN ASSISTANT

## 2024-08-15 PROCEDURE — 17003 DESTRUCT PREMALG LES 2-14: CPT | Mod: XS | Performed by: PHYSICIAN ASSISTANT

## 2024-08-15 PROCEDURE — 88305 TISSUE EXAM BY PATHOLOGIST: CPT | Performed by: DERMATOLOGY

## 2024-08-15 NOTE — LETTER
8/15/2024      Pranay Ratliff  [address]  [facility] [address]      Dear Colleague,    Thank you for referring your patient, Pranay Ratliff, to [facility]. Please see a copy of my visit note below.    [facility] Dermatology Note  Encounter Date: Aug 15, 2024  Office Visit     Reviewed patients past medical history and pertinent chart review prior to patients visit today.     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 Asteatotic dermatitis  - Current tx: fluocinonide 0.05% cream  3. Verruca plantaris, right foot s/p cryotherapy  - Current tx: WartPeel   4. Tinea pedis   5. AKs, forehead, temples, and nose  - Current tx: Efudex/ Dovonex 8/15/2024   - Past tx: Efudex 5% cream (initiated 11/3/2022)  6. Rash, most consistent with tinea pedis, left medial foot s/p KOH prep 12/6/2022  7. Verruca plantaris  - cryotherapy    ____________________________________________    Assessment & Plan:     # Verruca vulgaris, right plantar foot x2.   - Cryotherapy: Patient elects to treat warts today with cryotherapy. After verbal consent and discussion of risks and benefits including but no limited to dyspigmentation/scar, blister, and pain. A total of 2 warts were treated with 1-2mm freeze border for 3 cycles with liquid nitrogen. Post cryotherapy instructions were provided.      - Continue salicylic acid 40% (such as wart stick) nightly followed by duct tape or bandage.  Pare down after removal of occlusive cover for deeper penetration of medication.     # Neoplasm of uncertain behavior:  right vertex scalp  DDx includes BCC vs nevus vs cyst. Shave biopsy today.    Procedure Note: Biopsy by shave technique  The risks and benefits of the procedure were described to the patient. These include  but are not limited to bleeding, infection, scar, incomplete removal, and non-diagnostic biopsy. Verbal informed consent was obtained. The above site(s) was cleansed with an alcohol pad and injected with 1% lidocaine with epinephrine. Once anesthesia was obtained, a biopsy(ies) was performed with Gilette blade. The tissue(s) was placed in a labeled container(s) with formalin and sent to pathology. Hemostasis was achieved with aluminum chloride. Vaseline and a bandage were applied to the wound(s). The patient tolerated the procedure well and was given post biopsy care instructions.    # Inflamed seborrheic keratoses x1  The benign nature of the skin lesion(s) was discussed with the patient.  Due to the inflamed and irritated nature of the lesions I recommend cryotherapy and the patient was agreeable.      Cryotherapy procedure note, location(s): right nasal bridge x1 After verbal consent and discussion of risks and benefits including, but not limited to, dyspigmentation/scar, blister, and pain, the lesion(s) was(were) treated with 1-2 mm freeze border for 1-2 cycles with liquid nitrogen. Post cryotherapy instructions were provided.    # Actinic keratoses  Due to the diffuse nature of the actinic keratoses involving the forehead, temples, cheeks, and nose, we discussed options for treatment including liquid nitrogen cryotherapy versus topical calcipotriene 0.005% and fluorouracil 5% cream.  The patient was interested in undergoing topical therapy.  We discussed side effects including redness, pain, and sun sensitivity.  I recommended diligent avoidance of application to the eyes.  The patient had no further questions.    Aks on scalp more individual. I recommended cryotherapy and patient was agreeable.   - Cryotherapy procedure note, location(s): vertex scalp x3. After verbal consent and discussion of risks and benefits including, but not limited to, dyspigmentation/scar, blister, and pain, 3 lesion(s) was(were)  treated with 1-2 mm freeze border for 1-2 cycles with liquid nitrogen. Post cryotherapy instructions were provided.       All risks, benefits and alternatives were discussed with patient.  Patient is in agreement and understands the assessment and plan.  All questions were answered.  Amparo Bowman PA-C  [facility] Dermatology  _______________________________________    CC: RECHECK (Plantar wart tx right foot)    HPI:  Mr. Pranay Ratliff is a(n) 74 year old male who presents today as a return patient for wart treatment. The wart has persisted.  Second, he notes rough spots on the scalp and forehead.  The lesions are asymptomatic.  Family, he reports a bump on the right nasal bridge that often becomes irritated and inflamed when it rubs against his glasses.  Patient is otherwise feeling well, without additional skin concerns.      Physical Exam:  SKIN: Focused examination of scalp, face, right plantar foot was performed.  - Pink macule(s) with gritty scale involving the vertex scalp, forehead, temples, cheeks, and nose, consistent with actinic keratosis.   - Involving the right vertex scalp is a 0.7 x 0.7 cm pink papule with adherent scale.   - The right nasal bridge demonstrates waxy papule(s) with an erythematous base, consistent with inflamed seborrheic keratoses.   - The right plantar foot x2 demonstrates verrucous papules.     - No other lesions of concern on areas examined.     Medications:  Current Outpatient Medications   Medication Sig Dispense Refill     aspirin 81 MG tablet Take by mouth daily       azelaic acid (FINACIA) 15 % external gel Apply to the face twice daily. 50 g 4     diclofenac (VOLTAREN) 1 % topical gel Apply topically 4 times daily       fluocinonide (LIDEX) 0.05 % external cream Apply topically 2 times daily 120 g 6     gabapentin (NEURONTIN) 100 MG capsule Take 100 mg by mouth Take 1 time daily at HS       multivitamin w/minerals (THERA-VIT-M) tablet Take 1 tablet by mouth daily        Omega-3 Fatty Acids (OMEGA-3 FISH OIL PO)        pantoprazole (PROTONIX) 40 MG EC tablet Take 1 tablet (40 mg) by mouth daily 90 tablet 2     rosuvastatin (CRESTOR) 20 MG tablet Take 1 tablet (20 mg) by mouth daily 90 tablet 1     triamterene-HCTZ (MAXZIDE-25) 37.5-25 MG tablet Take 0.5 tablets by mouth every morning 45 tablet 1     No current facility-administered medications for this visit.      Past Medical History:   Patient Active Problem List   Diagnosis     History of basal cell carcinoma     Benign essential hypertension     Hyperlipidemia LDL goal <100     Obstructive sleep apnea syndrome     Gastroesophageal reflux disease with esophagitis     Localized swelling of lower extremity     Vasovagal syncope     Elevated prostate specific antigen (PSA)     Concussion with loss of consciousness of 30 minutes or less, subsequent encounter     BPH with obstruction/lower urinary tract symptoms     Myofascial pain syndrome, cervical     Occipital neuralgia of right side     Fatigue due to old head injury     Post concussion syndrome     Cervical segment dysfunction     Thoracic segment dysfunction     Cephalgia     Somatic dysfunction of sacral region     Cervical pain     Dizziness     Marital conflict     Chronic left-sided low back pain without sciatica     Past Medical History:   Diagnosis Date     Basal cell carcinoma      Concussion with loss of consciousness of 30 minutes or less, subsequent encounter      Congestive heart failure (H)      Epididymitis, bilateral      Epididymitis, left      Epididymitis, right      Obstructive sleep apnea syndrome      Squamous cell carcinoma of skin, unspecified      Vasovagal syncope        CC Referred Self, MD  No address on file on close of this encounter.       Again, thank you for allowing me to participate in the care of your patient.        Sincerely,        Amparo Bowman PA-C

## 2024-08-15 NOTE — PROGRESS NOTES
[facility] Dermatology Note  Encounter Date: Aug 15, 2024  Office Visit     Reviewed patients past medical history and pertinent chart review prior to patients visit today.     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 Asteatotic dermatitis  - Current tx: fluocinonide 0.05% cream  3. Verruca plantaris, right foot s/p cryotherapy  - Current tx: WartPeel   4. Tinea pedis   5. AKs, forehead, temples, and nose  - Current tx: Efudex/ Dovonex 8/15/2024   - Past tx: Efudex 5% cream (initiated 11/3/2022)  6. Rash, most consistent with tinea pedis, left medial foot s/p KOH prep 12/6/2022  7. Verruca plantaris  - cryotherapy    ____________________________________________    Assessment & Plan:     # Verruca vulgaris, right plantar foot x2.   - Cryotherapy: Patient elects to treat warts today with cryotherapy. After verbal consent and discussion of risks and benefits including but no limited to dyspigmentation/scar, blister, and pain. A total of 2 warts were treated with 1-2mm freeze border for 3 cycles with liquid nitrogen. Post cryotherapy instructions were provided.      - Continue salicylic acid 40% (such as wart stick) nightly followed by duct tape or bandage.  Pare down after removal of occlusive cover for deeper penetration of medication.     # Neoplasm of uncertain behavior:  right vertex scalp  DDx includes BCC vs nevus vs cyst. Shave biopsy today.    Procedure Note: Biopsy by shave technique  The risks and benefits of the procedure were described to the patient. These include but are not limited to bleeding, infection, scar, incomplete removal, and non-diagnostic biopsy. Verbal informed consent was obtained. The above site(s) was cleansed with an alcohol pad and injected with 1% lidocaine with epinephrine. Once  anesthesia was obtained, a biopsy(ies) was performed with Gilette blade. The tissue(s) was placed in a labeled container(s) with formalin and sent to pathology. Hemostasis was achieved with aluminum chloride. Vaseline and a bandage were applied to the wound(s). The patient tolerated the procedure well and was given post biopsy care instructions.    # Inflamed seborrheic keratoses x1  The benign nature of the skin lesion(s) was discussed with the patient.  Due to the inflamed and irritated nature of the lesions I recommend cryotherapy and the patient was agreeable.      Cryotherapy procedure note, location(s): right nasal bridge x1 After verbal consent and discussion of risks and benefits including, but not limited to, dyspigmentation/scar, blister, and pain, the lesion(s) was(were) treated with 1-2 mm freeze border for 1-2 cycles with liquid nitrogen. Post cryotherapy instructions were provided.    # Actinic keratoses  Due to the diffuse nature of the actinic keratoses involving the forehead, temples, cheeks, and nose, we discussed options for treatment including liquid nitrogen cryotherapy versus topical calcipotriene 0.005% and fluorouracil 5% cream.  The patient was interested in undergoing topical therapy.  We discussed side effects including redness, pain, and sun sensitivity.  I recommended diligent avoidance of application to the eyes.  The patient had no further questions.    Aks on scalp more individual. I recommended cryotherapy and patient was agreeable.   - Cryotherapy procedure note, location(s): vertex scalp x3. After verbal consent and discussion of risks and benefits including, but not limited to, dyspigmentation/scar, blister, and pain, 3 lesion(s) was(were) treated with 1-2 mm freeze border for 1-2 cycles with liquid nitrogen. Post cryotherapy instructions were provided.       All risks, benefits and alternatives were discussed with patient.  Patient is in agreement and understands the assessment  and plan.  All questions were answered.  [facility] Dermat[facility]  _______________________________________    CC: RECHECK (Plantar wart tx right foot)    HPI:  Mr. Pranay Ratliff is a(n) 74 year old male who presents today as a return patient for wart treatment. The wart has persisted.  Second, he notes rough spots on the scalp and forehead.  The lesions are asymptomatic.  Family, he reports a bump on the right nasal bridge that often becomes irritated and inflamed when it rubs against his glasses.  Patient is otherwise feeling well, without additional skin concerns.      Physical Exam:  SKIN: Focused examination of scalp, face, right plantar foot was performed.  - Pink macule(s) with gritty scale involving the vertex scalp, forehead, temples, cheeks, and nose, consistent with actinic keratosis.   - Involving the right vertex scalp is a 0.7 x 0.7 cm pink papule with adherent scale.   - The right nasal bridge demonstrates waxy papule(s) with an erythematous base, consistent with inflamed seborrheic keratoses.   - The right plantar foot x2 demonstrates verrucous papules.     - No other lesions of concern on areas examined.     Medications:  Current Outpatient Medications   Medication Sig Dispense Refill    aspirin 81 MG tablet Take by mouth daily      azelaic acid (FINACIA) 15 % external gel Apply to the face twice daily. 50 g 4    diclofenac (VOLTAREN) 1 % topical gel Apply topically 4 times daily      fluocinonide (LIDEX) 0.05 % external cream Apply topically 2 times daily 120 g 6    gabapentin (NEURONTIN) 100 MG capsule Take 100 mg by mouth Take 1 time daily at HS      multivitamin w/minerals (THERA-VIT-M) tablet Take 1 tablet by mouth daily      Omega-3 Fatty Acids (OMEGA-3 FISH OIL PO)       pantoprazole (PROTONIX) 40 MG EC tablet Take 1 tablet (40 mg) by mouth daily 90 tablet 2    rosuvastatin (CRESTOR) 20 MG tablet Take 1 tablet (20 mg) by mouth daily 90 tablet 1    triamterene-HCTZ  (MAXZIDE-25) 37.5-25 MG tablet Take 0.5 tablets by mouth every morning 45 tablet 1     No current facility-administered medications for this visit.      Past Medical History:   Patient Active Problem List   Diagnosis    History of basal cell carcinoma    Benign essential hypertension    Hyperlipidemia LDL goal <100    Obstructive sleep apnea syndrome    Gastroesophageal reflux disease with esophagitis    Localized swelling of lower extremity    Vasovagal syncope    Elevated prostate specific antigen (PSA)    Concussion with loss of consciousness of 30 minutes or less, subsequent encounter    BPH with obstruction/lower urinary tract symptoms    Myofascial pain syndrome, cervical    Occipital neuralgia of right side    Fatigue due to old head injury    Post concussion syndrome    Cervical segment dysfunction    Thoracic segment dysfunction    Cephalgia    Somatic dysfunction of sacral region    Cervical pain    Dizziness    Marital conflict    Chronic left-sided low back pain without sciatica     Past Medical History:   Diagnosis Date    Basal cell carcinoma     Concussion with loss of consciousness of 30 minutes or less, subsequent encounter     Congestive heart failure (H)     Epididymitis, bilateral     Epididymitis, left     Epididymitis, right     Obstructive sleep apnea syndrome     Squamous cell carcinoma of skin, unspecified     Vasovagal syncope        CC Referred Self, MD  No address on file on close of this encounter.

## 2024-08-15 NOTE — PATIENT INSTRUCTIONS
Efudex and Dovonex combination Treatment  Today, you are being prescribed Fluorouracil (Efudex) and Calcipotriene (Dovonex) a topical cream used for the treatment of Actinic Keratosis (AKs).  The medication is working to eliminate the precancerous cells.    Your treatment will last 5-7 days. You may experience some discomfort while being treated.  Stop any other creams such as glycolic acid products, retin A, Tazorac, etc. to the area. You may use bland makeup/cover-up as long as it doesn't sting or cause you discomfort.  When using Fluorouracil and Dovonex, apply the cream both morning and night for 5-7 days as your provider recommends. Use a Q-tip or use gloves if applying it with your fingertips. If applied with unprotected fingertips, it is important to wash your hands well after you apply this medicine.   Keep this medication away from pets.  We recommend avoiding excessive sun exposure to the treated area. Wear a sunscreen with SPF 50+ to the areas being treated prior to any sun exposure as you will be more sensitive to the sun and more prone to sunburn while being treated and afterwards during healing process.   You may use ointments such as vaseline or Aquaphor, or moisturizing creams such as Vanicream or Cetaphil cream over bothersome areas. If the reaction becomes itchy you may apply over the counter hydrocorisone 1% cream or ointment twice daily to itchy areas. If the reaction becomes too bothersome, please call the clinic as we may need to discontinue treatment or reevaluate in the clinic.     Potential Side Effects  Your treated areas may be red and inflamed during therapy.  This will improve slowly following the discontinuation of therapy.   During the beginning of treatment, mild inflammation may occur.   During the end of treatment, redness, and swelling may occur with some crusting and burning.   Lesions resolve as the skin exfoliates.   Over 1 to 2 weeks, new skin grows into the treatment  area.  Keep this medication away from pets as it can be very harmful to them.     Specific side effects that usually do not require medical attention (report to your doctor or health care professional if they continue or are bothersome) include:  Red or dark-colored skin   Mild erosion (loss of upper layer of skin)   Mild eye irritation including burning, itching, sensitivity, stinging, or watering   Increased sensitivity of the skin to sun and ultraviolet light   Pain and burning of the affected area   Dryness, scaling or swelling of the affected area   Skin rash, itching of the affected area   Tenderness   If you have severe pain, please, call the clinic immediately and indicate that you have pain.  Ask for a call from the RN.     Who should I call with questions?  Dermatology nurse line: [phone]     .Wound Care After a Biopsy    What is a skin biopsy?  A skin biopsy allows the doctor to examine a very small piece of tissue under the microscope to determine the diagnosis and the best treatment for the skin condition. A local anesthetic (numbing medicine) is injected with a very small needle into the skin area to be tested. A small piece of skin is taken from the area. Sometimes a suture (stitch) is used.     What are the risks of a skin biopsy?  I will experience scar, bleeding, swelling, pain, crusting and redness. I may experience incomplete removal or recurrence. Risks of this procedure are excessive bleeding, bruising, infection, nerve damage, numbness, thick (hypertrophic or keloidal) scar and non-diagnostic biopsy.    How should I care for my wound for the first 24 hours?  Keep the wound dry and covered for 24 hours  If it bleeds, hold direct pressure on the area for 15 minutes. If bleeding does not stop, call us or go to the emergency room  Avoid strenuous exercise the first 1-2 days or as your doctor instructs you    How should I care for the wound after 24 hours?  After 24 hours, remove the  bandage  You may bathe or shower as normal  If you had a scalp biopsy, you can shampoo as usual and can use shower water to clean the biopsy site daily  Clean the wound once a day with gentle soap and water  Do not scrub, be gentle  Apply white petroleum/Vaseline after cleaning the wound with a cotton swab or a clean finger, and keep the site covered with a Bandaid /bandage. Bandages are not necessary with a scalp biopsy  If you are unable to cover the site with a Bandaid /bandage, re-apply ointment 2-3 times a day to keep the site moist. Moisture will help with healing  Avoid strenuous activity for first 1-2 days  Avoid lakes, rivers, pools, and oceans until the stitches are removed or the site is healed    How do I clean my wound?  Wash hands thoroughly with soap or use hand  before all wound care  Clean the wound with gentle soap and water  Apply white petroleum/Vaseline  to wound after it is clean  Replace the Bandaid /bandage to keep the wound covered for the first few days or as instructed by your doctor  If you had a scalp biopsy, warm shower water to the area on a daily basis should suffice    What should I use to clean my wound?   Cotton-tipped applicators (Qtips )  White petroleum jelly (Vaseline or Aquaphor ). Use a clean new container and use Q-tips to apply.  Bandaids  as needed  Gentle soap     How should I care for my wound long term?  Do not get your wound dirty  Keep up with wound care for one week or until the area is healed.  A small scab will form and fall off by itself when the area is completely healed. The area will be red and will become pink in color as it heals. Sun protection is very important for how your scar will turn out. Sunscreen with an SPF 30 or greater is recommended once the area is healed.  You should have some soreness but it should be mild and slowly go away over several days. Talk to your doctor about using tylenol for pain,    When should I call my doctor?  If you  have increased:   Pain or swelling  Pus or drainage (clear or slightly yellow drainage is ok)  Temperature over 100F  Spreading redness or warmth around wound    When will I hear about my results?  The biopsy results can take up to 2 weeks to come back.  Your results will automatically release to [employer] before your provider has even reviewed them.  The clinic will call you with the results, send you a [employer] message, or have you schedule a follow-up clinic or phone time to discuss the results.  Contact our clinics if you do not hear from us in 2 weeks.    Who should I call with questions?  [facility]: [phone]  For urgent needs outside of business hours call the [facility] at [phone] and ask for the dermatology resident on call Cryotherapy    What is it?  Use of a very cold liquid, such as liquid nitrogen, to freeze and destroy abnormal skin cells that need to be removed    What should I expect?  Tenderness and redness  A small blister that might grow and fill with dark purple blood. There may be crusting.  More than one treatment may be needed if the lesions do not go away.    How do I care for the treated area?  Gently wash the area with your hands when bathing.  Use a thin layer of Vaseline to help with healing. You may use a Band-Aid.   The area should heal within 7-10 days and may leave behind a pink or lighter color.   Do not use an antibiotic or Neosporin ointment.   You may take acetaminophen (Tylenol) for pain.     Call your Doctor if you have:  Severe pain  Signs of infection (warmth, redness, cloudy yellow drainage, and or a bad smell)  Questions or concerns    Who should I call with questions?      [facility] [phone]      For urgent needs outside of business hours call the [facility] at [phone] and ask for the dermatology resident on call

## 2024-08-16 LAB
PATH REPORT.COMMENTS IMP SPEC: ABNORMAL
PATH REPORT.COMMENTS IMP SPEC: ABNORMAL
PATH REPORT.COMMENTS IMP SPEC: YES
PATH REPORT.FINAL DX SPEC: ABNORMAL
PATH REPORT.GROSS SPEC: ABNORMAL
PATH REPORT.MICROSCOPIC SPEC OTHER STN: ABNORMAL
PATH REPORT.RELEVANT HX SPEC: ABNORMAL

## 2024-08-19 ENCOUNTER — TELEPHONE (OUTPATIENT)
Dept: DERMATOLOGY | Facility: CLINIC | Age: 74
End: 2024-08-19
Payer: COMMERCIAL

## 2024-08-19 DIAGNOSIS — C44.41 BASAL CELL CARCINOMA (BCC) OF SCALP: Primary | ICD-10-CM

## 2024-08-19 NOTE — TELEPHONE ENCOUNTER
S/w pt and went over Amparo's message below about biopsy results.  Explained and answered all questions about the mohs procedure.  Scheduled with Dr. Gallagher on Wednesday September 11th at 7:45 am at [place].    Mohs letter and information sent via my chart and mailed home.    Jenifer MENENDEZ RN  [facility]  [phone]

## 2024-08-19 NOTE — LETTER
[facility]  [address] [place] [address]  [phone]      August 19, 2024    Pranay Ratliff  [address] [place] [address]      Dear Pranay      You are scheduled for Mohs Surgery on Wednesday September 11th at 7:45 am.     Please check in at 2nd floor D[facility]atology [facility].     Be sure to eat a good breakfast and bathe and wash your hair prior to Surgery. Please bring  with you if this is above your neck    If you are taking any anti-coagulants that are prescribed by your Doctor (such as Coumadin/warfarin, Plavix, Aspirin, Ibuprofen), please continue taking them.     However, If you are taking anti-coagulants over the counter without  a Doctor's order for a Medical condition, please discontinue them 10 days prior to Surgery.      Please wear loose comfortable clothing as it could possibly be 4-6 hours until your surgery is completed depending upon how many layers of tissue need to be removed.     Thank you,    Tato Gallagher MD

## 2024-08-19 NOTE — TELEPHONE ENCOUNTER
Patient Contact    Attempt # 1    Was call answered?  No.  Left message on voicemail with information to call nurse back at [phone].    Jenifer MENENDEZ RN  [facility]  [phone]

## 2024-08-19 NOTE — TELEPHONE ENCOUNTER
----- Message from Amparo Bowman sent at 8/16/2024  4:56 PM CDT -----  [facility], needs Mohs. Please place order and call patient to schedule. Thank you!    Right vertex scalp:  - Basal cell carcinoma, nodular type

## 2024-08-26 ENCOUNTER — THERAPY VISIT (OUTPATIENT)
Dept: PHYSICAL THERAPY | Facility: CLINIC | Age: 74
End: 2024-08-26
Attending: FAMILY MEDICINE
Payer: COMMERCIAL

## 2024-08-26 DIAGNOSIS — M62.838 NECK MUSCLE SPASM: ICD-10-CM

## 2024-08-26 DIAGNOSIS — M25.561 CHRONIC PAIN OF RIGHT KNEE: ICD-10-CM

## 2024-08-26 DIAGNOSIS — G89.29 CHRONIC PAIN OF RIGHT KNEE: ICD-10-CM

## 2024-08-26 DIAGNOSIS — M54.2 NECK PAIN: Primary | ICD-10-CM

## 2024-08-26 PROCEDURE — 97140 MANUAL THERAPY 1/> REGIONS: CPT | Mod: GP | Performed by: PHYSICAL THERAPIST

## 2024-08-26 PROCEDURE — 97110 THERAPEUTIC EXERCISES: CPT | Mod: GP | Performed by: PHYSICAL THERAPIST

## 2024-08-26 PROCEDURE — 97161 PT EVAL LOW COMPLEX 20 MIN: CPT | Mod: GP | Performed by: PHYSICAL THERAPIST

## 2024-08-26 NOTE — PROGRESS NOTES
PHYSICAL THERAPY EVALUATION  Type of Visit: Evaluation  Patient reports having a long history of neck pain and pressure with symptoms increasing again on 06/15/2024.  He also reports having a history of R knee pain since a climbing injury years ago.  Pain has worsened in the R knee since 02/15/2024 for unknown reasons.  Patient is active with yoga-type classes.         Fall Risk Screen:  Fall screen completed by: PT  Have you fallen 2 or more times in the past year?: No  Have you fallen and had an injury in the past year?: No  Is patient a fall risk?: No    Subjective       Presenting condition or subjective complaint: Muscle spasms & stiffness in neck & head. Also right knee issue due to degenerative arthritis with pain & limiting some activity.  Date of onset: 06/15/24 (06/15/2024 neck; 02/15/2024 R knee)    Relevant medical history: Arthritis; Cancer; Hearing problems; Migraines or headaches; Neck injury; Overweight; Sleep disorder like apnea   Dates & types of surgery: Basal cell MOHS surgery. Another one set for 9/11/2024.    Prior diagnostic imaging/testing results: MRI; CT scan     Prior therapy history for the same diagnosis, illness or injury: Yes Physical therapy for neck/head. Physical therapy for knee years ago.    Prior Level of Function  Transfers: Independent  Ambulation: Independent  ADL: Independent  IADL:     Living Environment  Social support: With a significant other or spouse   Type of home: [facility]; 2-story   Stairs to enter the home: Yes 3 Is there a railing: Yes     Ramp: No   Stairs inside the home: Yes 14 Is there a railing: Yes     Help at home: None  Equipment owned:       Employment: No    Hobbies/Interests: Reading, walking, yoga, chi gong, plants, music, public television.    Patient goals for therapy: Neck & head stiffness is uncomfortable & contributes to moderate headaches. Right knee pain & arthritis is limiting some activity like longer walks & some exercises that involve knee  "bending & strength--like yoga, weight lifting.    Pain assessment: Location: base of head and R side of head, pressure from forehead to base of skull; R knee--in the joint/Rating: 3/10 neck, 0-5/10 knee     Objective   KNEE EVALUATION  PAIN: Pain Level at Rest: 0/10  Pain Level with Use: 5/10  Pain Location: L knee inside  Pain Quality: aching, sharp  Pain Frequency: intermittent  Pain is Worst: no difference time of day  Pain is Exacerbated By: rising from sitting--5/10 pain, squatting, stairs  Pain is Relieved By: nothing specific  Pain Progression: Worsened    INTEGUMENTARY (edema, incisions):   POSTURE: Sitting Posture: Rounded shoulders, Forward head, Lordosis decreased, Thoracic kyphosis increased  GAIT:  Weightbearing Status:   Assistive Device(s):   Gait Deviations: normal  BALANCE/PROPRIOCEPTION:   WEIGHTBEARING ALIGNMENT:   NON-WEIGHTBEARING ALIGNMENT:   ROM: R knee AROM:  flexion nil loss, NE; extension 2 degrees--mild pain; PROM;  flexion nil loss, extension 0 degrees, mild pain overpressure    STRENGTH: flexion and extension R knee MMT 5/5  FLEXIBILITY:   SPECIAL TESTS:   FUNCTIONAL TESTS: full squat on 2 legs--p. R knee pain/stiffness; abolished after rep R knee ext in sitting  PALPATION: no tenderness with palpation of R knee  JOINT MOBILITY:   Repeated motions  Cervical--ret w/self-OP--increase post neck, better, decreased neck pain with B rotation after  Ret w/self-OP and upper cervical flexion--\"stretch\", decreased neck pain with B rotation--further than regular ret w/self-OP    Knee--rep R knee ext in sitting, AROM--NE during, better after, decreased stiffness/pain with full squat        CERVICAL SPINE EVALUATION  PAIN: Pain Level at Rest: 3/10  Pain Level with Use: 3/10  Pain Location: forehead to base of skull pressure, base of head pain and R side of neck  Pain Quality: Aching and Pressure  Pain Frequency: constant  Pain is Worst: no difference time of day  Pain is Exacerbated By: turning head " "either direction, sometimes looking up  Pain is Relieved By: nothing specific  Pain Progression: Worsened  INTEGUMENTARY (edema, incisions):   POSTURE: Sitting Posture: Rounded shoulders, Forward head, Lordosis decreased, Thoracic kyphosis increased  GAIT:   Weightbearing Status:   Assistive Device(s):   Gait Deviations:   BALANCE/PROPRIOCEPTION:   WEIGHTBEARING ALIGNMENT:   ROM: Cervical AROM:  B rotation min loss, mild increase neck pain/head pressure both directions; extension mod loss, NE; flexion min loss, NE; B SB min loss, \"stiff\"    MYOTOMES: not assessed, no complaints  DTR S:   CORD SIGNS:   DERMATOMES: not assessed--no complaints  NEURAL TENSION:   FLEXIBILITY:    SPECIAL TESTS:   PALPATION: hypertonicity B SO and paraspinals, mild to mod tenderness noted  SPINAL SEGMENTAL CONCLUSIONS:       Assessment & Plan   CLINICAL IMPRESSIONS  Medical Diagnosis: Neck muscle spasm    Treatment Diagnosis: neck pain; R knee pain   Impression/Assessment: Patient is a 74 year old male with neck and R knee complaints.  The following significant findings have been identified: Pain, Decreased ROM/flexibility, Decreased activity tolerance, and Impaired posture. These impairments interfere with their ability to perform self care tasks, recreational activities, household chores, and driving  as compared to previous level of function.     Clinical Decision Making (Complexity):  Clinical Presentation: Stable/Uncomplicated  Clinical Presentation Rationale: based on medical and personal factors listed in PT evaluation  Clinical Decision Making (Complexity): Low complexity    PLAN OF CARE  Treatment Interventions:  Interventions: Manual Therapy, Neuromuscular Re-education, Therapeutic Activity, Therapeutic Exercise, Self-Care/Home Management    Long Term Goals     PT Goal 1  Goal Identifier: turning neck either direction  Goal Description: Patient will report no neck pain with turning either direction  Rationale: to maximize safety " and independence with transportation;to maximize safety and independence within the community  Goal Progress: Currently 3/10 neck pain with  Target Date: 10/07/24  PT Goal 2  Goal Identifier: rising from sitting  Goal Description: Patient will report no R knee pain with rising from sitting  Rationale:  (for normal mobility and transitional movements)  Goal Progress: Currently 3/10 R knee pain with  Target Date: 10/07/24      Frequency of Treatment: 1x/week  Duration of Treatment: 6 weeks    Recommended Referrals to Other Professionals: none  Education Assessment:   Learner/Method: Patient;Listening;Reading;Demonstration;Pictures/Video;No Barriers to Learning    Risks and benefits of evaluation/treatment have been explained.   Patient/Family/caregiver agrees with Plan of Care.     Evaluation Time:     PT Eval, Low Complexity Minutes (86822): 18       Signing Clinician: Yoly Gan PT        [facility]                                                                                   OUTPATIENT PHYSICAL THERAPY      PLAN OF TREATMENT FOR OUTPATIENT REHABILITATION   Patient's Last Name, First Name, JERONIMO RatliffPranay    YOB: 1950   Provider's Name   [facility]   Medical Record No.  [id number]     Onset Date: 06/15/24 (06/15/2024 neck; 02/15/2024 R knee)  Start of Care Date: 08/26/24     Medical Diagnosis:  Neck muscle spasm      PT Treatment Diagnosis:  neck pain; R knee pain Plan of Treatment  Frequency/Duration: 1x/week/ 6 weeks    Certification date from 08/26/24 to 10/07/24         See note for plan of treatment details and functional goals     Yoly Gan PT                         I CERTIFY THE NEED FOR THESE SERVICES FURNISHED UNDER        THIS PLAN OF TREATMENT AND WHILE UNDER MY CARE     (Physician attestation of this document indicates review and certification of the therapy plan).              Referring Provider:  Hipolito LUGO  [place]    Initial Assessment  See Epic Evaluation- Start of Care Date: 08/26/24

## 2024-09-04 ENCOUNTER — THERAPY VISIT (OUTPATIENT)
Dept: PHYSICAL THERAPY | Facility: CLINIC | Age: 74
End: 2024-09-04
Attending: FAMILY MEDICINE
Payer: COMMERCIAL

## 2024-09-04 DIAGNOSIS — G89.29 CHRONIC PAIN OF RIGHT KNEE: ICD-10-CM

## 2024-09-04 DIAGNOSIS — M25.561 CHRONIC PAIN OF RIGHT KNEE: ICD-10-CM

## 2024-09-04 DIAGNOSIS — M54.2 NECK PAIN: ICD-10-CM

## 2024-09-04 DIAGNOSIS — M62.838 NECK MUSCLE SPASM: Primary | ICD-10-CM

## 2024-09-04 PROCEDURE — 97140 MANUAL THERAPY 1/> REGIONS: CPT | Mod: GP | Performed by: PHYSICAL THERAPIST

## 2024-09-04 PROCEDURE — 97110 THERAPEUTIC EXERCISES: CPT | Mod: GP | Performed by: PHYSICAL THERAPIST

## 2024-09-11 ENCOUNTER — OFFICE VISIT (OUTPATIENT)
Dept: DERMATOLOGY | Facility: CLINIC | Age: 74
End: 2024-09-11
Payer: COMMERCIAL

## 2024-09-11 DIAGNOSIS — L81.4 LENTIGO: ICD-10-CM

## 2024-09-11 DIAGNOSIS — D23.9 DERMAL NEVUS: Primary | ICD-10-CM

## 2024-09-11 DIAGNOSIS — L82.1 SEBORRHEIC KERATOSES: ICD-10-CM

## 2024-09-11 DIAGNOSIS — D18.01 ANGIOMA OF SKIN: ICD-10-CM

## 2024-09-11 DIAGNOSIS — C44.41 BASAL CELL CARCINOMA (BCC) OF SCALP: ICD-10-CM

## 2024-09-11 DIAGNOSIS — Z85.828 HISTORY OF SKIN CANCER: ICD-10-CM

## 2024-09-11 PROCEDURE — 17311 MOHS 1 STAGE H/N/HF/G: CPT | Performed by: DERMATOLOGY

## 2024-09-11 PROCEDURE — 99213 OFFICE O/P EST LOW 20 MIN: CPT | Mod: 25 | Performed by: DERMATOLOGY

## 2024-09-11 NOTE — LETTER
2024      Pranay Ratliff   [address]  [facility] [address]      Dear Colleague,    Thank you for referring your patient, Pranay Ratliff, to the [facility]. Please see a copy of my visit note below.    Surgical Office Location :   [facility]  [address] [place] [address]      Pranay Ratliff , a 74 year old year old male patient, I was asked to see by MARISA Bowman for basal cell carcinoma on scalp.  Patient has no other skin complaints today.  Remainder of the HPI, Meds, PMH, Allergies, FH, and SH was reviewed in chart.      Past Medical History:   Diagnosis Date     Basal cell carcinoma      Concussion with loss of consciousness of 30 minutes or less, subsequent encounter      Congestive heart failure (H)      Epididymitis, bilateral      Epididymitis, left      Epididymitis, right      Obstructive sleep apnea syndrome      Squamous cell carcinoma of skin, unspecified      Vasovagal syncope        Past Surgical History:   Procedure Laterality Date     COLONOSCOPY  2021    Next will be      COLONOSCOPY N/A 2024    Procedure: Colonoscopy;  Surgeon: Jaydon Parks MD;  Location:  GI     ESOPHAGOSCOPY, GASTROSCOPY, DUODENOSCOPY (EGD), COMBINED N/A 2024    Procedure: Esophagoscopy, gastroscopy, duodenoscopy (EGD), combined;  Surgeon: Jayodn Parks MD;  Location:  GI     TESTICLE SURGERY       VASECTOMY          Family History   Problem Relation Age of Onset     Skin Cancer Mother          from pneumonia     Abdominal Aortic Aneurysm Mother      Heart Failure Father      Diabetes Type 1 Brother         Possibly secondary to chemical exposure in [place]     Diabetes Type 2  Maternal Grandmother      Cancer Brother         Exposure to Agent Orange in [place]     Heart Disease Brother        Social History     Socioeconomic History     Marital status:      Spouse name: Not on file     Number of children: Not on file      Years of education: Not on file     Highest education level: Not on file   Occupational History     Not on file   Tobacco Use     Smoking status: Never     Smokeless tobacco: Never     Tobacco comments:     Smoked cigarettes in undergrad college  socially. Not often.   Vaping Use     Vaping status: Never Used   Substance and Sexual Activity     Alcohol use: Yes     Comment: Very infrequent. Wine usually     Drug use: No     Sexual activity: Not Currently     Partners: Male   Other Topics Concern     Parent/sibling w/ CABG, MI or angioplasty before 65F 55M? No   Social History Narrative     Not on file     Social Determinants of Health     Financial Resource Strain: Low Risk  (4/15/2024)    Financial Resource Strain      Within the past 12 months, have you or your family members you live with been unable to get utilities (heat, electricity) when it was really needed?: No   Food Insecurity: Low Risk  (4/15/2024)    Food Insecurity      Within the past 12 months, did you worry that your food would run out before you got money to buy more?: No      Within the past 12 months, did the food you bought just not last and you didn t have money to get more?: No   Transportation Needs: Low Risk  (4/15/2024)    Transportation Needs      Within the past 12 months, has lack of transportation kept you from medical appointments, getting your medicines, non-medical meetings or appointments, work, or from getting things that you need?: No   Physical Activity: Sufficiently Active (4/15/2024)    Exercise Vital Sign      Days of Exercise per Week: 7 days      Minutes of Exercise per Session: 120 min   Stress: Stress Concern Present (4/15/2024)    [employer] - Occupational Stress Questionnaire      Feeling of Stress : To some extent   Social Connections: Unknown (4/15/2024)    Social Connection and Isolation Panel [NHANES]      Frequency of Communication with Friends and Family: Not on file      Frequency of  Social Gatherings with Friends and Family: Once a week      Attends Latter day Services: Not on file      Active Member of Clubs or Organizations: Not on file      Attends Club or Organization Meetings: Not on file      Marital Status: Not on file   Interpersonal Safety: Low Risk  (4/22/2024)    Interpersonal Safety      Do you feel physically and emotionally safe where you currently live?: Yes      Within the past 12 months, have you been hit, slapped, kicked or otherwise physically hurt by someone?: No      Within the past 12 months, have you been humiliated or emotionally abused in other ways by your partner or ex-partner?: No   Housing Stability: Low Risk  (4/15/2024)    Housing Stability      Do you have housing? : Yes      Are you worried about losing your housing?: No       Outpatient Encounter Medications as of 9/11/2024   Medication Sig Dispense Refill     aspirin 81 MG tablet Take by mouth daily       azelaic acid (FINACIA) 15 % external gel Apply to the face twice daily. 50 g 4     COMPOUNDED NON-CONTROLLED SUBSTANCE (CMPD RX) - PHARMACY TO MIX COMPOUNDED MEDICATION Apply to forehead, temples, cheeks, and nose twice daily for 5-7 days, wash hands after application. Avoid sun. 30 g 0     diclofenac (VOLTAREN) 1 % topical gel Apply topically 4 times daily       fluocinonide (LIDEX) 0.05 % external cream Apply topically 2 times daily 120 g 6     gabapentin (NEURONTIN) 100 MG capsule Take 100 mg by mouth Take 1 time daily at HS       multivitamin w/minerals (THERA-VIT-M) tablet Take 1 tablet by mouth daily       Omega-3 Fatty Acids (OMEGA-3 FISH OIL PO)        pantoprazole (PROTONIX) 40 MG EC tablet Take 1 tablet (40 mg) by mouth daily 90 tablet 2     rosuvastatin (CRESTOR) 20 MG tablet Take 1 tablet (20 mg) by mouth daily 90 tablet 1     triamterene-HCTZ (MAXZIDE-25) 37.5-25 MG tablet Take 0.5 tablets by mouth every morning 45 tablet 1     No facility-administered encounter medications on file as of  9/11/2024.             Review Of Systems  Skin: As above  Eyes: negative  Ears/Nose/Throat: negative  Respiratory: No shortness of breath, dyspnea on exertion, cough, or hemoptysis  Cardiovascular: negative  Gastrointestinal: negative  Genitourinary: negative  Musculoskeletal: negative  Neurologic: negative  Psychiatric: negative  Hematologic/Lymphatic/Immunologic: negative  Endocrine: negative      O:   NAD, WDWN, Alert & Oriented, Mood & Affect wnl, Vitals stable   General appearance diego ii   Vitals stable   Alert, oriented and in no acute distress   R vertex scalp 6mm scaly papule   Stuck on papules and brown macules on face and ext   Red papules on scalp    Flesh colored papules on face      Eyes: Conjunctivae/lids:Normal     ENT: Lips, mucosa: normal    MSK:Normal    Cardiovascular: peripheral edema none    Pulm: Breathing Normal    Neuro/Psych: Orientation:Normal; Mood/Affect:Normal      A/P:  1. Seborrheic keratosis, lentigo, angioma, dermal nevus, hx of non-melanoma skin cancer   2. Basal cell carcinoma scalp   It was a pleasure speaking to Pranay Ratliff today.  Previous clinic  notes and pertinent laboratory tests were reviewed prior to Pranay Ratliff's visit.  Signs and Symptoms of skin cancer discussed with patient.  Patient encouraged to perform monthly skin exams.  UV precautions reviewed with patient.  Risks of non-melanoma skin cancer discussed with patient   Return to clinic 6 months  PROCEDURE NOTE  R vertex scalp basal cell carcinoma   MOHS:   Location    The rationale for Mohs surgery was discussed with the patient and consent was obtained.  The risks and benefits as well as alternatives to therapy were discussed, in detail.  Specifically, the risks of infection, scarring, bleeding, prolonged wound healing, incomplete removal, allergy to anesthesia, nerve injury and recurrence were addressed.  Indication for Mohs was Location. Prior to the procedure, the treatment site was clearly identified  and, if available, confirmed with previous photos and confirmed by the patient   All components of the Universal Protocol/PAUSE rule were completed.  The Mohs surgeon operated in two distinct and integrated capacities as the surgeon and pathologist.      The area was prepped with Betasept.  A rim of normal appearing skin was marked circumferentially around the lesion.  The area was infiltrated with local anesthesia.  The tumor was first debulked to remove all clinically apparent tumor.  An incision following the standard Mohs approach was done and the specimen was oriented,mapped and placed in 1 block(s).  Each specimen was then chromacoded and processed in the Mohs laboratory using standard Mohs technique and submitted for frozen section histology.  Frozen section analysis showed no residual tumor but CLEAR MARGINS.      The tumor was excised using standard Mohs technique in 1 stages(s).  CLEAR MARGINS OBTAINED and Final defect size was 1.1 cm.     We discussed the options for wound management in full with the patient including risks/benefits/ possible outcomes.      REPAIR SECOND INTENT: We discussed the options for wound management in full with the patient including risks/benefits/possible outcomes. Decision made to allow the wound to heal by second intention. Cautery was used for for hemostasis. EBL minimal; complications none; wound care routine.  The patient was discharged in good condition and will return in one month or prn for wound evaluation.          Again, thank you for allowing me to participate in the care of your patient.        Sincerely,        Tato Gallagher MD

## 2024-09-11 NOTE — PATIENT INSTRUCTIONS
Open Wound Care     for __Scalp____________      Follow up in clinic with Dr. Gallagher in 3 months      No strenuous activity for 48 hours    Take Tylenol as needed for discomfort.                                                .         Do not drink alcoholic beverages for 48 hours.    Keep the pressure bandage in place for 24 hours. If the bandage becomes blood tinged or loose, reinforce it with gauze and tape.        (Refer to the reverse side of this page for management of bleeding).    Remove bandage in 24 hours and begin wound care as follows:     Clean area with tap water using a Q tip or gauze pad, (shower / bathe normally)  Dry wound with Q tip or gauze pad  Apply Aquaphor, Vaseline, Polysporin or Bacitracin Ointment with a Q tip  Do NOT use Neosporin Ointment *  Cover the wound with a band-aid or nonstick gauze pad and paper tape.  Repeat wound care once a day until wound is completely healed.    It is an old wives tale that a wound heals better when it is exposed to air and allowed to dry out. The wound will heal faster with a better cosmetic result if it is kept moist with ointment and covered with a bandage.  Do not let the wound dry out.      Supplies Needed:                Qtips or gauze pads                Polysporin or Bacitracin Ointment                Bandaids or nonstick gauze pads and paper tape    Wound care kits and brown paper tape are available for purchase at   the pharmacy.    BLEEDING:    Use tightly rolled up gauze or cloth to apply direct pressure over the bandage for 20   minutes.  Reapply pressure for an additional 20 minutes if necessary  Call the office or go to the nearest emergency room if pressure fails to stop the bleeding.  Use additional gauze and tape to maintain pressure once the bleeding has stopped.  Begin wound care 24 hours after surgery as directed.                  WOUND HEALING    One week after surgery a pink / red halo will form around the outside of the wound.    This is new skin.  The center of the wound will appear yellowish white and produce some drainage.  The pink halo will slowly migrate in toward the center of the wound until the wound is covered with new shiny pink skin.  There will be no more drainage when the wound is completely healed.  It will take six months to one year for the redness to fade.  The scar may be itchy, tight and sensitive to extreme temperatures for a year after the surgery.  Massaging the area several times a day for several minutes after the wound is completely healed will help the scar soften and normalize faster. Begin massage only after healing is complete.      In case of emergency call: Dr Gallagher: [phone]    [facility]: [phone]    [facility]:[phone]           Proper skin care from [facility]:    -Eliminate harsh soaps as they strip the natural oils from the skin, often resulting in dry itchy skin ( i.e. Dial, Zest, [place])  -Use mild soaps such as Cetaphil or Dove Sensitive Skin in the shower. You do not need to use soap on arms, legs, and trunk every time you shower unless visibly soiled.   -Avoid hot or cold showers.  -After showering, lightly dry off and apply moisturizing within 2-3 minutes. This will help trap moisture in the skin.   -Aggressive use of a moisturizer at least 1-2 times a day to the entire body (including -Vanicream, Cetaphil, Aquaphor or Cerave) and moisturize hands after every washing.  -We recommend using moisturizers that come in a tub that needs to be scooped out, not a pump. This has more of an oil base. It will hold moisture in your skin much better than a water base moisturizer. The above recommended are non-pore clogging.      Wear a sunscreen with at least SPF 30 on your face, ears, neck and V of the chest daily. Wear sunscreen on other areas of the body if those areas are exposed to the sun throughout the day. Sunscreens can contain physical and/or  chemical blockers. Physical blockers are less likely to clog pores, these include zinc oxide and titanium dioxide. Reapply every two hour and after swimming.     Sunscreen examples: https://www.ewg.org/sunscreen/    UV radiation  UVA radiation remains constant throughout the day and throughout the year. It is a longer wavelength than UVB and therefore penetrates deeper into the skin leading to immediate and delayed tanning, photoaging, and skin cancer. 70-80% of UVA and UVB radiation occurs between the hours of 10am-2pm.  UVB radiation  UVB radiation causes the most harmful effects and is more significant during the summer months. However, snow and ice can reflect UVB radiation leading to skin damage during the winter months as well. UVB radiation is responsible for tanning, burning, inflammation, delayed erythema (pinkness), pigmentation (brown spots), and skin cancer.     I recommend self monthly full body exams and yearly full body exams with a dermatology provider. If you develop a new or changing lesion please follow up for examination. Most skin cancers are pink and scaly or pink and pearly. However, we do see blue/brown/black skin cancers.  Consider the ABCDEs of melanoma when giving yourself your monthly full body exam ( don't forget the groin, buttocks, feet, toes, etc). A-asymmetry, B-borders, C-color, D-diameter, E-elevation or evolving. If you see any of these changes please follow up in clinic. If you cannot see your back I recommend purchasing a hand held mirror to use with a larger wall mirror.       Checking for Skin Cancer  You can find cancer early by checking your skin each month. There are 3 kinds of skin cancer. They are melanoma, basal cell carcinoma, and squamous cell carcinoma. Doing monthly skin checks is the best way to find new marks or skin changes. Follow the instructions below for checking your skin.   The ABCDEs of checking moles for melanoma   Check your moles or growths for signs of  melanoma using ABCDE:   Asymmetry: the sides of the mole or growth don t match  Border: the edges are ragged, notched, or blurred  Color: the color within the mole or growth varies  Diameter: the mole or growth is larger than 6 mm (size of a pencil eraser)  Evolving: the size, shape, or color of the mole or growth is changing (evolving is not shown in the images below)    Checking for other types of skin cancer  Basal cell carcinoma or squamous cell carcinoma have symptoms such as:     A spot or mole that looks different from all other marks on your skin  Changes in how an area feels, such as itching, tenderness, or pain  Changes in the skin's surface, such as oozing, bleeding, or scaliness  A sore that does not heal  New swelling or redness beyond the border of a mole    Who s at risk?  Anyone can get skin cancer. But you are at greater risk if you have:   Fair skin, light-colored hair, or light-colored eyes  Many moles or abnormal moles on your skin  A history of sunburns from sunlight or tanning beds  A family history of skin cancer  A history of exposure to radiation or chemicals  A weakened immune system  If you have had skin cancer in the past, you are at risk for recurring skin cancer.   How to check your skin  Do your monthly skin checkups in front of a full-length mirror. Check all parts of your body, including your:   Head (ears, face, neck, and scalp)  Torso (front, back, and sides)  Arms (tops, undersides, upper, and lower armpits)  Hands (palms, backs, and fingers, including under the nails)  Buttocks and genitals  Legs (front, back, and sides)  Feet (tops, soles, toes, including under the nails, and between toes)  If you have a lot of moles, take digital photos of them each month. Make sure to take photos both up close and from a distance. These can help you see if any moles change over time.   Most skin changes are not cancer. But if you see any changes in your skin, call your doctor right away. Only  he or she can diagnose a problem. If you have skin cancer, seeing your doctor can be the first step toward getting the treatment that could save your life.   [employer] last reviewed this educational content on 4/1/2019 2000-2020 [employer] [address] [place] [address], [place] [address]. All rights reserved. This information is not intended as a substitute for professional medical care. Always follow your healthcare professional's instructions.       When should I call my doctor?  If you are worsening or not improving, please, contact us or seek urgent care as noted below.     Who should I call with questions (adults)?    [facility] [phone]  For urgent needs outside of business hours call the [facility] at [phone] and ask for the dermatology resident on call to be paged  If this is a medical emergency and you are unable to reach an ER, Call [phone]      If you need a prescription refill, please contact your pharmacy. Refills are approved or denied by our Physicians during normal business hours, Monday through Fridays  Per office policy, refills will not be granted if you have not been seen within the past year (or sooner depending on your child's condition)

## 2024-09-11 NOTE — PROGRESS NOTES
Pranay Ratliff , a 74 year old year old male patient, I was asked to see by MARISA Bowman for basal cell carcinoma on scalp.  Patient has no other skin complaints today.  Remainder of the HPI, Meds, PMH, Allergies, FH, and SH was reviewed in chart.      Past Medical History:   Diagnosis Date    Basal cell carcinoma     Concussion with loss of consciousness of 30 minutes or less, subsequent encounter     Congestive heart failure (H)     Epididymitis, bilateral     Epididymitis, left     Epididymitis, right     Obstructive sleep apnea syndrome     Squamous cell carcinoma of skin, unspecified     Vasovagal syncope        Past Surgical History:   Procedure Laterality Date    COLONOSCOPY  2021    Next will be     COLONOSCOPY N/A 2024    Procedure: Colonoscopy;  Surgeon: Jaydon Parks MD;  Location:  GI    ESOPHAGOSCOPY, GASTROSCOPY, DUODENOSCOPY (EGD), COMBINED N/A 2024    Procedure: Esophagoscopy, gastroscopy, duodenoscopy (EGD), combined;  Surgeon: Jaydon Parks MD;  Location:  GI    TESTICLE SURGERY      VASECTOMY          Family History   Problem Relation Age of Onset    Skin Cancer Mother          from pneumonia    Abdominal Aortic Aneurysm Mother     Heart Failure Father     Diabetes Type 1 Brother         Possibly secondary to chemical exposure in [place]    Diabetes Type 2  Maternal Grandmother     Cancer Brother         Exposure to Agent Orange in [place]    Heart Disease Brother        Social History     Socioeconomic History    Marital status:      Spouse name: Not on file    Number of children: Not on file    Years of education: Not on file    Highest education level: Not on file   Occupational History    Not on file   Tobacco Use    Smoking status: Never    Smokeless tobacco: Never    Tobacco comments:     Smoked cigarettes in undergrad college  socially. Not often.   Vaping Use    Vaping status: Never Used   Substance and Sexual Activity    Alcohol use: Yes      Comment: Very infrequent. Wine usually    Drug use: No    Sexual activity: Not Currently     Partners: Male   Other Topics Concern    Parent/sibling w/ CABG, MI or angioplasty before 65F 55M? No   Social History Narrative    Not on file     Social Determinants of Health     Financial Resource Strain: Low Risk  (4/15/2024)    Financial Resource Strain     Within the past 12 months, have you or your family members you live with been unable to get utilities (heat, electricity) when it was really needed?: No   Food Insecurity: Low Risk  (4/15/2024)    Food Insecurity     Within the past 12 months, did you worry that your food would run out before you got money to buy more?: No     Within the past 12 months, did the food you bought just not last and you didn t have money to get more?: No   Transportation Needs: Low Risk  (4/15/2024)    Transportation Needs     Within the past 12 months, has lack of transportation kept you from medical appointments, getting your medicines, non-medical meetings or appointments, work, or from getting things that you need?: No   Physical Activity: Sufficiently Active (4/15/2024)    Exercise Vital Sign     Days of Exercise per Week: 7 days     Minutes of Exercise per Session: 120 min   Stress: Stress Concern Present (4/15/2024)    [employer] - Occupational Stress Questionnaire     Feeling of Stress : To some extent   Social Connections: Unknown (4/15/2024)    Social Connection and Isolation Panel [NHANES]     Frequency of Communication with Friends and Family: Not on file     Frequency of Social Gatherings with Friends and Family: Once a week     Attends Religion Services: Not on file     Active Member of Clubs or Organizations: Not on file     Attends Club or Organization Meetings: Not on file     Marital Status: Not on file   Interpersonal Safety: Low Risk  (4/22/2024)    Interpersonal Safety     Do you feel physically and emotionally safe where you currently  live?: Yes     Within the past 12 months, have you been hit, slapped, kicked or otherwise physically hurt by someone?: No     Within the past 12 months, have you been humiliated or emotionally abused in other ways by your partner or ex-partner?: No   Housing Stability: Low Risk  (4/15/2024)    Housing Stability     Do you have housing? : Yes     Are you worried about losing your housing?: No       Outpatient Encounter Medications as of 9/11/2024   Medication Sig Dispense Refill    aspirin 81 MG tablet Take by mouth daily      azelaic acid (FINACIA) 15 % external gel Apply to the face twice daily. 50 g 4    COMPOUNDED NON-CONTROLLED SUBSTANCE (CMPD RX) - PHARMACY TO MIX COMPOUNDED MEDICATION Apply to forehead, temples, cheeks, and nose twice daily for 5-7 days, wash hands after application. Avoid sun. 30 g 0    diclofenac (VOLTAREN) 1 % topical gel Apply topically 4 times daily      fluocinonide (LIDEX) 0.05 % external cream Apply topically 2 times daily 120 g 6    gabapentin (NEURONTIN) 100 MG capsule Take 100 mg by mouth Take 1 time daily at HS      multivitamin w/minerals (THERA-VIT-M) tablet Take 1 tablet by mouth daily      Omega-3 Fatty Acids (OMEGA-3 FISH OIL PO)       pantoprazole (PROTONIX) 40 MG EC tablet Take 1 tablet (40 mg) by mouth daily 90 tablet 2    rosuvastatin (CRESTOR) 20 MG tablet Take 1 tablet (20 mg) by mouth daily 90 tablet 1    triamterene-HCTZ (MAXZIDE-25) 37.5-25 MG tablet Take 0.5 tablets by mouth every morning 45 tablet 1     No facility-administered encounter medications on file as of 9/11/2024.             Review Of Systems  Skin: As above  Eyes: negative  Ears/Nose/Throat: negative  Respiratory: No shortness of breath, dyspnea on exertion, cough, or hemoptysis  Cardiovascular: negative  Gastrointestinal: negative  Genitourinary: negative  Musculoskeletal: negative  Neurologic: negative  Psychiatric: negative  Hematologic/Lymphatic/Immunologic: negative  Endocrine: negative      O:    NAD, WDWN, Alert & Oriented, Mood & Affect wnl, Vitals stable   General appearance diego ii   Vitals stable   Alert, oriented and in no acute distress   R vertex scalp 6mm scaly papule   Stuck on papules and brown macules on face and ext   Red papules on scalp    Flesh colored papules on face      Eyes: Conjunctivae/lids:Normal     ENT: Lips, mucosa: normal    MSK:Normal    Cardiovascular: peripheral edema none    Pulm: Breathing Normal    Neuro/Psych: Orientation:Normal; Mood/Affect:Normal      A/P:  1. Seborrheic keratosis, lentigo, angioma, dermal nevus, hx of non-melanoma skin cancer   2. Basal cell carcinoma scalp   It was a pleasure speaking to Pranay Ratliff today.  Previous clinic  notes and pertinent laboratory tests were reviewed prior to Pranay Ratliff's visit.  Signs and Symptoms of skin cancer discussed with patient.  Patient encouraged to perform monthly skin exams.  UV precautions reviewed with patient.  Risks of non-melanoma skin cancer discussed with patient   Return to clinic 6 months  PROCEDURE NOTE  R vertex scalp basal cell carcinoma   MOHS:   Location    The rationale for Mohs surgery was discussed with the patient and consent was obtained.  The risks and benefits as well as alternatives to therapy were discussed, in detail.  Specifically, the risks of infection, scarring, bleeding, prolonged wound healing, incomplete removal, allergy to anesthesia, nerve injury and recurrence were addressed.  Indication for Mohs was Location. Prior to the procedure, the treatment site was clearly identified and, if available, confirmed with previous photos and confirmed by the patient   All components of the Universal Protocol/PAUSE rule were completed.  The Mohs surgeon operated in two distinct and integrated capacities as the surgeon and pathologist.      The area was prepped with Betasept.  A rim of normal appearing skin was marked circumferentially around the lesion.  The area was infiltrated with local  anesthesia.  The tumor was first debulked to remove all clinically apparent tumor.  An incision following the standard Mohs approach was done and the specimen was oriented,mapped and placed in 1 block(s).  Each specimen was then chromacoded and processed in the Mohs laboratory using standard Mohs technique and submitted for frozen section histology.  Frozen section analysis showed no residual tumor but CLEAR MARGINS.      The tumor was excised using standard Mohs technique in 1 stages(s).  CLEAR MARGINS OBTAINED and Final defect size was 1.1 cm.     We discussed the options for wound management in full with the patient including risks/benefits/ possible outcomes.      REPAIR SECOND INTENT: We discussed the options for wound management in full with the patient including risks/benefits/possible outcomes. Decision made to allow the wound to heal by second intention. Cautery was used for for hemostasis. EBL minimal; complications none; wound care routine.  The patient was discharged in good condition and will return in one month or prn for wound evaluation.

## 2024-09-19 ENCOUNTER — ALLIED HEALTH/NURSE VISIT (OUTPATIENT)
Dept: FAMILY MEDICINE | Facility: CLINIC | Age: 74
End: 2024-09-19
Payer: COMMERCIAL

## 2024-09-19 ENCOUNTER — OFFICE VISIT (OUTPATIENT)
Dept: DERMATOLOGY | Facility: CLINIC | Age: 74
End: 2024-09-19
Payer: COMMERCIAL

## 2024-09-19 DIAGNOSIS — B07.0 PLANTAR WART: Primary | ICD-10-CM

## 2024-09-19 DIAGNOSIS — Z85.828 HISTORY OF BASAL CELL CARCINOMA: ICD-10-CM

## 2024-09-19 DIAGNOSIS — Z23 ENCOUNTER FOR IMMUNIZATION: Primary | ICD-10-CM

## 2024-09-19 PROCEDURE — 17110 DESTRUCTION B9 LES UP TO 14: CPT | Performed by: PHYSICIAN ASSISTANT

## 2024-09-19 PROCEDURE — 90480 ADMN SARSCOV2 VAC 1/ONLY CMP: CPT

## 2024-09-19 PROCEDURE — G0008 ADMIN INFLUENZA VIRUS VAC: HCPCS

## 2024-09-19 PROCEDURE — 91320 SARSCV2 VAC 30MCG TRS-SUC IM: CPT

## 2024-09-19 PROCEDURE — 90662 IIV NO PRSV INCREASED AG IM: CPT

## 2024-09-19 ASSESSMENT — PAIN SCALES - GENERAL: PAINLEVEL: NO PAIN (0)

## 2024-09-19 NOTE — PATIENT INSTRUCTIONS
Patient Education        Proper skin care from [facility]:     -Eliminate harsh soaps as they strip the natural oils from the skin, often resulting in dry itchy skin ( i.e. Dial, Zest, [place])  -Use mild soaps such as Cetaphil or Dove Sensitive Skin in the shower. You do not need to use soap on arms, legs, and trunk every time you shower unless visibly soiled.   -Avoid hot or cold showers.  -After showering, lightly dry off and apply moisturizing within 2-3 minutes. This will help trap moisture in the skin.   -Aggressive use of a moisturizer at least 1-2 times a day to the entire body (including -Vanicream, Cetaphil, Aquaphor or Cerave) and moisturize hands after every washing.  -We recommend using moisturizers that come in a tub that needs to be scooped out, not a pump. This has more of an oil base. It will hold moisture in your skin much better than a water base moisturizer. The above recommended are non-pore clogging.        Wear a sunscreen with at least SPF 30 on your face, ears, neck and V of the chest daily. Wear sunscreen on other areas of the body if those areas are exposed to the sun throughout the day. Sunscreens can contain physical and/or chemical blockers. Physical blockers are less likely to clog pores, these include zinc oxide and titanium dioxide. Reapply every two hour and after swimming.      Sunscreen examples: https://www.ewg.org/sunscreen/     UV radiation  UVA radiation remains constant throughout the day and throughout the year. It is a longer wavelength than UVB and therefore penetrates deeper into the skin leading to immediate and delayed tanning, photoaging, and skin cancer. 70-80% of UVA and UVB radiation occurs between the hours of 10am-2pm.  UVB radiation  UVB radiation causes the most harmful effects and is more significant during the summer months. However, snow and ice can reflect UVB radiation leading to skin damage during the winter months as well. UVB radiation is  responsible for tanning, burning, inflammation, delayed erythema (pinkness), pigmentation (brown spots), and skin cancer.      I recommend self monthly full body exams and yearly full body exams with a dermatology provider. If you develop a new or changing lesion please follow up for examination. Most skin cancers are pink and scaly or pink and pearly. However, we do see blue/brown/black skin cancers.  Consider the ABCDEs of melanoma when giving yourself your monthly full body exam ( don't forget the groin, buttocks, feet, toes, etc). A-asymmetry, B-borders, C-color, D-diameter, E-elevation or evolving. If you see any of these changes please follow up in clinic. If you cannot see your back I recommend purchasing a hand held mirror to use with a larger wall mirror.       Checking for Skin Cancer  You can find cancer early by checking your skin each month. There are 3 kinds of skin cancer. They are melanoma, basal cell carcinoma, and squamous cell carcinoma. Doing monthly skin checks is the best way to find new marks or skin changes. Follow the instructions below for checking your skin.   The ABCDEs of checking moles for melanoma   Check your moles or growths for signs of melanoma using ABCDE:   Asymmetry: the sides of the mole or growth don t match  Border: the edges are ragged, notched, or blurred  Color: the color within the mole or growth varies  Diameter: the mole or growth is larger than 6 mm (size of a pencil eraser)  Evolving: the size, shape, or color of the mole or growth is changing (evolving is not shown in the images below)    Checking for other types of skin cancer  Basal cell carcinoma or squamous cell carcinoma have symptoms such as:      A spot or mole that looks different from all other marks on your skin  Changes in how an area feels, such as itching, tenderness, or pain  Changes in the skin's surface, such as oozing, bleeding, or scaliness  A sore that does not heal  New swelling or redness beyond  the border of a mole     Who s at risk?  Anyone can get skin cancer. But you are at greater risk if you have:   Fair skin, light-colored hair, or light-colored eyes  Many moles or abnormal moles on your skin  A history of sunburns from sunlight or tanning beds  A family history of skin cancer  A history of exposure to radiation or chemicals  A weakened immune system  If you have had skin cancer in the past, you are at risk for recurring skin cancer.   How to check your skin  Do your monthly skin checkups in front of a full-length mirror. Check all parts of your body, including your:   Head (ears, face, neck, and scalp)  Torso (front, back, and sides)  Arms (tops, undersides, upper, and lower armpits)  Hands (palms, backs, and fingers, including under the nails)  Buttocks and genitals  Legs (front, back, and sides)  Feet (tops, soles, toes, including under the nails, and between toes)  If you have a lot of moles, take digital photos of them each month. Make sure to take photos both up close and from a distance. These can help you see if any moles change over time.   Most skin changes are not cancer. But if you see any changes in your skin, call your doctor right away. Only he or she can diagnose a problem. If you have skin cancer, seeing your doctor can be the first step toward getting the treatment that could save your life.   [employer] last reviewed this educational content on 4/1/2019 2000-2020 [employer]. [address] [place] [address], [place] [address]. All rights reserved. This information is not intended as a substitute for professional medical care. Always follow your healthcare professional's instructions.        When should I call my doctor?  If you are worsening or not improving, please, contact us or seek urgent care as noted below.      Who should I call with questions (adults)?  [facility] (adult and pediatric): [phone]  [facility]  [place] (adult): [phone]  [facility] ([place], [place], [place] and [place]) [phone]  For urgent needs outside of business hours call the [facility] at [phone] and ask for the dermatology resident on call to be paged  If this is a medical emergency and you are unable to reach an ER, Call 911        If you need a prescription refill, please contact your pharmacy. Refills are approved or denied by our Physicians during normal business hours, Monday through Fridays  Per office policy, refills will not be granted if you have not been seen within the past year (or sooner depending on your child's condition)

## 2024-09-19 NOTE — LETTER
9/19/2024      Pranay Ratliff  [address]  [place] [facility] [address]      Dear Colleague,    Thank you for referring your patient, Pranay Ratliff, [facility]. Please see a copy of my visit note below.    [facility] D[facility]ology Note  Encounter Date: Sep 19, 2024  Office Visit     Reviewed patients past medical history and pertinent chart review prior to patients visit today.     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 s/p Mohs 09/11/2024  2. Asteatotic dermatitis  - Current tx: fluocinonide 0.05% cream  3. Verruca plantaris, right foot s/p cryotherapy  - Current tx: WartPeel   4. Tinea pedis   5. AKs, forehead, temples, and nose  - Current tx: Efudex/ Dovonex 8/15/2024   - Past tx: Efudex 5% cream (initiated 11/3/2022)  6. Rash, most consistent with tinea pedis, left medial foot s/p KOH prep 12/6/2022  7. Verruca plantaris  - cryotherapy    ____________________________________________    Assessment & Plan:     # Verruca vulgaris, right plantar foot x3.   - Cryotherapy: Patient elects to treat warts today with cryotherapy. After verbal consent and discussion of risks and benefits including but no limited to dyspigmentation/scar, blister, and pain. A total of 2 warts were treated with 1-2mm freeze border for 3 cycles with liquid nitrogen. Post cryotherapy instructions were provided.      - Continue salicylic acid 40% (such as wart stick) nightly followed by duct tape or bandage.  Pare down after removal of occlusive cover for deeper penetration of medication.     # Mohs site, scalp  - Healing well. No sign of infection today. Care reviewed.     All risks, benefits and alternatives were discussed with patient.  Patient is in agreement and understands the  assessment and plan.  All questions were answered.  Amparo Bowman PA-C  [facility]  _______________________________________    CC: Derm Problem (Plantar wart on right foot/Check mohs site on scalp)    HPI:  Mr. Pranay Ratliff is a(n) 74 year old male who presents today as a return patient for warts. He notes improvement. He underwent Mohs for a nodular BCC on the scalp on 9/11/2024. He would like this checked. He reports healing is going well. Patient is otherwise feeling well, without additional skin concerns.      Physical Exam:  SKIN: Focused examination of scalp and right plantar foot was performed.  - Verrucal papule(s) with thrombosed capillaries involving the right plantar foot x3.   - Superficial ulcer involving the scalp, no evidence of infection.     - No other lesions of concern on areas examined.     Medications:  Current Outpatient Medications   Medication Sig Dispense Refill     aspirin 81 MG tablet Take by mouth daily       azelaic acid (FINACIA) 15 % external gel Apply to the face twice daily. 50 g 4     diclofenac (VOLTAREN) 1 % topical gel Apply topically 4 times daily       fluocinonide (LIDEX) 0.05 % external cream Apply topically 2 times daily 120 g 6     multivitamin w/minerals (THERA-VIT-M) tablet Take 1 tablet by mouth daily       Omega-3 Fatty Acids (OMEGA-3 FISH OIL PO)        pantoprazole (PROTONIX) 40 MG EC tablet Take 1 tablet (40 mg) by mouth daily 90 tablet 2     rosuvastatin (CRESTOR) 20 MG tablet Take 1 tablet (20 mg) by mouth daily 90 tablet 1     triamterene-HCTZ (MAXZIDE-25) 37.5-25 MG tablet Take 0.5 tablets by mouth every morning 45 tablet 1     COMPOUNDED NON-CONTROLLED SUBSTANCE (CMPD RX) - PHARMACY TO MIX COMPOUNDED MEDICATION Apply to forehead, temples, cheeks, and nose twice daily for 5-7 days, wash hands after application. Avoid sun. (Patient not taking: Reported on 9/19/2024) 30 g 0     gabapentin (NEURONTIN) 100 MG capsule Take 100 mg by mouth Take 1  time daily at HS (Patient not taking: Reported on 9/19/2024)       No current facility-administered medications for this visit.      Past Medical History:   Patient Active Problem List   Diagnosis     History of basal cell carcinoma     Benign essential hypertension     Hyperlipidemia LDL goal <100     Obstructive sleep apnea syndrome     Gastroesophageal reflux disease with esophagitis     Localized swelling of lower extremity     Vasovagal syncope     Elevated prostate specific antigen (PSA)     Concussion with loss of consciousness of 30 minutes or less, subsequent encounter     BPH with obstruction/lower urinary tract symptoms     Myofascial pain syndrome, cervical     Occipital neuralgia of right side     Fatigue due to old head injury     Post concussion syndrome     Cervical segment dysfunction     Thoracic segment dysfunction     Cephalgia     Somatic dysfunction of sacral region     Cervical pain     Dizziness     Marital conflict     Chronic left-sided low back pain without sciatica     Neck muscle spasm     Neck pain     Chronic pain of right knee     Past Medical History:   Diagnosis Date     Basal cell carcinoma      Concussion with loss of consciousness of 30 minutes or less, subsequent encounter      Congestive heart failure (H)      Epididymitis, bilateral      Epididymitis, left      Epididymitis, right      Obstructive sleep apnea syndrome      Squamous cell carcinoma of skin, unspecified      Vasovagal syncope        CC Referred Self, MD  No address on file on close of this encounter.       Again, thank you for allowing me to participate in the care of your patient.        Sincerely,        Amparo Bowman PA-C

## 2024-09-19 NOTE — PROGRESS NOTES
[facility] D[facility]atology Note  Encounter Date: Sep 19, 2024  Office Visit     Reviewed patients past medical history and pertinent chart review prior to patients visit today.     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 s/p Mohs 09/11/2024  2. Asteatotic dermatitis  - Current tx: fluocinonide 0.05% cream  3. Verruca plantaris, right foot s/p cryotherapy  - Current tx: WartPeel   4. Tinea pedis   5. AKs, forehead, temples, and nose  - Current tx: Efudex/ Dovonex 8/15/2024   - Past tx: Efudex 5% cream (initiated 11/3/2022)  6. Rash, most consistent with tinea pedis, left medial foot s/p KOH prep 12/6/2022  7. Verruca plantaris  - cryotherapy    ____________________________________________    Assessment & Plan:     # Verruca vulgaris, right plantar foot x3.   - Cryotherapy: Patient elects to treat warts today with cryotherapy. After verbal consent and discussion of risks and benefits including but no limited to dyspigmentation/scar, blister, and pain. A total of 2 warts were treated with 1-2mm freeze border for 3 cycles with liquid nitrogen. Post cryotherapy instructions were provided.      - Continue salicylic acid 40% (such as wart stick) nightly followed by duct tape or bandage.  Pare down after removal of occlusive cover for deeper penetration of medication.     # Mohs site, scalp  - Healing well. No sign of infection today. Care reviewed.     All risks, benefits and alternatives were discussed with patient.  Patient is in agreement and understands the assessment and plan.  All questions were answered.  Amparo Bowman PA-C  [facility]  _______________________________________    CC: Derm Problem (Plantar wart on right foot/Check mohs site on scalp)    HPI:  Mr. Pires  Gaudencio is a(n) 74 year old male who presents today as a return patient for warts. He notes improvement. He underwent Mohs for a nodular BCC on the scalp on 9/11/2024. He would like this checked. He reports healing is going well. Patient is otherwise feeling well, without additional skin concerns.      Physical Exam:  SKIN: Focused examination of scalp and right plantar foot was performed.  - Verrucal papule(s) with thrombosed capillaries involving the right plantar foot x3.   - Superficial ulcer involving the scalp, no evidence of infection.     - No other lesions of concern on areas examined.     Medications:  Current Outpatient Medications   Medication Sig Dispense Refill    aspirin 81 MG tablet Take by mouth daily      azelaic acid (FINACIA) 15 % external gel Apply to the face twice daily. 50 g 4    diclofenac (VOLTAREN) 1 % topical gel Apply topically 4 times daily      fluocinonide (LIDEX) 0.05 % external cream Apply topically 2 times daily 120 g 6    multivitamin w/minerals (THERA-VIT-M) tablet Take 1 tablet by mouth daily      Omega-3 Fatty Acids (OMEGA-3 FISH OIL PO)       pantoprazole (PROTONIX) 40 MG EC tablet Take 1 tablet (40 mg) by mouth daily 90 tablet 2    rosuvastatin (CRESTOR) 20 MG tablet Take 1 tablet (20 mg) by mouth daily 90 tablet 1    triamterene-HCTZ (MAXZIDE-25) 37.5-25 MG tablet Take 0.5 tablets by mouth every morning 45 tablet 1    COMPOUNDED NON-CONTROLLED SUBSTANCE (CMPD RX) - PHARMACY TO MIX COMPOUNDED MEDICATION Apply to forehead, temples, cheeks, and nose twice daily for 5-7 days, wash hands after application. Avoid sun. (Patient not taking: Reported on 9/19/2024) 30 g 0    gabapentin (NEURONTIN) 100 MG capsule Take 100 mg by mouth Take 1 time daily at HS (Patient not taking: Reported on 9/19/2024)       No current facility-administered medications for this visit.      Past Medical History:   Patient Active Problem List   Diagnosis    History of basal cell carcinoma    Benign  essential hypertension    Hyperlipidemia LDL goal <100    Obstructive sleep apnea syndrome    Gastroesophageal reflux disease with esophagitis    Localized swelling of lower extremity    Vasovagal syncope    Elevated prostate specific antigen (PSA)    Concussion with loss of consciousness of 30 minutes or less, subsequent encounter    BPH with obstruction/lower urinary tract symptoms    Myofascial pain syndrome, cervical    Occipital neuralgia of right side    Fatigue due to old head injury    Post concussion syndrome    Cervical segment dysfunction    Thoracic segment dysfunction    Cephalgia    Somatic dysfunction of sacral region    Cervical pain    Dizziness    Marital conflict    Chronic left-sided low back pain without sciatica    Neck muscle spasm    Neck pain    Chronic pain of right knee     Past Medical History:   Diagnosis Date    Basal cell carcinoma     Concussion with loss of consciousness of 30 minutes or less, subsequent encounter     Congestive heart failure (H)     Epididymitis, bilateral     Epididymitis, left     Epididymitis, right     Obstructive sleep apnea syndrome     Squamous cell carcinoma of skin, unspecified     Vasovagal syncope        CC Referred Self, MD  No address on file on close of this encounter.

## 2024-09-19 NOTE — PROGRESS NOTES
Prior to immunization administration, verified patients identity using patient s name and date of birth. Please see Immunization Activity for additional information.     Is the patient's temperature normal (100.5 or less)? Yes     Patient MEETS CRITERIA. PROCEED with vaccine administration.          9/19/2024   COVID   Have you had myocarditis or pericarditis (inflammation of or around the heart muscle) after getting a COVID-19 vaccine? No   Have you had a serious reaction to a COVID vaccine or something in a COVID vaccine, like polyethylene glycol (PEG) or polysorbate? No   Have you had multisystem inflammatory syndrome from COVID-19 in the past 90 days? No            Patient MEETS CRITERIA. PROCEED with vaccine administration.        9/19/2024   INFLUENZA   Would you like to receive the flu shot or the nasal flu vaccine today? Flu Shot   Have you had a serious reaction to a flu vaccine or something in a flu vaccine? No   Have you had Guillain-Casmalia syndrome within 6 weeks of getting a vaccine? No   Have you received a bone marrow transplant within the previous 6 months? No            Patient MEETS CRITERIA. PROCEED with vaccine administration.        Patient instructed to remain in clinic for 15 minutes afterwards, and to report any adverse reactions.      Link to Ancillary Visit Immunization Standing Orders SmartSet     Screening performed by Rossana Shaw CMA on 9/19/2024 at 9:09 AM.

## 2024-09-20 ENCOUNTER — THERAPY VISIT (OUTPATIENT)
Dept: PHYSICAL THERAPY | Facility: CLINIC | Age: 74
End: 2024-09-20
Payer: COMMERCIAL

## 2024-09-20 DIAGNOSIS — M25.561 CHRONIC PAIN OF RIGHT KNEE: ICD-10-CM

## 2024-09-20 DIAGNOSIS — M62.838 NECK MUSCLE SPASM: Primary | ICD-10-CM

## 2024-09-20 DIAGNOSIS — M54.2 NECK PAIN: ICD-10-CM

## 2024-09-20 DIAGNOSIS — G89.29 CHRONIC PAIN OF RIGHT KNEE: ICD-10-CM

## 2024-09-20 PROCEDURE — 97110 THERAPEUTIC EXERCISES: CPT | Mod: GP | Performed by: PHYSICAL THERAPIST

## 2024-09-20 PROCEDURE — 97140 MANUAL THERAPY 1/> REGIONS: CPT | Mod: GP | Performed by: PHYSICAL THERAPIST

## 2024-09-23 DIAGNOSIS — M25.561 CHRONIC PAIN OF RIGHT KNEE: Primary | ICD-10-CM

## 2024-09-23 DIAGNOSIS — G89.29 CHRONIC PAIN OF RIGHT KNEE: Primary | ICD-10-CM

## 2024-09-27 ENCOUNTER — THERAPY VISIT (OUTPATIENT)
Dept: PHYSICAL THERAPY | Facility: CLINIC | Age: 74
End: 2024-09-27
Payer: COMMERCIAL

## 2024-09-27 DIAGNOSIS — M25.561 CHRONIC PAIN OF RIGHT KNEE: ICD-10-CM

## 2024-09-27 DIAGNOSIS — G89.29 CHRONIC PAIN OF RIGHT KNEE: ICD-10-CM

## 2024-09-27 DIAGNOSIS — M62.838 NECK MUSCLE SPASM: Primary | ICD-10-CM

## 2024-09-27 DIAGNOSIS — M54.2 NECK PAIN: ICD-10-CM

## 2024-09-27 PROCEDURE — 97110 THERAPEUTIC EXERCISES: CPT | Mod: GP | Performed by: PHYSICAL THERAPIST

## 2024-09-27 PROCEDURE — 97140 MANUAL THERAPY 1/> REGIONS: CPT | Mod: GP | Performed by: PHYSICAL THERAPIST

## 2024-10-04 ENCOUNTER — THERAPY VISIT (OUTPATIENT)
Dept: PHYSICAL THERAPY | Facility: CLINIC | Age: 74
End: 2024-10-04
Payer: COMMERCIAL

## 2024-10-04 DIAGNOSIS — M62.838 NECK MUSCLE SPASM: Primary | ICD-10-CM

## 2024-10-04 DIAGNOSIS — M54.2 NECK PAIN: ICD-10-CM

## 2024-10-04 DIAGNOSIS — G89.29 CHRONIC PAIN OF RIGHT KNEE: ICD-10-CM

## 2024-10-04 DIAGNOSIS — M25.561 CHRONIC PAIN OF RIGHT KNEE: ICD-10-CM

## 2024-10-04 PROCEDURE — 97140 MANUAL THERAPY 1/> REGIONS: CPT | Mod: GP | Performed by: PHYSICAL THERAPIST

## 2024-10-04 PROCEDURE — 97110 THERAPEUTIC EXERCISES: CPT | Mod: GP | Performed by: PHYSICAL THERAPIST

## 2024-10-08 NOTE — PROGRESS NOTES
[facility]                                                                                   OUTPATIENT PHYSICAL THERAPY    PLAN OF TREATMENT FOR OUTPATIENT REHABILITATION   Patient's Last Name, First Name, Pranay Kwok    YOB: 1950   Provider's Name   [facility]   Medical Record No.  [id number]     Onset Date: 06/15/24 (06/15/2024 neck; 02/15/2024 R knee)  Start of Care Date: 08/26/24     Medical Diagnosis:  Neck muscle spasm      PT Treatment Diagnosis:  neck pain; R knee pain Plan of Treatment  Frequency/Duration: 1x/week/ 4 additional weeks    Certification date from 10/08/24 to 11/04/24         See note for plan of treatment details and functional goals     Yoly Gan, PT                         I CERTIFY THE NEED FOR THESE SERVICES FURNISHED UNDER        THIS PLAN OF TREATMENT AND WHILE UNDER MY CARE     (Physician attestation of this document indicates review and certification of the therapy plan).              Referring Provider:  Hipolito Alva    Initial Assessment  See Epic Evaluation- Start of Care Date: 08/26/24            PLAN  Continue therapy per current plan of care.    Beginning/End Dates of Progress Note Reporting Period:  08/26/2024 to 10/04/2024    Referring Provider:  Hipolito Alva     10/04/24 0500   Appointment Info   Signing clinician's name / credentials Yoly Gan PT   Total/Authorized Visits 9 (PT estimate updated on PN dated 10/04/2024)   Visits Used 5   Medical Diagnosis Neck muscle spasm   PT Tx Diagnosis neck pain; R knee pain   Progress Note/Certification   Start of Care Date 08/26/24   Onset of illness/injury or Date of Surgery 06/15/24  (06/15/2024 neck; 02/15/2024 R knee)   Therapy Frequency 1x/week   Predicted Duration 4 additional weeks   Certification date from 10/08/24   Certification date to 11/04/24   Progress Note Due Date 10/04/2024   Progress Note Completed Date 08/26/2024   GOALS   PT  Goals 2;3;4   PT Goal 1   Goal Identifier turning neck either direction   Goal Description Patient will report no neck pain with turning either direction   Rationale to maximize safety and independence with transportation;to maximize safety and independence within the community   Goal Progress Better--2/10 now.  Continue.   Target Date 10/07/24   PT Goal 2   Goal Identifier rising from sitting   Goal Description Patient will report no R knee pain with rising from sitting   Rationale   (for normal mobility and transitional movements)   Goal Progress no pain with recently.   Target Date 10/07/24   Date Met 09/04/24   PT Goal 3   Goal Identifier walking   Goal Description Patient will be able to walk 1 mile without R knee pain episodes   Rationale to maximize safety and independence within the community   Goal Progress Has not had this consistently lately.   Target Date 10/07/24   Date Met 10/04/24   PT Goal 4   Goal Identifier walking on uneven surface   Goal Description Patient will be able to walk on uneven surface for 5 minutes without R knee pain   Rationale to maximize safety and independence within the community;to maximize safety and independence within the home   Goal Progress Currently has 1 episode at least with this   Target Date 11/04/24   Subjective Report   Subjective Report Patient reports he has been doing yoga and weight lifting which goes well.  R knee is still painful when he moves wrong--twisting, turning, stepping wrong.  He still walks 1 mile and doesn't feel the pain when he goes for his walks--only when he steps on something uneven.  He does feel like the exercises make his neck better, and overall he can move easier and with less pain--2/10 now with turning.  Walking on uneven surfaces will cause pain--usually only a few minutes of walking and will notice.   Objective Measures   Objective Measures Objective Measure 1;Objective Measure 2   Objective Measure 1   Objective Measure squat  "  Objective Measure 2   Objective Measure Cervical AROM:   Details L rotation min loss, mild stiffness; R rotation min loss, mild stiffness.   Treatment Interventions (PT)   Interventions Therapeutic Procedure/Exercise;Manual Therapy;Therapeutic Activity;Neuromuscular Re-education   Therapeutic Procedure/Exercise   Therapeutic Procedures: strength, endurance, ROM, flexibility minutes (87383) 10   Therapeutic Procedures Ther Proc 2;Ther Proc 3;Ther Proc 4;Ther Proc 5;Ther Proc 6;Ther Proc 7;Ther Proc 8   Ther Proc 1 rep flex w/self-OP   Ther Proc 1 - Details x10 reps   Ther Proc 2 seated cerv ret w/self-OP and upper cerv flexion   Ther Proc 2 - Details x10 reps--VC, VSC for form   Ther Proc 3 seated rep R knee extension   Ther Proc 3 - Details review w/TB--continuing   Ther Proc 4 SL clams   Ther Proc 4 - Details R --HEP   Ther Proc 5 bridge   Ther Proc 5 - Details #1 x20 reps--VC, MC for increased lift height   Ther Proc 6 rep t-ext in sitting   Ther Proc 6 - Details hands behind head x10 reps   Skilled Intervention VC, MC, VSC for form   Patient Response/Progress decreased neck tightness with B rotation after retraction and flexion exercises   Therapeutic Activity   Therapeutic Activities: dynamic activities to improve functional performance minutes (41839) 3   Ther Act 1 step-down/lateral   Ther Act 1 - Details 4\" x10 reps, x10 additional reps--VC, MC, VSC for sitting hip back   Skilled Intervention VC, MC, VSC for form   Patient Response/Progress cues for technique and hip use   Neuromuscular Re-education   Neuromuscular re-ed of mvmt, balance, coord, kinesthetic sense, posture, proprioception minutes (93585) 4   Neuro Re-ed 1 SLS, floor, EO   Neuro Re-ed 1 - Details x2' total , breaks as needed   Skilled Intervention VC, VSC again for position and form   Patient Response/Progress able to perform   Neuro Re-ed 2 rows   Neuro Re-ed 2 - Details green TB x20 reps--VC, VSC, MC for scap use and form   Neuromuscular " Re-education Neuro Re-ed 2;Neuro Re-ed 3   Manual Therapy   Manual Therapy: Mobilization, MFR, MLD, friction massage minutes (44327) 10   Manual Therapy 1 prone, STM   Manual Therapy 1 - Details B cerv paraspinals and SO x10 min   Skilled Intervention manual technique   Patient Response/Progress improved B cervical rotation after   Education   Learner/Method Patient;Listening;Reading;Demonstration;Pictures/Video;No Barriers to Learning   Plan   Plan for next session progress hip and knee strength, posture and cervical/thoracic extension   Total Session Time   Timed Code Treatment Minutes 27   Total Treatment Time (sum of timed and untimed services) 27

## 2024-10-09 ENCOUNTER — THERAPY VISIT (OUTPATIENT)
Dept: PHYSICAL THERAPY | Facility: CLINIC | Age: 74
End: 2024-10-09
Payer: COMMERCIAL

## 2024-10-09 DIAGNOSIS — M25.561 CHRONIC PAIN OF RIGHT KNEE: ICD-10-CM

## 2024-10-09 DIAGNOSIS — G89.29 CHRONIC PAIN OF RIGHT KNEE: ICD-10-CM

## 2024-10-09 DIAGNOSIS — M62.838 NECK MUSCLE SPASM: Primary | ICD-10-CM

## 2024-10-09 DIAGNOSIS — M54.2 NECK PAIN: ICD-10-CM

## 2024-10-09 PROCEDURE — 97110 THERAPEUTIC EXERCISES: CPT | Mod: GP | Performed by: PHYSICAL THERAPIST

## 2024-10-09 PROCEDURE — 97140 MANUAL THERAPY 1/> REGIONS: CPT | Mod: GP | Performed by: PHYSICAL THERAPIST

## 2024-10-22 ENCOUNTER — THERAPY VISIT (OUTPATIENT)
Dept: PHYSICAL THERAPY | Facility: CLINIC | Age: 74
End: 2024-10-22
Payer: COMMERCIAL

## 2024-10-22 DIAGNOSIS — M54.2 NECK PAIN: ICD-10-CM

## 2024-10-22 DIAGNOSIS — G89.29 CHRONIC PAIN OF RIGHT KNEE: ICD-10-CM

## 2024-10-22 DIAGNOSIS — M62.838 NECK MUSCLE SPASM: Primary | ICD-10-CM

## 2024-10-22 DIAGNOSIS — M25.561 CHRONIC PAIN OF RIGHT KNEE: ICD-10-CM

## 2024-10-22 PROCEDURE — 97140 MANUAL THERAPY 1/> REGIONS: CPT | Mod: GP | Performed by: PHYSICAL THERAPIST

## 2024-10-22 PROCEDURE — 97112 NEUROMUSCULAR REEDUCATION: CPT | Mod: GP | Performed by: PHYSICAL THERAPIST

## 2024-11-13 ENCOUNTER — THERAPY VISIT (OUTPATIENT)
Dept: PHYSICAL THERAPY | Facility: CLINIC | Age: 74
End: 2024-11-13
Payer: COMMERCIAL

## 2024-11-13 DIAGNOSIS — M25.561 CHRONIC PAIN OF RIGHT KNEE: ICD-10-CM

## 2024-11-13 DIAGNOSIS — M62.838 NECK MUSCLE SPASM: Primary | ICD-10-CM

## 2024-11-13 DIAGNOSIS — M54.2 NECK PAIN: ICD-10-CM

## 2024-11-13 DIAGNOSIS — G89.29 CHRONIC PAIN OF RIGHT KNEE: ICD-10-CM

## 2024-11-13 PROCEDURE — 97012 MECHANICAL TRACTION THERAPY: CPT | Mod: GP | Performed by: PHYSICAL THERAPIST

## 2024-11-13 PROCEDURE — 97535 SELF CARE MNGMENT TRAINING: CPT | Mod: GP | Performed by: PHYSICAL THERAPIST

## 2024-11-13 PROCEDURE — 97110 THERAPEUTIC EXERCISES: CPT | Mod: GP | Performed by: PHYSICAL THERAPIST

## 2024-11-14 NOTE — PROGRESS NOTES
[facility]                                                                                   OUTPATIENT PHYSICAL THERAPY    PLAN OF TREATMENT FOR OUTPATIENT REHABILITATION   Patient's Last Name, First Name, Pranay Kwok    YOB: 1950   Provider's Name   [facility]   Medical Record No.  [id number]     Onset Date: 06/15/24 (06/15/2024 neck; 02/15/2024 R knee)  Start of Care Date: 08/26/24     Medical Diagnosis:  Neck muscle spasm      PT Treatment Diagnosis:  neck pain; R knee pain Plan of Treatment  Frequency/Duration: 1x/week/ 4 additional weeks    Certification date from 11/05/24 to 12/03/24         See note for plan of treatment details and functional goals     Yoly Gan, PT                         I CERTIFY THE NEED FOR THESE SERVICES FURNISHED UNDER        THIS PLAN OF TREATMENT AND WHILE UNDER MY CARE     (Physician attestation of this document indicates review and certification of the therapy plan).              Referring Provider:  Hipolito Alva    Initial Assessment  See Epic Evaluation- Start of Care Date: 08/26/24            PLAN  Continue therapy per current plan of care.    Beginning/End Dates of Progress Note Reporting Period:  10/04/24 to 11/13/2024    Referring Provider:  Hipolito Alva     11/13/24 0500   Appointment Info   Signing clinician's name / credentials Yoly Gan PT   Total/Authorized Visits 12(PT estimate updated on PN dated 10/04/2024)   Visits Used 8   Medical Diagnosis Neck muscle spasm   PT Tx Diagnosis neck pain; R knee pain   Quick [facility] Auth & Certification   Progress Note/Certification   Start of Care Date 08/26/24   Onset of illness/injury or Date of Surgery 06/15/24  (06/15/2024 neck; 02/15/2024 R knee)   Therapy Frequency 1x/week   Predicted Duration 4 additional weeks   Certification date from 11/05/24   Certification date to 12/03/24   Progress Note Due Date 11/13/24   Progress Note  Completed Date 10/04/24   [facility] Authorization Information   Condition type Chronic (continuous duration <3 months)   Cause of current episode Unspecified   Nature of treatment Rehabilitative   Documented POC (choose all that apply) Measurable short and long term/discharge treatment goals related to physical and functional deficits.;Frequency of treatment visits and treatment activities to address deficit areas.;Patient agrees to program participation including home program   How did the symptoms start Neck pain is chronic with intermittent episodes of unknown origin; R knee pain began initially with a climbing injury years ago, exacerbated- several months ago for unknown reasons.   General health reported by patient Good   GOALS   PT Goals 2;3;4   PT Goal 1   Goal Identifier turning neck either direction   Goal Description Patient will report no neck pain with turning either direction   Rationale to maximize safety and independence with transportation;to maximize safety and independence within the community   Goal Progress Still has discomfort and stiffness 2/10 either direction.  Continue to allow time for trial of traction and effects.   Target Date 12/03/24   PT Goal 2   Goal Identifier rising from sitting   Goal Description Patient will report no R knee pain with rising from sitting   Rationale   (for normal mobility and transitional movements)   Goal Progress no pain with recently.   Target Date 10/07/24   Date Met 09/04/24   PT Goal 3   Goal Identifier walking   Goal Description Patient will be able to walk 1 mile without R knee pain episodes   Rationale to maximize safety and independence within the community   Goal Progress has not had recently   Target Date 10/07/24   Date Met 10/04/24   PT Goal 4   Goal Identifier walking on uneven surface   Goal Description Patient will be able to walk on uneven surface for 5 minutes without R knee pain   Rationale to maximize safety and independence within the community;to  "maximize safety and independence within the home   Goal Progress Still has 1 episode of R knee pain with walking on uneven surfaces.  Continue to allow more time for strength.  Extend timeframe   Target Date 12/03/24   Subjective Report   Subjective Report Patient reports he has had some lightheadedness/feeling off for the past few days and wonders if he is overdoing the neck exercises so plans to back off a bit.  Neck is just always tight especially at the base of his head, and turning either direction is still stiff and sore--2/10.  R knee is mostly okay, but he still gets a sharp pain if he steps wrong, and sometimes he feels it hyperextend on him which hurts.   Objective Measures   Objective Measures Objective Measure 1;Objective Measure 2   Objective Measure 1   Objective Measure squat   Objective Measure 2   Objective Measure Cervical AROM:   Details B rotation min loss each direciton--neck pain 2/10.   PT Modalities   PT Modalities Mechanical Traction   Mechanical Traction   Traction -Type Cervical   Mechanical Traction, Minutes (08056) 18   Patient Response/Progress decreased neck stiffness after   Duration 15 min   Max poundage 21#   Min poundage 0   Hold Time 50\"   Rest Time 10\"   Steps ramping up 1   Steps ramping down 1   Speed 100%   Position supine   Treatment Interventions (PT)   Interventions Therapeutic Procedure/Exercise;Manual Therapy;Therapeutic Activity;Neuromuscular Re-education;Self Care/Home Management   Therapeutic Procedure/Exercise   Therapeutic Procedures: strength, endurance, ROM, flexibility minutes (67826) 10   Therapeutic Procedures Ther Proc 2;Ther Proc 3;Ther Proc 4;Ther Proc 5;Ther Proc 6;Ther Proc 7;Ther Proc 8   Ther Proc 1 rep flex w/self-OP   Ther Proc 1 - Details x5 reps review   Ther Proc 2 seated cerv ret w/self-OP and upper cerv flexion   Ther Proc 2 - Details x5 reps review   Ther Proc 3 seated rep R knee extension   Ther Proc 3 - Details continue   Ther Proc 4 SL clams " "  Ther Proc 4 - Details R--still doing at home--continue   Ther Proc 5 bridge   Ther Proc 5 - Details #1--continue   Ther Proc 6 rep t-ext in sitting   Ther Proc 6 - Details hands behind head--HEP   Ther Proc 7 B cerv rotation w/self-OP   Ther Proc 7 - Details Still doing at home--review   Skilled Intervention VC, VSC for form   Patient Response/Progress decreased neck pain/stiffness with rotation after repeated ret/flex and flex w/self-OP   Therapeutic Activity   Therapeutic Activities: dynamic activities to improve functional performance minutes (84364) 2   Therapeutic Activities Ther Act 2;Ther Act 3;Ther Act 4   Ther Act 1 step-down/lateral   Ther Act 1 - Details pt restarted--review on 4\" step--continue   Ther Act 2 squat   Ther Act 2 - Details continue as able, reviewed proper mechanics   Skilled Intervention VC, VSC   Patient Response/Progress good understanding   Neuromuscular Re-education   Neuromuscular Re-education Neuro Re-ed 2;Neuro Re-ed 3   Neuro Re-ed 1 SLS, floor, EO   Neuro Re-ed 1 - Details review   Neuro Re-ed 2 rows   Neuro Re-ed 2 - Details green TB--HEP   Neuro Re-ed 3 sidestep w/TB at toes   Neuro Re-ed 3 - Details HEP   Manual Therapy   Manual Therapy 1 prone, STM   Manual Therapy 1 - Details trial of traction today to assess so not today   Self Care/home Management   ADL/Home Mgmt Training (73699) 10   Self Care [place] home traction unit--cervical   Self Care 1 - Details x10' instruction in proper set-up and use of home unit   Skilled Intervention VC, VSC,  for set-up and proper use   Patient Response/Progress able to operate unit correctly with decreased neck pain noticed   Education   Learner/Method Patient;Listening;Demonstration;No Barriers to Learning   Plan   Updates to plan of care send MD  message regarding orders for home traction   Plan for next session continue cervical mobility, assess response to traction, continue hip and knee strength and stability   Total Session Time "   Timed Code Treatment Minutes 22   Total Treatment Time (sum of timed and untimed services) 40

## 2024-11-18 ENCOUNTER — THERAPY VISIT (OUTPATIENT)
Dept: PHYSICAL THERAPY | Facility: CLINIC | Age: 74
End: 2024-11-18
Payer: COMMERCIAL

## 2024-11-18 DIAGNOSIS — M62.838 NECK MUSCLE SPASM: Primary | ICD-10-CM

## 2024-11-18 DIAGNOSIS — M47.812 CERVICAL SPINE ARTHRITIS: ICD-10-CM

## 2024-11-18 DIAGNOSIS — G89.29 CHRONIC PAIN OF RIGHT KNEE: ICD-10-CM

## 2024-11-18 DIAGNOSIS — M54.2 NECK PAIN: ICD-10-CM

## 2024-11-18 DIAGNOSIS — M17.10 ARTHRITIS OF KNEE: Primary | ICD-10-CM

## 2024-11-18 DIAGNOSIS — M25.561 CHRONIC PAIN OF RIGHT KNEE: ICD-10-CM

## 2024-11-18 PROCEDURE — 97012 MECHANICAL TRACTION THERAPY: CPT | Mod: GP | Performed by: PHYSICAL THERAPIST

## 2024-11-18 PROCEDURE — 97110 THERAPEUTIC EXERCISES: CPT | Mod: GP | Performed by: PHYSICAL THERAPIST

## 2024-11-21 ENCOUNTER — OFFICE VISIT (OUTPATIENT)
Dept: DERMATOLOGY | Facility: CLINIC | Age: 74
End: 2024-11-21
Payer: COMMERCIAL

## 2024-11-21 DIAGNOSIS — Z85.828 HISTORY OF NONMELANOMA SKIN CANCER: ICD-10-CM

## 2024-11-21 DIAGNOSIS — E78.5 HYPERLIPIDEMIA LDL GOAL <100: ICD-10-CM

## 2024-11-21 DIAGNOSIS — B07.0 PLANTAR WART: Primary | ICD-10-CM

## 2024-11-21 RX ORDER — ROSUVASTATIN CALCIUM 20 MG/1
20 TABLET, COATED ORAL DAILY
Qty: 90 TABLET | Refills: 1 | Status: SHIPPED | OUTPATIENT
Start: 2024-11-21

## 2024-11-21 RX ORDER — IMIQUIMOD 12.5 MG/.25G
CREAM TOPICAL
Qty: 12 PACKET | Refills: 3 | Status: SHIPPED | OUTPATIENT
Start: 2024-11-21

## 2024-11-21 NOTE — PROGRESS NOTES
[facility] Dermatology Note  Encounter Date: Nov 21, 2024  Office Visit     Reviewed patients past medical history and pertinent chart review prior to patients visit today.     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 s/p Mohs 09/11/2024  2. Asteatotic dermatitis  - Current tx: fluocinonide 0.05% cream  3. Verruca plantaris, right foot s/p cryotherapy  - Current tx: WartPeel   4. Tinea pedis   5. AKs, forehead, temples, and nose  - Current tx: Efudex/ Dovonex 8/15/2024   - Past tx: Efudex 5% cream (initiated 11/3/2022)  6. Rash, most consistent with tinea pedis, left medial foot s/p KOH prep 12/6/2022  7. Verruca plantaris  - cryotherapy    ____________________________________________    Assessment & Plan:     # Verruca vulgaris, right plantar foot x3.   - Cryotherapy: Patient elects to treat warts today with cryotherapy. After verbal consent and discussion of risks and benefits including but no limited to dyspigmentation/scar, blister, and pain. A total of 2 warts were treated with 1-2mm freeze border for 3 cycles with liquid nitrogen. Post cryotherapy instructions were provided.      - Start imiquimod 5% cream every morning.     - Continue salicylic acid 40% (such as wart stick) nightly followed by duct tape or bandage.  Pare down after removal of occlusive cover for deeper penetration of medication.      # History of BCC, scalp  - Healing well. No sign of infection today. Care reviewed.     All risks, benefits and alternatives were discussed with patient.  Patient is in agreement and understands the assessment and plan.  All questions were answered.  Amparo Bowman PA-C  [facility]  _______________________________________    CC: RECHECK (Wart on right  foot)    HPI:  Mr. Pranay Ratliff is a(n) 74 year old male who presents today as a return patient for warts. He also requests evaluation of the Mohs site on his scalp. Patient is otherwise feeling well, without additional skin concerns.      Physical Exam:  SKIN: Focused examination of scalp and right foot was performed.  - Verrucal papule(s) with thrombosed capillaries involving the right plantar foot x3.   - Pink patch involving the scalp, no evidence of infection.        - No other lesions of concern on areas examined.     Medications:  Current Outpatient Medications   Medication Sig Dispense Refill    aspirin 81 MG tablet Take by mouth daily      azelaic acid (FINACIA) 15 % external gel Apply to the face twice daily. 50 g 4    diclofenac (VOLTAREN) 1 % topical gel Apply topically 4 times daily      fluocinonide (LIDEX) 0.05 % external cream Apply topically 2 times daily 120 g 6    multivitamin w/minerals (THERA-VIT-M) tablet Take 1 tablet by mouth daily      Omega-3 Fatty Acids (OMEGA-3 FISH OIL PO)       pantoprazole (PROTONIX) 40 MG EC tablet Take 1 tablet (40 mg) by mouth daily 90 tablet 2    rosuvastatin (CRESTOR) 20 MG tablet Take 1 tablet (20 mg) by mouth daily 90 tablet 1    triamterene-HCTZ (MAXZIDE-25) 37.5-25 MG tablet Take 0.5 tablets by mouth every morning 45 tablet 1    COMPOUNDED NON-CONTROLLED SUBSTANCE (CMPD RX) - PHARMACY TO MIX COMPOUNDED MEDICATION Apply to forehead, temples, cheeks, and nose twice daily for 5-7 days, wash hands after application. Avoid sun. (Patient not taking: Reported on 11/21/2024) 30 g 0    gabapentin (NEURONTIN) 100 MG capsule Take 100 mg by mouth Take 1 time daily at HS (Patient not taking: Reported on 11/21/2024)       No current facility-administered medications for this visit.      Past Medical History:   Patient Active Problem List   Diagnosis    History of basal cell carcinoma    Benign essential hypertension    Hyperlipidemia LDL goal <100    Obstructive sleep  apnea syndrome    Gastroesophageal reflux disease with esophagitis    Localized swelling of lower extremity    Vasovagal syncope    Elevated prostate specific antigen (PSA)    Concussion with loss of consciousness of 30 minutes or less, subsequent encounter    BPH with obstruction/lower urinary tract symptoms    Myofascial pain syndrome, cervical    Occipital neuralgia of right side    Fatigue due to old head injury    Post concussion syndrome    Cervical segment dysfunction    Thoracic segment dysfunction    Cephalgia    Somatic dysfunction of sacral region    Cervical pain    Dizziness    Marital conflict    Chronic left-sided low back pain without sciatica    Neck muscle spasm    Neck pain    Chronic pain of right knee     Past Medical History:   Diagnosis Date    Basal cell carcinoma     Concussion with loss of consciousness of 30 minutes or less, subsequent encounter     Congestive heart failure (H)     Epididymitis, bilateral     Epididymitis, left     Epididymitis, right     Obstructive sleep apnea syndrome     Squamous cell carcinoma of skin, unspecified     Vasovagal syncope        CC Referred Self, MD  No address on file on close of this encounter.

## 2024-11-21 NOTE — LETTER
11/21/2024      Pranay Ratliff  [address]  [facility] [address]      Dear Colleague,    Thank you for referring your patient, Pranay Ratliff, [facility]. Please see a copy of my visit note below.    [facility] Dermatology Note  Encounter Date: Nov 21, 2024  Office Visit     Reviewed patients past medical history and pertinent chart review prior to patients visit today.     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 s/p Mohs 09/11/2024  2. Asteatotic dermatitis  - Current tx: fluocinonide 0.05% cream  3. Verruca plantaris, right foot s/p cryotherapy  - Current tx: WartPeel   4. Tinea pedis   5. AKs, forehead, temples, and nose  - Current tx: Efudex/ Dovonex 8/15/2024   - Past tx: Efudex 5% cream (initiated 11/3/2022)  6. Rash, most consistent with tinea pedis, left medial foot s/p KOH prep 12/6/2022  7. Verruca plantaris  - cryotherapy    ____________________________________________    Assessment & Plan:     # Verruca vulgaris, right plantar foot x3.   - Cryotherapy: Patient elects to treat warts today with cryotherapy. After verbal consent and discussion of risks and benefits including but no limited to dyspigmentation/scar, blister, and pain. A total of 2 warts were treated with 1-2mm freeze border for 3 cycles with liquid nitrogen. Post cryotherapy instructions were provided.      - Start imiquimod 5% cream every morning.     - Continue salicylic acid 40% (such as wart stick) nightly followed by duct tape or bandage.  Pare down after removal of occlusive cover for deeper penetration of medication.      # History of BCC, scalp  - Healing well. No sign of infection today. Care reviewed.     All risks, benefits and alternatives were discussed with  patient.  Patient is in agreement and understands the assessment and plan.  All questions were answered.  ROBERTA Prado [facility] Dermatology  _______________________________________    CC: RECHECK (Wart on right foot)    HPI:  Mr. Pranay Ratliff is a(n) 74 year old male who presents today as a return patient for warts. He also requests evaluation of the Mohs site on his scalp. Patient is otherwise feeling well, without additional skin concerns.      Physical Exam:  SKIN: Focused examination of scalp and right foot was performed.  - Verrucal papule(s) with thrombosed capillaries involving the right plantar foot x3.   - Pink patch involving the scalp, no evidence of infection.        - No other lesions of concern on areas examined.     Medications:  Current Outpatient Medications   Medication Sig Dispense Refill     aspirin 81 MG tablet Take by mouth daily       azelaic acid (FINACIA) 15 % external gel Apply to the face twice daily. 50 g 4     diclofenac (VOLTAREN) 1 % topical gel Apply topically 4 times daily       fluocinonide (LIDEX) 0.05 % external cream Apply topically 2 times daily 120 g 6     multivitamin w/minerals (THERA-VIT-M) tablet Take 1 tablet by mouth daily       Omega-3 Fatty Acids (OMEGA-3 FISH OIL PO)        pantoprazole (PROTONIX) 40 MG EC tablet Take 1 tablet (40 mg) by mouth daily 90 tablet 2     rosuvastatin (CRESTOR) 20 MG tablet Take 1 tablet (20 mg) by mouth daily 90 tablet 1     triamterene-HCTZ (MAXZIDE-25) 37.5-25 MG tablet Take 0.5 tablets by mouth every morning 45 tablet 1     COMPOUNDED NON-CONTROLLED SUBSTANCE (CMPD RX) - PHARMACY TO MIX COMPOUNDED MEDICATION Apply to forehead, temples, cheeks, and nose twice daily for 5-7 days, wash hands after application. Avoid sun. (Patient not taking: Reported on 11/21/2024) 30 g 0     gabapentin (NEURONTIN) 100 MG capsule Take 100 mg by mouth Take 1 time daily at HS (Patient not taking: Reported on 11/21/2024)       No current  facility-administered medications for this visit.      Past Medical History:   Patient Active Problem List   Diagnosis     History of basal cell carcinoma     Benign essential hypertension     Hyperlipidemia LDL goal <100     Obstructive sleep apnea syndrome     Gastroesophageal reflux disease with esophagitis     Localized swelling of lower extremity     Vasovagal syncope     Elevated prostate specific antigen (PSA)     Concussion with loss of consciousness of 30 minutes or less, subsequent encounter     BPH with obstruction/lower urinary tract symptoms     Myofascial pain syndrome, cervical     Occipital neuralgia of right side     Fatigue due to old head injury     Post concussion syndrome     Cervical segment dysfunction     Thoracic segment dysfunction     Cephalgia     Somatic dysfunction of sacral region     Cervical pain     Dizziness     Marital conflict     Chronic left-sided low back pain without sciatica     Neck muscle spasm     Neck pain     Chronic pain of right knee     Past Medical History:   Diagnosis Date     Basal cell carcinoma      Concussion with loss of consciousness of 30 minutes or less, subsequent encounter      Congestive heart failure (H)      Epididymitis, bilateral      Epididymitis, left      Epididymitis, right      Obstructive sleep apnea syndrome      Squamous cell carcinoma of skin, unspecified      Vasovagal syncope        CC Referred Self, MD  No address on file on close of this encounter.       Again, thank you for allowing me to participate in the care of your patient.        Sincerely,        Amparo Bowman PA-C

## 2024-11-21 NOTE — PATIENT INSTRUCTIONS
Proper skin care from [facility]:    -Eliminate harsh soaps as they strip the natural oils from the skin, often resulting in dry itchy skin ( i.e. Dial, Zest, Israeli Spring)  -Use mild soaps such as Cetaphil or Dove Sensitive Skin in the shower. You do not need to use soap on arms, legs, and trunk every time you shower unless visibly soiled.   -Avoid hot or cold showers.  -After showering, lightly dry off and apply moisturizing within 2-3 minutes. This will help trap moisture in the skin.   -Aggressive use of a moisturizer at least 1-2 times a day to the entire body (including -Vanicream, Cetaphil, Aquaphor or Cerave) and moisturize hands after every washing.  -We recommend using moisturizers that come in a tub that needs to be scooped out, not a pump. This has more of an oil base. It will hold moisture in your skin much better than a water base moisturizer. The above recommended are non-pore clogging.      Wear a sunscreen with at least SPF 30 on your face, ears, neck and V of the chest daily. Wear sunscreen on other areas of the body if those areas are exposed to the sun throughout the day. Sunscreens can contain physical and/or chemical blockers. Physical blockers are less likely to clog pores, these include zinc oxide and titanium dioxide. Reapply every two hour and after swimming.     Sunscreen examples: https://www.ewg.org/sunscreen/    UV radiation  UVA radiation remains constant throughout the day and throughout the year. It is a longer wavelength than UVB and therefore penetrates deeper into the skin leading to immediate and delayed tanning, photoaging, and skin cancer. 70-80% of UVA and UVB radiation occurs between the hours of 10am-2pm.  UVB radiation  UVB radiation causes the most harmful effects and is more significant during the summer months. However, snow and ice can reflect UVB radiation leading to skin damage during the winter months as well. UVB radiation is responsible for tanning,  burning, inflammation, delayed erythema (pinkness), pigmentation (brown spots), and skin cancer.     I recommend self monthly full body exams and yearly full body exams with a dermatology provider. If you develop a new or changing lesion please follow up for examination. Most skin cancers are pink and scaly or pink and pearly. However, we do see blue/brown/black skin cancers.  Consider the ABCDEs of melanoma when giving yourself your monthly full body exam ( don't forget the groin, buttocks, feet, toes, etc). A-asymmetry, B-borders, C-color, D-diameter, E-elevation or evolving. If you see any of these changes please follow up in clinic. If you cannot see your back I recommend purchasing a hand held mirror to use with a larger wall mirror.       Checking for Skin Cancer  You can find cancer early by checking your skin each month. There are 3 kinds of skin cancer. They are melanoma, basal cell carcinoma, and squamous cell carcinoma. Doing monthly skin checks is the best way to find new marks or skin changes. Follow the instructions below for checking your skin.   The ABCDEs of checking moles for melanoma   Check your moles or growths for signs of melanoma using ABCDE:   Asymmetry: the sides of the mole or growth don t match  Border: the edges are ragged, notched, or blurred  Color: the color within the mole or growth varies  Diameter: the mole or growth is larger than 6 mm (size of a pencil eraser)  Evolving: the size, shape, or color of the mole or growth is changing (evolving is not shown in the images below)    Checking for other types of skin cancer  Basal cell carcinoma or squamous cell carcinoma have symptoms such as:     A spot or mole that looks different from all other marks on your skin  Changes in how an area feels, such as itching, tenderness, or pain  Changes in the skin's surface, such as oozing, bleeding, or scaliness  A sore that does not heal  New swelling or redness beyond the border of a  mole    Who s at risk?  Anyone can get skin cancer. But you are at greater risk if you have:   Fair skin, light-colored hair, or light-colored eyes  Many moles or abnormal moles on your skin  A history of sunburns from sunlight or tanning beds  A family history of skin cancer  A history of exposure to radiation or chemicals  A weakened immune system  If you have had skin cancer in the past, you are at risk for recurring skin cancer.   How to check your skin  Do your monthly skin checkups in front of a full-length mirror. Check all parts of your body, including your:   Head (ears, face, neck, and scalp)  Torso (front, back, and sides)  Arms (tops, undersides, upper, and lower armpits)  Hands (palms, backs, and fingers, including under the nails)  Buttocks and genitals  Legs (front, back, and sides)  Feet (tops, soles, toes, including under the nails, and between toes)  If you have a lot of moles, take digital photos of them each month. Make sure to take photos both up close and from a distance. These can help you see if any moles change over time.   Most skin changes are not cancer. But if you see any changes in your skin, call your doctor right away. Only he or she can diagnose a problem. If you have skin cancer, seeing your doctor can be the first step toward getting the treatment that could save your life.   [employer] last reviewed this educational content on 4/1/2019 2000-2020 [employer]. [address] [place] [address], [place] [address]. All rights reserved. This information is not intended as a substitute for professional medical care. Always follow your healthcare professional's instructions.       When should I call my doctor?  If you are worsening or not improving, please, contact us or seek urgent care as noted below.     Who should I call with questions (adults)?    [facility] [phone]  For urgent needs outside of business hours call the [facility] at  [phone] and ask for the dermatology resident on call to be paged  If this is a medical emergency and you are unable to reach an ER, Call 911      If you need a prescription refill, please contact your pharmacy. Refills are approved or denied by our Physicians during normal business hours, Monday through Fridays  Per office policy, refills will not be granted if you have not been seen within the past year (or sooner depending on your child's condition)

## 2024-11-25 ENCOUNTER — MYC MEDICAL ADVICE (OUTPATIENT)
Dept: UROLOGY | Facility: CLINIC | Age: 74
End: 2024-11-25
Payer: COMMERCIAL

## 2024-11-25 NOTE — TELEPHONE ENCOUNTER
Vin Sinha MD  [facility] Adult [place]15 minutes ago (3:10 PM)       Hi team,    Please reassure him that this does not sound concerning.  Blood in the ejaculate, although startling, does not have any clinical significance or require further workup.    Thanks,  Vin Sinha M.D.   MyChart response sent to pt.  Sarah Cabrera RN

## 2024-12-02 ENCOUNTER — THERAPY VISIT (OUTPATIENT)
Dept: PHYSICAL THERAPY | Facility: CLINIC | Age: 74
End: 2024-12-02
Payer: COMMERCIAL

## 2024-12-02 DIAGNOSIS — G89.29 CHRONIC PAIN OF RIGHT KNEE: ICD-10-CM

## 2024-12-02 DIAGNOSIS — M25.561 CHRONIC PAIN OF RIGHT KNEE: ICD-10-CM

## 2024-12-02 DIAGNOSIS — M54.2 NECK PAIN: ICD-10-CM

## 2024-12-02 DIAGNOSIS — M62.838 NECK MUSCLE SPASM: Primary | ICD-10-CM

## 2024-12-02 DIAGNOSIS — I10 BENIGN ESSENTIAL HYPERTENSION: ICD-10-CM

## 2024-12-02 PROCEDURE — 97012 MECHANICAL TRACTION THERAPY: CPT | Mod: GP | Performed by: PHYSICAL THERAPIST

## 2024-12-02 PROCEDURE — 97535 SELF CARE MNGMENT TRAINING: CPT | Mod: GP | Performed by: PHYSICAL THERAPIST

## 2024-12-02 ASSESSMENT — ACTIVITIES OF DAILY LIVING (ADL)
KNEEL ON THE FRONT OF YOUR KNEE: ACTIVITY IS MINIMALLY DIFFICULT
WEAKNESS: THE SYMPTOM AFFECTS MY ACTIVITY MODERATELY
LIMPING: I DO NOT HAVE THE SYMPTOM
GO UP STAIRS: ACTIVITY IS MINIMALLY DIFFICULT
PAIN: THE SYMPTOM AFFECTS MY ACTIVITY MODERATELY
HOW_WOULD_YOU_RATE_THE_CURRENT_FUNCTION_OF_YOUR_KNEE_DURING_YOUR_USUAL_DAILY_ACTIVITIES_ON_A_SCALE_FROM_0_TO_100_WITH_100_BEING_YOUR_LEVEL_OF_KNEE_FUNCTION_PRIOR_TO_YOUR_INJURY_AND_0_BEING_THE_INABILITY_TO_PERFORM_ANY_OF_YOUR_USUAL_DAILY_ACTIVITIES?: 85
WEAKNESS: THE SYMPTOM AFFECTS MY ACTIVITY MODERATELY
RAW_SCORE: 51
KNEE_ACTIVITY_OF_DAILY_LIVING_SCORE: 72.86
SWELLING: I DO NOT HAVE THE SYMPTOM
HOW_WOULD_YOU_RATE_THE_CURRENT_FUNCTION_OF_YOUR_KNEE_DURING_YOUR_USUAL_DAILY_ACTIVITIES_ON_A_SCALE_FROM_0_TO_100_WITH_100_BEING_YOUR_LEVEL_OF_KNEE_FUNCTION_PRIOR_TO_YOUR_INJURY_AND_0_BEING_THE_INABILITY_TO_PERFORM_ANY_OF_YOUR_USUAL_DAILY_ACTIVITIES?: 85
RISE FROM A CHAIR: ACTIVITY IS MINIMALLY DIFFICULT
GIVING WAY, BUCKLING OR SHIFTING OF KNEE: THE SYMPTOM AFFECTS MY ACTIVITY MODERATELY
GO UP STAIRS: ACTIVITY IS MINIMALLY DIFFICULT
WALK: ACTIVITY IS MINIMALLY DIFFICULT
PAIN: THE SYMPTOM AFFECTS MY ACTIVITY MODERATELY
SWELLING: I DO NOT HAVE THE SYMPTOM
KNEEL ON THE FRONT OF YOUR KNEE: ACTIVITY IS MINIMALLY DIFFICULT
KNEE_ACTIVITY_OF_DAILY_LIVING_SUM: 51
SIT WITH YOUR KNEE BENT: ACTIVITY IS NOT DIFFICULT
HOW_WOULD_YOU_RATE_THE_OVERALL_FUNCTION_OF_YOUR_KNEE_DURING_YOUR_USUAL_DAILY_ACTIVITIES?: NEARLY NORMAL
SIT WITH YOUR KNEE BENT: ACTIVITY IS NOT DIFFICULT
AS_A_RESULT_OF_YOUR_KNEE_INJURY,_HOW_WOULD_YOU_RATE_YOUR_CURRENT_LEVEL_OF_DAILY_ACTIVITY?: NEARLY NORMAL
STAND: ACTIVITY IS MINIMALLY DIFFICULT
WALK: ACTIVITY IS MINIMALLY DIFFICULT
GO DOWN STAIRS: ACTIVITY IS MINIMALLY DIFFICULT
SQUAT: ACTIVITY IS MINIMALLY DIFFICULT
RISE FROM A CHAIR: ACTIVITY IS MINIMALLY DIFFICULT
LIMPING: I DO NOT HAVE THE SYMPTOM
GIVING WAY, BUCKLING OR SHIFTING OF KNEE: THE SYMPTOM AFFECTS MY ACTIVITY MODERATELY
STIFFNESS: THE SYMPTOM AFFECTS MY ACTIVITY MODERATELY
STIFFNESS: THE SYMPTOM AFFECTS MY ACTIVITY MODERATELY
HOW_WOULD_YOU_RATE_THE_OVERALL_FUNCTION_OF_YOUR_KNEE_DURING_YOUR_USUAL_DAILY_ACTIVITIES?: NEARLY NORMAL
SQUAT: ACTIVITY IS MINIMALLY DIFFICULT
GO DOWN STAIRS: ACTIVITY IS MINIMALLY DIFFICULT
AS_A_RESULT_OF_YOUR_KNEE_INJURY,_HOW_WOULD_YOU_RATE_YOUR_CURRENT_LEVEL_OF_DAILY_ACTIVITY?: NEARLY NORMAL
STAND: ACTIVITY IS MINIMALLY DIFFICULT

## 2024-12-02 NOTE — PROGRESS NOTES
DISCHARGE  Reason for Discharge: Patient has made progress since his initial evaluation and has a good HEP to address both his neck and R knee issues.  He will be receiving a Beach cervical home traction unit as well as a R knee hinged brace to help with management at home in addition to continuing with his HEP.  He should do well independently at this point.      Equipment Issued: theraband for exercises    Discharge Plan: Patient to continue home program.    Referring Provider:  Hipolito Alva     12/02/24 0500   Appointment Info   Signing clinician's name / credentials Yoly Gan, PT   Total/Authorized Visits 12(PT estimate updated on PN dated 10/04/2024)   Visits Used 10   Medical Diagnosis Neck muscle spasm   PT Tx Diagnosis neck pain; R knee pain   Progress Note/Certification   Start of Care Date 08/26/24   Onset of illness/injury or Date of Surgery 06/15/24  (06/15/2024 neck; 02/15/2024 R knee)   Therapy Frequency no additional visits planned   Certification date from 11/05/24   Certification date to 12/03/24   Progress Note Due Date 12/02/24   Progress Note Completed Date 11/13/24   [facility] Authorization Information   Condition type Chronic (continuous duration <3 months)   Cause of current episode Unspecified   Nature of treatment Rehabilitative   Documented POC (choose all that apply) Measurable short and long term/discharge treatment goals related to physical and functional deficits.;Frequency of treatment visits and treatment activities to address deficit areas.;Patient agrees to program participation including home program   How did the symptoms start Neck pain is chronic with intermittent episodes of unknown origin; R knee pain began initially with a climbing injury years ago, exacerbated- several months ago for unknown reasons.   General health reported by patient Good   GOALS   PT Goals 2;3;4   PT Goal 1   Goal Identifier turning neck either direction   Goal Description Patient will report no neck  pain with turning either direction   Rationale to maximize safety and independence with transportation;to maximize safety and independence within the community   Goal Progress Varies but can still have up to 2/10 pain/discomfort/stiffness with.  Continue independently as patient is d/c'd.   Target Date 12/03/24   PT Goal 2   Goal Identifier rising from sitting   Goal Description Patient will report no R knee pain with rising from sitting   Rationale   (for normal mobility and transitional movements)   Goal Progress no pain with recently.   Target Date 10/07/24   Date Met 09/04/24   PT Goal 3   Goal Identifier walking   Goal Description Patient will be able to walk 1 mile without R knee pain episodes   Rationale to maximize safety and independence within the community   Goal Progress has not had recently   Target Date 10/07/24   Date Met 10/04/24   PT Goal 4   Goal Identifier walking on uneven surface   Goal Description Patient will be able to walk on uneven surface for 5 minutes without R knee pain   Rationale to maximize safety and independence within the community;to maximize safety and independence within the home   Goal Progress Still having episodes on uneven surfaces.  Continue independently as patient is d/c'd.   Target Date 12/03/24   Subjective Report   Subjective Report Patient thinks his neck is better.  Turning either direction is still stiff and uncomfortable--2/10 discomfort, but he can manage.  He will  a cervical home traction unit in the next day or 2 along with a knee brace.  He still walks 1 mile and sometimes has times when his R knee won't give out on him.  Stepping off of uneven surfaces will cause his R knee to hyperextend.  Exercises go well at home, and he feels they are helpful.   Objective Measures   Objective Measures Objective Measure 1;Objective Measure 2   Objective Measure 1   Objective Measure posture   Details continued fwd head posture   Objective Measure 2   Objective  "Measure Cervical AROM:   Details B rotation min loss, stiffness both ways   PT Modalities   PT Modalities Mechanical Traction   Mechanical Traction   Mechanical Traction, Minutes (30187) 17   Traction -Type Cervical   Position supine   Type Intermittent   Duration 15 min   Max poundage 21#   Min poundage 0   Hold Time 50\"   Rest Time 10\"   Steps ramping up 1   Steps ramping down 1   Speed 100%   Patient Response/Progress decreased neck pain/stiffness after   Treatment Interventions (PT)   Interventions Therapeutic Procedure/Exercise;Manual Therapy;Therapeutic Activity;Neuromuscular Re-education;Self Care/Home Management   Therapeutic Procedure/Exercise   Therapeutic Procedures: strength, endurance, ROM, flexibility minutes (68907) 10   Therapeutic Procedures Ther Proc 2;Ther Proc 3;Ther Proc 4;Ther Proc 5;Ther Proc 6;Ther Proc 7;Ther Proc 8   Ther Proc 1 rep flex w/self-OP   Ther Proc 1 - Details HEP--continue--review x5 reps   Ther Proc 2 seated cerv ret w/self-OP and upper cerv flexion   Ther Proc 2 - Details HEP   Ther Proc 3 seated rep R knee extension   Ther Proc 3 - Details continuing   Ther Proc 4 SL clams   Ther Proc 4 - Details w/green TB at knees--verbal review   Ther Proc 5 bridge   Ther Proc 5 - Details #1, green TB at thighs--verbal review   Ther Proc 6 rep t-ext in sitting   Ther Proc 6 - Details hands behind head--HEP   Ther Proc 7 B cerv rotation w/self-OP   Ther Proc 7 - Details review x5 reps each direction--continue   Ther Proc 8 standing hip abduction   Ther Proc 8 - Details green at toes--HEP   Skilled Intervention VC, VSC for form   Patient Response/Progress good understanding and technique with exercises   Therapeutic Activity   Therapeutic Activities Ther Act 2;Ther Act 3;Ther Act 4   Ther Act 1 step-down/lateral   Ther Act 1 - Details 4\" at home--continue   Ther Act 2 squat   Ther Act 2 - Details HEP as able   Neuromuscular Re-education   Neuromuscular Re-education Neuro Re-ed 2;Neuro Re-ed " 3   Neuro Re-ed 1 SLS, floor, EO   Neuro Re-ed 1 - Details HEP   Neuro Re-ed 2 rows   Neuro Re-ed 2 - Details green TB--HEP   Neuro Re-ed 3 sidestep w/TB at toes   Neuro Re-ed 3 - Details HEP   Self Care/home Management   ADL/Home Mgmt Training (94367) 11   Self Care Self Care 2   Self Care 1 Beach cervical home traction unit instruct and practice   Self Care 1 - Details x11'--instruction again as patient had questions,  practiced set-up and use of home unit as patient will be getting one for self-management--answered all questions and assured proper set-up and use   Skilled Intervention instruction, education   Patient Response/Progress able to operate home unit correctly   Education   Learner/Method Patient;Listening;Demonstration;No Barriers to Learning   Plan   Updates to plan of care d/c w/HEP and self-management of sxs   Total Session Time   Timed Code Treatment Minutes 21   Total Treatment Time (sum of timed and untimed services) 38

## 2024-12-03 RX ORDER — TRIAMTERENE AND HYDROCHLOROTHIAZIDE 37.5; 25 MG/1; MG/1
TABLET ORAL
Qty: 45 TABLET | Refills: 0 | Status: SHIPPED | OUTPATIENT
Start: 2024-12-03

## 2024-12-12 ENCOUNTER — OFFICE VISIT (OUTPATIENT)
Dept: DERMATOLOGY | Facility: CLINIC | Age: 74
End: 2024-12-12
Payer: COMMERCIAL

## 2024-12-12 DIAGNOSIS — Z85.828 HISTORY OF SKIN CANCER: Primary | ICD-10-CM

## 2024-12-12 NOTE — PROGRESS NOTES
Pranay Ratliff is an extremely pleasant 74 year old year old male patient here today for hx of non-melanoma skin cancer scalp, wound check , healed well.  Patient has no other skin complaints today.  Remainder of the HPI, Meds, PMH, Allergies, FH, and SH was reviewed in chart.      Past Medical History:   Diagnosis Date    Basal cell carcinoma     Concussion with loss of consciousness of 30 minutes or less, subsequent encounter     Congestive heart failure (H)     Epididymitis, bilateral     Epididymitis, left     Epididymitis, right     Obstructive sleep apnea syndrome     Squamous cell carcinoma of skin, unspecified     Vasovagal syncope        Past Surgical History:   Procedure Laterality Date    COLONOSCOPY  2021    Next will be     COLONOSCOPY N/A 2024    Procedure: Colonoscopy;  Surgeon: Jaydon Parks MD;  Location:  GI    ESOPHAGOSCOPY, GASTROSCOPY, DUODENOSCOPY (EGD), COMBINED N/A 2024    Procedure: Esophagoscopy, gastroscopy, duodenoscopy (EGD), combined;  Surgeon: Jaydon Parks MD;  Location:  GI    TESTICLE SURGERY      VASECTOMY          Family History   Problem Relation Age of Onset    Skin Cancer Mother          from pneumonia    Abdominal Aortic Aneurysm Mother     Heart Failure Father     Diabetes Type 1 Brother         Possibly secondary to chemical exposure in [place]    Diabetes Type 2  Maternal Grandmother     Cancer Brother         Exposure to Agent Orange in [place]    Heart Disease Brother        Social History     Socioeconomic History    Marital status:      Spouse name: Not on file    Number of children: Not on file    Years of education: Not on file    Highest education level: Not on file   Occupational History    Not on file   Tobacco Use    Smoking status: Never    Smokeless tobacco: Never    Tobacco comments:     Smoked cigarettes in undergrad college  socially. Not often.   Vaping Use    Vaping status: Never Used   Substance and Sexual  Activity    Alcohol use: Yes     Comment: Very infrequent. Wine usually    Drug use: No    Sexual activity: Not Currently     Partners: Male   Other Topics Concern    Parent/sibling w/ CABG, MI or angioplasty before 65F 55M? No   Social History Narrative    Not on file     Social Drivers of Health     Financial Resource Strain: Low Risk  (4/15/2024)    Financial Resource Strain     Within the past 12 months, have you or your family members you live with been unable to get utilities (heat, electricity) when it was really needed?: No   Food Insecurity: Low Risk  (4/15/2024)    Food Insecurity     Within the past 12 months, did you worry that your food would run out before you got money to buy more?: No     Within the past 12 months, did the food you bought just not last and you didn t have money to get more?: No   Transportation Needs: Low Risk  (4/15/2024)    Transportation Needs     Within the past 12 months, has lack of transportation kept you from medical appointments, getting your medicines, non-medical meetings or appointments, work, or from getting things that you need?: No   Physical Activity: Sufficiently Active (4/15/2024)    Exercise Vital Sign     Days of Exercise per Week: 7 days     Minutes of Exercise per Session: 120 min   Stress: Stress Concern Present (4/15/2024)    [facility] [employer] - Occupational Stress Questionnaire     Feeling of Stress : To some extent   Social Connections: Unknown (4/15/2024)    Social Connection and Isolation Panel [NHANES]     Frequency of Communication with Friends and Family: Not on file     Frequency of Social Gatherings with Friends and Family: Once a week     Attends Yarsanism Services: Not on file     Active Member of Clubs or Organizations: Not on file     Attends Club or Organization Meetings: Not on file     Marital Status: Not on file   Interpersonal Safety: Low Risk  (4/22/2024)    Interpersonal Safety     Do you feel physically and  emotionally safe where you currently live?: Yes     Within the past 12 months, have you been hit, slapped, kicked or otherwise physically hurt by someone?: No     Within the past 12 months, have you been humiliated or emotionally abused in other ways by your partner or ex-partner?: No   Housing Stability: Low Risk  (4/15/2024)    Housing Stability     Do you have housing? : Yes     Are you worried about losing your housing?: No       Outpatient Encounter Medications as of 12/12/2024   Medication Sig Dispense Refill    aspirin 81 MG tablet Take by mouth daily      azelaic acid (FINACIA) 15 % external gel Apply to the face twice daily. 50 g 4    COMPOUNDED NON-CONTROLLED SUBSTANCE (CMPD RX) - PHARMACY TO MIX COMPOUNDED MEDICATION Apply to forehead, temples, cheeks, and nose twice daily for 5-7 days, wash hands after application. Avoid sun. (Patient not taking: Reported on 11/21/2024) 30 g 0    diclofenac (VOLTAREN) 1 % topical gel Apply topically 4 times daily      fluocinonide (LIDEX) 0.05 % external cream Apply topically 2 times daily 120 g 6    gabapentin (NEURONTIN) 100 MG capsule Take 100 mg by mouth Take 1 time daily at HS (Patient not taking: Reported on 11/21/2024)      imiquimod (ALDARA) 5 % external cream Apply topically to warts every morning. 12 packet 3    multivitamin w/minerals (THERA-VIT-M) tablet Take 1 tablet by mouth daily      Omega-3 Fatty Acids (OMEGA-3 FISH OIL PO)       pantoprazole (PROTONIX) 40 MG EC tablet Take 1 tablet (40 mg) by mouth daily 90 tablet 2    rosuvastatin (CRESTOR) 20 MG tablet TAKE 1 TABLET BY MOUTH EVERY DAY 90 tablet 1    triamterene-HCTZ (MAXZIDE-25) 37.5-25 MG tablet TAKE 1/2 TABLET BY MOUTH EVERY MORNING 45 tablet 0     No facility-administered encounter medications on file as of 12/12/2024.             O:   NAD, WDWN, Alert & Oriented, Mood & Affect wnl, Vitals stable   General appearance normal   Vitals stable   Alert, oriented and in no acute distress     Scalp well  healed      Eyes: Conjunctivae/lids:Normal     ENT: Lips, mucosa: normal    MSK:Normal    Cardiovascular: peripheral edema none    Pulm: Breathing Normal    Neuro/Psych: Orientation:Alert and Orientedx3 ; Mood/Affect:normal       A/P:  Hx of non-melanoma skin cancer   It was a pleasure speaking to Pranay Ratliff today.  Previous clinic notes and pertinent laboratory tests were reviewed prior to Pranay Ratliff's visit.  Patient encouraged to perform monthly skin exams.  UV precautions reviewed with patient.  Return to clinic next skin check

## 2024-12-12 NOTE — LETTER
2024      Pranay Ratliff   [address]  [facility] [address]      Dear Colleague,    Thank you for referring your patient, Pranay Ratliff, to the [facility]. Please see a copy of my visit note below.    Pranay Ratliff is an extremely pleasant 74 year old year old male patient here today for hx of non-melanoma skin cancer scalp, wound check , healed well.  Patient has no other skin complaints today.  Remainder of the HPI, Meds, PMH, Allergies, FH, and SH was reviewed in chart.      Past Medical History:   Diagnosis Date     Basal cell carcinoma      Concussion with loss of consciousness of 30 minutes or less, subsequent encounter      Congestive heart failure (H)      Epididymitis, bilateral      Epididymitis, left      Epididymitis, right      Obstructive sleep apnea syndrome      Squamous cell carcinoma of skin, unspecified      Vasovagal syncope        Past Surgical History:   Procedure Laterality Date     COLONOSCOPY  2021    Next will be      COLONOSCOPY N/A 2024    Procedure: Colonoscopy;  Surgeon: Jaydon Parks MD;  Location:  GI     ESOPHAGOSCOPY, GASTROSCOPY, DUODENOSCOPY (EGD), COMBINED N/A 2024    Procedure: Esophagoscopy, gastroscopy, duodenoscopy (EGD), combined;  Surgeon: Jaydon Parks MD;  Location:  GI     TESTICLE SURGERY       VASECTOMY          Family History   Problem Relation Age of Onset     Skin Cancer Mother          from pneumonia     Abdominal Aortic Aneurysm Mother      Heart Failure Father      Diabetes Type 1 Brother         Possibly secondary to chemical exposure in [place]     Diabetes Type 2  Maternal Grandmother      Cancer Brother         Exposure to Agent Orange in [place]     Heart Disease Brother        Social History     Socioeconomic History     Marital status:      Spouse name: Not on file     Number of children: Not on file     Years of education: Not on file     Highest education  level: Not on file   Occupational History     Not on file   Tobacco Use     Smoking status: Never     Smokeless tobacco: Never     Tobacco comments:     Smoked cigarettes in undergrad college  socially. Not often.   Vaping Use     Vaping status: Never Used   Substance and Sexual Activity     Alcohol use: Yes     Comment: Very infrequent. Wine usually     Drug use: No     Sexual activity: Not Currently     Partners: Male   Other Topics Concern     Parent/sibling w/ CABG, MI or angioplasty before 65F 55M? No   Social History Narrative     Not on file     Social Drivers of Health     Financial Resource Strain: Low Risk  (4/15/2024)    Financial Resource Strain      Within the past 12 months, have you or your family members you live with been unable to get utilities (heat, electricity) when it was really needed?: No   Food Insecurity: Low Risk  (4/15/2024)    Food Insecurity      Within the past 12 months, did you worry that your food would run out before you got money to buy more?: No      Within the past 12 months, did the food you bought just not last and you didn t have money to get more?: No   Transportation Needs: Low Risk  (4/15/2024)    Transportation Needs      Within the past 12 months, has lack of transportation kept you from medical appointments, getting your medicines, non-medical meetings or appointments, work, or from getting things that you need?: No   Physical Activity: Sufficiently Active (4/15/2024)    Exercise Vital Sign      Days of Exercise per Week: 7 days      Minutes of Exercise per Session: 120 min   Stress: Stress Concern Present (4/15/2024)    [employer] - Occupational Stress Questionnaire      Feeling of Stress : To some extent   Social Connections: Unknown (4/15/2024)    Social Connection and Isolation Panel [NHANES]      Frequency of Communication with Friends and Family: Not on file      Frequency of Social Gatherings with Friends and Family: Once a week       Attends Cheondoism Services: Not on file      Active Member of Clubs or Organizations: Not on file      Attends Club or Organization Meetings: Not on file      Marital Status: Not on file   Interpersonal Safety: Low Risk  (4/22/2024)    Interpersonal Safety      Do you feel physically and emotionally safe where you currently live?: Yes      Within the past 12 months, have you been hit, slapped, kicked or otherwise physically hurt by someone?: No      Within the past 12 months, have you been humiliated or emotionally abused in other ways by your partner or ex-partner?: No   Housing Stability: Low Risk  (4/15/2024)    Housing Stability      Do you have housing? : Yes      Are you worried about losing your housing?: No       Outpatient Encounter Medications as of 12/12/2024   Medication Sig Dispense Refill     aspirin 81 MG tablet Take by mouth daily       azelaic acid (FINACIA) 15 % external gel Apply to the face twice daily. 50 g 4     COMPOUNDED NON-CONTROLLED SUBSTANCE (CMPD RX) - PHARMACY TO MIX COMPOUNDED MEDICATION Apply to forehead, temples, cheeks, and nose twice daily for 5-7 days, wash hands after application. Avoid sun. (Patient not taking: Reported on 11/21/2024) 30 g 0     diclofenac (VOLTAREN) 1 % topical gel Apply topically 4 times daily       fluocinonide (LIDEX) 0.05 % external cream Apply topically 2 times daily 120 g 6     gabapentin (NEURONTIN) 100 MG capsule Take 100 mg by mouth Take 1 time daily at HS (Patient not taking: Reported on 11/21/2024)       imiquimod (ALDARA) 5 % external cream Apply topically to warts every morning. 12 packet 3     multivitamin w/minerals (THERA-VIT-M) tablet Take 1 tablet by mouth daily       Omega-3 Fatty Acids (OMEGA-3 FISH OIL PO)        pantoprazole (PROTONIX) 40 MG EC tablet Take 1 tablet (40 mg) by mouth daily 90 tablet 2     rosuvastatin (CRESTOR) 20 MG tablet TAKE 1 TABLET BY MOUTH EVERY DAY 90 tablet 1     triamterene-HCTZ (MAXZIDE-25) 37.5-25 MG tablet TAKE  1/2 TABLET BY MOUTH EVERY MORNING 45 tablet 0     No facility-administered encounter medications on file as of 12/12/2024.             O:   NAD, WDWN, Alert & Oriented, Mood & Affect wnl, Vitals stable   General appearance normal   Vitals stable   Alert, oriented and in no acute distress     Scalp well healed      Eyes: Conjunctivae/lids:Normal     ENT: Lips, mucosa: normal    MSK:Normal    Cardiovascular: peripheral edema none    Pulm: Breathing Normal    Neuro/Psych: Orientation:Alert and Orientedx3 ; Mood/Affect:normal       A/P:  Hx of non-melanoma skin cancer   It was a pleasure speaking to Pranay Ratliff today.  Previous clinic notes and pertinent laboratory tests were reviewed prior to Pranay Ratliff's visit.  Patient encouraged to perform monthly skin exams.  UV precautions reviewed with patient.  Return to clinic next skin check       Again, thank you for allowing me to participate in the care of your patient.        Sincerely,        Tato Gallagher MD

## 2025-01-14 DIAGNOSIS — I10 BENIGN ESSENTIAL HYPERTENSION: ICD-10-CM

## 2025-01-14 RX ORDER — TRIAMTERENE AND HYDROCHLOROTHIAZIDE 37.5; 25 MG/1; MG/1
TABLET ORAL
Qty: 45 TABLET | Refills: 0 | OUTPATIENT
Start: 2025-01-14

## 2025-01-23 ENCOUNTER — OFFICE VISIT (OUTPATIENT)
Dept: DERMATOLOGY | Facility: CLINIC | Age: 75
End: 2025-01-23
Payer: COMMERCIAL

## 2025-01-23 DIAGNOSIS — B07.0 PLANTAR WART: Primary | ICD-10-CM

## 2025-01-23 ASSESSMENT — PAIN SCALES - GENERAL: PAINLEVEL_OUTOF10: NO PAIN (0)

## 2025-01-23 NOTE — PATIENT INSTRUCTIONS
Proper skin care from [facility]:    -Eliminate harsh soaps as they strip the natural oils from the skin, often resulting in dry itchy skin ( i.e. Dial, Zest, [place] Spring)  -Use mild soaps such as Cetaphil or Dove Sensitive Skin in the shower. You do not need to use soap on arms, legs, and trunk every time you shower unless visibly soiled.   -Avoid hot or cold showers.  -After showering, lightly dry off and apply moisturizing within 2-3 minutes. This will help trap moisture in the skin.   -Aggressive use of a moisturizer at least 1-2 times a day to the entire body (including -Vanicream, Cetaphil, Aquaphor or Cerave) and moisturize hands after every washing.  -We recommend using moisturizers that come in a tub that needs to be scooped out, not a pump. This has more of an oil base. It will hold moisture in your skin much better than a water base moisturizer. The above recommended are non-pore clogging.      Wear a sunscreen with at least SPF 30 on your face, ears, neck and V of the chest daily. Wear sunscreen on other areas of the body if those areas are exposed to the sun throughout the day. Sunscreens can contain physical and/or chemical blockers. Physical blockers are less likely to clog pores, these include zinc oxide and titanium dioxide. Reapply every two hour and after swimming.     Sunscreen examples: https://www.ewg.org/sunscreen/    UV radiation  UVA radiation remains constant throughout the day and throughout the year. It is a longer wavelength than UVB and therefore penetrates deeper into the skin leading to immediate and delayed tanning, photoaging, and skin cancer. 70-80% of UVA and UVB radiation occurs between the hours of 10am-2pm.  UVB radiation  UVB radiation causes the most harmful effects and is more significant during the summer months. However, snow and ice can reflect UVB radiation leading to skin damage during the winter months as well. UVB radiation is responsible for tanning,  burning, inflammation, delayed erythema (pinkness), pigmentation (brown spots), and skin cancer.     I recommend self monthly full body exams and yearly full body exams with a dermatology provider. If you develop a new or changing lesion please follow up for examination. Most skin cancers are pink and scaly or pink and pearly. However, we do see blue/brown/black skin cancers.  Consider the ABCDEs of melanoma when giving yourself your monthly full body exam ( don't forget the groin, buttocks, feet, toes, etc). A-asymmetry, B-borders, C-color, D-diameter, E-elevation or evolving. If you see any of these changes please follow up in clinic. If you cannot see your back I recommend purchasing a hand held mirror to use with a larger wall mirror.       Checking for Skin Cancer  You can find cancer early by checking your skin each month. There are 3 kinds of skin cancer. They are melanoma, basal cell carcinoma, and squamous cell carcinoma. Doing monthly skin checks is the best way to find new marks or skin changes. Follow the instructions below for checking your skin.   The ABCDEs of checking moles for melanoma   Check your moles or growths for signs of melanoma using ABCDE:   Asymmetry: the sides of the mole or growth don t match  Border: the edges are ragged, notched, or blurred  Color: the color within the mole or growth varies  Diameter: the mole or growth is larger than 6 mm (size of a pencil eraser)  Evolving: the size, shape, or color of the mole or growth is changing (evolving is not shown in the images below)    Checking for other types of skin cancer  Basal cell carcinoma or squamous cell carcinoma have symptoms such as:     A spot or mole that looks different from all other marks on your skin  Changes in how an area feels, such as itching, tenderness, or pain  Changes in the skin's surface, such as oozing, bleeding, or scaliness  A sore that does not heal  New swelling or redness beyond the border of a  mole    Who s at risk?  Anyone can get skin cancer. But you are at greater risk if you have:   Fair skin, light-colored hair, or light-colored eyes  Many moles or abnormal moles on your skin  A history of sunburns from sunlight or tanning beds  A family history of skin cancer  A history of exposure to radiation or chemicals  A weakened immune system  If you have had skin cancer in the past, you are at risk for recurring skin cancer.   How to check your skin  Do your monthly skin checkups in front of a full-length mirror. Check all parts of your body, including your:   Head (ears, face, neck, and scalp)  Torso (front, back, and sides)  Arms (tops, undersides, upper, and lower armpits)  Hands (palms, backs, and fingers, including under the nails)  Buttocks and genitals  Legs (front, back, and sides)  Feet (tops, soles, toes, including under the nails, and between toes)  If you have a lot of moles, take digital photos of them each month. Make sure to take photos both up close and from a distance. These can help you see if any moles change over time.   Most skin changes are not cancer. But if you see any changes in your skin, call your doctor right away. Only he or she can diagnose a problem. If you have skin cancer, seeing your doctor can be the first step toward getting the treatment that could save your life.   [employer] last reviewed this educational content on 4/1/2019 2000-2020 [employer]. [address] [place] [address], [place] [address]. All rights reserved. This information is not intended as a substitute for professional medical care. Always follow your healthcare professional's instructions.       When should I call my doctor?  If you are worsening or not improving, please, contact us or seek urgent care as noted below.     Who should I call with questions (adults)?    [facility] [phone]  For urgent needs outside of business hours call the [facility] at  [phone] and ask for the dermatology resident on call to be paged  If this is a medical emergency and you are unable to reach an ER, Call [phone]      If you need a prescription refill, please contact your pharmacy. Refills are approved or denied by our Physicians during normal business hours, Monday through Fridays  Per office policy, refills will not be granted if you have not been seen within the past year (or sooner depending on your child's condition)

## 2025-01-23 NOTE — PROGRESS NOTES
[facility] Dermatology Note  Encounter Date: Jan 23, 2025  Office Visit     Reviewed patients past medical history and pertinent chart review prior to patients visit today.     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 s/p Mohs 09/11/2024  2. Asteatotic dermatitis  - Current tx: fluocinonide 0.05% cream  3. Verruca plantaris, right foot s/p cryotherapy  - Current tx: WartPeel   4. Tinea pedis   5. AKs, forehead, temples, and nose  - Current tx: Efudex/ Dovonex 8/15/2024   - Past tx: Efudex 5% cream (initiated 11/3/2022)  6. Rash, most consistent with tinea pedis, left medial foot s/p KOH prep 12/6/2022  7. Verruca plantaris  - cryotherapy    ____________________________________________    Assessment & Plan:     # Verruca vulgaris, right plantar foot x4.   - Cryotherapy: Patient elects to treat warts today with cryotherapy. After verbal consent and discussion of risks and benefits including but no limited to dyspigmentation/scar, blister, and pain. A total of 4 warts were treated with 1-2mm freeze border for 3 cycles with liquid nitrogen. Post cryotherapy instructions were provided.      - Continue imiquimod 5% cream every morning.     - Continue salicylic acid 40% (such as wart stick) nightly followed by duct tape or bandage.  Pare down after removal of occlusive cover for deeper penetration of medication.      Follow up 4 weeks.     All risks, benefits and alternatives were discussed with patient.  Patient is in agreement and understands the assessment and plan.  All questions were answered.  Amparo Bowman PA-C  [facility] Dermatology  _______________________________________    CC: Wart (Right foot treatment)    HPI:  Mr. Pranay Ratliff is a(n) 74 year old male who presents today as  a return patient for warts.  Patient is otherwise feeling well, without additional skin concerns.      Physical Exam:  SKIN: Focused examination of scalp and right foot was performed.  - Verrucal papule(s) with thrombosed capillaries involving the right plantar foot x4.   - Pink patch involving the scalp, no evidence of infection.        - No other lesions of concern on areas examined.     Medications:  Current Outpatient Medications   Medication Sig Dispense Refill    aspirin 81 MG tablet Take by mouth daily      azelaic acid (FINACIA) 15 % external gel Apply to the face twice daily. 50 g 4    diclofenac (VOLTAREN) 1 % topical gel Apply topically 4 times daily      fluocinonide (LIDEX) 0.05 % external cream Apply topically 2 times daily 120 g 6    imiquimod (ALDARA) 5 % external cream Apply topically to warts every morning. 12 packet 3    multivitamin w/minerals (THERA-VIT-M) tablet Take 1 tablet by mouth daily      Omega-3 Fatty Acids (OMEGA-3 FISH OIL PO)       pantoprazole (PROTONIX) 40 MG EC tablet Take 1 tablet (40 mg) by mouth daily 90 tablet 2    rosuvastatin (CRESTOR) 20 MG tablet TAKE 1 TABLET BY MOUTH EVERY DAY 90 tablet 1    triamterene-HCTZ (MAXZIDE-25) 37.5-25 MG tablet TAKE 1/2 TABLET BY MOUTH EVERY MORNING 45 tablet 0    COMPOUNDED NON-CONTROLLED SUBSTANCE (CMPD RX) - PHARMACY TO MIX COMPOUNDED MEDICATION Apply to forehead, temples, cheeks, and nose twice daily for 5-7 days, wash hands after application. Avoid sun. (Patient not taking: Reported on 1/23/2025) 30 g 0    gabapentin (NEURONTIN) 100 MG capsule Take 100 mg by mouth Take 1 time daily at HS (Patient not taking: Reported on 1/23/2025)       No current facility-administered medications for this visit.      Past Medical History:   Patient Active Problem List   Diagnosis    History of basal cell carcinoma    Benign essential hypertension    Hyperlipidemia LDL goal <100    Obstructive sleep apnea syndrome    Gastroesophageal reflux disease with  esophagitis    Localized swelling of lower extremity    Vasovagal syncope    Elevated prostate specific antigen (PSA)    Concussion with loss of consciousness of 30 minutes or less, subsequent encounter    BPH with obstruction/lower urinary tract symptoms    Myofascial pain syndrome, cervical    Occipital neuralgia of right side    Fatigue due to old head injury    Post concussion syndrome    Cervical segment dysfunction    Thoracic segment dysfunction    Cephalgia    Somatic dysfunction of sacral region    Cervical pain    Dizziness    Marital conflict    Chronic left-sided low back pain without sciatica    Neck muscle spasm    Neck pain    Chronic pain of right knee     Past Medical History:   Diagnosis Date    Basal cell carcinoma     Concussion with loss of consciousness of 30 minutes or less, subsequent encounter     Congestive heart failure (H)     Epididymitis, bilateral     Epididymitis, left     Epididymitis, right     Obstructive sleep apnea syndrome     Squamous cell carcinoma of skin, unspecified     Vasovagal syncope        CC Referred Self, MD  No address on file on close of this encounter.

## 2025-01-23 NOTE — LETTER
1/23/2025      Pranay Ratliff  [address]  [facility] [address]      Dear Colleague,    Thank you [facility] your [facility], [facility]iff[facility]. Please see a copy of my visit note below.    [facility] Dermatology Note  Encounter Date: Jan 23, 2025  Office Visit     Reviewed patients past medical history and pertinent chart review prior to patients visit today.     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 s/p Mohs 09/11/2024  2. Asteatotic dermatitis  - Current tx: fluocinonide 0.05% cream  3. Verruca plantaris, right foot s/p cryotherapy  - Current tx: WartPeel   4. Tinea pedis   5. AKs, forehead, temples, and nose  - Current tx: Efudex/ Dovonex 8/15/2024   - Past tx: Efudex 5% cream (initiated 11/3/2022)  6. Rash, most consistent with tinea pedis, left medial foot s/p KOH prep 12/6/2022  7. Verruca plantaris  - cryotherapy    ____________________________________________    Assessment & Plan:     # Verruca vulgaris, right plantar foot x4.   - Cryotherapy: Patient elects to treat warts today with cryotherapy. After verbal consent and discussion of risks and benefits including but no limited to dyspigmentation/scar, blister, and pain. A total of 4 warts were treated with 1-2mm freeze border for 3 cycles with liquid nitrogen. Post cryotherapy instructions were provided.      - Continue imiquimod 5% cream every morning.     - Continue salicylic acid 40% (such as wart stick) nightly followed by duct tape or bandage.  Pare down after removal of occlusive cover for deeper penetration of medication.      Follow up 4 weeks.     All risks, benefits and alternatives were discussed with patient.  Patient is in agreement and understands the assessment and  plan.  All questions were answered.  Amparo Bowman PA-C  [facility] Dermatology  _______________________________________    CC: Wart (Right foot treatment)    HPI:  Mr. Pranay Ratliff is a(n) 74 year old male who presents today as a return patient for warts.  Patient is otherwise feeling well, without additional skin concerns.      Physical Exam:  SKIN: Focused examination of scalp and right foot was performed.  - Verrucal papule(s) with thrombosed capillaries involving the right plantar foot x4.   - Pink patch involving the scalp, no evidence of infection.        - No other lesions of concern on areas examined.     Medications:  Current Outpatient Medications   Medication Sig Dispense Refill     aspirin 81 MG tablet Take by mouth daily       azelaic acid (FINACIA) 15 % external gel Apply to the face twice daily. 50 g 4     diclofenac (VOLTAREN) 1 % topical gel Apply topically 4 times daily       fluocinonide (LIDEX) 0.05 % external cream Apply topically 2 times daily 120 g 6     imiquimod (ALDARA) 5 % external cream Apply topically to warts every morning. 12 packet 3     multivitamin w/minerals (THERA-VIT-M) tablet Take 1 tablet by mouth daily       Omega-3 Fatty Acids (OMEGA-3 FISH OIL PO)        pantoprazole (PROTONIX) 40 MG EC tablet Take 1 tablet (40 mg) by mouth daily 90 tablet 2     rosuvastatin (CRESTOR) 20 MG tablet TAKE 1 TABLET BY MOUTH EVERY DAY 90 tablet 1     triamterene-HCTZ (MAXZIDE-25) 37.5-25 MG tablet TAKE 1/2 TABLET BY MOUTH EVERY MORNING 45 tablet 0     COMPOUNDED NON-CONTROLLED SUBSTANCE (CMPD RX) - PHARMACY TO MIX COMPOUNDED MEDICATION Apply to forehead, temples, cheeks, and nose twice daily for 5-7 days, wash hands after application. Avoid sun. (Patient not taking: Reported on 1/23/2025) 30 g 0     gabapentin (NEURONTIN) 100 MG capsule Take 100 mg by mouth Take 1 time daily at HS (Patient not taking: Reported on 1/23/2025)       No current facility-administered medications for this  visit.      Past Medical History:   Patient Active Problem List   Diagnosis     History of basal cell carcinoma     Benign essential hypertension     Hyperlipidemia LDL goal <100     Obstructive sleep apnea syndrome     Gastroesophageal reflux disease with esophagitis     Localized swelling of lower extremity     Vasovagal syncope     Elevated prostate specific antigen (PSA)     Concussion with loss of consciousness of 30 minutes or less, subsequent encounter     BPH with obstruction/lower urinary tract symptoms     Myofascial pain syndrome, cervical     Occipital neuralgia of right side     Fatigue due to old head injury     Post concussion syndrome     Cervical segment dysfunction     Thoracic segment dysfunction     Cephalgia     Somatic dysfunction of sacral region     Cervical pain     Dizziness     Marital conflict     Chronic left-sided low back pain without sciatica     Neck muscle spasm     Neck pain     Chronic pain of right knee     Past Medical History:   Diagnosis Date     Basal cell carcinoma      Concussion with loss of consciousness of 30 minutes or less, subsequent encounter      Congestive heart failure (H)      Epididymitis, bilateral      Epididymitis, left      Epididymitis, right      Obstructive sleep apnea syndrome      Squamous cell carcinoma of skin, unspecified      Vasovagal syncope        CC Referred Self, MD  No address on file on close of this encounter.       Again, thank you for allowing me to participate in the care of your patient.        Sincerely,        Amparo Bowman PA-C    Electronically signed

## 2025-02-13 ENCOUNTER — OFFICE VISIT (OUTPATIENT)
Dept: DERMATOLOGY | Facility: CLINIC | Age: 75
End: 2025-02-13
Payer: COMMERCIAL

## 2025-02-13 DIAGNOSIS — L82.1 SEBORRHEIC KERATOSES: ICD-10-CM

## 2025-02-13 DIAGNOSIS — D23.9 DERMAL NEVUS: Primary | ICD-10-CM

## 2025-02-13 DIAGNOSIS — D18.01 ANGIOMA OF SKIN: ICD-10-CM

## 2025-02-13 DIAGNOSIS — L81.4 LENTIGO: ICD-10-CM

## 2025-02-13 DIAGNOSIS — D22.9 MULTIPLE BENIGN NEVI: ICD-10-CM

## 2025-02-13 DIAGNOSIS — Z85.828 HISTORY OF SKIN CANCER: ICD-10-CM

## 2025-02-13 RX ORDER — CLOTRIMAZOLE 1 %
CREAM (GRAM) TOPICAL 2 TIMES DAILY PRN
COMMUNITY

## 2025-02-13 ASSESSMENT — PAIN SCALES - GENERAL: PAINLEVEL_OUTOF10: NO PAIN (0)

## 2025-02-13 NOTE — LETTER
2025      Pranay Ratliff   [address]  [facility] [facility] [address]      Dear Colleague,    Thank you for referring your patient, Pranay Ratliff, to the [facility]. Please see a copy of my visit note below.    Pranay Ratliff is an extremely pleasant 75 year old year old male patient here today for hx of non-melanoma skin cancer.   Patient has no other skin complaints today.  Remainder of the HPI, Meds, PMH, Allergies, FH, and SH was reviewed in chart.      Past Medical History:   Diagnosis Date     Basal cell carcinoma      Concussion with loss of consciousness of 30 minutes or less, subsequent encounter      Congestive heart failure (H)      Epididymitis, bilateral      Epididymitis, left      Epididymitis, right      Obstructive sleep apnea syndrome      Squamous cell carcinoma of skin, unspecified      Vasovagal syncope        Past Surgical History:   Procedure Laterality Date     COLONOSCOPY  2021    Next will be      COLONOSCOPY N/A 2024    Procedure: Colonoscopy;  Surgeon: Jaydon Parks MD;  Location:  GI     ESOPHAGOSCOPY, GASTROSCOPY, DUODENOSCOPY (EGD), COMBINED N/A 2024    Procedure: Esophagoscopy, gastroscopy, duodenoscopy (EGD), combined;  Surgeon: Jaydon Parks MD;  Location:  GI     TESTICLE SURGERY       VASECTOMY          Family History   Problem Relation Age of Onset     Skin Cancer Mother          from pneumonia     Abdominal Aortic Aneurysm Mother      Heart Failure Father      Diabetes Type 1 Brother         Possibly secondary to chemical exposure in [place]     Diabetes Type 2  Maternal Grandmother      Cancer Brother         Exposure to Agent Orange in [place]     Heart Disease Brother        Social History     Socioeconomic History     Marital status:      Spouse name: Not on file     Number of children: Not on file     Years of education: Not on file     Highest education level: Not on file   Occupational  History     Not on file   Tobacco Use     Smoking status: Never     Passive exposure: Never     Smokeless tobacco: Never     Tobacco comments:     Smoked cigarettes in undergrad college  socially. Not often.   Vaping Use     Vaping status: Never Used   Substance and Sexual Activity     Alcohol use: Yes     Comment: Very infrequent. Wine usually     Drug use: No     Sexual activity: Not Currently     Partners: Male   Other Topics Concern     Parent/sibling w/ CABG, MI or angioplasty before 65F 55M? No   Social History Narrative     Not on file     Social Drivers of Health     Financial Resource Strain: Low Risk  (4/15/2024)    Financial Resource Strain      Within the past 12 months, have you or your family members you live with been unable to get utilities (heat, electricity) when it was really needed?: No   Food Insecurity: Low Risk  (4/15/2024)    Food Insecurity      Within the past 12 months, did you worry that your food would run out before you got money to buy more?: No      Within the past 12 months, did the food you bought just not last and you didn t have money to get more?: No   Transportation Needs: Low Risk  (4/15/2024)    Transportation Needs      Within the past 12 months, has lack of transportation kept you from medical appointments, getting your medicines, non-medical meetings or appointments, work, or from getting things that you need?: No   Physical Activity: Sufficiently Active (4/15/2024)    Exercise Vital Sign      Days of Exercise per Week: 7 days      Minutes of Exercise per Session: 120 min   Stress: Stress Concern Present (4/15/2024)    [employer] - Occupational Stress Questionnaire      Feeling of Stress : To some extent   Social Connections: Unknown (4/15/2024)    Social Connection and Isolation Panel [NHANES]      Frequency of Communication with Friends and Family: Not on file      Frequency of Social Gatherings with Friends and Family: Once a week       Attends Taoist Services: Not on file      Active Member of Clubs or Organizations: Not on file      Attends Club or Organization Meetings: Not on file      Marital Status: Not on file   Interpersonal Safety: Low Risk  (4/22/2024)    Interpersonal Safety      Do you feel physically and emotionally safe where you currently live?: Yes      Within the past 12 months, have you been hit, slapped, kicked or otherwise physically hurt by someone?: No      Within the past 12 months, have you been humiliated or emotionally abused in other ways by your partner or ex-partner?: No   Housing Stability: Low Risk  (4/15/2024)    Housing Stability      Do you have housing? : Yes      Are you worried about losing your housing?: No       Outpatient Encounter Medications as of 2/13/2025   Medication Sig Dispense Refill     aspirin 81 MG tablet Take by mouth daily       azelaic acid (FINACIA) 15 % external gel Apply to the face twice daily. 50 g 4     clotrimazole (LOTRIMIN) 1 % external cream Apply topically 2 times daily as needed.       diclofenac (VOLTAREN) 1 % topical gel Apply topically 4 times daily       fluocinonide (LIDEX) 0.05 % external cream Apply topically 2 times daily 120 g 6     imiquimod (ALDARA) 5 % external cream Apply topically to warts every morning. 12 packet 3     multivitamin w/minerals (THERA-VIT-M) tablet Take 1 tablet by mouth daily       Omega-3 Fatty Acids (OMEGA-3 FISH OIL PO)        pantoprazole (PROTONIX) 40 MG EC tablet Take 1 tablet (40 mg) by mouth daily 90 tablet 2     rosuvastatin (CRESTOR) 20 MG tablet TAKE 1 TABLET BY MOUTH EVERY DAY 90 tablet 1     triamterene-HCTZ (MAXZIDE-25) 37.5-25 MG tablet TAKE 1/2 TABLET BY MOUTH EVERY MORNING 45 tablet 0     COMPOUNDED NON-CONTROLLED SUBSTANCE (CMPD RX) - PHARMACY TO MIX COMPOUNDED MEDICATION Apply to forehead, temples, cheeks, and nose twice daily for 5-7 days, wash hands after application. Avoid sun. (Patient not taking: Reported on 11/21/2024) 30 g 0      gabapentin (NEURONTIN) 100 MG capsule Take 100 mg by mouth Take 1 time daily at HS (Patient not taking: Reported on 11/21/2024)       No facility-administered encounter medications on file as of 2/13/2025.             O:   NAD, WDWN, Alert & Oriented, Mood & Affect wnl, Vitals stable   General appearance normal   Vitals stable   Alert, oriented and in no acute distress        pigmented macules on trunk and ext with regular borders and pigment networks   Stuck on papules and brown macules on trunk and ext   Red papules on trunk  Flesh colored papules on trunk     The remainder of the full exam was normal; the following areas were examined:  conjunctiva/lids, , neck, peripheral vascular system, abdomen, lymph nodes, digits/nails, eccrine and apocrine glands, scalp/hair, face, neck, chest, abdomen, buttocks, back, RUE, LUE, RLE, LLE       Eyes: Conjunctivae/lids:Normal     ENT: Lips, mucosa: normal    MSK:Normal    Cardiovascular: peripheral edema none    Pulm: Breathing Normal    Lymph Nodes: No Head and Neck Lymphadenopathy     Neuro/Psych: Orientation:Alert and Orientedx3 ; Mood/Affect:normal       A/P:  1. Seborrheic keratosis, lentigo, angioma, dermal nevus, nevi , hx of non-melanoma skin cancer   It was a pleasure speaking to Pranay Ratliff today.  Previous clinic notes and pertinent laboratory tests were reviewed prior to Pranay Ratliff's visit.  Signs and Symptoms of skin cancer discussed with patient.  Patient encouraged to perform monthly skin exams.  UV precautions reviewed with patient.  Return to clinic 12 months      Again, thank you for allowing me to participate in the care of your patient.        Sincerely,        Tato Gallagher MD    Electronically signed

## 2025-02-13 NOTE — PROGRESS NOTES
Pranay Ratliff is an extremely pleasant 75 year old year old male patient here today for hx of non-melanoma skin cancer.   Patient has no other skin complaints today.  Remainder of the HPI, Meds, PMH, Allergies, FH, and SH was reviewed in chart.      Past Medical History:   Diagnosis Date    Basal cell carcinoma     Concussion with loss of consciousness of 30 minutes or less, subsequent encounter     Congestive heart failure (H)     Epididymitis, bilateral     Epididymitis, left     Epididymitis, right     Obstructive sleep apnea syndrome     Squamous cell carcinoma of skin, unspecified     Vasovagal syncope        Past Surgical History:   Procedure Laterality Date    COLONOSCOPY  2021    Next will be     COLONOSCOPY N/A 2024    Procedure: Colonoscopy;  Surgeon: Jaydon Parks MD;  Location:  GI    ESOPHAGOSCOPY, GASTROSCOPY, DUODENOSCOPY (EGD), COMBINED N/A 2024    Procedure: Esophagoscopy, gastroscopy, duodenoscopy (EGD), combined;  Surgeon: Jaydon Parks MD;  Location:  GI    TESTICLE SURGERY      VASECTOMY          Family History   Problem Relation Age of Onset    Skin Cancer Mother          from pneumonia    Abdominal Aortic Aneurysm Mother     Heart Failure Father     Diabetes Type 1 Brother         Possibly secondary to chemical exposure in [place]    Diabetes Type 2  Maternal Grandmother     Cancer Brother         Exposure to Agent Orange in [place]    Heart Disease Brother        Social History     Socioeconomic History    Marital status:      Spouse name: Not on file    Number of children: Not on file    Years of education: Not on file    Highest education level: Not on file   Occupational History    Not on file   Tobacco Use    Smoking status: Never     Passive exposure: Never    Smokeless tobacco: Never    Tobacco comments:     Smoked cigarettes in undergrad college  socially. Not often.   Vaping Use    Vaping status: Never Used   Substance and Sexual  Activity    Alcohol use: Yes     Comment: Very infrequent. Wine usually    Drug use: No    Sexual activity: Not Currently     Partners: Male   Other Topics Concern    Parent/sibling w/ CABG, MI or angioplasty before 65F 55M? No   Social History Narrative    Not on file     Social Drivers of Health     Financial Resource Strain: Low Risk  (4/15/2024)    Financial Resource Strain     Within the past 12 months, have you or your family members you live with been unable to get utilities (heat, electricity) when it was really needed?: No   Food Insecurity: Low Risk  (4/15/2024)    Food Insecurity     Within the past 12 months, did you worry that your food would run out before you got money to buy more?: No     Within the past 12 months, did the food you bought just not last and you didn t have money to get more?: No   Transportation Needs: Low Risk  (4/15/2024)    Transportation Needs     Within the past 12 months, has lack of transportation kept you from medical appointments, getting your medicines, non-medical meetings or appointments, work, or from getting things that you need?: No   Physical Activity: Sufficiently Active (4/15/2024)    Exercise Vital Sign     Days of Exercise per Week: 7 days     Minutes of Exercise per Session: 120 min   Stress: Stress Concern Present (4/15/2024)    [employer] - Occupational Stress Questionnaire     Feeling of Stress : To some extent   Social Connections: Unknown (4/15/2024)    Social Connection and Isolation Panel [NHANES]     Frequency of Communication with Friends and Family: Not on file     Frequency of Social Gatherings with Friends and Family: Once a week     Attends Mandaen Services: Not on file     Active Member of Clubs or Organizations: Not on file     Attends Club or Organization Meetings: Not on file     Marital Status: Not on file   Interpersonal Safety: Low Risk  (4/22/2024)    Interpersonal Safety     Do you feel physically and  emotionally safe where you currently live?: Yes     Within the past 12 months, have you been hit, slapped, kicked or otherwise physically hurt by someone?: No     Within the past 12 months, have you been humiliated or emotionally abused in other ways by your partner or ex-partner?: No   Housing Stability: Low Risk  (4/15/2024)    Housing Stability     Do you have housing? : Yes     Are you worried about losing your housing?: No       Outpatient Encounter Medications as of 2/13/2025   Medication Sig Dispense Refill    aspirin 81 MG tablet Take by mouth daily      azelaic acid (FINACIA) 15 % external gel Apply to the face twice daily. 50 g 4    clotrimazole (LOTRIMIN) 1 % external cream Apply topically 2 times daily as needed.      diclofenac (VOLTAREN) 1 % topical gel Apply topically 4 times daily      fluocinonide (LIDEX) 0.05 % external cream Apply topically 2 times daily 120 g 6    imiquimod (ALDARA) 5 % external cream Apply topically to warts every morning. 12 packet 3    multivitamin w/minerals (THERA-VIT-M) tablet Take 1 tablet by mouth daily      Omega-3 Fatty Acids (OMEGA-3 FISH OIL PO)       pantoprazole (PROTONIX) 40 MG EC tablet Take 1 tablet (40 mg) by mouth daily 90 tablet 2    rosuvastatin (CRESTOR) 20 MG tablet TAKE 1 TABLET BY MOUTH EVERY DAY 90 tablet 1    triamterene-HCTZ (MAXZIDE-25) 37.5-25 MG tablet TAKE 1/2 TABLET BY MOUTH EVERY MORNING 45 tablet 0    COMPOUNDED NON-CONTROLLED SUBSTANCE (CMPD RX) - PHARMACY TO MIX COMPOUNDED MEDICATION Apply to forehead, temples, cheeks, and nose twice daily for 5-7 days, wash hands after application. Avoid sun. (Patient not taking: Reported on 11/21/2024) 30 g 0    gabapentin (NEURONTIN) 100 MG capsule Take 100 mg by mouth Take 1 time daily at HS (Patient not taking: Reported on 11/21/2024)       No facility-administered encounter medications on file as of 2/13/2025.             O:   NAD, WDWN, Alert & Oriented, Mood & Affect wnl, Vitals stable   General  appearance normal   Vitals stable   Alert, oriented and in no acute distress        pigmented macules on trunk and ext with regular borders and pigment networks   Stuck on papules and brown macules on trunk and ext   Red papules on trunk  Flesh colored papules on trunk     The remainder of the full exam was normal; the following areas were examined:  conjunctiva/lids, , neck, peripheral vascular system, abdomen, lymph nodes, digits/nails, eccrine and apocrine glands, scalp/hair, face, neck, chest, abdomen, buttocks, back, RUE, LUE, RLE, LLE       Eyes: Conjunctivae/lids:Normal     ENT: Lips, mucosa: normal    MSK:Normal    Cardiovascular: peripheral edema none    Pulm: Breathing Normal    Lymph Nodes: No Head and Neck Lymphadenopathy     Neuro/Psych: Orientation:Alert and Orientedx3 ; Mood/Affect:normal       A/P:  1. Seborrheic keratosis, lentigo, angioma, dermal nevus, nevi , hx of non-melanoma skin cancer   It was a pleasure speaking to Pranay Ratliff today.  Previous clinic notes and pertinent laboratory tests were reviewed prior to Pranay Ratliff's visit.  Signs and Symptoms of skin cancer discussed with patient.  Patient encouraged to perform monthly skin exams.  UV precautions reviewed with patient.  Return to clinic 12 months

## 2025-02-20 ENCOUNTER — OFFICE VISIT (OUTPATIENT)
Dept: DERMATOLOGY | Facility: CLINIC | Age: 75
End: 2025-02-20
Payer: COMMERCIAL

## 2025-02-20 DIAGNOSIS — B07.0 PLANTAR WART: Primary | ICD-10-CM

## 2025-02-20 RX ORDER — CANDIDA ALBICANS 1000 [PNU]/ML
0.3 INJECTION, SOLUTION INTRADERMAL ONCE
Status: COMPLETED | OUTPATIENT
Start: 2025-02-20 | End: 2025-02-20

## 2025-02-20 RX ADMIN — CANDIDA ALBICANS 0.3 ML: 1000 INJECTION, SOLUTION INTRADERMAL at 12:04

## 2025-02-20 NOTE — LETTER
2/20/2025      Pranay Ratliff  [address]  [place] [address]      Dear Colleague,    Thank you for referring your patient, Pranay Ratliff, to the [facility]. Please see a copy of my visit note below.    [facility] Dermatology Note  Encounter Date: Feb 20, 2025  Office Visit     Reviewed patients past medical history and pertinent chart review prior to patients visit today.     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 s/p Mohs 09/11/2024  2. Asteatotic dermatitis  - Current tx: fluocinonide 0.05% cream  3. Verruca plantaris, right foot s/p cryotherapy  - Current tx: imiquimod cream, salicylic acid, candida  - Past tx: cryotherapy  4. Tinea pedis   5. AKs, forehead, temples, and nose  - Current tx: Efudex/ Dovonex 8/15/2024   - Past tx: Efudex 5% cream (initiated 11/3/2022)  6. Rash, most consistent with tinea pedis, left medial foot s/p KOH prep 12/6/2022      ____________________________________________    Assessment & Plan:     # Verruca vulgaris, right plantar foot x2.     Counseled on the viral etiology of this condition. Counseled that these are often recalcitrant to treatment. Discussed all treatment options that we offer as well as side effects of each. Advised that highest rates of success can be observed with combination monthly in office treatment and home treatments.    - Candida injection: After verbal consent was obtained, the skin was cleaned with an alcohol wipe and 0.3 ml of candida was injected under lesions on the right plantar foot The patient tolerated the procedure well.     - Continue imiquimod 5% cream every morning.      - Continue salicylic acid 40% (such as wart stick) nightly followed by duct tape or bandage.  Sonja down after  removal of occlusive cover for deeper penetration of medication.      Follow up 4 weeks.    All risks, benefits and alternatives were discussed with patient.  Patient is in agreement and understands the assessment and plan.  All questions were answered.  ROBERTA Prado [facility]  _______________________________________    CC: RECHECK (Wart follow up on right foot.)    HPI:  Mr. Pranay Ratliff is a(n) 75 year old male who presents today as a return patient for warts. Unsure of improvement. Patient is otherwise feeling well, without additional skin concerns.      Physical Exam:  SKIN: Focused examination of plantar feet was performed.  - Verrucal papule(s) with thrombosed capillaries involving the right plantar foot x2.     - No other lesions of concern on areas examined.     Medications:  Current Outpatient Medications   Medication Sig Dispense Refill     aspirin 81 MG tablet Take by mouth daily       azelaic acid (FINACIA) 15 % external gel Apply to the face twice daily. 50 g 4     clotrimazole (LOTRIMIN) 1 % external cream Apply topically 2 times daily as needed.       COMPOUNDED NON-CONTROLLED SUBSTANCE (CMPD RX) - PHARMACY TO MIX COMPOUNDED MEDICATION Apply to forehead, temples, cheeks, and nose twice daily for 5-7 days, wash hands after application. Avoid sun. 30 g 0     diclofenac (VOLTAREN) 1 % topical gel Apply topically 4 times daily       fluocinonide (LIDEX) 0.05 % external cream Apply topically 2 times daily 120 g 6     gabapentin (NEURONTIN) 100 MG capsule Take 100 mg by mouth. Take 1 time daily at HS       imiquimod (ALDARA) 5 % external cream Apply topically to warts every morning. 12 packet 3     multivitamin w/minerals (THERA-VIT-M) tablet Take 1 tablet by mouth daily       Omega-3 Fatty Acids (OMEGA-3 FISH OIL PO)        pantoprazole (PROTONIX) 40 MG EC tablet Take 1 tablet (40 mg) by mouth daily 90 tablet 2     rosuvastatin (CRESTOR) 20 MG tablet TAKE 1 TABLET BY MOUTH  EVERY DAY 90 tablet 1     triamterene-HCTZ (MAXZIDE-25) 37.5-25 MG tablet TAKE 1/2 TABLET BY MOUTH EVERY MORNING 45 tablet 0     No current facility-administered medications for this visit.      Past Medical History:   Patient Active Problem List   Diagnosis     History of basal cell carcinoma     Benign essential hypertension     Hyperlipidemia LDL goal <100     Obstructive sleep apnea syndrome     Gastroesophageal reflux disease with esophagitis     Localized swelling of lower extremity     Vasovagal syncope     Elevated prostate specific antigen (PSA)     Concussion with loss of consciousness of 30 minutes or less, subsequent encounter     BPH with obstruction/lower urinary tract symptoms     Myofascial pain syndrome, cervical     Occipital neuralgia of right side     Fatigue due to old head injury     Post concussion syndrome     Cervical segment dysfunction     Thoracic segment dysfunction     Cephalgia     Somatic dysfunction of sacral region     Cervical pain     Dizziness     Marital conflict     Chronic left-sided low back pain without sciatica     Neck muscle spasm     Neck pain     Chronic pain of right knee     Past Medical History:   Diagnosis Date     Basal cell carcinoma      Concussion with loss of consciousness of 30 minutes or less, subsequent encounter      Congestive heart failure (H)      Epididymitis, bilateral      Epididymitis, left      Epididymitis, right      Obstructive sleep apnea syndrome      Squamous cell carcinoma of skin, unspecified      Vasovagal syncope        CC Referred Self, MD  No address on file on close of this encounter.       Again, thank you for allowing me to participate in the care of your patient.        Sincerely,        Amparo Bowman PA-C    Electronically signed

## 2025-02-20 NOTE — PATIENT INSTRUCTIONS
Proper skin care from [facility]:    -Eliminate harsh soaps as they strip the natural oils from the skin, often resulting in dry itchy skin ( i.e. Dial, Zest, [place])  -Use mild soaps such as Cetaphil or Dove Sensitive Skin in the shower. You do not need to use soap on arms, legs, and trunk every time you shower unless visibly soiled.   -Avoid hot or cold showers.  -After showering, lightly dry off and apply moisturizing within 2-3 minutes. This will help trap moisture in the skin.   -Aggressive use of a moisturizer at least 1-2 times a day to the entire body (including -Vanicream, Cetaphil, Aquaphor or Cerave) and moisturize hands after every washing.  -We recommend using moisturizers that come in a tub that needs to be scooped out, not a pump. This has more of an oil base. It will hold moisture in your skin much better than a water base moisturizer. The above recommended are non-pore clogging.      Wear a sunscreen with at least SPF 30 on your face, ears, neck and V of the chest daily. Wear sunscreen on other areas of the body if those areas are exposed to the sun throughout the day. Sunscreens can contain physical and/or chemical blockers. Physical blockers are less likely to clog pores, these include zinc oxide and titanium dioxide. Reapply every two hour and after swimming.     Sunscreen examples: https://www.ewg.org/sunscreen/    UV radiation  UVA radiation remains constant throughout the day and throughout the year. It is a longer wavelength than UVB and therefore penetrates deeper into the skin leading to immediate and delayed tanning, photoaging, and skin cancer. 70-80% of UVA and UVB radiation occurs between the hours of 10am-2pm.  UVB radiation  UVB radiation causes the most harmful effects and is more significant during the summer months. However, snow and ice can reflect UVB radiation leading to skin damage during the winter months as well. UVB radiation is responsible for tanning,  burning, inflammation, delayed erythema (pinkness), pigmentation (brown spots), and skin cancer.     I recommend self monthly full body exams and yearly full body exams with a dermatology provider. If you develop a new or changing lesion please follow up for examination. Most skin cancers are pink and scaly or pink and pearly. However, we do see blue/brown/black skin cancers.  Consider the ABCDEs of melanoma when giving yourself your monthly full body exam ( don't forget the groin, buttocks, feet, toes, etc). A-asymmetry, B-borders, C-color, D-diameter, E-elevation or evolving. If you see any of these changes please follow up in clinic. If you cannot see your back I recommend purchasing a hand held mirror to use with a larger wall mirror.       Checking for Skin Cancer  You can find cancer early by checking your skin each month. There are 3 kinds of skin cancer. They are melanoma, basal cell carcinoma, and squamous cell carcinoma. Doing monthly skin checks is the best way to find new marks or skin changes. Follow the instructions below for checking your skin.   The ABCDEs of checking moles for melanoma   Check your moles or growths for signs of melanoma using ABCDE:   Asymmetry: the sides of the mole or growth don t match  Border: the edges are ragged, notched, or blurred  Color: the color within the mole or growth varies  Diameter: the mole or growth is larger than 6 mm (size of a pencil eraser)  Evolving: the size, shape, or color of the mole or growth is changing (evolving is not shown in the images below)    Checking for other types of skin cancer  Basal cell carcinoma or squamous cell carcinoma have symptoms such as:     A spot or mole that looks different from all other marks on your skin  Changes in how an area feels, such as itching, tenderness, or pain  Changes in the skin's surface, such as oozing, bleeding, or scaliness  A sore that does not heal  New swelling or redness beyond the border of a  mole    Who s at risk?  Anyone can get skin cancer. But you are at greater risk if you have:   Fair skin, light-colored hair, or light-colored eyes  Many moles or abnormal moles on your skin  A history of sunburns from sunlight or tanning beds  A family history of skin cancer  A history of exposure to radiation or chemicals  A weakened immune system  If you have had skin cancer in the past, you are at risk for recurring skin cancer.   How to check your skin  Do your monthly skin checkups in front of a full-length mirror. Check all parts of your body, including your:   Head (ears, face, neck, and scalp)  Torso (front, back, and sides)  Arms (tops, undersides, upper, and lower armpits)  Hands (palms, backs, and fingers, including under the nails)  Buttocks and genitals  Legs (front, back, and sides)  Feet (tops, soles, toes, including under the nails, and between toes)  If you have a lot of moles, take digital photos of them each month. Make sure to take photos both up close and from a distance. These can help you see if any moles change over time.   Most skin changes are not cancer. But if you see any changes in your skin, call your doctor right away. Only he or she can diagnose a problem. If you have skin cancer, seeing your doctor can be the first step toward getting the treatment that could save your life.   [email or web address] last reviewed this educational content on 4/1/2019 2000-2020 [employer]. [address] [place] [address], [place] [address]. All rights reserved. This information is not intended as a substitute for professional medical care. Always follow your healthcare professional's instructions.       When should I call my doctor?  If you are worsening or not improving, please, contact us or seek urgent care as noted below.     Who should I call with questions (adults)?    [facility] [phone]  For urgent needs outside of business hours call the [facility] at  [phone] and ask for the dermatology resident on call to be paged  If this is a medical emergency and you are unable to reach an ER, Call 911      If you need a prescription refill, please contact your pharmacy. Refills are approved or denied by our Physicians during normal business hours, Monday through Friday.  Per office policy, refills will not be granted if you have not been seen within the past year (or sooner depending on the condition).

## 2025-02-20 NOTE — PROGRESS NOTES
Clinic Administered Medication Documentation    Patient was given candida. Prior to medication administration, verified patient's identity using patient s name and date of birth. Please see MAR and medication order for additional information. Patient instructed to remain in clinic for 15 minutes and report any adverse reaction to staff immediately.    Vial/Syringe: Multi dose vial    Jenifer MENENDEZ RN  [facility]  [phone]

## 2025-02-20 NOTE — PROGRESS NOTES
[facility] Dermatology Note  Encounter Date: Feb 20, 2025  Office Visit     Reviewed patients past medical history and pertinent chart review prior to patients visit today.     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 s/p Mohs 09/11/2024  2. Asteatotic dermatitis  - Current tx: fluocinonide 0.05% cream  3. Verruca plantaris, right foot s/p cryotherapy  - Current tx: imiquimod cream, salicylic acid, candida  - Past tx: cryotherapy  4. Tinea pedis   5. AKs, forehead, temples, and nose  - Current tx: Efudex/ Dovonex 8/15/2024   - Past tx: Efudex 5% cream (initiated 11/3/2022)  6. Rash, most consistent with tinea pedis, left medial foot s/p KOH prep 12/6/2022      ____________________________________________    Assessment & Plan:     # Verruca vulgaris, right plantar foot x2.     Counseled on the viral etiology of this condition. Counseled that these are often recalcitrant to treatment. Discussed all treatment options that we offer as well as side effects of each. Advised that highest rates of success can be observed with combination monthly in office treatment and home treatments.    - Candida injection: After verbal consent was obtained, the skin was cleaned with an alcohol wipe and 0.3 ml of candida was injected under lesions on the right plantar foot The patient tolerated the procedure well.     - Continue imiquimod 5% cream every morning.      - Continue salicylic acid 40% (such as wart stick) nightly followed by duct tape or bandage.  Pare down after removal of occlusive cover for deeper penetration of medication.      Follow up 4 weeks.    All risks, benefits and alternatives were discussed with patient.  Patient is in agreement and understands the assessment and plan.  All questions were  answered.  Amparo Bowman PA-C  [facility] Dermatology  _______________________________________    CC: RECHECK (Wart follow up on right foot.)    HPI:  Mr. Pranay Ratliff is a(n) 75 year old male who presents today as a return patient for warts. Unsure of improvement. Patient is otherwise feeling well, without additional skin concerns.      Physical Exam:  SKIN: Focused examination of plantar feet was performed.  - Verrucal papule(s) with thrombosed capillaries involving the right plantar foot x2.     - No other lesions of concern on areas examined.     Medications:  Current Outpatient Medications   Medication Sig Dispense Refill    aspirin 81 MG tablet Take by mouth daily      azelaic acid (FINACIA) 15 % external gel Apply to the face twice daily. 50 g 4    clotrimazole (LOTRIMIN) 1 % external cream Apply topically 2 times daily as needed.      COMPOUNDED NON-CONTROLLED SUBSTANCE (CMPD RX) - PHARMACY TO MIX COMPOUNDED MEDICATION Apply to forehead, temples, cheeks, and nose twice daily for 5-7 days, wash hands after application. Avoid sun. 30 g 0    diclofenac (VOLTAREN) 1 % topical gel Apply topically 4 times daily      fluocinonide (LIDEX) 0.05 % external cream Apply topically 2 times daily 120 g 6    gabapentin (NEURONTIN) 100 MG capsule Take 100 mg by mouth. Take 1 time daily at HS      imiquimod (ALDARA) 5 % external cream Apply topically to warts every morning. 12 packet 3    multivitamin w/minerals (THERA-VIT-M) tablet Take 1 tablet by mouth daily      Omega-3 Fatty Acids (OMEGA-3 FISH OIL PO)       pantoprazole (PROTONIX) 40 MG EC tablet Take 1 tablet (40 mg) by mouth daily 90 tablet 2    rosuvastatin (CRESTOR) 20 MG tablet TAKE 1 TABLET BY MOUTH EVERY DAY 90 tablet 1    triamterene-HCTZ (MAXZIDE-25) 37.5-25 MG tablet TAKE 1/2 TABLET BY MOUTH EVERY MORNING 45 tablet 0     No current facility-administered medications for this visit.      Past Medical History:   Patient Active Problem List   Diagnosis     History of basal cell carcinoma    Benign essential hypertension    Hyperlipidemia LDL goal <100    Obstructive sleep apnea syndrome    Gastroesophageal reflux disease with esophagitis    Localized swelling of lower extremity    Vasovagal syncope    Elevated prostate specific antigen (PSA)    Concussion with loss of consciousness of 30 minutes or less, subsequent encounter    BPH with obstruction/lower urinary tract symptoms    Myofascial pain syndrome, cervical    Occipital neuralgia of right side    Fatigue due to old head injury    Post concussion syndrome    Cervical segment dysfunction    Thoracic segment dysfunction    Cephalgia    Somatic dysfunction of sacral region    Cervical pain    Dizziness    Marital conflict    Chronic left-sided low back pain without sciatica    Neck muscle spasm    Neck pain    Chronic pain of right knee     Past Medical History:   Diagnosis Date    Basal cell carcinoma     Concussion with loss of consciousness of 30 minutes or less, subsequent encounter     Congestive heart failure (H)     Epididymitis, bilateral     Epididymitis, left     Epididymitis, right     Obstructive sleep apnea syndrome     Squamous cell carcinoma of skin, unspecified     Vasovagal syncope        CC Referred Self, MD  No address on file on close of this encounter.

## 2025-02-25 DIAGNOSIS — I10 BENIGN ESSENTIAL HYPERTENSION: ICD-10-CM

## 2025-02-25 DIAGNOSIS — K21.9 GASTROESOPHAGEAL REFLUX DISEASE WITHOUT ESOPHAGITIS: ICD-10-CM

## 2025-02-25 RX ORDER — PANTOPRAZOLE SODIUM 40 MG/1
40 TABLET, DELAYED RELEASE ORAL DAILY
Qty: 90 TABLET | Refills: 0 | Status: SHIPPED | OUTPATIENT
Start: 2025-02-25

## 2025-02-25 RX ORDER — TRIAMTERENE AND HYDROCHLOROTHIAZIDE 37.5; 25 MG/1; MG/1
TABLET ORAL
Qty: 45 TABLET | Refills: 0 | Status: SHIPPED | OUTPATIENT
Start: 2025-02-25

## 2025-03-20 ENCOUNTER — OFFICE VISIT (OUTPATIENT)
Dept: DERMATOLOGY | Facility: CLINIC | Age: 75
End: 2025-03-20
Payer: COMMERCIAL

## 2025-03-20 ENCOUNTER — OFFICE VISIT (OUTPATIENT)
Dept: FAMILY MEDICINE | Facility: CLINIC | Age: 75
End: 2025-03-20
Payer: COMMERCIAL

## 2025-03-20 VITALS
OXYGEN SATURATION: 98 % | HEIGHT: 70 IN | BODY MASS INDEX: 33.56 KG/M2 | RESPIRATION RATE: 14 BRPM | TEMPERATURE: 97.8 F | WEIGHT: 234.4 LBS | SYSTOLIC BLOOD PRESSURE: 130 MMHG | DIASTOLIC BLOOD PRESSURE: 70 MMHG | HEART RATE: 70 BPM

## 2025-03-20 DIAGNOSIS — K21.9 GASTROESOPHAGEAL REFLUX DISEASE WITHOUT ESOPHAGITIS: ICD-10-CM

## 2025-03-20 DIAGNOSIS — B07.0 PLANTAR WART: Primary | ICD-10-CM

## 2025-03-20 DIAGNOSIS — K40.90 LEFT INGUINAL HERNIA: Primary | ICD-10-CM

## 2025-03-20 PROBLEM — M54.2 NECK PAIN: Status: RESOLVED | Noted: 2024-08-26 | Resolved: 2025-03-20

## 2025-03-20 PROBLEM — M62.838 NECK MUSCLE SPASM: Status: RESOLVED | Noted: 2024-08-26 | Resolved: 2025-03-20

## 2025-03-20 PROBLEM — M25.561 CHRONIC PAIN OF RIGHT KNEE: Status: RESOLVED | Noted: 2024-08-26 | Resolved: 2025-03-20

## 2025-03-20 PROBLEM — G89.29 CHRONIC PAIN OF RIGHT KNEE: Status: RESOLVED | Noted: 2024-08-26 | Resolved: 2025-03-20

## 2025-03-20 RX ORDER — CANDIDA ALBICANS 1000 [PNU]/ML
0.1 INJECTION, SOLUTION INTRADERMAL ONCE
Status: DISCONTINUED | OUTPATIENT
Start: 2025-03-20 | End: 2025-03-20 | Stop reason: DRUGHIGH

## 2025-03-20 RX ORDER — CANDIDA ALBICANS 1000 [PNU]/ML
0.3 INJECTION, SOLUTION INTRADERMAL ONCE
Status: COMPLETED | OUTPATIENT
Start: 2025-03-20 | End: 2025-03-20

## 2025-03-20 RX ORDER — PANTOPRAZOLE SODIUM 40 MG/1
40 TABLET, DELAYED RELEASE ORAL DAILY
Qty: 90 TABLET | Refills: 3 | Status: SHIPPED | OUTPATIENT
Start: 2025-03-20

## 2025-03-20 RX ADMIN — CANDIDA ALBICANS 0.3 ML: 1000 INJECTION, SOLUTION INTRADERMAL at 14:48

## 2025-03-20 ASSESSMENT — PAIN SCALES - GENERAL
PAINLEVEL_OUTOF10: MILD PAIN (3)
PAINLEVEL_OUTOF10: MODERATE PAIN (4)

## 2025-03-20 NOTE — PROGRESS NOTES
Clinic Administered Medication Documentation    Patient was given candida. Prior to medication administration, verified patient's identity using patient s name and date of birth. Please see MAR and medication order for additional information. Patient instructed to remain in clinic for 15 minutes and report any adverse reaction to staff immediately.    Vial/Syringe: Multi dose vial    Jenifer MENENDEZ RN  [facility] [place]  [phone]

## 2025-03-20 NOTE — PATIENT INSTRUCTIONS
Patient Education        Proper skin care from [facility]:     -Eliminate harsh soaps as they strip the natural oils from the skin, often resulting in dry itchy skin ( i.e. Dial, Zest, [place])  -Use mild soaps such as Cetaphil or Dove Sensitive Skin in the shower. You do not need to use soap on arms, legs, and trunk every time you shower unless visibly soiled.   -Avoid hot or cold showers.  -After showering, lightly dry off and apply moisturizing within 2-3 minutes. This will help trap moisture in the skin.   -Aggressive use of a moisturizer at least 1-2 times a day to the entire body (including -Vanicream, Cetaphil, Aquaphor or Cerave) and moisturize hands after every washing.  -We recommend using moisturizers that come in a tub that needs to be scooped out, not a pump. This has more of an oil base. It will hold moisture in your skin much better than a water base moisturizer. The above recommended are non-pore clogging.        Wear a sunscreen with at least SPF 30 on your face, ears, neck and V of the chest daily. Wear sunscreen on other areas of the body if those areas are exposed to the sun throughout the day. Sunscreens can contain physical and/or chemical blockers. Physical blockers are less likely to clog pores, these include zinc oxide and titanium dioxide. Reapply every two hour and after swimming.      Sunscreen examples: https://www.ewg.org/sunscreen/     UV radiation  UVA radiation remains constant throughout the day and throughout the year. It is a longer wavelength than UVB and therefore penetrates deeper into the skin leading to immediate and delayed tanning, photoaging, and skin cancer. 70-80% of UVA and UVB radiation occurs between the hours of 10am-2pm.  UVB radiation  UVB radiation causes the most harmful effects and is more significant during the summer months. However, snow and ice can reflect UVB radiation leading to skin damage during the winter months as well. UVB radiation is  responsible for tanning, burning, inflammation, delayed erythema (pinkness), pigmentation (brown spots), and skin cancer.      I recommend self monthly full body exams and yearly full body exams with a dermatology provider. If you develop a new or changing lesion please follow up for examination. Most skin cancers are pink and scaly or pink and pearly. However, we do see blue/brown/black skin cancers.  Consider the ABCDEs of melanoma when giving yourself your monthly full body exam ( don't forget the groin, buttocks, feet, toes, etc). A-asymmetry, B-borders, C-color, D-diameter, E-elevation or evolving. If you see any of these changes please follow up in clinic. If you cannot see your back I recommend purchasing a hand held mirror to use with a larger wall mirror.       Checking for Skin Cancer  You can find cancer early by checking your skin each month. There are 3 kinds of skin cancer. They are melanoma, basal cell carcinoma, and squamous cell carcinoma. Doing monthly skin checks is the best way to find new marks or skin changes. Follow the instructions below for checking your skin.   The ABCDEs of checking moles for melanoma   Check your moles or growths for signs of melanoma using ABCDE:   Asymmetry: the sides of the mole or growth don t match  Border: the edges are ragged, notched, or blurred  Color: the color within the mole or growth varies  Diameter: the mole or growth is larger than 6 mm (size of a pencil eraser)  Evolving: the size, shape, or color of the mole or growth is changing (evolving is not shown in the images below)    Checking for other types of skin cancer  Basal cell carcinoma or squamous cell carcinoma have symptoms such as:      A spot or mole that looks different from all other marks on your skin  Changes in how an area feels, such as itching, tenderness, or pain  Changes in the skin's surface, such as oozing, bleeding, or scaliness  A sore that does not heal  New swelling or redness beyond  the border of a mole     Who s at risk?  Anyone can get skin cancer. But you are at greater risk if you have:   Fair skin, light-colored hair, or light-colored eyes  Many moles or abnormal moles on your skin  A history of sunburns from sunlight or tanning beds  A family history of skin cancer  A history of exposure to radiation or chemicals  A weakened immune system  If you have had skin cancer in the past, you are at risk for recurring skin cancer.   How to check your skin  Do your monthly skin checkups in front of a full-length mirror. Check all parts of your body, including your:   Head (ears, face, neck, and scalp)  Torso (front, back, and sides)  Arms (tops, undersides, upper, and lower armpits)  Hands (palms, backs, and fingers, including under the nails)  Buttocks and genitals  Legs (front, back, and sides)  Feet (tops, soles, toes, including under the nails, and between toes)  If you have a lot of moles, take digital photos of them each month. Make sure to take photos both up close and from a distance. These can help you see if any moles change over time.   Most skin changes are not cancer. But if you see any changes in your skin, call your doctor right away. Only he or she can diagnose a problem. If you have skin cancer, seeing your doctor can be the first step toward getting the treatment that could save your life.   [employer] last reviewed this educational content on 4/1/2019 2000-2020 [employer]. [address] [place] [address], [place] [address]. All rights reserved. This information is not intended as a substitute for professional medical care. Always follow your healthcare professional's instructions.        When should I call my doctor?  If you are worsening or not improving, please, contact us or seek urgent care as noted below.      Who should I call with questions (adults)?  [facility] (adult and pediatric): [phone]  [facility]  [place] (adult): [phone]  [facility] ([place], [place], [place] and [place]) [phone]  For urgent needs outside of business hours call the [facility] at [phone] and ask for the dermatology resident on call to be paged  If this is a medical emergency and you are unable to reach an ER, Call 911        If you need a prescription refill, please contact your pharmacy. Refills are approved or denied by our Physicians during normal business hours, Monday through Fridays  Per office policy, refills will not be granted if you have not been seen within the past year (or sooner depending on your child's condition)

## 2025-03-20 NOTE — LETTER
3/20/2025      Pranay Ratliff  [address]  [place] [facility] [facility] [address]      Dear Colleague,    Thank you for referring your patient, Pranay Ratliff, to the [facility]. Please see a copy of my visit note below.    [facility] Dermatology Note  Encounter Date: Mar 20, 2025  Office Visit     Reviewed patients past medical history and pertinent chart review prior to patients visit today.     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 s/p Mohs 09/11/2024  2. Asteatotic dermatitis  - Current tx: fluocinonide 0.05% cream  3. Verruca plantaris, right foot s/p cryotherapy  - Current tx: imiquimod cream, salicylic acid, candida  - Past tx: cryotherapy  4. Tinea pedis   5. AKs, forehead, temples, and nose  - Current tx: Efudex/ Dovonex 8/15/2024   - Past tx: Efudex 5% cream (initiated 11/3/2022)  6. Rash, most consistent with tinea pedis, left medial foot s/p KOH prep 12/6/2022      ____________________________________________    Assessment & Plan:     # Verruca vulgaris, right plantar foot x2.     Counseled on the viral etiology of this condition. Counseled that these are often recalcitrant to treatment. Discussed all treatment options that we offer as well as side effects of each. Advised that highest rates of success can be observed with combination monthly in office treatment and home treatments.    - Candida injection: After verbal consent was obtained, the skin was cleaned with an alcohol wipe and 0.3 ml of candida was injected under lesions on the right plantar foot The patient tolerated the procedure well.     - Continue imiquimod 5% cream every morning.      - Continue salicylic acid 40% (such as wart stick) nightly followed by duct tape or bandage.  Pare down after  removal of occlusive cover for deeper penetration of medication.      Follow up 4 weeks.    All risks, benefits and alternatives were discussed with patient.  Patient is in agreement and understands the assessment and plan.  All questions were answered.  ROBERTA Prado [facility]ermat[facility]  _______________________________________    CC: Derm Problem (Plantar wart on right foot)    HPI:  Mr. Pranay Ratliff is a(n) 75 year old male who presents today as a return patient for warts. Unsure of improvement. Patient is otherwise feeling well, without additional skin concerns.      Physical Exam:  SKIN: Focused examination of plantar feet was performed.  - Verrucal papule(s) with thrombosed capillaries involving the right plantar foot x2.     - No other lesions of concern on areas examined.     Medications:  Current Outpatient Medications   Medication Sig Dispense Refill     aspirin 81 MG tablet Take by mouth daily       azelaic acid (FINACIA) 15 % external gel Apply to the face twice daily. 50 g 4     clotrimazole (LOTRIMIN) 1 % external cream Apply topically 2 times daily as needed.       COMPOUNDED NON-CONTROLLED SUBSTANCE (CMPD RX) - PHARMACY TO MIX COMPOUNDED MEDICATION Apply to forehead, temples, cheeks, and nose twice daily for 5-7 days, wash hands after application. Avoid sun. 30 g 0     diclofenac (VOLTAREN) 1 % topical gel Apply topically 4 times daily       fluocinonide (LIDEX) 0.05 % external cream Apply topically 2 times daily 120 g 6     gabapentin (NEURONTIN) 100 MG capsule Take 100 mg by mouth. Take 1 time daily at HS       imiquimod (ALDARA) 5 % external cream Apply topically to warts every morning. 12 packet 3     multivitamin w/minerals (THERA-VIT-M) tablet Take 1 tablet by mouth daily       Omega-3 Fatty Acids (OMEGA-3 FISH OIL PO)        pantoprazole (PROTONIX) 40 MG EC tablet Take 1 tablet (40 mg) by mouth daily. 90 tablet 3     rosuvastatin (CRESTOR) 20 MG tablet TAKE 1 TABLET BY MOUTH  EVERY DAY 90 tablet 1     triamterene-HCTZ (MAXZIDE-25) 37.5-25 MG tablet TAKE 1/2 TABLET BY MOUTH EVERY MORNING 45 tablet 0     No current facility-administered medications for this visit.      Past Medical History:   Patient Active Problem List   Diagnosis     History of basal cell carcinoma     Benign essential hypertension     Hyperlipidemia LDL goal <100     Obstructive sleep apnea syndrome     Gastroesophageal reflux disease with esophagitis     Localized swelling of lower extremity     Vasovagal syncope     Elevated prostate specific antigen (PSA)     Concussion with loss of consciousness of 30 minutes or less, subsequent encounter     BPH with obstruction/lower urinary tract symptoms     Myofascial pain syndrome, cervical     Occipital neuralgia of right side     Fatigue due to old head injury     Post concussion syndrome     Cervical segment dysfunction     Thoracic segment dysfunction     Cephalgia     Somatic dysfunction of sacral region     Cervical pain     Dizziness     Marital conflict     Chronic left-sided low back pain without sciatica     Neck muscle spasm     Neck pain     Chronic pain of right knee     Past Medical History:   Diagnosis Date     Basal cell carcinoma      Concussion with loss of consciousness of 30 minutes or less, subsequent encounter      Congestive heart failure (H)      Epididymitis, bilateral      Epididymitis, left      Epididymitis, right      Obstructive sleep apnea syndrome      Squamous cell carcinoma of skin, unspecified      Vasovagal syncope        CC Referred Self, MD  No address on file on close of this encounter.       Again, thank you for allowing me to participate in the care of your patient.        Sincerely,        Amparo Bowman PA-C    Electronically signed

## 2025-03-20 NOTE — PROGRESS NOTES
[facility] Dermatology Note  Encounter Date: Mar 20, 2025  Office Visit     Reviewed patients past medical history and pertinent chart review prior to patients visit today.     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 s/p Mohs 09/11/2024  2. Asteatotic dermatitis  - Current tx: fluocinonide 0.05% cream  3. Verruca plantaris, right foot s/p cryotherapy  - Current tx: imiquimod cream, salicylic acid, candida  - Past tx: cryotherapy  4. Tinea pedis   5. AKs, forehead, temples, and nose  - Current tx: Efudex/ Dovonex 8/15/2024   - Past tx: Efudex 5% cream (initiated 11/3/2022)  6. Rash, most consistent with tinea pedis, left medial foot s/p KOH prep 12/6/2022      ____________________________________________    Assessment & Plan:     # Verruca vulgaris, right plantar foot x2.     Counseled on the viral etiology of this condition. Counseled that these are often recalcitrant to treatment. Discussed all treatment options that we offer as well as side effects of each. Advised that highest rates of success can be observed with combination monthly in office treatment and home treatments.    - Candida injection: After verbal consent was obtained, the skin was cleaned with an alcohol wipe and 0.3 ml of candida was injected under lesions on the right plantar foot The patient tolerated the procedure well.     - Continue imiquimod 5% cream every morning.      - Continue salicylic acid 40% (such as wart stick) nightly followed by duct tape or bandage.  Pare down after removal of occlusive cover for deeper penetration of medication.      Follow up 4 weeks.    All risks, benefits and alternatives were discussed with patient.  Patient is in agreement and understands the assessment and plan.  All questions were  answered.  Amparo Bowman PA-C  [facility] Dermatology  _______________________________________    CC: Derm Problem (Plantar wart on right foot)    HPI:  Mr. Pranay Ratliff is a(n) 75 year old male who presents today as a return patient for warts. Unsure of improvement. Patient is otherwise feeling well, without additional skin concerns.      Physical Exam:  SKIN: Focused examination of plantar feet was performed.  - Verrucal papule(s) with thrombosed capillaries involving the right plantar foot x2.     - No other lesions of concern on areas examined.     Medications:  Current Outpatient Medications   Medication Sig Dispense Refill    aspirin 81 MG tablet Take by mouth daily      azelaic acid (FINACIA) 15 % external gel Apply to the face twice daily. 50 g 4    clotrimazole (LOTRIMIN) 1 % external cream Apply topically 2 times daily as needed.      COMPOUNDED NON-CONTROLLED SUBSTANCE (CMPD RX) - PHARMACY TO MIX COMPOUNDED MEDICATION Apply to forehead, temples, cheeks, and nose twice daily for 5-7 days, wash hands after application. Avoid sun. 30 g 0    diclofenac (VOLTAREN) 1 % topical gel Apply topically 4 times daily      fluocinonide (LIDEX) 0.05 % external cream Apply topically 2 times daily 120 g 6    gabapentin (NEURONTIN) 100 MG capsule Take 100 mg by mouth. Take 1 time daily at HS      imiquimod (ALDARA) 5 % external cream Apply topically to warts every morning. 12 packet 3    multivitamin w/minerals (THERA-VIT-M) tablet Take 1 tablet by mouth daily      Omega-3 Fatty Acids (OMEGA-3 FISH OIL PO)       pantoprazole (PROTONIX) 40 MG EC tablet Take 1 tablet (40 mg) by mouth daily. 90 tablet 3    rosuvastatin (CRESTOR) 20 MG tablet TAKE 1 TABLET BY MOUTH EVERY DAY 90 tablet 1    triamterene-HCTZ (MAXZIDE-25) 37.5-25 MG tablet TAKE 1/2 TABLET BY MOUTH EVERY MORNING 45 tablet 0     No current facility-administered medications for this visit.      Past Medical History:   Patient Active Problem List    Diagnosis    History of basal cell carcinoma    Benign essential hypertension    Hyperlipidemia LDL goal <100    Obstructive sleep apnea syndrome    Gastroesophageal reflux disease with esophagitis    Localized swelling of lower extremity    Vasovagal syncope    Elevated prostate specific antigen (PSA)    Concussion with loss of consciousness of 30 minutes or less, subsequent encounter    BPH with obstruction/lower urinary tract symptoms    Myofascial pain syndrome, cervical    Occipital neuralgia of right side    Fatigue due to old head injury    Post concussion syndrome    Cervical segment dysfunction    Thoracic segment dysfunction    Cephalgia    Somatic dysfunction of sacral region    Cervical pain    Dizziness    Marital conflict    Chronic left-sided low back pain without sciatica    Neck muscle spasm    Neck pain    Chronic pain of right knee     Past Medical History:   Diagnosis Date    Basal cell carcinoma     Concussion with loss of consciousness of 30 minutes or less, subsequent encounter     Congestive heart failure (H)     Epididymitis, bilateral     Epididymitis, left     Epididymitis, right     Obstructive sleep apnea syndrome     Squamous cell carcinoma of skin, unspecified     Vasovagal syncope        CC Referred Self, MD  No address on file on close of this encounter.

## 2025-03-20 NOTE — PROGRESS NOTES
"  Assessment & Plan     Gastroesophageal reflux disease without esophagitis  Has been stable with current dose of meds, will keep monitoring   - pantoprazole (PROTONIX) 40 MG EC tablet; Take 1 tablet (40 mg) by mouth daily.    Left inguinal hernia  Has been worsening with referred pain, has palpable mass on left inguinal area, will have him to see surgery for further evaluation    - Adult Gen Surg  Referral; Future      BMI  Estimated body mass index is 33.56 kg/m  as calculated from the following:    Height as of this encounter: 1.78 m (5' 10.08\").    Weight as of this encounter: 106.3 kg (234 lb 6.4 oz).   Weight management plan: Discussed healthy diet and exercise guidelines      FUTURE APPOINTMENTS:       - Follow-up visit at preop    Mary Ann   Pranay is a 75 year old, presenting for the following health issues:  Hip Pain (Left)        3/20/2025     1:25 PM   Additional Questions   Roomed by Sandrine MARK     History of Present Illness       Reason for visit:  Pain in left hip/groin area  Symptom onset:  1-2 weeks ago  Symptoms include:  Pain when walking/exercising  Symptom intensity:  Moderate  Symptom progression:  Staying the same  Had these symptoms before:  No  What makes it worse:  Walking/exercising movement  What makes it better:  Sitting stationary & tylenol & Sportscreme & heat pad   He is taking medications regularly.          Pain History:  When did you first notice your pain? 1.5 weeks   Have you seen anyone else for your pain? No  How has your pain affected your ability to work? Not applicable  Where in your body do you have pain?  Left Hip radiates to groin area. Left shoulder pain started today. l          Review of Systems  Constitutional, HEENT, cardiovascular, pulmonary, GI, , musculoskeletal, neuro, skin, endocrine and psych systems are negative, except as otherwise noted.      Objective    /70 (BP Location: Right arm, Patient Position: Sitting, Cuff Size: Adult Large)   " "Pulse 70   Temp 97.8  F (36.6  C) (Temporal)   Resp 14   Ht 1.78 m (5' 10.08\")   Wt 106.3 kg (234 lb 6.4 oz)   SpO2 98%   BMI 33.56 kg/m    Body mass index is 33.56 kg/m .  Physical Exam   GENERAL: alert and no distress  EYES: Eyes grossly normal to inspection, PERRL and conjunctivae and sclerae normal  HENT: ear canals and TM's normal, nose and mouth without ulcers or lesions  NECK: no adenopathy, no asymmetry, masses, or scars  RESP: lungs clear to auscultation - no rales, rhonchi or wheezes  CV: regular rate and rhythm, normal S1 S2, no S3 or S4, no murmur, click or rub, no peripheral edema  ABDOMEN: soft, nontender, no hepatosplenomegaly, no masses and bowel sounds normal   (male): testicles normal without atrophy or masses, hernia left inguinal, and penis normal without urethral discharge  MS: no gross musculoskeletal defects noted, no edema    The longitudinal plan of care for the diagnosis(es)/condition(s) as documented were addressed during this visit. Due to the added complexity in care, I will continue to support Pranay in the subsequent management and with ongoing continuity of care.          Signed Electronically by: Hipolito Alva MD    "

## 2025-03-25 ENCOUNTER — NURSE TRIAGE (OUTPATIENT)
Dept: FAMILY MEDICINE | Facility: CLINIC | Age: 75
End: 2025-03-25
Payer: COMMERCIAL

## 2025-03-25 NOTE — TELEPHONE ENCOUNTER
Reason for Disposition   Sinus congestion as part of a cold, present < 10 days    Additional Information   Negative: SEVERE headache and has fever   Negative: Patient sounds very sick or weak to the triager   Negative: Difficulty breathing, and not from stuffy nose (e.g., not relieved by cleaning out the nose)   Negative: Sounds like a life-threatening emergency to the triager   Negative: Lots of coughing   Negative: Using nasal washes and pain medicine > 24 hours and sinus pain (lower forehead, cheekbone, or eye) persists   Negative: Sinus congestion (pressure, fullness) present > 10 days   Negative: Nasal discharge present > 10 days   Negative: Patient wants to be seen   Negative: Fever present > 3 days (72 hours)   Negative: Fever returns after gone for over 24 hours and symptoms worse or not improved   Negative: Sinus pain (not just congestion) and fever   Negative: Earache   Negative: SEVERE sinus pain (e.g., excruciating)   Negative: Severe headache   Negative: Redness or swelling on the cheek, forehead, or around the eye   Negative: Fever > 103 F (39.4 C)   Negative: Fever > 101 F (38.3 C) and over 60 years of age   Negative: Fever > 100 F (37.8 C) and has diabetes mellitus or a weak immune system (e.g., HIV positive, cancer chemotherapy, organ transplant, splenectomy, chronic steroids)   Negative: Fever > 100 F (37.8 C) and bedridden (e.g., CVA, chronic illness, recovering from surgery)    Protocols used: Sinus Pain or Congestion-A-OH

## 2025-03-27 ENCOUNTER — TELEPHONE (OUTPATIENT)
Dept: SURGERY | Facility: CLINIC | Age: 75
End: 2025-03-27

## 2025-03-27 ENCOUNTER — OFFICE VISIT (OUTPATIENT)
Dept: SURGERY | Facility: CLINIC | Age: 75
End: 2025-03-27
Payer: COMMERCIAL

## 2025-03-27 VITALS
DIASTOLIC BLOOD PRESSURE: 68 MMHG | HEART RATE: 79 BPM | HEIGHT: 72 IN | BODY MASS INDEX: 30.48 KG/M2 | WEIGHT: 225 LBS | OXYGEN SATURATION: 98 % | SYSTOLIC BLOOD PRESSURE: 142 MMHG

## 2025-03-27 DIAGNOSIS — K40.90 LEFT INGUINAL HERNIA: ICD-10-CM

## 2025-03-27 PROCEDURE — 3077F SYST BP >= 140 MM HG: CPT | Performed by: SURGERY

## 2025-03-27 PROCEDURE — 3078F DIAST BP <80 MM HG: CPT | Performed by: SURGERY

## 2025-03-27 PROCEDURE — 99204 OFFICE O/P NEW MOD 45 MIN: CPT | Performed by: SURGERY

## 2025-03-27 NOTE — TELEPHONE ENCOUNTER
Orders received for robot assisted laparoscopic left inguinal hernia repair with Dr Rubio, I left a message for the patient to return my call to schedule surgery

## 2025-03-27 NOTE — PROGRESS NOTES
Inguinal Hernia Patient Questionnaire:    Do you have a history of urinary retention?  No    Have you ever experienced urinary problems after surgery?  No    Have you had any urinary symptoms, for example incomplete emptying of your urine or difficulty voiding?  No    Have you been diagnosed with a neurologic disease, for example neuropathy or multiple sclerosis?  No    Are you taking Flomax (tamsulosin) or Uroxatral (alfuzosin)?  No    Have you had prior prostrate or pelvic surgery?  No

## 2025-03-27 NOTE — LETTER
April 15, 2025          Hipolito Alva MD  [address] [place] [address]      RE:   Pranay Ratliff 1950      Dear Colleague,    Thank you for referring your patient, Pranay Ratliff, to [facility] [place]. Please see a copy of my visit note below.    Patient is a 75-year-old gentleman referred by the above provider for consultation regarding left inguinal hernia.  He reports that he has noted this for a couple of weeks.  Feels that this was related back to an episode of lifting a heavy television.  He has had pain in the left groin which also radiates into his back.  No signs or symptoms of incarceration or strangulation.  No GI or bowel obstruction symptoms.  Was evaluated by primary care and felt to have left inguinal hernia.     PMH:   has a past medical history of Basal cell carcinoma, Concussion with loss of consciousness of 30 minutes or less, subsequent encounter, Congestive heart failure (H), Epididymitis, bilateral, Epididymitis, left, Epididymitis, right, Obstructive sleep apnea syndrome, Squamous cell carcinoma of skin, unspecified, and Vasovagal syncope.  PSH:    has a past surgical history that includes Vasectomy; Testicle surgery; colonoscopy (5/2021); Colonoscopy (N/A, 6/27/2024); and Esophagoscopy, gastroscopy, duodenoscopy (EGD), combined (N/A, 6/27/2024).  Social History:   reports that he has quit smoking. His smoking use included cigarettes. He has been exposed to tobacco smoke. He has never used smokeless tobacco. He reports current alcohol use. He reports that he does not use drugs.  Family History:  family history includes Abdominal Aortic Aneurysm in his mother; Cancer in his brother; Diabetes Type 1 in his brother; Diabetes Type 2  in his maternal grandmother; Heart Disease in his brother; Heart Failure in his father; Skin Cancer in his mother.  Medications/Allergies: Home medications and allergies reviewed.     ROS:  The 10 point  "Review of Systems is negative other than noted in the HPI.     Physical Exam:  BP (!) 142/68   Pulse 79   Ht 1.816 m (5' 11.5\")   Wt 102.1 kg (225 lb)   SpO2 98%   BMI 30.94 kg/m    GENERAL: Generally appears well.  Psych: Alert and Oriented.  Normal affect  Eyes: Sclera clear  Respiratory:  Lungs clear to ausculation bilaterally with good air excursion  Cardiovascular:  Regular Rate and Rhythm with no murmurs gallops or rubs, normal peripheral pulses  GI: Abdomen Non Distended Soft Non-Tender  No hernias palpated..  Groin- I examined the patient in both the standing and supine positions. Right Groin- No hernia Palpated. Left Groin- Small inguinal hernia.  Hernia was easily reduciable. No scrotal or testicle abnormalities.  Lymphatic/Hematologic/Immune:  No femoral or cervical lymphadenopathy.  Integumentary:  No rashes  Neurological: grossly intact      All new lab and imaging data was reviewed.      Impression and Plan:  Patient is a 75 year old male with left inguinal hernia     PLAN: Recommend robotic assisted laparoscopic repair at his convenience  I discussed the pathophysiology of hernias and options for repair including laparoscopic VS open.  The risks associated with the procedure including, but not limited to, recurrence, nerve entrapment or injury, persistence of pain, injury to the bowel/bladder, infertility, hematoma, mesh migration, mesh infection, MI, and PE were discussed with the patient. He indicated understanding of the discussion, asked appropriate questions, and provided consent. Signs and symptoms of incarceration were discussed. If these develop in the interim, he promises to call or go straight to the ER. I have provided the patient with an information pamphlet.       Again, thank you for allowing me to participate in the care of your patient.      Sincerely,      Iván Rubio MD    "

## 2025-04-09 ENCOUNTER — OFFICE VISIT (OUTPATIENT)
Dept: FAMILY MEDICINE | Facility: CLINIC | Age: 75
End: 2025-04-09
Payer: COMMERCIAL

## 2025-04-09 VITALS
HEART RATE: 63 BPM | DIASTOLIC BLOOD PRESSURE: 76 MMHG | HEIGHT: 70 IN | SYSTOLIC BLOOD PRESSURE: 122 MMHG | OXYGEN SATURATION: 98 % | RESPIRATION RATE: 18 BRPM | TEMPERATURE: 97.7 F | WEIGHT: 230.9 LBS | BODY MASS INDEX: 33.05 KG/M2

## 2025-04-09 DIAGNOSIS — I10 BENIGN ESSENTIAL HYPERTENSION: ICD-10-CM

## 2025-04-09 DIAGNOSIS — K40.90 LEFT INGUINAL HERNIA: ICD-10-CM

## 2025-04-09 DIAGNOSIS — Z01.818 PRE-OP EXAM: Primary | ICD-10-CM

## 2025-04-09 DIAGNOSIS — E78.5 HYPERLIPIDEMIA LDL GOAL <100: ICD-10-CM

## 2025-04-09 LAB
ANION GAP SERPL CALCULATED.3IONS-SCNC: 11 MMOL/L (ref 7–15)
BUN SERPL-MCNC: 12.1 MG/DL (ref 8–23)
CALCIUM SERPL-MCNC: 9.3 MG/DL (ref 8.8–10.4)
CHLORIDE SERPL-SCNC: 98 MMOL/L (ref 98–107)
CREAT SERPL-MCNC: 0.89 MG/DL (ref 0.67–1.17)
EGFRCR SERPLBLD CKD-EPI 2021: 89 ML/MIN/1.73M2
ERYTHROCYTE [DISTWIDTH] IN BLOOD BY AUTOMATED COUNT: 13 % (ref 10–15)
GLUCOSE SERPL-MCNC: 77 MG/DL (ref 70–99)
HCO3 SERPL-SCNC: 25 MMOL/L (ref 22–29)
HCT VFR BLD AUTO: 41.4 % (ref 40–53)
HGB BLD-MCNC: 14.5 G/DL (ref 13.3–17.7)
MCH RBC QN AUTO: 30.4 PG (ref 26.5–33)
MCHC RBC AUTO-ENTMCNC: 35 G/DL (ref 31.5–36.5)
MCV RBC AUTO: 87 FL (ref 78–100)
PLATELET # BLD AUTO: 248 10E3/UL (ref 150–450)
POTASSIUM SERPL-SCNC: 4.1 MMOL/L (ref 3.4–5.3)
RBC # BLD AUTO: 4.77 10E6/UL (ref 4.4–5.9)
SODIUM SERPL-SCNC: 134 MMOL/L (ref 135–145)
WBC # BLD AUTO: 13.8 10E3/UL (ref 4–11)

## 2025-04-09 PROCEDURE — 80048 BASIC METABOLIC PNL TOTAL CA: CPT | Performed by: FAMILY MEDICINE

## 2025-04-09 PROCEDURE — 1126F AMNT PAIN NOTED NONE PRSNT: CPT | Performed by: FAMILY MEDICINE

## 2025-04-09 PROCEDURE — 3074F SYST BP LT 130 MM HG: CPT | Performed by: FAMILY MEDICINE

## 2025-04-09 PROCEDURE — 85027 COMPLETE CBC AUTOMATED: CPT | Performed by: FAMILY MEDICINE

## 2025-04-09 PROCEDURE — 3078F DIAST BP <80 MM HG: CPT | Performed by: FAMILY MEDICINE

## 2025-04-09 PROCEDURE — 99214 OFFICE O/P EST MOD 30 MIN: CPT | Performed by: FAMILY MEDICINE

## 2025-04-09 PROCEDURE — 93000 ELECTROCARDIOGRAM COMPLETE: CPT | Performed by: FAMILY MEDICINE

## 2025-04-09 PROCEDURE — 36415 COLL VENOUS BLD VENIPUNCTURE: CPT | Performed by: FAMILY MEDICINE

## 2025-04-09 ASSESSMENT — PAIN SCALES - GENERAL: PAINLEVEL_OUTOF10: NO PAIN (0)

## 2025-04-09 NOTE — PROGRESS NOTES
Preoperative Evaluation  [address] [address]  Phone: [phone]  Primary Provider: Hipolito Alva MD  Pre-op Performing Provider: Hipolito Alva MD  Apr 9, 2025 4/4/2025   Surgical Information   What procedure is being done? Left inguinal hernia   Facility or Hospital where procedure/surgery will be performed: [facility]   Who is doing the procedure / surgery? Dr Modesto Rubio   Date of surgery / procedure: 4/14/2025   Time of surgery / procedure: 7:15 am   Where do you plan to recover after surgery? at home with family     Fax number for surgical facility: Note does not need to be faxed, will be available electronically in Epic.    Assessment & Plan     The proposed surgical procedure is considered LOW risk.    Pre-op exam    - EKG 12-lead complete w/read - Clinics  - BASIC METABOLIC PANEL; Future  - CBC with platelets; Future    Benign essential hypertension    - BASIC METABOLIC PANEL; Future    Hyperlipidemia LDL goal <100      Left inguinal hernia    - BASIC METABOLIC PANEL; Future  - CBC with platelets; Future            - No identified additional risk factors other than previously addressed         Recommendation  Approval given to proceed with proposed procedure, without further diagnostic evaluation.    Mary Ann Pires is a 75 year old, presenting for the following:  Pre-Op Exam          4/9/2025    10:37 AM   Additional Questions   Roomed by Nadeem           4/4/2025   Pre-Op Questionnaire   Have you ever had a heart attack or stroke? No   Have you ever had surgery on your heart or blood vessels, such as a stent placement, a coronary artery bypass, or surgery on an artery in your head, neck, heart, or legs? No   Do you have chest pain with activity? No   Do you have a history of heart failure? No   Do you currently have a cold, bronchitis or symptoms of other infection? (!) YES    Do you have a cough, shortness of  breath, or wheezing? (!) YES    Do you or anyone in your family have previous history of blood clots? (!) UNKNOWN    Do you or does anyone in your family have a serious bleeding problem such as prolonged bleeding following surgeries or cuts? No   Have you ever had problems with anemia or been told to take iron pills? No   Have you had any abnormal blood loss such as black, tarry or bloody stools? No   Have you ever had a blood transfusion? No   Are you willing to have a blood transfusion if it is medically needed before, during, or after your surgery? Yes   Have you or any of your relatives ever had problems with anesthesia? No   Do you have sleep apnea, excessive snoring or daytime drowsiness? (!) YES   Do you have a CPAP machine? Yes   Do you have any artifical heart valves or other implanted medical devices like a pacemaker, defibrillator, or continuous glucose monitor? No   Do you have artificial joints? No   Are you allergic to latex? No     Health Care Directive  Patient has a Health Care Directive on file      Preoperative Review of    reviewed - no record of controlled substances prescribed.      Status of Chronic Conditions:  See problem list for active medical problems.  Problems all longstanding and stable, except as noted/documented.  See ROS for pertinent symptoms related to these conditions.    Patient Active Problem List    Diagnosis Date Noted    Chronic left-sided low back pain without sciatica 11/07/2022     Priority: Medium    Marital conflict 10/03/2022     Priority: Medium    Cervical pain 07/08/2021     Priority: Medium    Dizziness 07/08/2021     Priority: Medium    Somatic dysfunction of sacral region 11/13/2019     Priority: Medium    Cervical segment dysfunction 10/23/2019     Priority: Medium    Thoracic segment dysfunction 10/23/2019     Priority: Medium    Cephalgia 10/23/2019     Priority: Medium    Myofascial pain syndrome, cervical 09/04/2019     Priority: Medium    Occipital  neuralgia of right side 09/04/2019     Priority: Medium    Fatigue due to old head injury 09/04/2019     Priority: Medium    Post concussion syndrome 09/04/2019     Priority: Medium    BPH with obstruction/lower urinary tract symptoms 08/20/2019     Priority: Medium    Concussion with loss of consciousness of 30 minutes or less, subsequent encounter 07/29/2019     Priority: Medium    Elevated prostate specific antigen (PSA) 03/19/2019     Priority: Medium    Vasovagal syncope 03/04/2019     Priority: Medium    Gastroesophageal reflux disease with esophagitis 02/14/2019     Priority: Medium    Localized swelling of lower extremity 02/14/2019     Priority: Medium    Benign essential hypertension 01/30/2019     Priority: Medium    Hyperlipidemia LDL goal <100 01/30/2019     Priority: Medium     CT Coronary Calcium Scan:    Left main coronary artery: 0  Left anterior descending coronary artery: 81.83  Circumflex coronary artery: 8.49  Right coronary artery: 22.87      Obstructive sleep apnea syndrome 01/30/2019     Priority: Medium    History of basal cell carcinoma 10/15/2018     Priority: Medium      Past Medical History:   Diagnosis Date    Basal cell carcinoma     Concussion with loss of consciousness of 30 minutes or less, subsequent encounter     Congestive heart failure (H)     Epididymitis, bilateral     Epididymitis, left     Epididymitis, right     Obstructive sleep apnea syndrome     Squamous cell carcinoma of skin, unspecified     Vasovagal syncope      Past Surgical History:   Procedure Laterality Date    COLONOSCOPY  5/2021    Next will be 5/24    COLONOSCOPY N/A 6/27/2024    Procedure: Colonoscopy;  Surgeon: Jaydon Parks MD;  Location:  GI    ESOPHAGOSCOPY, GASTROSCOPY, DUODENOSCOPY (EGD), COMBINED N/A 6/27/2024    Procedure: Esophagoscopy, gastroscopy, duodenoscopy (EGD), combined;  Surgeon: Jaydon Parks MD;  Location:  GI    TESTICLE SURGERY      VASECTOMY       Current  Outpatient Medications   Medication Sig Dispense Refill    amoxicillin-clavulanate (AUGMENTIN) 875-125 MG tablet Take 1 tablet by mouth 2 times daily for 7 days. 14 tablet 0    aspirin 81 MG tablet Take by mouth daily      azelaic acid (FINACIA) 15 % external gel Apply to the face twice daily. 50 g 4    clotrimazole (LOTRIMIN) 1 % external cream Apply topically 2 times daily as needed.      COMPOUNDED NON-CONTROLLED SUBSTANCE (CMPD RX) - PHARMACY TO MIX COMPOUNDED MEDICATION Apply to forehead, temples, cheeks, and nose twice daily for 5-7 days, wash hands after application. Avoid sun. 30 g 0    diclofenac (VOLTAREN) 1 % topical gel Apply topically 4 times daily      fluocinonide (LIDEX) 0.05 % external cream Apply topically 2 times daily 120 g 6    gabapentin (NEURONTIN) 100 MG capsule Take 100 mg by mouth. Take 1 time daily at HS      imiquimod (ALDARA) 5 % external cream Apply topically to warts every morning. 12 packet 3    multivitamin w/minerals (THERA-VIT-M) tablet Take 1 tablet by mouth daily      Omega-3 Fatty Acids (OMEGA-3 FISH OIL PO)       pantoprazole (PROTONIX) 40 MG EC tablet Take 1 tablet (40 mg) by mouth daily. 90 tablet 3    rosuvastatin (CRESTOR) 20 MG tablet TAKE 1 TABLET BY MOUTH EVERY DAY 90 tablet 1    triamterene-HCTZ (MAXZIDE-25) 37.5-25 MG tablet TAKE 1/2 TABLET BY MOUTH EVERY MORNING 45 tablet 0       Allergies   Allergen Reactions    [place] Plus Allergy [Diphenhydramine]      Patient has lip swollen ,has used tylenol in th past. No issues. Has used benadryl in the past no issues.  Never used phenylephrine.        Social History     Tobacco Use    Smoking status: Former     Types: Cigarettes     Passive exposure: Past    Smokeless tobacco: Never    Tobacco comments:     Smoked cigarettes in undergrad college  socially. Not often.   Substance Use Topics    Alcohol use: Yes     Comment: Very infrequent. Wine usually     Family History   Problem Relation Age of Onset    Skin Cancer  "Mother          from pneumonia    Abdominal Aortic Aneurysm Mother     Heart Failure Father     Diabetes Type 1 Brother         Possibly secondary to chemical exposure in [place]    Diabetes Type 2  Maternal Grandmother     Cancer Brother         Exposure to Agent Orange in [place]    Heart Disease Brother      History   Drug Use No             Review of Systems  Constitutional, HEENT, cardiovascular, pulmonary, GI, , musculoskeletal, neuro, skin, endocrine and psych systems are negative, except as otherwise noted.    Objective    /76 (BP Location: Left arm, Patient Position: Sitting, Cuff Size: Adult Large)   Pulse 63   Temp 97.7  F (36.5  C) (Temporal)   Resp 18   Ht 1.79 m (5' 10.47\")   Wt 104.7 kg (230 lb 14.4 oz)   SpO2 98%   BMI 32.69 kg/m     Estimated body mass index is 32.69 kg/m  as calculated from the following:    Height as of this encounter: 1.79 m (5' 10.47\").    Weight as of this encounter: 104.7 kg (230 lb 14.4 oz).  Physical Exam  GENERAL: alert and no distress  EYES: Eyes grossly normal to inspection, PERRL and conjunctivae and sclerae normal  HENT: ear canals and TM's normal, nose and mouth without ulcers or lesions  NECK: no adenopathy, no asymmetry, masses, or scars  RESP: lungs clear to auscultation - no rales, rhonchi or wheezes  CV: regular rate and rhythm, normal S1 S2, no S3 or S4, no murmur, click or rub, no peripheral edema  ABDOMEN: soft, nontender, no hepatosplenomegaly, no masses and bowel sounds normal  MS: no gross musculoskeletal defects noted, no edema  SKIN: no suspicious lesions or rashes  NEURO: Normal strength and tone, mentation intact and speech normal  BACK: no CVA tenderness, no paralumbar tenderness  PSYCH: mentation appears normal, affect normal/bright  LYMPH: no cervical, supraclavicular, axillary, or inguinal adenopathy    Recent Labs   Lab Test 24  0936   HGB 14.3         POTASSIUM 4.5   CR 0.95   A1C 5.6        Diagnostics  Recent " Results (from the past week)   BASIC METABOLIC PANEL    Collection Time: 04/09/25 11:28 AM   Result Value Ref Range    Sodium 134 (L) 135 - 145 mmol/L    Potassium 4.1 3.4 - 5.3 mmol/L    Chloride 98 98 - 107 mmol/L    Carbon Dioxide (CO2) 25 22 - 29 mmol/L    Anion Gap 11 7 - 15 mmol/L    Urea Nitrogen 12.1 8.0 - 23.0 mg/dL    Creatinine 0.89 0.67 - 1.17 mg/dL    GFR Estimate 89 >60 mL/min/1.73m2    Calcium 9.3 8.8 - 10.4 mg/dL    Glucose 77 70 - 99 mg/dL   CBC with platelets    Collection Time: 04/09/25 11:28 AM   Result Value Ref Range    WBC Count 13.8 (H) 4.0 - 11.0 10e3/uL    RBC Count 4.77 4.40 - 5.90 10e6/uL    Hemoglobin 14.5 13.3 - 17.7 g/dL    Hematocrit 41.4 40.0 - 53.0 %    MCV 87 78 - 100 fL    MCH 30.4 26.5 - 33.0 pg    MCHC 35.0 31.5 - 36.5 g/dL    RDW 13.0 10.0 - 15.0 %    Platelet Count 248 150 - 450 10e3/uL      EKG: appears normal, NSR, normal axis, normal intervals, no acute ST/T changes c/w ischemia, no LVH by voltage criteria    Revised Cardiac Risk Index (RCRI)  The patient has the following serious cardiovascular risks for perioperative complications:   - No serious cardiac risks = 0 points     RCRI Interpretation: 0 points: Class I (very low risk - 0.4% complication rate)         Signed Electronically by: Hipolito Alva MD  A copy of this evaluation report is provided to the requesting physician.

## 2025-04-14 ENCOUNTER — HOSPITAL ENCOUNTER (OUTPATIENT)
Facility: CLINIC | Age: 75
Discharge: HOME | End: 2025-04-14
Attending: SURGERY | Admitting: SURGERY
Payer: COMMERCIAL

## 2025-04-14 ENCOUNTER — ANESTHESIA EVENT (OUTPATIENT)
Dept: SURGERY | Facility: CLINIC | Age: 75
End: 2025-04-14
Payer: COMMERCIAL

## 2025-04-14 ENCOUNTER — ANESTHESIA (OUTPATIENT)
Dept: SURGERY | Facility: CLINIC | Age: 75
End: 2025-04-14
Payer: COMMERCIAL

## 2025-04-14 VITALS
TEMPERATURE: 97.1 F | HEART RATE: 54 BPM | RESPIRATION RATE: 16 BRPM | WEIGHT: 230.8 LBS | BODY MASS INDEX: 32.31 KG/M2 | SYSTOLIC BLOOD PRESSURE: 114 MMHG | HEIGHT: 71 IN | DIASTOLIC BLOOD PRESSURE: 72 MMHG | OXYGEN SATURATION: 96 %

## 2025-04-14 DIAGNOSIS — G89.18 ACUTE POST-OPERATIVE PAIN: Primary | ICD-10-CM

## 2025-04-14 PROCEDURE — 250N000011 HC RX IP 250 OP 636: Performed by: NURSE ANESTHETIST, CERTIFIED REGISTERED

## 2025-04-14 PROCEDURE — 258N000003 HC RX IP 258 OP 636: Performed by: NURSE ANESTHETIST, CERTIFIED REGISTERED

## 2025-04-14 PROCEDURE — 360N000080 HC SURGERY LEVEL 7, PER MIN: Performed by: SURGERY

## 2025-04-14 PROCEDURE — 710N000009 HC RECOVERY PHASE 1, LEVEL 1, PER MIN: Performed by: SURGERY

## 2025-04-14 PROCEDURE — 250N000013 HC RX MED GY IP 250 OP 250 PS 637: Performed by: PHYSICIAN ASSISTANT

## 2025-04-14 PROCEDURE — 999N000141 HC STATISTIC PRE-PROCEDURE NURSING ASSESSMENT: Performed by: SURGERY

## 2025-04-14 PROCEDURE — 250N000025 HC SEVOFLURANE, PER MIN: Performed by: SURGERY

## 2025-04-14 PROCEDURE — 250N000011 HC RX IP 250 OP 636: Mod: JZ | Performed by: ANESTHESIOLOGY

## 2025-04-14 PROCEDURE — 49650 LAP ING HERNIA REPAIR INIT: CPT | Mod: AS | Performed by: PHYSICIAN ASSISTANT

## 2025-04-14 PROCEDURE — 250N000011 HC RX IP 250 OP 636: Performed by: SURGERY

## 2025-04-14 PROCEDURE — 49650 LAP ING HERNIA REPAIR INIT: CPT | Mod: LT | Performed by: SURGERY

## 2025-04-14 PROCEDURE — 258N000003 HC RX IP 258 OP 636: Performed by: ANESTHESIOLOGY

## 2025-04-14 PROCEDURE — 272N000001 HC OR GENERAL SUPPLY STERILE: Performed by: SURGERY

## 2025-04-14 PROCEDURE — C1781 MESH (IMPLANTABLE): HCPCS | Performed by: SURGERY

## 2025-04-14 PROCEDURE — 370N000017 HC ANESTHESIA TECHNICAL FEE, PER MIN: Performed by: SURGERY

## 2025-04-14 PROCEDURE — S2900 ROBOTIC SURGICAL SYSTEM: HCPCS | Performed by: SURGERY

## 2025-04-14 PROCEDURE — 250N000009 HC RX 250: Performed by: SURGERY

## 2025-04-14 PROCEDURE — 250N000009 HC RX 250: Performed by: NURSE ANESTHETIST, CERTIFIED REGISTERED

## 2025-04-14 PROCEDURE — 710N000012 HC RECOVERY PHASE 2, PER MINUTE: Performed by: SURGERY

## 2025-04-14 DEVICE — LAPAROSCOPIC SELF-FIXATING MESH POLYESTER WITH POLYLACTIC ACID GRIPS AND COLLAGEN FILM
Type: IMPLANTABLE DEVICE | Site: GROIN | Status: FUNCTIONAL
Brand: PROGRIP

## 2025-04-14 RX ORDER — OXYCODONE HYDROCHLORIDE 5 MG/1
5 TABLET ORAL
Status: COMPLETED | OUTPATIENT
Start: 2025-04-14 | End: 2025-04-14

## 2025-04-14 RX ORDER — LIDOCAINE HYDROCHLORIDE 20 MG/ML
INJECTION, SOLUTION INFILTRATION; PERINEURAL PRN
Status: DISCONTINUED | OUTPATIENT
Start: 2025-04-14 | End: 2025-04-14

## 2025-04-14 RX ORDER — HYDROMORPHONE HCL IN WATER/PF 6 MG/30 ML
0.4 PATIENT CONTROLLED ANALGESIA SYRINGE INTRAVENOUS EVERY 5 MIN PRN
Status: DISCONTINUED | OUTPATIENT
Start: 2025-04-14 | End: 2025-04-14 | Stop reason: HOSPADM

## 2025-04-14 RX ORDER — CEFAZOLIN SODIUM/WATER 2 G/20 ML
2 SYRINGE (ML) INTRAVENOUS SEE ADMIN INSTRUCTIONS
Status: DISCONTINUED | OUTPATIENT
Start: 2025-04-14 | End: 2025-04-14 | Stop reason: HOSPADM

## 2025-04-14 RX ORDER — AMOXICILLIN 250 MG
1 CAPSULE ORAL 2 TIMES DAILY
Qty: 15 TABLET | Refills: 0 | Status: SHIPPED | OUTPATIENT
Start: 2025-04-14

## 2025-04-14 RX ORDER — HYDROMORPHONE HYDROCHLORIDE 1 MG/ML
INJECTION, SOLUTION INTRAMUSCULAR; INTRAVENOUS; SUBCUTANEOUS PRN
Status: DISCONTINUED | OUTPATIENT
Start: 2025-04-14 | End: 2025-04-14

## 2025-04-14 RX ORDER — SODIUM CHLORIDE, SODIUM LACTATE, POTASSIUM CHLORIDE, CALCIUM CHLORIDE 600; 310; 30; 20 MG/100ML; MG/100ML; MG/100ML; MG/100ML
INJECTION, SOLUTION INTRAVENOUS CONTINUOUS PRN
Status: DISCONTINUED | OUTPATIENT
Start: 2025-04-14 | End: 2025-04-14

## 2025-04-14 RX ORDER — DEXAMETHASONE SODIUM PHOSPHATE 4 MG/ML
INJECTION, SOLUTION INTRA-ARTICULAR; INTRALESIONAL; INTRAMUSCULAR; INTRAVENOUS; SOFT TISSUE PRN
Status: DISCONTINUED | OUTPATIENT
Start: 2025-04-14 | End: 2025-04-14

## 2025-04-14 RX ORDER — PROPOFOL 10 MG/ML
INJECTION, EMULSION INTRAVENOUS PRN
Status: DISCONTINUED | OUTPATIENT
Start: 2025-04-14 | End: 2025-04-14

## 2025-04-14 RX ORDER — DEXAMETHASONE SODIUM PHOSPHATE 4 MG/ML
4 INJECTION, SOLUTION INTRA-ARTICULAR; INTRALESIONAL; INTRAMUSCULAR; INTRAVENOUS; SOFT TISSUE
Status: DISCONTINUED | OUTPATIENT
Start: 2025-04-14 | End: 2025-04-14 | Stop reason: HOSPADM

## 2025-04-14 RX ORDER — ONDANSETRON 2 MG/ML
4 INJECTION INTRAMUSCULAR; INTRAVENOUS EVERY 30 MIN PRN
Status: DISCONTINUED | OUTPATIENT
Start: 2025-04-14 | End: 2025-04-14 | Stop reason: HOSPADM

## 2025-04-14 RX ORDER — MAGNESIUM HYDROXIDE 1200 MG/15ML
LIQUID ORAL PRN
Status: DISCONTINUED | OUTPATIENT
Start: 2025-04-14 | End: 2025-04-14 | Stop reason: HOSPADM

## 2025-04-14 RX ORDER — OXYCODONE HYDROCHLORIDE 5 MG/1
2.5-5 TABLET ORAL EVERY 4 HOURS PRN
Qty: 10 TABLET | Refills: 0 | Status: SHIPPED | OUTPATIENT
Start: 2025-04-14

## 2025-04-14 RX ORDER — BUPIVACAINE HYDROCHLORIDE AND EPINEPHRINE 5; 5 MG/ML; UG/ML
INJECTION, SOLUTION PERINEURAL PRN
Status: DISCONTINUED | OUTPATIENT
Start: 2025-04-14 | End: 2025-04-14 | Stop reason: HOSPADM

## 2025-04-14 RX ORDER — FENTANYL CITRATE 50 UG/ML
50 INJECTION, SOLUTION INTRAMUSCULAR; INTRAVENOUS EVERY 5 MIN PRN
Status: DISCONTINUED | OUTPATIENT
Start: 2025-04-14 | End: 2025-04-14 | Stop reason: HOSPADM

## 2025-04-14 RX ORDER — ONDANSETRON 4 MG/1
4 TABLET, ORALLY DISINTEGRATING ORAL EVERY 30 MIN PRN
Status: DISCONTINUED | OUTPATIENT
Start: 2025-04-14 | End: 2025-04-14 | Stop reason: HOSPADM

## 2025-04-14 RX ORDER — FENTANYL CITRATE 50 UG/ML
INJECTION, SOLUTION INTRAMUSCULAR; INTRAVENOUS PRN
Status: DISCONTINUED | OUTPATIENT
Start: 2025-04-14 | End: 2025-04-14

## 2025-04-14 RX ORDER — HYDROMORPHONE HCL IN WATER/PF 6 MG/30 ML
0.2 PATIENT CONTROLLED ANALGESIA SYRINGE INTRAVENOUS EVERY 5 MIN PRN
Status: DISCONTINUED | OUTPATIENT
Start: 2025-04-14 | End: 2025-04-14 | Stop reason: HOSPADM

## 2025-04-14 RX ORDER — FENTANYL CITRATE 50 UG/ML
25 INJECTION, SOLUTION INTRAMUSCULAR; INTRAVENOUS EVERY 5 MIN PRN
Status: DISCONTINUED | OUTPATIENT
Start: 2025-04-14 | End: 2025-04-14 | Stop reason: HOSPADM

## 2025-04-14 RX ORDER — SODIUM CHLORIDE, SODIUM LACTATE, POTASSIUM CHLORIDE, CALCIUM CHLORIDE 600; 310; 30; 20 MG/100ML; MG/100ML; MG/100ML; MG/100ML
INJECTION, SOLUTION INTRAVENOUS CONTINUOUS
Status: DISCONTINUED | OUTPATIENT
Start: 2025-04-14 | End: 2025-04-14 | Stop reason: HOSPADM

## 2025-04-14 RX ORDER — ONDANSETRON 2 MG/ML
INJECTION INTRAMUSCULAR; INTRAVENOUS PRN
Status: DISCONTINUED | OUTPATIENT
Start: 2025-04-14 | End: 2025-04-14

## 2025-04-14 RX ORDER — ONDANSETRON 4 MG/1
4 TABLET, ORALLY DISINTEGRATING ORAL EVERY 6 HOURS PRN
Qty: 6 TABLET | Refills: 0 | Status: SHIPPED | OUTPATIENT
Start: 2025-04-14

## 2025-04-14 RX ORDER — NALOXONE HYDROCHLORIDE 0.4 MG/ML
0.1 INJECTION, SOLUTION INTRAMUSCULAR; INTRAVENOUS; SUBCUTANEOUS
Status: DISCONTINUED | OUTPATIENT
Start: 2025-04-14 | End: 2025-04-14 | Stop reason: HOSPADM

## 2025-04-14 RX ORDER — CEFAZOLIN SODIUM/WATER 2 G/20 ML
2 SYRINGE (ML) INTRAVENOUS
Status: COMPLETED | OUTPATIENT
Start: 2025-04-14 | End: 2025-04-14

## 2025-04-14 RX ADMIN — FENTANYL CITRATE 50 MCG: 50 INJECTION, SOLUTION INTRAMUSCULAR; INTRAVENOUS at 11:29

## 2025-04-14 RX ADMIN — SODIUM CHLORIDE, SODIUM LACTATE, POTASSIUM CHLORIDE, AND CALCIUM CHLORIDE: .6; .31; .03; .02 INJECTION, SOLUTION INTRAVENOUS at 10:29

## 2025-04-14 RX ADMIN — ONDANSETRON 4 MG: 2 INJECTION INTRAMUSCULAR; INTRAVENOUS at 10:12

## 2025-04-14 RX ADMIN — LIDOCAINE HYDROCHLORIDE 100 MG: 20 INJECTION, SOLUTION INFILTRATION; PERINEURAL at 09:26

## 2025-04-14 RX ADMIN — ONDANSETRON 4 MG: 2 INJECTION, SOLUTION INTRAMUSCULAR; INTRAVENOUS at 10:59

## 2025-04-14 RX ADMIN — FENTANYL CITRATE 50 MCG: 50 INJECTION INTRAMUSCULAR; INTRAVENOUS at 09:53

## 2025-04-14 RX ADMIN — DEXMEDETOMIDINE HYDROCHLORIDE 8 MCG: 100 INJECTION, SOLUTION INTRAVENOUS at 10:38

## 2025-04-14 RX ADMIN — OXYCODONE HYDROCHLORIDE 5 MG: 5 TABLET ORAL at 11:40

## 2025-04-14 RX ADMIN — PROCHLORPERAZINE EDISYLATE 2.5 MG: 5 INJECTION INTRAMUSCULAR; INTRAVENOUS at 11:54

## 2025-04-14 RX ADMIN — Medication 2 G: at 09:22

## 2025-04-14 RX ADMIN — ROCURONIUM BROMIDE 10 MG: 50 INJECTION, SOLUTION INTRAVENOUS at 10:09

## 2025-04-14 RX ADMIN — ROCURONIUM BROMIDE 50 MG: 50 INJECTION, SOLUTION INTRAVENOUS at 09:27

## 2025-04-14 RX ADMIN — FENTANYL CITRATE 50 MCG: 50 INJECTION, SOLUTION INTRAMUSCULAR; INTRAVENOUS at 11:17

## 2025-04-14 RX ADMIN — FENTANYL CITRATE 50 MCG: 50 INJECTION INTRAMUSCULAR; INTRAVENOUS at 09:26

## 2025-04-14 RX ADMIN — DEXMEDETOMIDINE HYDROCHLORIDE 12 MCG: 100 INJECTION, SOLUTION INTRAVENOUS at 10:36

## 2025-04-14 RX ADMIN — SODIUM CHLORIDE, SODIUM LACTATE, POTASSIUM CHLORIDE, AND CALCIUM CHLORIDE: .6; .31; .03; .02 INJECTION, SOLUTION INTRAVENOUS at 08:32

## 2025-04-14 RX ADMIN — SODIUM CHLORIDE, SODIUM LACTATE, POTASSIUM CHLORIDE, AND CALCIUM CHLORIDE: .6; .31; .03; .02 INJECTION, SOLUTION INTRAVENOUS at 12:19

## 2025-04-14 RX ADMIN — PROPOFOL 200 MG: 10 INJECTION, EMULSION INTRAVENOUS at 09:26

## 2025-04-14 RX ADMIN — DEXAMETHASONE SODIUM PHOSPHATE 4 MG: 4 INJECTION, SOLUTION INTRA-ARTICULAR; INTRALESIONAL; INTRAMUSCULAR; INTRAVENOUS; SOFT TISSUE at 09:37

## 2025-04-14 RX ADMIN — Medication 400 MG: at 10:33

## 2025-04-14 RX ADMIN — HYDROMORPHONE HYDROCHLORIDE 0.5 MG: 1 INJECTION, SOLUTION INTRAMUSCULAR; INTRAVENOUS; SUBCUTANEOUS at 10:05

## 2025-04-14 ASSESSMENT — ACTIVITIES OF DAILY LIVING (ADL)
ADLS_ACUITY_SCORE: 41
ADLS_ACUITY_SCORE: 43
ADLS_ACUITY_SCORE: 41

## 2025-04-14 ASSESSMENT — LIFESTYLE VARIABLES: TOBACCO_USE: 1

## 2025-04-14 NOTE — PROGRESS NOTES
"I have reviewed the surgical (or preoperative) H&P that is linked to this encounter, and examined the patient. There are no significant changes    Clinical Conditions Present on Arrival:  Clinically Significant Risk Factors Present on Admission         # Hyponatremia: Lowest Na = 134 mmol/L in last 30 days, will monitor as appropriate          # Drug Induced Platelet Defect: home medication list includes an antiplatelet medication      # Obesity: Estimated body mass index is 32.19 kg/m  as calculated from the following:    Height as of this encounter: 1.803 m (5' 11\").    Weight as of this encounter: 104.7 kg (230 lb 12.8 oz).       "

## 2025-04-14 NOTE — DISCHARGE INSTRUCTIONS
**Because you had anesthesia today and your history of sleep apnea, it is extremely important that you use your CPAP machine for the next 24 hours while napping or sleeping.**    [facility]  Discharge Instructions: Post-Operative Inguinal Hernia Repair    ACTIVITY  Take frequent, short walks and increase your activity gradually.    Avoid strenuous physical activity or heavy lifting greater than 15 lbs. for 3-4 weeks.  You may climb stairs.  You may drive without restrictions when you are not using any prescription pain medication and feel comfortable in a car.  You may return to work/school when you are comfortable without any prescription pain medication.    WOUND CARE  You may remove your outer dressing or Band-Aids and shower 48 hours after the surgery.  Pat your incisions dry and leave them open to air.  Re-apply dressing (Band-Aids or gauze/tape) as needed for comfort or drainage.  You may have steri-strips (looks like white tape) on your incision.  You may peel off the steri-strips 2 weeks after your surgery if they have not peeled off on their own.  If you have skin glue, it will peel up and fall off on its own.  Do not soak your incisions in a tub or pool for 2 weeks.   Do not apply any lotions, creams, or ointments to your incisions.  A ridge under your incisions is normal and will gradually resolve.    DIET  Start with liquids, then gradually resume your regular diet as tolerated.   Drink plenty of fluids to stay hydrated.    PAIN  Expect some tenderness and discomfort at the incision site(s).  Use the prescribed pain medication at your discretion.  Expect gradual resolution of your pain over several days.  You may take ibuprofen with food (unless you have been told not to) or acetaminophen/Tylenol instead of or in addition to your prescribed pain medication.  However, if you are taking Norco or Percocet, do not take any additional acetaminophen/Tylenol.  Do not drink alcohol  or drive while you are taking pain medications.  You may apply ice to your incisions in 20 minute intervals as needed for the next 48 hours.  After that time, consider switching to heat if you prefer.    EXPECTATIONS  You may notice air in your scrotum and/or penis after the surgery.  This is due to the gas used during the surgery.  You can expect some swelling and bruising involving the scrotum and/or penis as well as numbness.  These symptoms are expected and should gradually resolve in the next few days.  You may use ice to help with the swelling and try placing a rolled hand towel below your scrotum to help alleviate scrotal discomfort.  If you develop significant testicular or penile pain, please call our office and speak with a nurse.  Pain medications can cause constipation.  Limit use when possible.  Take an over the counter or prescribed stool softener/stimulant, such as Colace or Senna, 1-2 times a day with plenty of water.  You may take a mild over the counter laxative, such as Miralax or a suppository, as needed.  You may take 1 oz. (2 tablespoons) Milk of Magnesia the evening following surgery to encourage bowel movement.  You may discontinue these medications once you are having regular bowel movements and/or are no longer taking your narcotic pain medication.   You may have shoulder or upper back discomfort due to the gas used in surgery.  This is temporary and should resolve in 48-72 hours.  Short, frequent walks may help with this.  If you are unable to urinate for 8 hours or feel as though you are not emptying your bladder adequately, we recommend you seek care at an ER or Urgent Care facility for possible catheter placement.     FOLLOW UP  Our office will contact you in approximately 2-3 weeks to check on your progress and answer any questions you may have.  If you are doing well, you will not need to return for a follow up appointment.  If any concerns are identified over the phone, we will help  you make an appointment to see a provider.   If you have not received a phone call, have any questions or concerns, or would like to be seen, please call us at [phone] and ask to speak with our nurse.  We are located at [address] [place] [address] [address] [place] [address].    CALL OUR OFFICE -[phone] IF YOU HAVE:   Chills or fever above 101 F.  Increased redness, warmth, or drainage at your incisions.  Significant bleeding.  Pain not relieved by your pain medication or rest.  Increasing pain after the first 48 hours.  Any other concerns or questions.             Revised October 2022  Same Day Surgery Discharge Instructions for  Sedation and General Anesthesia     It's not unusual to feel dizzy, light-headed or faint for up to 24 hours after surgery or while taking pain medication.  If you have these symptoms: sit for a few minutes before standing and have someone assist you when you get up to walk or use the bathroom.    You should rest and relax for the next 24 hours. We recommend you make arrangements to have an adult stay with you for at least 24 hours after your discharge.  Avoid hazardous and strenuous activity.    DO NOT DRIVE any vehicle or operate mechanical equipment for 24 hours following the end of your surgery.  Even though you may feel normal, your reactions may be affected by the medication you have received.    Do not drink alcoholic beverages for 24 hours following surgery.     Slowly progress to your regular diet as you feel able. It's not unusual to feel nauseated and/or vomit after receiving anesthesia.  If you develop these symptoms, drink clear liquids (apple juice, ginger ale, broth, 7-up, etc. ) until you feel better.  If your nausea and vomiting persists for 24 hours, please notify your surgeon.      All narcotic pain medications, along with inactivity and anesthesia, can cause constipation. Drinking plenty of liquids and increasing fiber intake will help.    For any questions of a  medical nature, call your surgeon.    Do not make important decisions for 24 hours.    If you had general anesthesia, you may have a sore throat for a couple of days related to the breathing tube used during surgery.  You may use Cepacol lozenges to help with this discomfort.  If it worsens or if you develop a fever, contact your surgeon.     If you feel your pain is not well managed with the pain medications prescribed by your surgeon, please contact your surgeon's office to let them know so they can address your concerns.      **If you have questions or concerns about your procedure,  call Dr. Rubio at [phone]**

## 2025-04-14 NOTE — ANESTHESIA POSTPROCEDURE EVALUATION
Patient: Pranay Ratliff    Procedure: Procedure(s):  Robotic assisted laparoscopic left inguinal hernia repair       Anesthesia Type:  General    Note:     Postop Pain Control: Uneventful            Sign Out: Well controlled pain   PONV: No   Neuro/Psych: Uneventful            Sign Out: Acceptable/Baseline neuro status   Airway/Respiratory: Uneventful            Sign Out: Acceptable/Baseline resp. status   CV/Hemodynamics: Uneventful            Sign Out: Acceptable CV status; No obvious hypovolemia; No obvious fluid overload   Other NRE: NONE   DID A NON-ROUTINE EVENT OCCUR? No           Last vitals:  Vitals Value Taken Time   /66 04/14/25 1045   Temp     Pulse 72 04/14/25 1046   Resp 15 04/14/25 1046   SpO2 98 % 04/14/25 1046   Vitals shown include unfiled device data.    Electronically Signed By: Jose Frankel MD  April 14, 2025  10:47 AM

## 2025-04-14 NOTE — ANESTHESIA PROCEDURE NOTES
Airway       Patient location during procedure: OR       Procedure Start/Stop Times: 4/14/2025 9:29 AM  Staff -        Performed By: anesthesiologist and CRNA  Consent for Airway        Urgency: elective  Indications and Patient Condition       Indications for airway management: kaykay-procedural       Induction type:intravenous       Mask difficulty assessment: 2 - vent by mask + OA or adjuvant +/- NMBA    Final Airway Details       Final airway type: endotracheal airway       Successful airway: ETT - single  Endotracheal Airway Details        ETT size (mm): 8.0       Cuffed: yes       Successful intubation technique: video laryngoscopy       VL Blade Size: Glidescope 4       Grade View of Cords: 1       Adjucts: stylet       Position: Right       Measured from: gums/teeth       Secured at (cm): 22       Bite block used: None    Post intubation assessment        Placement verified by: capnometry, equal breath sounds and chest rise        Number of attempts at approach: 1       Number of other approaches attempted: 0       Secured with: tape       Ease of procedure: easy       Dentition: Intact and Unchanged (small chips noted in both front teeth prior to induction)    Medication(s) Administered   Medication Administration Time: 4/14/2025 9:29 AM

## 2025-04-14 NOTE — OR NURSING
Pt dressed, up in recliner and transported to Phase 2. Hemalatha DAVIDSON paged re: prescription for Zofran for home.

## 2025-04-14 NOTE — ANESTHESIA CARE TRANSFER NOTE
Patient: Pranay Ratliff    Procedure: Procedure(s):  Robotic assisted laparoscopic left inguinal hernia repair       Diagnosis: Left inguinal hernia [K40.90]  Diagnosis Additional Information: No value filed.    Anesthesia Type:   General     Note:    Oropharynx: oropharynx clear of all foreign objects and spontaneously breathing  Level of Consciousness: awake  Oxygen Supplementation: face mask  Level of Supplemental Oxygen (L/min / FiO2): 6  Independent Airway: airway patency satisfactory and stable  Dentition: dentition unchanged  Vital Signs Stable: post-procedure vital signs reviewed and stable  Report to RN Given: handoff report given  Patient transferred to: PACU  Comments: Pt to PACU with O2 via mask, airway patent, VSS. Report to RN.  Handoff Report: Identifed the Patient, Identified the Reponsible Provider, Reviewed the pertinent medical history, Discussed the surgical course, Reviewed Intra-OP anesthesia mangement and issues during anesthesia, Set expectations for post-procedure period and Allowed opportunity for questions and acknowledgement of understanding  Vitals:  Vitals Value Taken Time   BP     Temp     Pulse 71 04/14/25 1044   Resp 17 04/14/25 1044   SpO2 99 % 04/14/25 1044   Vitals shown include unfiled device data.    Electronically Signed By: JUAN Orta CRNA  April 14, 2025  10:45 AM

## 2025-04-14 NOTE — PROGRESS NOTES
Meets criteria for discharge.  Discharge instructions reviewed with pt and pt's designated responsible party.  Pt label on prescription bag from pharmacy matched to pt's wristband. Pharmacy bag opened with 2 prescriptions inside. Medications were reviewed to match pt wristband while pt and significant other agreed with identification. Prescriptions placed back in pharmacy bag resealed with tape and given to Chris per pt request.

## 2025-04-14 NOTE — PROGRESS NOTES
Meets criteria for discharge.  Discharge instructions reviewed with pt and pt's designated responsible party.  Pt label on prescription bag from pharmacy matched to pt's wristband. Pharmacy bag opened with 1 prescriptions inside. Medications were reviewed to match pt wristband while pt and significant other agreed with identification. Prescriptions placed back in pharmacy bag resealed with tape and given to Chris with other medications per pt request.

## 2025-04-14 NOTE — OP NOTE
Preoperative diagnosis: left Inguinal hernia    Postoperative diagnosis: left Inguinal hernia    Procedure: Robotic assisted laparoscopic transabdominal preperitoneal left inguinal hernia repair with mesh    Surgeon: Iván Rubio MD    Assistant: Hemalatha De Souza PA-C, Physician assistant first assistant was necessary during the performance of this procedure for expertise in patient positioning, prepping, draping, trocar placement, camera management, retraction and exposure, and suctioning.    Anesthesia: General endotracheal    Estimated blood loss: 10cc        Indication for procedure: This is a 74 yo male who presented to my office with symptomatic inguinal hernia.  We had an exhaustive discussion regarding therapeutic options.  It was ultimately elected to proceed with robotic assisted laparoscopic repair.  The potential risks of bleeding, infection, neurovascular injury to the vas deferens or testicle, bowel or bladder injury, recurrent hernia, chronic pain were all reviewed in great detail.  The patient's questions were thoroughly answered.  Informed consent was provided.    Procedure: After informed consent was obtained the patient was taken to the operating room and placed supine in the operating room table.  Following the induction of adequate general endotracheal anesthesia, the patient's abdomen was shaved, prepped and draped in usual fashion.  A surgeon initiated timeout was then completed.  Appropriate IV antibiotics were administered for surgical prophylaxis.  Local anesthetic was then injected at the site above the umbilicus at the midline.  A skin incision was made at this location and the Veress needle was passed into an intra-abdominal position.  The intra-abdominal position of the needle was confirmed using the saline drop test.  A carbon dioxide pneumoperitoneum was then established.  After adequate insufflation the insufflation needle was removed and replaced with an 8 mm robotic port.  Under  direct vision after the infiltration of local anesthetic 2 additional 8 mm ports were placed at the level of the first port in the right and left abdomen.  The robot was then uneventfully docked.  I assumed control of the surgeon console.  Starting on the left side the peritoneum was grasped well above the inguinal canal and incised. I then began dissecting into the preperitoneal plane.  This dissection was carried down into the inguinal canal.  Peritoneum was mobilized well down below Christian's ligament as well as laterally to expose the transverse abdominis.  The spermatic cord and its contents were skeletonized. Indirect Hernia was present and reduced.  I then elected to repair this utilizing a 10 x 15 cm piece of pro- mesh.  This mesh was introduced into the abdominal cavity and placed appropriately within the inguinal canal.  With the mesh in good position the peritoneum was sutured closed using 2.0 strata fix suture.  The robot was then undocked.  The trochars were opened and the carbon dioxide was massaged from the abdomen.  Trochars were subsequently removed and the skin incisions were closed with subcuticular 4-0 Vicryl sutures.  Sponge, needle and instrument counts were correct.  Patient tolerated this well.  He was awakened in the operating room, extubated and taken to the recovery room in stable condition.    Iván Rubio MD

## 2025-04-15 NOTE — PROGRESS NOTES
"[facility]  Surgery Consultation    CONSULTATION REQUESTED BY:  Hipolito Alva [phone]    HPI: Patient is a 75-year-old gentleman referred by the above provider for consultation regarding left inguinal hernia.  He reports that he has noted this for a couple of weeks.  Feels that this was related back to an episode of lifting a heavy television.  He has had pain in the left groin which also radiates into his back.  No signs or symptoms of incarceration or strangulation.  No GI or bowel obstruction symptoms.  Was evaluated by primary care and felt to have left inguinal hernia.    PMH:   has a past medical history of Basal cell carcinoma, Concussion with loss of consciousness of 30 minutes or less, subsequent encounter, Congestive heart failure (H), Epididymitis, bilateral, Epididymitis, left, Epididymitis, right, Obstructive sleep apnea syndrome, Squamous cell carcinoma of skin, unspecified, and Vasovagal syncope.  PSH:    has a past surgical history that includes Vasectomy; Testicle surgery; colonoscopy (5/2021); Colonoscopy (N/A, 6/27/2024); and Esophagoscopy, gastroscopy, duodenoscopy (EGD), combined (N/A, 6/27/2024).  Social History:   reports that he has quit smoking. His smoking use included cigarettes. He has been exposed to tobacco smoke. He has never used smokeless tobacco. He reports current alcohol use. He reports that he does not use drugs.  Family History:  family history includes Abdominal Aortic Aneurysm in his mother; Cancer in his brother; Diabetes Type 1 in his brother; Diabetes Type 2  in his maternal grandmother; Heart Disease in his brother; Heart Failure in his father; Skin Cancer in his mother.  Medications/Allergies: Home medications and allergies reviewed.    ROS:  The 10 point Review of Systems is negative other than noted in the HPI.    Physical Exam:  BP (!) 142/68   Pulse 79   Ht 1.816 m (5' 11.5\")   Wt 102.1 kg (225 lb)   SpO2 98%   BMI 30.94 kg/m    GENERAL: " Generally appears well.  Psych: Alert and Oriented.  Normal affect  Eyes: Sclera clear  Respiratory:  Lungs clear to ausculation bilaterally with good air excursion  Cardiovascular:  Regular Rate and Rhythm with no murmurs gallops or rubs, normal peripheral pulses  GI: Abdomen Non Distended Soft Non-Tender  No hernias palpated..  Groin- I examined the patient in both the standing and supine positions. Right Groin- No hernia Palpated. Left Groin- Small inguinal hernia.  Hernia was easily reduciable. No scrotal or testicle abnormalities.  Lymphatic/Hematologic/Immune:  No femoral or cervical lymphadenopathy.  Integumentary:  No rashes  Neurological: grossly intact     All new lab and imaging data was reviewed.     Impression and Plan:  Patient is a 75 year old male with left inguinal hernia    PLAN: Recommend robotic assisted laparoscopic repair at his convenience  I discussed the pathophysiology of hernias and options for repair including laparoscopic VS open.  The risks associated with the procedure including, but not limited to, recurrence, nerve entrapment or injury, persistence of pain, injury to the bowel/bladder, infertility, hematoma, mesh migration, mesh infection, MI, and PE were discussed with the patient. He indicated understanding of the discussion, asked appropriate questions, and provided consent. Signs and symptoms of incarceration were discussed. If these develop in the interim, he promises to call or go straight to the ER. I have provided the patient with an information pamphlet.    Thank you very much for this consult.    Iván Rubio M.D.  [facility]  [phone]    Please route or send letter to:  Primary Care Provider (PCP) and Referring Provider

## 2025-04-24 SDOH — HEALTH STABILITY: PHYSICAL HEALTH: ON AVERAGE, HOW MANY MINUTES DO YOU ENGAGE IN EXERCISE AT THIS LEVEL?: 90 MIN

## 2025-04-24 SDOH — HEALTH STABILITY: PHYSICAL HEALTH: ON AVERAGE, HOW MANY DAYS PER WEEK DO YOU ENGAGE IN MODERATE TO STRENUOUS EXERCISE (LIKE A BRISK WALK)?: 7 DAYS

## 2025-04-24 ASSESSMENT — SOCIAL DETERMINANTS OF HEALTH (SDOH): HOW OFTEN DO YOU GET TOGETHER WITH FRIENDS OR RELATIVES?: ONCE A WEEK

## 2025-04-29 ENCOUNTER — OFFICE VISIT (OUTPATIENT)
Dept: FAMILY MEDICINE | Facility: CLINIC | Age: 75
End: 2025-04-29
Attending: FAMILY MEDICINE
Payer: COMMERCIAL

## 2025-04-29 VITALS
BODY MASS INDEX: 32.93 KG/M2 | HEIGHT: 70 IN | SYSTOLIC BLOOD PRESSURE: 122 MMHG | WEIGHT: 230 LBS | HEART RATE: 67 BPM | OXYGEN SATURATION: 99 % | TEMPERATURE: 97.2 F | DIASTOLIC BLOOD PRESSURE: 70 MMHG | RESPIRATION RATE: 12 BRPM

## 2025-04-29 DIAGNOSIS — Z13.6 SCREENING FOR CARDIOVASCULAR CONDITION: ICD-10-CM

## 2025-04-29 DIAGNOSIS — Z00.00 HEALTH MAINTENANCE EXAMINATION: Primary | ICD-10-CM

## 2025-04-29 DIAGNOSIS — Z12.5 SCREENING FOR PROSTATE CANCER: ICD-10-CM

## 2025-04-29 DIAGNOSIS — E78.5 HYPERLIPIDEMIA LDL GOAL <100: ICD-10-CM

## 2025-04-29 DIAGNOSIS — R97.20 ELEVATED PROSTATE SPECIFIC ANTIGEN (PSA): ICD-10-CM

## 2025-04-29 DIAGNOSIS — G89.18 ACUTE POST-OPERATIVE PAIN: ICD-10-CM

## 2025-04-29 LAB
ALBUMIN SERPL BCG-MCNC: 4.2 G/DL (ref 3.5–5.2)
ALP SERPL-CCNC: 83 U/L (ref 40–150)
ALT SERPL W P-5'-P-CCNC: 23 U/L (ref 0–70)
ANION GAP SERPL CALCULATED.3IONS-SCNC: 10 MMOL/L (ref 7–15)
AST SERPL W P-5'-P-CCNC: 26 U/L (ref 0–45)
BILIRUB SERPL-MCNC: 0.5 MG/DL
BUN SERPL-MCNC: 13.3 MG/DL (ref 8–23)
CALCIUM SERPL-MCNC: 9.4 MG/DL (ref 8.8–10.4)
CHLORIDE SERPL-SCNC: 103 MMOL/L (ref 98–107)
CHOLEST SERPL-MCNC: 177 MG/DL
CREAT SERPL-MCNC: 0.92 MG/DL (ref 0.67–1.17)
EGFRCR SERPLBLD CKD-EPI 2021: 87 ML/MIN/1.73M2
ERYTHROCYTE [DISTWIDTH] IN BLOOD BY AUTOMATED COUNT: 13.3 % (ref 10–15)
FASTING STATUS PATIENT QL REPORTED: ABNORMAL
FASTING STATUS PATIENT QL REPORTED: NORMAL
GLUCOSE SERPL-MCNC: 97 MG/DL (ref 70–99)
HCO3 SERPL-SCNC: 25 MMOL/L (ref 22–29)
HCT VFR BLD AUTO: 40.1 % (ref 40–53)
HDLC SERPL-MCNC: 46 MG/DL
HGB BLD-MCNC: 14.2 G/DL (ref 13.3–17.7)
LDLC SERPL CALC-MCNC: 80 MG/DL
MCH RBC QN AUTO: 30.8 PG (ref 26.5–33)
MCHC RBC AUTO-ENTMCNC: 35.4 G/DL (ref 31.5–36.5)
MCV RBC AUTO: 87 FL (ref 78–100)
NONHDLC SERPL-MCNC: 131 MG/DL
PLATELET # BLD AUTO: 276 10E3/UL (ref 150–450)
POTASSIUM SERPL-SCNC: 4 MMOL/L (ref 3.4–5.3)
PROT SERPL-MCNC: 7.1 G/DL (ref 6.4–8.3)
PSA SERPL DL<=0.01 NG/ML-MCNC: 7.81 NG/ML (ref 0–6.5)
RBC # BLD AUTO: 4.61 10E6/UL (ref 4.4–5.9)
SODIUM SERPL-SCNC: 138 MMOL/L (ref 135–145)
TRIGL SERPL-MCNC: 255 MG/DL
WBC # BLD AUTO: 10.2 10E3/UL (ref 4–11)

## 2025-04-29 RX ORDER — AMOXICILLIN 250 MG
1 CAPSULE ORAL 2 TIMES DAILY
Qty: 20 TABLET | Refills: 0 | Status: SHIPPED | OUTPATIENT
Start: 2025-04-29

## 2025-04-29 ASSESSMENT — PAIN SCALES - GENERAL: PAINLEVEL_OUTOF10: NO PAIN (0)

## 2025-04-29 NOTE — PROGRESS NOTES
Preventive Care Visit  [facility] NEREYDA Alva MD, Family Medicine  Apr 29, 2025      Assessment & Plan     Screening for cardiovascular condition    - Lipid panel reflex to direct LDL Non-fasting; Future  - Comprehensive metabolic panel (BMP + Alb, Alk Phos, ALT, AST, Total. Bili, TP); Future    Hyperlipidemia LDL goal <100  Stable   Keep monitoring   Will review the lab and update pt  - Lipid panel reflex to direct LDL Non-fasting; Future  - Comprehensive metabolic panel (BMP + Alb, Alk Phos, ALT, AST, Total. Bili, TP); Future    Acute post-operative pain  Stable, keep monitoring with current dose of stool softener   - senna-docusate (SENOKOT-S/PERICOLACE) 8.6-50 MG tablet; Take 1 tablet by mouth 2 times daily.    Health maintenance examination    - Lipid panel reflex to direct LDL Non-fasting; Future  - CBC with platelets; Future  - PSA, screen; Future  - Comprehensive metabolic panel (BMP + Alb, Alk Phos, ALT, AST, Total. Bili, TP); Future    Screening for prostate cancer    - PSA, screen; Future    Patient has been advised of split billing requirements and indicates understanding: Yes        Counseling  Appropriate preventive services were addressed with this patient via screening, questionnaire, or discussion as appropriate for fall prevention, nutrition, physical activity, Tobacco-use cessation, social engagement, weight loss and cognition.  Checklist reviewing preventive services available has been given to the patient.  Reviewed patient's diet, addressing concerns and/or questions.   Patient reported safety concerns were addressed today.The patient was provided with written information regarding signs of hearing loss.   I have reviewed Opioid Use Disorder and Substance Use Disorder risk factors and made any needed referrals.       Follow-up    Follow-up Visit   Expected date:  Apr 29, 2026 (Approximate)      Follow Up Appointment Details:     Follow-up with whom?: Me    Follow-Up  for what?: Adult Preventive    How?: In Person                 Mary Ann Pires is a 75 year old, presenting for the following:  Wellness Visit (Fasting)        4/29/2025     8:55 AM   Additional Questions   Roomed by Zoë AGUILERA       Hyperlipidemia Follow-Up    Are you regularly taking any medication or supplement to lower your cholesterol?   Yes- rosuvastatin   Are you having muscle aches or other side effects that you think could be caused by your cholesterol lowering medication?  No    Hypertension Follow-up    Do you check your blood pressure regularly outside of the clinic? No   Are you following a low salt diet? Yes  Are your blood pressures ever more than 140 on the top number (systolic) OR more   than 90 on the bottom number (diastolic), for example 140/90? N/A    BP Readings from Last 2 Encounters:   04/29/25 122/70   04/14/25 114/72       Advance Care Planning    Document on file is a Health Care Directive or POLST.        4/24/2025   General Health   How would you rate your overall physical health? Good   Feel stress (tense, anxious, or unable to sleep) Only a little   (!) STRESS CONCERN      4/24/2025   Nutrition   Diet: Regular (no restrictions)         4/24/2025   Exercise   Days per week of moderate/strenous exercise 7 days   Average minutes spent exercising at this level 90 min         4/24/2025   Social Factors   Frequency of gathering with friends or relatives Once a week   Worry food won't last until get money to buy more No   Food not last or not have enough money for food? No   Do you have housing? (Housing is defined as stable permanent housing and does not include staying outside in a car, in a tent, in an abandoned building, in an overnight shelter, or couch-surfing.) Yes   Are you worried about losing your housing? No   Lack of transportation? No   Unable to get utilities (heat,electricity)? No         4/24/2025   Fall Risk   Fallen 2 or more times in the past year? No    No    Trouble with walking or balance? No    No       Multiple values from one day are sorted in reverse-chronological order          4/24/2025   Activities of Daily Living- Home Safety   Needs help with the following daily activites None of the above   Safety concerns in the home No grab bars in the bathroom         4/24/2025   Dental   Dentist two times every year? Yes         4/24/2025   Hearing Screening   Hearing concerns? (!) IT'S HARDER TO UNDERSTAND WOMEN'S VOICES THAN MEN'S VOICES.    (!) IT'S HARD TO FOLLOW A CONVERSATION IN A NOISY RESTAURANT OR CROWDED ROOM.    (!) TROUBLE UNDERSTANDING SOFT OR WHISPERED SPEECH.       Multiple values from one day are sorted in reverse-chronological order         4/24/2025   Driving Risk Screening   Patient/family members have concerns about driving No         4/24/2025   General Alertness/Fatigue Screening   Have you been more tired than usual lately? No         4/24/2025   Urinary Incontinence Screening   Bothered by leaking urine in past 6 months No         Today's PHQ-2 Score:       4/29/2025     8:49 AM   PHQ-2 ( 1999 [employer])   Q1: Little interest or pleasure in doing things 0   Q2: Feeling down, depressed or hopeless 0   PHQ-2 Score 0    Q1: Little interest or pleasure in doing things Not at all   Q2: Feeling down, depressed or hopeless Not at all   PHQ-2 Score 0       Patient-reported           4/24/2025   Substance Use   Alcohol more than 3/day or more than 7/wk No   Do you have a current opioid prescription? (!) YES   How severe/bad is pain from 1 to 10? 2/10   Do you use any other substances recreationally? No       Social History     Tobacco Use    Smoking status: Former     Types: Cigarettes     Passive exposure: Past    Smokeless tobacco: Never    Tobacco comments:     Smoked cigarettes in [place]  socially. Not often.   Vaping Use    Vaping status: Never Used   Substance Use Topics    Alcohol use: Yes     Comment: Very infrequent. Wine usually    Drug  use: No       ASCVD Risk   The 10-year ASCVD risk score (Leonard CARCAMO, et al., 2019) is: 24.8%    Values used to calculate the score:      Age: 75 years      Sex: Male      Is Non- : No      Diabetic: No      Tobacco smoker: No      Systolic Blood Pressure: 122 mmHg      Is BP treated: Yes      HDL Cholesterol: 47 mg/dL      Total Cholesterol: 153 mg/dL            Reviewed and updated as needed this visit by Provider                    Past Medical History:   Diagnosis Date    Basal cell carcinoma     Concussion with loss of consciousness of 30 minutes or less, subsequent encounter     Congestive heart failure (H)     Epididymitis, bilateral     Epididymitis, left     Epididymitis, right     Obstructive sleep apnea syndrome     Squamous cell carcinoma of skin, unspecified     Vasovagal syncope      Past Surgical History:   Procedure Laterality Date    COLONOSCOPY  5/2021    Next will be 5/24    COLONOSCOPY N/A 6/27/2024    Procedure: Colonoscopy;  Surgeon: Jaydon Parks MD;  Location:  GI    DAVINCI XI HERNIORRHAPHY INGUINAL Left 4/14/2025    Procedure: Robotic assisted laparoscopic left inguinal hernia repair;  Surgeon: Iván Rubio MD;  Location:  OR    ESOPHAGOSCOPY, GASTROSCOPY, DUODENOSCOPY (EGD), COMBINED N/A 6/27/2024    Procedure: Esophagoscopy, gastroscopy, duodenoscopy (EGD), combined;  Surgeon: Jaydon Parks MD;  Location:  GI    TESTICLE SURGERY      VASECTOMY       Labs reviewed in EPIC  BP Readings from Last 3 Encounters:   04/29/25 122/70   04/14/25 114/72   04/09/25 122/76    Wt Readings from Last 3 Encounters:   04/29/25 104.3 kg (230 lb)   04/14/25 104.7 kg (230 lb 12.8 oz)   04/09/25 104.7 kg (230 lb 14.4 oz)                  Patient Active Problem List   Diagnosis    History of basal cell carcinoma    Benign essential hypertension    Hyperlipidemia LDL goal <100    Obstructive sleep apnea syndrome    Gastroesophageal reflux disease  with esophagitis    Localized swelling of lower extremity    Vasovagal syncope    Elevated prostate specific antigen (PSA)    Concussion with loss of consciousness of 30 minutes or less, subsequent encounter    BPH with obstruction/lower urinary tract symptoms    Myofascial pain syndrome, cervical    Occipital neuralgia of right side    Fatigue due to old head injury    Post concussion syndrome    Cervical segment dysfunction    Thoracic segment dysfunction    Cephalgia    Somatic dysfunction of sacral region    Cervical pain    Dizziness    Marital conflict    Chronic left-sided low back pain without sciatica     Past Surgical History:   Procedure Laterality Date    COLONOSCOPY  2021    Next will be     COLONOSCOPY N/A 2024    Procedure: Colonoscopy;  Surgeon: Jaydon Parks MD;  Location:  GI    DAVINCI XI HERNIORRHAPHY INGUINAL Left 2025    Procedure: Robotic assisted laparoscopic left inguinal hernia repair;  Surgeon: Iván Rubio MD;  Location:  OR    ESOPHAGOSCOPY, GASTROSCOPY, DUODENOSCOPY (EGD), COMBINED N/A 2024    Procedure: Esophagoscopy, gastroscopy, duodenoscopy (EGD), combined;  Surgeon: Jyadon Parks MD;  Location:  GI    TESTICLE SURGERY      VASECTOMY         Social History     Tobacco Use    Smoking status: Former     Types: Cigarettes     Passive exposure: Past    Smokeless tobacco: Never    Tobacco comments:     Smoked cigarettes in undergrad college  socially. Not often.   Substance Use Topics    Alcohol use: Yes     Comment: Very infrequent. Wine usually     Family History   Problem Relation Age of Onset    Skin Cancer Mother          from pneumonia    Abdominal Aortic Aneurysm Mother     Heart Failure Father     Diabetes Type 1 Brother         Possibly secondary to chemical exposure in [place]    Diabetes Type 2  Maternal Grandmother     Cancer Brother         Exposure to Agent Orange in [place]    Heart Disease Brother           Current Outpatient Medications   Medication Sig Dispense Refill    aspirin 81 MG tablet Take by mouth daily      azelaic acid (FINACIA) 15 % external gel Apply to the face twice daily. 50 g 4    clotrimazole (LOTRIMIN) 1 % external cream Apply topically 2 times daily as needed.      COMPOUNDED NON-CONTROLLED SUBSTANCE (CMPD RX) - PHARMACY TO MIX COMPOUNDED MEDICATION Apply to forehead, temples, cheeks, and nose twice daily for 5-7 days, wash hands after application. Avoid sun. 30 g 0    diclofenac (VOLTAREN) 1 % topical gel Apply topically 4 times daily      fluocinonide (LIDEX) 0.05 % external cream Apply topically 2 times daily 120 g 6    imiquimod (ALDARA) 5 % external cream Apply topically to warts every morning. 12 packet 3    multivitamin w/minerals (THERA-VIT-M) tablet Take 1 tablet by mouth daily      Omega-3 Fatty Acids (OMEGA-3 FISH OIL PO)       pantoprazole (PROTONIX) 40 MG EC tablet Take 1 tablet (40 mg) by mouth daily. 90 tablet 3    rosuvastatin (CRESTOR) 20 MG tablet TAKE 1 TABLET BY MOUTH EVERY DAY 90 tablet 1    senna-docusate (SENOKOT-S/PERICOLACE) 8.6-50 MG tablet Take 1 tablet by mouth 2 times daily. 20 tablet 0    triamterene-HCTZ (MAXZIDE-25) 37.5-25 MG tablet TAKE 1/2 TABLET BY MOUTH EVERY MORNING 45 tablet 0     Allergies   Allergen Reactions    Johanna-[place] Plus Allergy [Diphenhydramine]      Patient has lip swollen ,has used tylenol in th past. No issues. Has used benadryl in the past no issues.  Never used phenylephrine.     Recent Labs   Lab Test 04/09/25  1128 04/22/24  0936 04/03/24  1447 04/13/23  0927 04/07/22  1000 04/07/22  1000 03/02/19  1636 01/30/19  0901   A1C  --  5.6  --   --   --   --   --   --    LDL  --  70 44 72   < > 46  --  73   HDL  --  47 42 49   < > 45  --  48   TRIG  --  179* 217* 130   < > 189*  --  181*   ALT  --  37  --  25  --  40  --  30   CR 0.89 0.95  --  0.96   < > 0.89 1.08 0.96   GFRESTIMATED 89 84  --  83   < > >90 70 80   GFRESTBLACK  --   --   --    --   --   --  81 >90   POTASSIUM 4.1 4.5  --  3.9   < > 4.2 3.9 3.6   TSH  --   --   --   --   --   --  3.64  --     < > = values in this interval not displayed.           Current providers sharing in care for this patient include:  Patient Care Team:  Hipolito Alva MD as PCP - General (Family Medicine)  Jaylan Guajardo MD as MD (Urology)  Hipolito Alva MD as Assigned PCP  Vin Sinha MD as MD (Urology)  Amparo Bowman PA-C as Physician Assistant (Dermatology)  Amparo Bowman PA-C as Assigned Dermatology Provider  Iván Rubio MD as Assigned Surgical Provider    The following health maintenance items are reviewed in Epic and correct as of today:  Health Maintenance   Topic Date Due    LUNG CANCER SCREENING  Never done    COVID-19 Vaccine (10 - 2024-25 season) 03/19/2025    LIPID  04/22/2025    MEDICARE ANNUAL WELLNESS VISIT  04/22/2025    BMP  04/09/2026    ANNUAL REVIEW OF HM ORDERS  04/09/2026    FALL RISK ASSESSMENT  04/29/2026    DIABETES SCREENING  04/09/2028    ADVANCE CARE PLANNING  06/19/2029    COLORECTAL CANCER SCREENING  06/27/2029    DTAP/TDAP/TD IMMUNIZATION (4 - Td or Tdap) 09/12/2030    HEPATITIS C SCREENING  Completed    PHQ-2 (once per calendar year)  Completed    INFLUENZA VACCINE  Completed    Pneumococcal Vaccine: 50+ Years  Completed    ZOSTER IMMUNIZATION  Completed    HEPATITIS A IMMUNIZATION  Completed    RSV VACCINE  Completed    AORTIC ANEURYSM SCREENING (SYSTEM ASSIGNED)  Completed    HPV IMMUNIZATION  Aged Out    MENINGITIS IMMUNIZATION  Aged Out    HEPATITIS B IMMUNIZATION  Discontinued         Review of Systems  Constitutional, HEENT, cardiovascular, pulmonary, GI, , musculoskeletal, neuro, skin, endocrine and psych systems are negative, except as otherwise noted.     Objective    Exam  /70 (BP Location: Left arm, Patient Position: Sitting, Cuff Size: Adult Large)   Pulse 67   Temp 97.2  F (36.2  C) (Tympanic)   Resp 12   Ht 1.784 m (5'  "10.24\")   Wt 104.3 kg (230 lb)   SpO2 99%   BMI 32.78 kg/m     Estimated body mass index is 32.78 kg/m  as calculated from the following:    Height as of this encounter: 1.784 m (5' 10.24\").    Weight as of this encounter: 104.3 kg (230 lb).    Physical Exam  GENERAL: alert and no distress  EYES: Eyes grossly normal to inspection, PERRL and conjunctivae and sclerae normal  HENT: ear canals and TM's normal, nose and mouth without ulcers or lesions  NECK: no adenopathy, no asymmetry, masses, or scars  RESP: lungs clear to auscultation - no rales, rhonchi or wheezes  CV: regular rate and rhythm, normal S1 S2, no S3 or S4, no murmur, click or rub, no peripheral edema  ABDOMEN: soft, nontender, no hepatosplenomegaly, no masses and bowel sounds normal  MS: no gross musculoskeletal defects noted, no edema  SKIN: no suspicious lesions or rashes  NEURO: Normal strength and tone, mentation intact and speech normal  BACK: no CVA tenderness, no paralumbar tenderness  PSYCH: mentation appears normal, affect normal/bright  LYMPH: no cervical, supraclavicular, axillary, or inguinal adenopathy        4/22/2024   Mini Cog   Clock Draw Score 2 Normal   3 Item Recall 3 objects recalled   Mini Cog Total Score 5              Signed Electronically by: Hipolito Alva MD    "

## 2025-05-01 ENCOUNTER — OFFICE VISIT (OUTPATIENT)
Dept: DERMATOLOGY | Facility: CLINIC | Age: 75
End: 2025-05-01
Payer: COMMERCIAL

## 2025-05-01 DIAGNOSIS — B07.0 PLANTAR WART: Primary | ICD-10-CM

## 2025-05-01 RX ORDER — CANDIDA ALBICANS 1000 [PNU]/ML
0.3 INJECTION, SOLUTION INTRADERMAL ONCE
Status: COMPLETED | OUTPATIENT
Start: 2025-05-01 | End: 2025-05-01

## 2025-05-01 RX ADMIN — CANDIDA ALBICANS 0.3 ML: 1000 INJECTION, SOLUTION INTRADERMAL at 09:16

## 2025-05-01 ASSESSMENT — PAIN SCALES - GENERAL: PAINLEVEL_OUTOF10: NO PAIN (0)

## 2025-05-01 NOTE — LETTER
5/1/2025      Pranay Ratliff  [address]  [facility] [address]      Dear Colleague,    Thank you for referring your patient, Pranay Ratliff, to the [facility] NEREYDA [facility]. Please see a copy of my visit note below.    [facility] Dermatology Note  Encounter Date: May 1, 2025  Office Visit     Reviewed patients past medical history and pertinent chart review prior to patients visit today.     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 s/p Mohs 09/11/2024  2. Asteatotic dermatitis  - Current tx: fluocinonide 0.05% cream  3. Verruca plantaris, right foot s/p cryotherapy  - Current tx: imiquimod cream, salicylic acid, candida  - Past tx: cryotherapy  4. Tinea pedis   5. AKs, forehead, temples, and nose  - Current tx: Efudex/ Dovonex 8/15/2024   - Past tx: Efudex 5% cream (initiated 11/3/2022)  6. Rash, most consistent with tinea pedis, left medial foot s/p KOH prep 12/6/2022      ____________________________________________    Assessment & Plan:     # Verruca vulgaris, right plantar foot x2.     Counseled on the viral etiology of this condition. Counseled that these are often recalcitrant to treatment. Discussed all treatment options that we offer as well as side effects of each. Advised that highest rates of success can be observed with combination monthly in office treatment and home treatments.    - Candida injection: After verbal consent was obtained, the skin was cleaned with an alcohol wipe and 0.3 ml of candida was injected under lesions on the right plantar foot The patient tolerated the procedure well.     - Wart involving the right heel pared down using a 7 mm curette.     - Continue imiquimod 5% cream every morning.      - Continue salicylic acid 40% (such as wart  stick) nightly followed by duct tape or bandage.  Pare down after removal of occlusive cover for deeper penetration of medication.      Follow up 4 weeks.    All risks, benefits and alternatives were discussed with patient.  Patient is in agreement and understands the assessment and plan.  All questions were answered.  ROBERTA Prado [facility] Dermatology  _______________________________________    CC: Derm Problem (Wart treatment right foot)    HPI:  Mr. Pranay Ratliff is a(n) 75 year old male who presents today as a return patient for warts. Unsure of improvement. Patient is otherwise feeling well, without additional skin concerns.      Physical Exam:  SKIN: Focused examination of plantar feet was performed.  - Verrucal papule(s) with thrombosed capillaries involving the right plantar foot x2.     - No other lesions of concern on areas examined.     Medications:  Current Outpatient Medications   Medication Sig Dispense Refill     aspirin 81 MG tablet Take by mouth daily       azelaic acid (FINACIA) 15 % external gel Apply to the face twice daily. 50 g 4     clotrimazole (LOTRIMIN) 1 % external cream Apply topically 2 times daily as needed.       COMPOUNDED NON-CONTROLLED SUBSTANCE (CMPD RX) - PHARMACY TO MIX COMPOUNDED MEDICATION Apply to forehead, temples, cheeks, and nose twice daily for 5-7 days, wash hands after application. Avoid sun. 30 g 0     diclofenac (VOLTAREN) 1 % topical gel Apply topically 4 times daily       fluocinonide (LIDEX) 0.05 % external cream Apply topically 2 times daily 120 g 6     imiquimod (ALDARA) 5 % external cream Apply topically to warts every morning. 12 packet 3     multivitamin w/minerals (THERA-VIT-M) tablet Take 1 tablet by mouth daily       Omega-3 Fatty Acids (OMEGA-3 FISH OIL PO)        pantoprazole (PROTONIX) 40 MG EC tablet Take 1 tablet (40 mg) by mouth daily. 90 tablet 3     rosuvastatin (CRESTOR) 20 MG tablet TAKE 1 TABLET BY MOUTH EVERY DAY 90 tablet 1      senna-docusate (SENOKOT-S/PERICOLACE) 8.6-50 MG tablet Take 1 tablet by mouth 2 times daily. 20 tablet 0     triamterene-HCTZ (MAXZIDE-25) 37.5-25 MG tablet TAKE 1/2 TABLET BY MOUTH EVERY MORNING 45 tablet 0     No current facility-administered medications for this visit.      Past Medical History:   Patient Active Problem List   Diagnosis     History of basal cell carcinoma     Benign essential hypertension     Hyperlipidemia LDL goal <100     Obstructive sleep apnea syndrome     Gastroesophageal reflux disease with esophagitis     Localized swelling of lower extremity     Vasovagal syncope     Elevated prostate specific antigen (PSA)     Concussion with loss of consciousness of 30 minutes or less, subsequent encounter     BPH with obstruction/lower urinary tract symptoms     Myofascial pain syndrome, cervical     Occipital neuralgia of right side     Fatigue due to old head injury     Post concussion syndrome     Cervical segment dysfunction     Thoracic segment dysfunction     Cephalgia     Somatic dysfunction of sacral region     Cervical pain     Dizziness     Marital conflict     Chronic left-sided low back pain without sciatica     Past Medical History:   Diagnosis Date     Basal cell carcinoma      Concussion with loss of consciousness of 30 minutes or less, subsequent encounter      Congestive heart failure (H)      Epididymitis, bilateral      Epididymitis, left      Epididymitis, right      Obstructive sleep apnea syndrome      Squamous cell carcinoma of skin, unspecified      Vasovagal syncope        CC Referred Self, MD  No address on file on close of this encounter.     Clinic Administered Medication Documentation        Patient was given candida. Prior to medication administration, verified patient's identity using patient s name and date of birth. Please see MAR and medication order for additional information. Patient instructed to remain in clinic for 15 minutes and report any adverse reaction  to staff immediately.    Vial/Syringe: Multi dose vial  Amrit Glover LPN         Again, thank you for allowing me to participate in the care of your patient.        Sincerely,        Amparo Bowman PA-C    Electronically signed

## 2025-05-01 NOTE — PATIENT INSTRUCTIONS
Proper skin care from [facility]:    -Eliminate harsh soaps as they strip the natural oils from the skin, often resulting in dry itchy skin ( i.e. Dial, Zest, [place] Spring)  -Use mild soaps such as Cetaphil or Dove Sensitive Skin in the shower. You do not need to use soap on arms, legs, and trunk every time you shower unless visibly soiled.   -Avoid hot or cold showers.  -After showering, lightly dry off and apply moisturizing within 2-3 minutes. This will help trap moisture in the skin.   -Aggressive use of a moisturizer at least 1-2 times a day to the entire body (including -Vanicream, Cetaphil, Aquaphor or Cerave) and moisturize hands after every washing.  -We recommend using moisturizers that come in a tub that needs to be scooped out, not a pump. This has more of an oil base. It will hold moisture in your skin much better than a water base moisturizer. The above recommended are non-pore clogging.      Wear a sunscreen with at least SPF 30 on your face, ears, neck and V of the chest daily. Wear sunscreen on other areas of the body if those areas are exposed to the sun throughout the day. Sunscreens can contain physical and/or chemical blockers. Physical blockers are less likely to clog pores, these include zinc oxide and titanium dioxide. Reapply every two hour and after swimming.     Sunscreen examples: https://www.ewg.org/sunscreen/    UV radiation  UVA radiation remains constant throughout the day and throughout the year. It is a longer wavelength than UVB and therefore penetrates deeper into the skin leading to immediate and delayed tanning, photoaging, and skin cancer. 70-80% of UVA and UVB radiation occurs between the hours of 10am-2pm.  UVB radiation  UVB radiation causes the most harmful effects and is more significant during the summer months. However, snow and ice can reflect UVB radiation leading to skin damage during the winter months as well. UVB radiation is responsible for tanning,  burning, inflammation, delayed erythema (pinkness), pigmentation (brown spots), and skin cancer.     I recommend self monthly full body exams and yearly full body exams with a dermatology provider. If you develop a new or changing lesion please follow up for examination. Most skin cancers are pink and scaly or pink and pearly. However, we do see blue/brown/black skin cancers.  Consider the ABCDEs of melanoma when giving yourself your monthly full body exam ( don't forget the groin, buttocks, feet, toes, etc). A-asymmetry, B-borders, C-color, D-diameter, E-elevation or evolving. If you see any of these changes please follow up in clinic. If you cannot see your back I recommend purchasing a hand held mirror to use with a larger wall mirror.       Checking for Skin Cancer  You can find cancer early by checking your skin each month. There are 3 kinds of skin cancer. They are melanoma, basal cell carcinoma, and squamous cell carcinoma. Doing monthly skin checks is the best way to find new marks or skin changes. Follow the instructions below for checking your skin.   The ABCDEs of checking moles for melanoma   Check your moles or growths for signs of melanoma using ABCDE:   Asymmetry: the sides of the mole or growth don t match  Border: the edges are ragged, notched, or blurred  Color: the color within the mole or growth varies  Diameter: the mole or growth is larger than 6 mm (size of a pencil eraser)  Evolving: the size, shape, or color of the mole or growth is changing (evolving is not shown in the images below)    Checking for other types of skin cancer  Basal cell carcinoma or squamous cell carcinoma have symptoms such as:     A spot or mole that looks different from all other marks on your skin  Changes in how an area feels, such as itching, tenderness, or pain  Changes in the skin's surface, such as oozing, bleeding, or scaliness  A sore that does not heal  New swelling or redness beyond the border of a  mole    Who s at risk?  Anyone can get skin cancer. But you are at greater risk if you have:   Fair skin, light-colored hair, or light-colored eyes  Many moles or abnormal moles on your skin  A history of sunburns from sunlight or tanning beds  A family history of skin cancer  A history of exposure to radiation or chemicals  A weakened immune system  If you have had skin cancer in the past, you are at risk for recurring skin cancer.   How to check your skin  Do your monthly skin checkups in front of a full-length mirror. Check all parts of your body, including your:   Head (ears, face, neck, and scalp)  Torso (front, back, and sides)  Arms (tops, undersides, upper, and lower armpits)  Hands (palms, backs, and fingers, including under the nails)  Buttocks and genitals  Legs (front, back, and sides)  Feet (tops, soles, toes, including under the nails, and between toes)  If you have a lot of moles, take digital photos of them each month. Make sure to take photos both up close and from a distance. These can help you see if any moles change over time.   Most skin changes are not cancer. But if you see any changes in your skin, call your doctor right away. Only he or she can diagnose a problem. If you have skin cancer, seeing your doctor can be the first step toward getting the treatment that could save your life.   [employer] last reviewed this educational content on 4/1/2019 2000-2020 [employer]. [address] [place] [address], [place] [address]. All rights reserved. This information is not intended as a substitute for professional medical care. Always follow your healthcare professional's instructions.       When should I call my doctor?  If you are worsening or not improving, please, contact us or seek urgent care as noted below.     Who should I call with questions (adults)?    [facility] [phone]  For urgent needs outside of business hours call the [facility] at  [phone] and ask for the dermatology resident on call to be paged  If this is a medical emergency and you are unable to reach an ER, Call [phone]      If you need a prescription refill, please contact your pharmacy. Refills are approved or denied by our Physicians during normal business hours, Monday through Friday.  Per office policy, refills will not be granted if you have not been seen within the past year (or sooner depending on the condition).

## 2025-05-01 NOTE — PROGRESS NOTES
Clinic Administered Medication Documentation        Patient was given candida. Prior to medication administration, verified patient's identity using patient s name and date of birth. Please see MAR and medication order for additional information. Patient instructed to remain in clinic for 15 minutes and report any adverse reaction to staff immediately.    Vial/Syringe: Multi dose vial  Amrit Glover LPN

## 2025-05-01 NOTE — PROGRESS NOTES
[facility] Dermatology Note  Encounter Date: May 1, 2025  Office Visit     Reviewed patients past medical history and pertinent chart review prior to patients visit today.     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 s/p Mohs 09/11/2024  2. Asteatotic dermatitis  - Current tx: fluocinonide 0.05% cream  3. Verruca plantaris, right foot s/p cryotherapy  - Current tx: imiquimod cream, salicylic acid, candida  - Past tx: cryotherapy  4. Tinea pedis   5. AKs, forehead, temples, and nose  - Current tx: Efudex/ Dovonex 8/15/2024   - Past tx: Efudex 5% cream (initiated 11/3/2022)  6. Rash, most consistent with tinea pedis, left medial foot s/p KOH prep 12/6/2022      ____________________________________________    Assessment & Plan:     # Verruca vulgaris, right plantar foot x2.     Counseled on the viral etiology of this condition. Counseled that these are often recalcitrant to treatment. Discussed all treatment options that we offer as well as side effects of each. Advised that highest rates of success can be observed with combination monthly in office treatment and home treatments.    - Candida injection: After verbal consent was obtained, the skin was cleaned with an alcohol wipe and 0.3 ml of candida was injected under lesions on the right plantar foot The patient tolerated the procedure well.     - Wart involving the right heel pared down using a 7 mm curette.     - Continue imiquimod 5% cream every morning.      - Continue salicylic acid 40% (such as wart stick) nightly followed by duct tape or bandage.  Pare down after removal of occlusive cover for deeper penetration of medication.      Follow up 4 weeks.    All risks, benefits and alternatives were discussed with patient.  Patient is in  agreement and understands the assessment and plan.  All questions were answered.  Amparo Bowman PA-C  [facility]  _______________________________________    CC: Derm Problem (Wart treatment right foot)    HPI:  Mr. Pranay Ratliff is a(n) 75 year old male who presents today as a return patient for warts. Unsure of improvement. Patient is otherwise feeling well, without additional skin concerns.      Physical Exam:  SKIN: Focused examination of plantar feet was performed.  - Verrucal papule(s) with thrombosed capillaries involving the right plantar foot x2.     - No other lesions of concern on areas examined.     Medications:  Current Outpatient Medications   Medication Sig Dispense Refill    aspirin 81 MG tablet Take by mouth daily      azelaic acid (FINACIA) 15 % external gel Apply to the face twice daily. 50 g 4    clotrimazole (LOTRIMIN) 1 % external cream Apply topically 2 times daily as needed.      COMPOUNDED NON-CONTROLLED SUBSTANCE (CMPD RX) - PHARMACY TO MIX COMPOUNDED MEDICATION Apply to forehead, temples, cheeks, and nose twice daily for 5-7 days, wash hands after application. Avoid sun. 30 g 0    diclofenac (VOLTAREN) 1 % topical gel Apply topically 4 times daily      fluocinonide (LIDEX) 0.05 % external cream Apply topically 2 times daily 120 g 6    imiquimod (ALDARA) 5 % external cream Apply topically to warts every morning. 12 packet 3    multivitamin w/minerals (THERA-VIT-M) tablet Take 1 tablet by mouth daily      Omega-3 Fatty Acids (OMEGA-3 FISH OIL PO)       pantoprazole (PROTONIX) 40 MG EC tablet Take 1 tablet (40 mg) by mouth daily. 90 tablet 3    rosuvastatin (CRESTOR) 20 MG tablet TAKE 1 TABLET BY MOUTH EVERY DAY 90 tablet 1    senna-docusate (SENOKOT-S/PERICOLACE) 8.6-50 MG tablet Take 1 tablet by mouth 2 times daily. 20 tablet 0    triamterene-HCTZ (MAXZIDE-25) 37.5-25 MG tablet TAKE 1/2 TABLET BY MOUTH EVERY MORNING 45 tablet 0     No current facility-administered  medications for this visit.      Past Medical History:   Patient Active Problem List   Diagnosis    History of basal cell carcinoma    Benign essential hypertension    Hyperlipidemia LDL goal <100    Obstructive sleep apnea syndrome    Gastroesophageal reflux disease with esophagitis    Localized swelling of lower extremity    Vasovagal syncope    Elevated prostate specific antigen (PSA)    Concussion with loss of consciousness of 30 minutes or less, subsequent encounter    BPH with obstruction/lower urinary tract symptoms    Myofascial pain syndrome, cervical    Occipital neuralgia of right side    Fatigue due to old head injury    Post concussion syndrome    Cervical segment dysfunction    Thoracic segment dysfunction    Cephalgia    Somatic dysfunction of sacral region    Cervical pain    Dizziness    Marital conflict    Chronic left-sided low back pain without sciatica     Past Medical History:   Diagnosis Date    Basal cell carcinoma     Concussion with loss of consciousness of 30 minutes or less, subsequent encounter     Congestive heart failure (H)     Epididymitis, bilateral     Epididymitis, left     Epididymitis, right     Obstructive sleep apnea syndrome     Squamous cell carcinoma of skin, unspecified     Vasovagal syncope        CC Referred Self, MD  No address on file on close of this encounter.

## 2025-05-26 ENCOUNTER — MYC REFILL (OUTPATIENT)
Dept: FAMILY MEDICINE | Facility: CLINIC | Age: 75
End: 2025-05-26
Payer: COMMERCIAL

## 2025-05-26 DIAGNOSIS — G89.18 ACUTE POST-OPERATIVE PAIN: ICD-10-CM

## 2025-05-26 DIAGNOSIS — E78.5 HYPERLIPIDEMIA LDL GOAL <100: ICD-10-CM

## 2025-05-27 RX ORDER — ROSUVASTATIN CALCIUM 20 MG/1
20 TABLET, COATED ORAL DAILY
Qty: 90 TABLET | Refills: 3 | Status: SHIPPED | OUTPATIENT
Start: 2025-05-27

## 2025-05-27 RX ORDER — AMOXICILLIN 250 MG
1 CAPSULE ORAL 2 TIMES DAILY
Qty: 20 TABLET | Refills: 0 | Status: SHIPPED | OUTPATIENT
Start: 2025-05-27

## 2025-05-29 ENCOUNTER — OFFICE VISIT (OUTPATIENT)
Dept: DERMATOLOGY | Facility: CLINIC | Age: 75
End: 2025-05-29
Payer: COMMERCIAL

## 2025-05-29 DIAGNOSIS — B07.0 PLANTAR WART: Primary | ICD-10-CM

## 2025-05-29 RX ORDER — CANDIDA ALBICANS 1000 [PNU]/ML
0.3 INJECTION, SOLUTION INTRADERMAL ONCE
Status: COMPLETED | OUTPATIENT
Start: 2025-05-29 | End: 2025-05-29

## 2025-05-29 RX ADMIN — CANDIDA ALBICANS 0.3 ML: 1000 INJECTION, SOLUTION INTRADERMAL at 11:08

## 2025-05-29 NOTE — LETTER
5/29/2025      Pranay Ratliff  [id number] [facility] [address] [address]      Dear Colleague,    Thank you for referring your patient, Pranay Ratliff, to the [facility]. Please see a copy of my visit note below.    [facility] Dermatology Note  Encounter Date: May 29, 2025  Office Visit     Reviewed patients past medical history and pertinent chart review prior to patients visit today.     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 s/p Mohs 09/11/2024  2. Asteatotic dermatitis  - Current tx: fluocinonide 0.05% cream  3. Verruca plantaris, right foot s/p cryotherapy  - Current tx: imiquimod cream, salicylic acid, candida  - Past tx: cryotherapy  4. Tinea pedis   5. AKs, forehead, temples, and nose  - Current tx: Efudex/ Dovonex 8/15/2024   - Past tx: Efudex 5% cream (initiated 11/3/2022)  6. Rash, most consistent with tinea pedis, left medial foot s/p KOH prep 12/6/2022      ____________________________________________    Assessment & Plan:     # Verruca vulgaris, right plantar foot x2.     - Candida injection: After verbal consent was obtained, the skin was cleaned with an alcohol wipe and 0.3 ml of candida was injected under lesions on the right heel. The patient tolerated the procedure well.     - Wart involving the right plantar foot pared down using a 5 mm curette.     - Continue imiquimod 5% cream every morning.      - Continue salicylic acid 40% (such as wart stick) nightly followed by duct tape or bandage.  Pare down after removal of occlusive cover for deeper penetration of medication.      Follow up 4 weeks.    All risks, benefits and alternatives were discussed with patient.  Patient is in agreement and understands the assessment and plan.  All questions were  answered.  Amparo Bowman PA-C  [facility]  _______________________________________    CC: Derm Problem (Follow-up on plantar wart)    HPI:  Mr. Pranay Ratliff is a(n) 75 year old male who presents today as a return patient for warts. Unsure of improvement. Patient is otherwise feeling well, without additional skin concerns.      Physical Exam:  SKIN: Focused examination of plantar feet was performed.  - Verrucal papule(s) with thrombosed capillaries involving the right plantar foot x2.     - No other lesions of concern on areas examined.     Medications:  Current Outpatient Medications   Medication Sig Dispense Refill     aspirin 81 MG tablet Take by mouth daily       azelaic acid (FINACIA) 15 % external gel Apply to the face twice daily. 50 g 4     clotrimazole (LOTRIMIN) 1 % external cream Apply topically 2 times daily as needed.       COMPOUNDED NON-CONTROLLED SUBSTANCE (CMPD RX) - PHARMACY TO MIX COMPOUNDED MEDICATION Apply to forehead, temples, cheeks, and nose twice daily for 5-7 days, wash hands after application. Avoid sun. 30 g 0     diclofenac (VOLTAREN) 1 % topical gel Apply topically 4 times daily       fluocinonide (LIDEX) 0.05 % external cream Apply topically 2 times daily 120 g 6     imiquimod (ALDARA) 5 % external cream Apply topically to warts every morning. 12 packet 3     multivitamin w/minerals (THERA-VIT-M) tablet Take 1 tablet by mouth daily       Omega-3 Fatty Acids (OMEGA-3 FISH OIL PO)        pantoprazole (PROTONIX) 40 MG EC tablet Take 1 tablet (40 mg) by mouth daily. 90 tablet 3     rosuvastatin (CRESTOR) 20 MG tablet Take 1 tablet (20 mg) by mouth daily. 90 tablet 3     senna-docusate (SENOKOT-S/PERICOLACE) 8.6-50 MG tablet Take 1 tablet by mouth 2 times daily. 20 tablet 0     triamterene-HCTZ (MAXZIDE-25) 37.5-25 MG tablet TAKE 1/2 TABLET BY MOUTH EVERY MORNING 45 tablet 0     No current facility-administered medications for this visit.      Past Medical History:    Patient Active Problem List   Diagnosis     History of basal cell carcinoma     Benign essential hypertension     Hyperlipidemia LDL goal <100     Obstructive sleep apnea syndrome     Gastroesophageal reflux disease with esophagitis     Localized swelling of lower extremity     Vasovagal syncope     Elevated prostate specific antigen (PSA)     Concussion with loss of consciousness of 30 minutes or less, subsequent encounter     BPH with obstruction/lower urinary tract symptoms     Myofascial pain syndrome, cervical     Occipital neuralgia of right side     Fatigue due to old head injury     Post concussion syndrome     Cervical segment dysfunction     Thoracic segment dysfunction     Cephalgia     Somatic dysfunction of sacral region     Cervical pain     Dizziness     Marital conflict     Chronic left-sided low back pain without sciatica     Past Medical History:   Diagnosis Date     Basal cell carcinoma      Concussion with loss of consciousness of 30 minutes or less, subsequent encounter      Congestive heart failure (H)      Epididymitis, bilateral      Epididymitis, left      Epididymitis, right      Obstructive sleep apnea syndrome      Squamous cell carcinoma of skin, unspecified      Vasovagal syncope        CC Referred Self, MD  No address on file on close of this encounter.     Again, thank you for allowing me to participate in the care of your patient.        Sincerely,        Amparo Bowman PA-C    Electronically signed

## 2025-05-29 NOTE — PROGRESS NOTES
[facility] Dermatology Note  Encounter Date: May 29, 2025  Office Visit     Reviewed patients past medical history and pertinent chart review prior to patients visit today.     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 s/p Mohs 09/11/2024  2. Asteatotic dermatitis  - Current tx: fluocinonide 0.05% cream  3. Verruca plantaris, right foot s/p cryotherapy  - Current tx: imiquimod cream, salicylic acid, candida  - Past tx: cryotherapy  4. Tinea pedis   5. AKs, forehead, temples, and nose  - Current tx: Efudex/ Dovonex 8/15/2024   - Past tx: Efudex 5% cream (initiated 11/3/2022)  6. Rash, most consistent with tinea pedis, left medial foot s/p KOH prep 12/6/2022      ____________________________________________    Assessment & Plan:     # Verruca vulgaris, right plantar foot x2.     - Candida injection: After verbal consent was obtained, the skin was cleaned with an alcohol wipe and 0.3 ml of candida was injected under lesions on the right heel. The patient tolerated the procedure well.     - Wart involving the right plantar foot pared down using a 5 mm curette.     - Continue imiquimod 5% cream every morning.      - Continue salicylic acid 40% (such as wart stick) nightly followed by duct tape or bandage.  Pare down after removal of occlusive cover for deeper penetration of medication.      Follow up 4 weeks.    All risks, benefits and alternatives were discussed with patient.  Patient is in agreement and understands the assessment and plan.  All questions were answered.  Amparo Bowman PA-C  [facility]  _______________________________________    CC: Derm Problem (Follow-up on plantar wart)    HPI:  Mr. Pranay Ratliff is a(n) 75 year old male who presents today as a return  patient for warts. Unsure of improvement. Patient is otherwise feeling well, without additional skin concerns.      Physical Exam:  SKIN: Focused examination of plantar feet was performed.  - Verrucal papule(s) with thrombosed capillaries involving the right plantar foot x2.     - No other lesions of concern on areas examined.     Medications:  Current Outpatient Medications   Medication Sig Dispense Refill    aspirin 81 MG tablet Take by mouth daily      azelaic acid (FINACIA) 15 % external gel Apply to the face twice daily. 50 g 4    clotrimazole (LOTRIMIN) 1 % external cream Apply topically 2 times daily as needed.      COMPOUNDED NON-CONTROLLED SUBSTANCE (CMPD RX) - PHARMACY TO MIX COMPOUNDED MEDICATION Apply to forehead, temples, cheeks, and nose twice daily for 5-7 days, wash hands after application. Avoid sun. 30 g 0    diclofenac (VOLTAREN) 1 % topical gel Apply topically 4 times daily      fluocinonide (LIDEX) 0.05 % external cream Apply topically 2 times daily 120 g 6    imiquimod (ALDARA) 5 % external cream Apply topically to warts every morning. 12 packet 3    multivitamin w/minerals (THERA-VIT-M) tablet Take 1 tablet by mouth daily      Omega-3 Fatty Acids (OMEGA-3 FISH OIL PO)       pantoprazole (PROTONIX) 40 MG EC tablet Take 1 tablet (40 mg) by mouth daily. 90 tablet 3    rosuvastatin (CRESTOR) 20 MG tablet Take 1 tablet (20 mg) by mouth daily. 90 tablet 3    senna-docusate (SENOKOT-S/PERICOLACE) 8.6-50 MG tablet Take 1 tablet by mouth 2 times daily. 20 tablet 0    triamterene-HCTZ (MAXZIDE-25) 37.5-25 MG tablet TAKE 1/2 TABLET BY MOUTH EVERY MORNING 45 tablet 0     No current facility-administered medications for this visit.      Past Medical History:   Patient Active Problem List   Diagnosis    History of basal cell carcinoma    Benign essential hypertension    Hyperlipidemia LDL goal <100    Obstructive sleep apnea syndrome    Gastroesophageal reflux disease with esophagitis    Localized swelling  of lower extremity    Vasovagal syncope    Elevated prostate specific antigen (PSA)    Concussion with loss of consciousness of 30 minutes or less, subsequent encounter    BPH with obstruction/lower urinary tract symptoms    Myofascial pain syndrome, cervical    Occipital neuralgia of right side    Fatigue due to old head injury    Post concussion syndrome    Cervical segment dysfunction    Thoracic segment dysfunction    Cephalgia    Somatic dysfunction of sacral region    Cervical pain    Dizziness    Marital conflict    Chronic left-sided low back pain without sciatica     Past Medical History:   Diagnosis Date    Basal cell carcinoma     Concussion with loss of consciousness of 30 minutes or less, subsequent encounter     Congestive heart failure (H)     Epididymitis, bilateral     Epididymitis, left     Epididymitis, right     Obstructive sleep apnea syndrome     Squamous cell carcinoma of skin, unspecified     Vasovagal syncope        CC Referred Self, MD  No address on file on close of this encounter.

## 2025-05-29 NOTE — PROGRESS NOTES
Clinic Administered Medication Documentation        Patient was given Candida .3ml. Prior to medication administration, verified patient's identity using patient s name and date of birth. Please see MAR and medication order for additional information. Patient instructed to remain in clinic for 15 minutes and report any adverse reaction to staff immediately.    Vial/Syringe: Multi dose vial    TING WOODS MA

## 2025-06-12 ENCOUNTER — MYC REFILL (OUTPATIENT)
Dept: FAMILY MEDICINE | Facility: CLINIC | Age: 75
End: 2025-06-12
Payer: COMMERCIAL

## 2025-06-12 DIAGNOSIS — I10 BENIGN ESSENTIAL HYPERTENSION: ICD-10-CM

## 2025-06-12 RX ORDER — TRIAMTERENE AND HYDROCHLOROTHIAZIDE 37.5; 25 MG/1; MG/1
TABLET ORAL
Qty: 45 TABLET | Refills: 3 | Status: SHIPPED | OUTPATIENT
Start: 2025-06-12

## 2025-06-13 ENCOUNTER — ANCILLARY PROCEDURE (OUTPATIENT)
Dept: GENERAL RADIOLOGY | Facility: CLINIC | Age: 75
End: 2025-06-13
Attending: NURSE PRACTITIONER
Payer: COMMERCIAL

## 2025-06-13 PROCEDURE — 73130 X-RAY EXAM OF HAND: CPT | Mod: TC | Performed by: INTERNAL MEDICINE

## 2025-07-03 ENCOUNTER — OFFICE VISIT (OUTPATIENT)
Dept: DERMATOLOGY | Facility: CLINIC | Age: 75
End: 2025-07-03
Payer: COMMERCIAL

## 2025-07-03 DIAGNOSIS — B07.0 PLANTAR WART: Primary | ICD-10-CM

## 2025-07-03 NOTE — PATIENT INSTRUCTIONS
Proper skin care from [facility]:    -Eliminate harsh soaps as they strip the natural oils from the skin, often resulting in dry itchy skin ( i.e. Dial, Zest, [place])  -Use mild soaps such as Cetaphil or Dove Sensitive Skin in the shower. You do not need to use soap on arms, legs, and trunk every time you shower unless visibly soiled.   -Avoid hot or cold showers.  -After showering, lightly dry off and apply moisturizing within 2-3 minutes. This will help trap moisture in the skin.   -Aggressive use of a moisturizer at least 1-2 times a day to the entire body (including -Vanicream, Cetaphil, Aquaphor or Cerave) and moisturize hands after every washing.  -We recommend using moisturizers that come in a tub that needs to be scooped out, not a pump. This has more of an oil base. It will hold moisture in your skin much better than a water base moisturizer. The above recommended are non-pore clogging.      Wear a sunscreen with at least SPF 30 on your face, ears, neck and V of the chest daily. Wear sunscreen on other areas of the body if those areas are exposed to the sun throughout the day. Sunscreens can contain physical and/or chemical blockers. Physical blockers are less likely to clog pores, these include zinc oxide and titanium dioxide. Reapply every two hour and after swimming.     Sunscreen examples: https://www.ewg.org/sunscreen/    UV radiation  UVA radiation remains constant throughout the day and throughout the year. It is a longer wavelength than UVB and therefore penetrates deeper into the skin leading to immediate and delayed tanning, photoaging, and skin cancer. 70-80% of UVA and UVB radiation occurs between the hours of 10am-2pm.  UVB radiation  UVB radiation causes the most harmful effects and is more significant during the summer months. However, snow and ice can reflect UVB radiation leading to skin damage during the winter months as well. UVB radiation is responsible for tanning,  burning, inflammation, delayed erythema (pinkness), pigmentation (brown spots), and skin cancer.     I recommend self monthly full body exams and yearly full body exams with a dermatology provider. If you develop a new or changing lesion please follow up for examination. Most skin cancers are pink and scaly or pink and pearly. However, we do see blue/brown/black skin cancers.  Consider the ABCDEs of melanoma when giving yourself your monthly full body exam ( don't forget the groin, buttocks, feet, toes, etc). A-asymmetry, B-borders, C-color, D-diameter, E-elevation or evolving. If you see any of these changes please follow up in clinic. If you cannot see your back I recommend purchasing a hand held mirror to use with a larger wall mirror.       Checking for Skin Cancer  You can find cancer early by checking your skin each month. There are 3 kinds of skin cancer. They are melanoma, basal cell carcinoma, and squamous cell carcinoma. Doing monthly skin checks is the best way to find new marks or skin changes. Follow the instructions below for checking your skin.   The ABCDEs of checking moles for melanoma   Check your moles or growths for signs of melanoma using ABCDE:   Asymmetry: the sides of the mole or growth don t match  Border: the edges are ragged, notched, or blurred  Color: the color within the mole or growth varies  Diameter: the mole or growth is larger than 6 mm (size of a pencil eraser)  Evolving: the size, shape, or color of the mole or growth is changing (evolving is not shown in the images below)    Checking for other types of skin cancer  Basal cell carcinoma or squamous cell carcinoma have symptoms such as:     A spot or mole that looks different from all other marks on your skin  Changes in how an area feels, such as itching, tenderness, or pain  Changes in the skin's surface, such as oozing, bleeding, or scaliness  A sore that does not heal  New swelling or redness beyond the border of a  mole    Who s at risk?  Anyone can get skin cancer. But you are at greater risk if you have:   Fair skin, light-colored hair, or light-colored eyes  Many moles or abnormal moles on your skin  A history of sunburns from sunlight or tanning beds  A family history of skin cancer  A history of exposure to radiation or chemicals  A weakened immune system  If you have had skin cancer in the past, you are at risk for recurring skin cancer.   How to check your skin  Do your monthly skin checkups in front of a full-length mirror. Check all parts of your body, including your:   Head (ears, face, neck, and scalp)  Torso (front, back, and sides)  Arms (tops, undersides, upper, and lower armpits)  Hands (palms, backs, and fingers, including under the nails)  Buttocks and genitals  Legs (front, back, and sides)  Feet (tops, soles, toes, including under the nails, and between toes)  If you have a lot of moles, take digital photos of them each month. Make sure to take photos both up close and from a distance. These can help you see if any moles change over time.   Most skin changes are not cancer. But if you see any changes in your skin, call your doctor right away. Only he or she can diagnose a problem. If you have skin cancer, seeing your doctor can be the first step toward getting the treatment that could save your life.   [employer] last reviewed this educational content on 4/1/2019 2000-2020 [employer]. [address] [place] [address], [place] [address]. All rights reserved. This information is not intended as a substitute for professional medical care. Always follow your healthcare professional's instructions.       When should I call my doctor?  If you are worsening or not improving, please, contact us or seek urgent care as noted below.     Who should I call with questions (adults)?    [facility] [phone]  For urgent needs outside of business hours call the [facility] at  [phone] and ask for the dermatology resident on call to be paged  If this is a medical emergency and you are unable to reach an ER, Call [phone]      If you need a prescription refill, please contact your pharmacy. Refills are approved or denied by our Physicians during normal business hours, Monday through Friday.  Per office policy, refills will not be granted if you have not been seen within the past year (or sooner depending on the condition).

## 2025-07-03 NOTE — PROGRESS NOTES
[facility] Note  Encounter Date: Jul 3, 2025  Office Visit     Reviewed patient's past medical history and pertinent chart review prior to patient's visit today.     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 s/p Mohs 09/11/2024  2. Asteatotic dermatitis  - Current tx: fluocinonide 0.05% cream  3. Verruca plantaris, right foot s/p cryotherapy  - Current tx: imiquimod cream, salicylic acid, candida  - Past tx: cryotherapy  4. Tinea pedis   5. AKs, forehead, temples, and nose  - Current tx: Efudex/ Dovonex 8/15/2024   - Past tx: Efudex 5% cream (initiated 11/3/2022)  6. Rash, most consistent with tinea pedis, left medial foot s/p KOH prep 12/6/2022    ____________________________________________    Assessment & Plan:     # Verruca vulgaris  - Warts have resolved! Continue to monitor for recurrence.     Follow up for annual skin cancer screening.     All risks, benefits and alternatives were discussed with patient.  Patient is in agreement and understands the assessment and plan.  All questions were answered.  Amparo Bowman PA-C  [facility]  _______________________________________    CC: RECHECK (Plantar wart treatment)    HPI:  Mr. Pranay Ratliff is a(n) 75 year old male who presents today as a return patient for warts. Patient is otherwise feeling well, without additional skin concerns.      Physical Exam:  SKIN: Focused examination of plantar feet was performed.  - No evidence of verruca.     - No other lesions of concern on areas examined.     Medications:  Current Outpatient Medications   Medication Sig Dispense Refill    aspirin 81 MG tablet Take by mouth daily      azelaic acid (FINACIA) 15 % external gel Apply to the face twice daily. 50 g 4     clotrimazole (LOTRIMIN) 1 % external cream Apply topically 2 times daily as needed.      COMPOUNDED NON-CONTROLLED SUBSTANCE (CMPD RX) - PHARMACY TO MIX COMPOUNDED MEDICATION Apply to forehead, temples, cheeks, and nose twice daily for 5-7 days, wash hands after application. Avoid sun. 30 g 0    diclofenac (VOLTAREN) 1 % topical gel Apply topically 4 times daily      fluocinonide (LIDEX) 0.05 % external cream Apply topically 2 times daily 120 g 6    imiquimod (ALDARA) 5 % external cream Apply topically to warts every morning. 12 packet 3    multivitamin w/minerals (THERA-VIT-M) tablet Take 1 tablet by mouth daily      Omega-3 Fatty Acids (OMEGA-3 FISH OIL PO)       pantoprazole (PROTONIX) 40 MG EC tablet Take 1 tablet (40 mg) by mouth daily. 90 tablet 3    rosuvastatin (CRESTOR) 20 MG tablet Take 1 tablet (20 mg) by mouth daily. 90 tablet 3    senna-docusate (SENOKOT-S/PERICOLACE) 8.6-50 MG tablet Take 1 tablet by mouth 2 times daily. 20 tablet 0    triamterene-HCTZ (MAXZIDE-25) 37.5-25 MG tablet TAKE 1/2 TABLET BY MOUTH EVERY MORNING 45 tablet 3     No current facility-administered medications for this visit.      Past Medical History:   Patient Active Problem List   Diagnosis    History of basal cell carcinoma    Benign essential hypertension    Hyperlipidemia LDL goal <100    Obstructive sleep apnea syndrome    Gastroesophageal reflux disease with esophagitis    Localized swelling of lower extremity    Vasovagal syncope    Elevated prostate specific antigen (PSA)    Concussion with loss of consciousness of 30 minutes or less, subsequent encounter    BPH with obstruction/lower urinary tract symptoms    Myofascial pain syndrome, cervical    Occipital neuralgia of right side    Fatigue due to old head injury    Post concussion syndrome    Cervical segment dysfunction    Thoracic segment dysfunction    Cephalgia    Somatic dysfunction of sacral region    Cervical pain    Dizziness    Marital conflict    Chronic  left-sided low back pain without sciatica     Past Medical History:   Diagnosis Date    Basal cell carcinoma     Concussion with loss of consciousness of 30 minutes or less, subsequent encounter     Congestive heart failure (H)     Epididymitis, bilateral     Epididymitis, left     Epididymitis, right     Obstructive sleep apnea syndrome     Squamous cell carcinoma of skin, unspecified     Vasovagal syncope        CC Referred Self, MD  No address on file on close of this encounter.

## 2025-07-07 ENCOUNTER — TELEPHONE (OUTPATIENT)
Dept: FAMILY MEDICINE | Facility: CLINIC | Age: 75
End: 2025-07-07
Payer: COMMERCIAL

## 2025-07-07 NOTE — TELEPHONE ENCOUNTER
Pt returned call to clinic. Denies fever, mild to moderate pain. Relayed provider message in note below, pt agrees to scheduling. Appointment scheduled. Pt will notify MD of new or worsening symptoms.

## 2025-07-07 NOTE — TELEPHONE ENCOUNTER
Reason for Call:  Appointment Request    Patient requesting this type of appt:  plugged ear    Requested provider: Hipolito Alva or next avail    Reason patient unable to be scheduled: Not within requested timeframe    When does patient want to be seen/preferred time: 1-2 days    Comments: Left ear plugged, but would like both ears cleared before hearing test on 07/17    Could we send this information to you in [employer] or would you prefer to receive a phone call?:   Patient would prefer a phone call   Okay to leave a detailed message?: Yes at Home number on file [phone] (home)    Call taken on 7/7/2025 at 10:28 AM by Selene Carrion

## 2025-07-09 ENCOUNTER — OFFICE VISIT (OUTPATIENT)
Dept: FAMILY MEDICINE | Facility: CLINIC | Age: 75
End: 2025-07-09
Payer: COMMERCIAL

## 2025-07-09 VITALS
RESPIRATION RATE: 20 BRPM | SYSTOLIC BLOOD PRESSURE: 135 MMHG | HEIGHT: 70 IN | DIASTOLIC BLOOD PRESSURE: 83 MMHG | BODY MASS INDEX: 33.41 KG/M2 | WEIGHT: 233.4 LBS | HEART RATE: 65 BPM | OXYGEN SATURATION: 95 % | TEMPERATURE: 98.4 F

## 2025-07-09 DIAGNOSIS — H61.20 WAX IN EAR: ICD-10-CM

## 2025-07-09 DIAGNOSIS — M25.531 RIGHT WRIST PAIN: ICD-10-CM

## 2025-07-09 DIAGNOSIS — H60.392 OTHER INFECTIVE ACUTE OTITIS EXTERNA OF LEFT EAR: Primary | ICD-10-CM

## 2025-07-09 PROCEDURE — G0268 REMOVAL OF IMPACTED WAX MD: HCPCS | Mod: 50 | Performed by: FAMILY MEDICINE

## 2025-07-09 PROCEDURE — 99214 OFFICE O/P EST MOD 30 MIN: CPT | Mod: 25 | Performed by: FAMILY MEDICINE

## 2025-07-09 PROCEDURE — 1125F AMNT PAIN NOTED PAIN PRSNT: CPT | Performed by: FAMILY MEDICINE

## 2025-07-09 PROCEDURE — 3079F DIAST BP 80-89 MM HG: CPT | Performed by: FAMILY MEDICINE

## 2025-07-09 PROCEDURE — 3075F SYST BP GE 130 - 139MM HG: CPT | Performed by: FAMILY MEDICINE

## 2025-07-09 RX ORDER — OFLOXACIN 3 MG/ML
5 SOLUTION AURICULAR (OTIC) DAILY
COMMUNITY
End: 2025-07-09

## 2025-07-09 RX ORDER — OFLOXACIN 3 MG/ML
5 SOLUTION AURICULAR (OTIC) DAILY
Qty: 10 ML | Refills: 1 | Status: SHIPPED | OUTPATIENT
Start: 2025-07-09

## 2025-07-09 ASSESSMENT — PAIN SCALES - GENERAL: PAINLEVEL_OUTOF10: MILD PAIN (3)

## 2025-07-09 NOTE — PROGRESS NOTES
"  Assessment & Plan     Other infective acute otitis externa of left ear  Has been flares,will have him to resume ofloxacin   - ofloxacin (FLOXIN) 0.3 % otic solution; Place 5 drops into both ears daily.    Wax in ear  Impacted wax in both ear removed with ENT alligator forceps and flushed with water without complication   - REMOVAL OF IMPACTED WAX MD    Right wrist pain  Improving, encouraged conservative management with brace applying and ice       Subjective   Pranay is a 75 year old, presenting for the following health issues:  Follow Up (Swimmer's ear possibly /Right hand slammed in door prior x-ray done but would like to review results from x-ray done 6/13/25/Still in pain after surgery would like to know what he should or should not be doing )        7/9/2025     9:41 AM   Additional Questions   Roomed by Jocelyne CUNNINGHAM     Ear Problem    History of Present Illness       Reason for visit:  Left ear pain/blockage    He eats 2-3 servings of fruits and vegetables daily.He consumes 1 sweetened beverage(s) daily.He exercises with enough effort to increase his heart rate 60 or more minutes per day.  He exercises with enough effort to increase his heart rate 7 days per week.   He is taking medications regularly.          Concern - Ear pain  Onset: 4 days   Description: Clogged ear, jaw pain   Intensity: moderate  Progression of Symptoms:  same  Accompanying Signs & Symptoms: Jaw pain   Previous history of similar problem: Yes with the right ear   Precipitating factors:        Worsened by: nothing   Alleviating factors:        Improved by: nothing   Therapies tried and outcome: Mineral oil but did not relieve the pain         Review of Systems  Constitutional, HEENT, cardiovascular, pulmonary, GI, , musculoskeletal, neuro, skin, endocrine and psych systems are negative, except as otherwise noted.      Objective    /83   Pulse 65   Temp 98.4  F (36.9  C) (Temporal)   Resp 20   Ht 1.784 m (5' 10.24\")   Wt 105.9 " kg (233 lb 6.4 oz)   SpO2 95%   BMI 33.26 kg/m    Body mass index is 33.26 kg/m .  Physical Exam   GENERAL: alert and no distress  EYES: Eyes grossly normal to inspection, PERRL and conjunctivae and sclerae normal  HENT: normal cephalic/atraumatic, both ears: mucopurulent effusion and occluded with wax, nose and mouth without ulcers or lesions, oropharynx clear, and oral mucous membranes moist  NECK: no adenopathy, no asymmetry, masses, or scars  RESP: lungs clear to auscultation - no rales, rhonchi or wheezes  CV: regular rate and rhythm, normal S1 S2, no S3 or S4, no murmur, click or rub, no peripheral edema  ABDOMEN: soft, nontender, no hepatosplenomegaly, no masses and bowel sounds normal  MS: no gross musculoskeletal defects noted, no edema  SKIN: no suspicious lesions or rashes  NEURO: Normal strength and tone, mentation intact and speech normal  BACK: no CVA tenderness, no paralumbar tenderness  PSYCH: mentation appears normal, affect normal/bright  LYMPH: no cervical, supraclavicular, axillary, or inguinal adenopathy            Signed Electronically by: Hipolito Alva MD

## 2025-07-09 NOTE — PROCEDURES
Patient identified using two patient identifiers.  Ear exam showing wax occlusion completed by RN.  Solution: warm water was placed in the both ear(s) via irrigation tool: gulshan Grant CMA on 7/9/2025 at 10:44 AM  .

## 2025-07-29 ENCOUNTER — LAB (OUTPATIENT)
Dept: LAB | Facility: CLINIC | Age: 75
End: 2025-07-29
Payer: COMMERCIAL

## 2025-07-29 DIAGNOSIS — R97.20 ELEVATED PROSTATE SPECIFIC ANTIGEN (PSA): ICD-10-CM

## 2025-07-29 LAB — PSA SERPL DL<=0.01 NG/ML-MCNC: 8.66 NG/ML (ref 0–6.5)

## 2025-07-29 PROCEDURE — 84153 ASSAY OF PSA TOTAL: CPT

## 2025-07-29 PROCEDURE — 36415 COLL VENOUS BLD VENIPUNCTURE: CPT

## 2025-07-30 ENCOUNTER — RESULTS FOLLOW-UP (OUTPATIENT)
Dept: FAMILY MEDICINE | Facility: CLINIC | Age: 75
End: 2025-07-30
Payer: COMMERCIAL

## 2025-07-30 DIAGNOSIS — R97.20 ELEVATED PROSTATE SPECIFIC ANTIGEN (PSA): Primary | ICD-10-CM

## 2025-07-31 ENCOUNTER — PATIENT OUTREACH (OUTPATIENT)
Dept: CARE COORDINATION | Facility: CLINIC | Age: 75
End: 2025-07-31
Payer: COMMERCIAL

## 2025-08-04 ENCOUNTER — PATIENT OUTREACH (OUTPATIENT)
Dept: CARE COORDINATION | Facility: CLINIC | Age: 75
End: 2025-08-04
Payer: COMMERCIAL

## 2025-08-28 DIAGNOSIS — G89.18 ACUTE POST-OPERATIVE PAIN: ICD-10-CM

## 2025-08-28 RX ORDER — DOCUSATE SODIUM 50MG AND SENNOSIDES 8.6MG 8.6; 5 MG/1; MG/1
1 TABLET, FILM COATED ORAL 2 TIMES DAILY
Qty: 180 TABLET | Refills: 0 | Status: SHIPPED | OUTPATIENT
Start: 2025-08-28

## 2025-09-03 ENCOUNTER — ANCILLARY PROCEDURE (OUTPATIENT)
Dept: MRI IMAGING | Facility: CLINIC | Age: 75
End: 2025-09-03
Attending: UROLOGY
Payer: COMMERCIAL

## 2025-09-03 DIAGNOSIS — R97.20 ELEVATED PROSTATE SPECIFIC ANTIGEN (PSA): ICD-10-CM

## 2025-09-03 PROCEDURE — 72197 MRI PELVIS W/O & W/DYE: CPT

## 2025-09-03 PROCEDURE — 255N000002 HC RX 255 OP 636: Performed by: UROLOGY

## 2025-09-03 PROCEDURE — A9585 GADOBUTROL INJECTION: HCPCS | Performed by: UROLOGY

## 2025-09-03 RX ORDER — GADOBUTROL 604.72 MG/ML
0.1 INJECTION INTRAVENOUS ONCE
Status: COMPLETED | OUTPATIENT
Start: 2025-09-03 | End: 2025-09-03

## 2025-09-03 RX ADMIN — GADOBUTROL 10 ML: 604.72 INJECTION INTRAVENOUS at 08:38

## 2025-09-04 ENCOUNTER — ANCILLARY PROCEDURE (OUTPATIENT)
Dept: GENERAL RADIOLOGY | Facility: CLINIC | Age: 75
End: 2025-09-04
Attending: PHYSICIAN ASSISTANT
Payer: COMMERCIAL

## 2025-09-04 ENCOUNTER — OFFICE VISIT (OUTPATIENT)
Dept: URGENT CARE | Facility: URGENT CARE | Age: 75
End: 2025-09-04
Payer: COMMERCIAL

## 2025-09-04 VITALS
DIASTOLIC BLOOD PRESSURE: 88 MMHG | RESPIRATION RATE: 17 BRPM | HEIGHT: 71 IN | HEART RATE: 72 BPM | TEMPERATURE: 98.4 F | OXYGEN SATURATION: 93 % | SYSTOLIC BLOOD PRESSURE: 139 MMHG | WEIGHT: 234 LBS | BODY MASS INDEX: 32.76 KG/M2

## 2025-09-04 DIAGNOSIS — K59.00 CONSTIPATION, UNSPECIFIED CONSTIPATION TYPE: Primary | ICD-10-CM

## 2025-09-04 DIAGNOSIS — R14.0 ABDOMINAL BLOATING: ICD-10-CM

## 2025-09-04 DIAGNOSIS — R10.11 RUQ ABDOMINAL PAIN: ICD-10-CM

## 2025-09-04 LAB
ALBUMIN UR-MCNC: NEGATIVE MG/DL
APPEARANCE UR: CLEAR
BILIRUB UR QL STRIP: NEGATIVE
COLOR UR AUTO: YELLOW
GLUCOSE UR STRIP-MCNC: NEGATIVE MG/DL
HGB UR QL STRIP: NEGATIVE
KETONES UR STRIP-MCNC: NEGATIVE MG/DL
LEUKOCYTE ESTERASE UR QL STRIP: NEGATIVE
NITRATE UR QL: NEGATIVE
PH UR STRIP: 6.5 [PH] (ref 5–7)
SP GR UR STRIP: 1.01 (ref 1–1.03)
UROBILINOGEN UR STRIP-ACNC: 0.2 E.U./DL

## 2025-09-04 RX ORDER — POLYETHYLENE GLYCOL 3350 17 G/17G
1 POWDER, FOR SOLUTION ORAL DAILY
Qty: 510 G | Refills: 0 | Status: SHIPPED | OUTPATIENT
Start: 2025-09-04

## 2025-09-04 ASSESSMENT — ENCOUNTER SYMPTOMS
ABDOMINAL DISTENTION: 1
ABDOMINAL PAIN: 1

## (undated) DEVICE — SU VICRYL 4-0 PS-2 18" UND J496H

## (undated) DEVICE — DAVINCI XI SEAL UNIVERSAL 5-12MM 470500

## (undated) DEVICE — DAVINCI HOT SHEARS TIP COVER  400180

## (undated) DEVICE — DAVINCI XI DRAPE COLUMN 470341

## (undated) DEVICE — DAVINCI XI OBTURATOR BLADELESS 8MM 470359

## (undated) DEVICE — LINEN TOWEL PACK X5 5464

## (undated) DEVICE — GLOVE BIOGEL PI SZ 8.0 40880

## (undated) DEVICE — ANTIFOG SOLUTION SEE SHARP 150M TROCAR SWABS 30978 (COI)

## (undated) DEVICE — PREP CHLORAPREP 26ML TINTED HI-LITE ORANGE 930815

## (undated) DEVICE — GOWN IMPERVIOUS SPECIALTY XLG/XLONG 32474

## (undated) DEVICE — SU STRATAFIX PDS PLUS 2-0 SPIRAL SH 23CM SXPP1B433

## (undated) DEVICE — DAVINCI XI DRAPE ARM 470015

## (undated) DEVICE — BAG DECANTER STERILE WHITE DYNJDEC09

## (undated) DEVICE — SOL WATER IRRIG 1000ML BOTTLE 2F7114

## (undated) DEVICE — ESU GROUND PAD UNIVERSAL W/O CORD

## (undated) DEVICE — SUCTION MANIFOLD NEPTUNE 2 SYS 4 PORT 0702-020-000

## (undated) DEVICE — LUBRICANT INST ELECTROLUBE EL101

## (undated) DEVICE — EVAC SYSTEM CLEAR FLOW SC082500

## (undated) DEVICE — BLADE CLIPPER 4406

## (undated) DEVICE — PACK LAP CHOLE SLC15LCFSD

## (undated) DEVICE — NDL INSUFFLATION 13GA 120MM C2201

## (undated) DEVICE — DRAPE SHEET REV FOLD 3/4 9349

## (undated) DEVICE — CLEANER INST PRE-KLENZ SOAK SHIELD TUBE 6 ML MEDIUM 2D66J4

## (undated) DEVICE — DAVINCI XI GRASPER ENDOWRIST PROGRASP 470093

## (undated) DEVICE — SUCTION CANISTER MEDIVAC LINER 3000ML W/LID 65651-530

## (undated) RX ORDER — LIDOCAINE HYDROCHLORIDE 10 MG/ML
INJECTION, SOLUTION EPIDURAL; INFILTRATION; INTRACAUDAL; PERINEURAL
Status: DISPENSED
Start: 2019-08-06

## (undated) RX ORDER — PROPOFOL 10 MG/ML
INJECTION, EMULSION INTRAVENOUS
Status: DISPENSED
Start: 2025-04-14

## (undated) RX ORDER — FENTANYL CITRATE 50 UG/ML
INJECTION, SOLUTION INTRAMUSCULAR; INTRAVENOUS
Status: DISPENSED
Start: 2024-06-27

## (undated) RX ORDER — BUPIVACAINE HYDROCHLORIDE AND EPINEPHRINE 5; 5 MG/ML; UG/ML
INJECTION, SOLUTION EPIDURAL; INTRACAUDAL; PERINEURAL
Status: DISPENSED
Start: 2025-04-14

## (undated) RX ORDER — HYDROMORPHONE HYDROCHLORIDE 1 MG/ML
INJECTION, SOLUTION INTRAMUSCULAR; INTRAVENOUS; SUBCUTANEOUS
Status: DISPENSED
Start: 2025-04-14

## (undated) RX ORDER — ONDANSETRON 2 MG/ML
INJECTION INTRAMUSCULAR; INTRAVENOUS
Status: DISPENSED
Start: 2025-04-14

## (undated) RX ORDER — FENTANYL CITRATE 0.05 MG/ML
INJECTION, SOLUTION INTRAMUSCULAR; INTRAVENOUS
Status: DISPENSED
Start: 2025-04-14

## (undated) RX ORDER — DEXAMETHASONE SODIUM PHOSPHATE 4 MG/ML
INJECTION, SOLUTION INTRA-ARTICULAR; INTRALESIONAL; INTRAMUSCULAR; INTRAVENOUS; SOFT TISSUE
Status: DISPENSED
Start: 2025-04-14

## (undated) RX ORDER — FENTANYL CITRATE 50 UG/ML
INJECTION, SOLUTION INTRAMUSCULAR; INTRAVENOUS
Status: DISPENSED
Start: 2025-04-14

## (undated) RX ORDER — GENTAMICIN 40 MG/ML
INJECTION, SOLUTION INTRAMUSCULAR; INTRAVENOUS
Status: DISPENSED
Start: 2019-08-06

## (undated) RX ORDER — OXYCODONE HYDROCHLORIDE 5 MG/1
TABLET ORAL
Status: DISPENSED
Start: 2025-04-14